# Patient Record
Sex: MALE | Race: WHITE | Employment: OTHER
[De-identification: names, ages, dates, MRNs, and addresses within clinical notes are randomized per-mention and may not be internally consistent; named-entity substitution may affect disease eponyms.]

---

## 2017-01-05 ENCOUNTER — CARE COORDINATION (OUTPATIENT)
Dept: CARE COORDINATION | Facility: CLINIC | Age: 57
End: 2017-01-05

## 2017-01-19 ENCOUNTER — CARE COORDINATION (OUTPATIENT)
Dept: CARE COORDINATION | Facility: CLINIC | Age: 57
End: 2017-01-19

## 2017-01-23 DIAGNOSIS — J69.0 ASPIRATION PNEUMONIA OF BOTH LOWER LOBES DUE TO GASTRIC SECRETIONS (HCC): ICD-10-CM

## 2017-01-23 DIAGNOSIS — I46.9 CARDIAC ARREST (HCC): ICD-10-CM

## 2017-01-26 ENCOUNTER — TELEPHONE (OUTPATIENT)
Dept: FAMILY MEDICINE CLINIC | Facility: CLINIC | Age: 57
End: 2017-01-26

## 2017-01-26 ENCOUNTER — CARE COORDINATION (OUTPATIENT)
Dept: CARE COORDINATION | Facility: CLINIC | Age: 57
End: 2017-01-26

## 2017-01-27 ENCOUNTER — TELEPHONE (OUTPATIENT)
Dept: FAMILY MEDICINE CLINIC | Facility: CLINIC | Age: 57
End: 2017-01-27

## 2017-01-27 DIAGNOSIS — M79.604 LUMBAR PAIN WITH RADIATION DOWN BOTH LEGS: ICD-10-CM

## 2017-01-27 DIAGNOSIS — M79.605 LUMBAR PAIN WITH RADIATION DOWN BOTH LEGS: ICD-10-CM

## 2017-01-27 DIAGNOSIS — M54.50 LUMBAR PAIN WITH RADIATION DOWN BOTH LEGS: ICD-10-CM

## 2017-01-27 RX ORDER — ALPRAZOLAM 1 MG/1
1 TABLET ORAL 2 TIMES DAILY PRN
Qty: 60 TABLET | Refills: 0 | Status: SHIPPED | OUTPATIENT
Start: 2017-01-27 | End: 2017-03-01 | Stop reason: SDUPTHER

## 2017-01-27 RX ORDER — CARISOPRODOL 350 MG/1
350 TABLET ORAL 2 TIMES DAILY PRN
Qty: 60 TABLET | Refills: 0 | Status: SHIPPED | OUTPATIENT
Start: 2017-01-27 | End: 2017-03-01 | Stop reason: SDUPTHER

## 2017-01-30 DIAGNOSIS — R00.2 PALPITATIONS: ICD-10-CM

## 2017-01-30 DIAGNOSIS — J69.0 ASPIRATION PNEUMONIA OF BOTH LOWER LOBES DUE TO GASTRIC SECRETIONS (HCC): ICD-10-CM

## 2017-01-30 DIAGNOSIS — M79.604 LUMBAR PAIN WITH RADIATION DOWN BOTH LEGS: ICD-10-CM

## 2017-01-30 DIAGNOSIS — M54.50 LUMBAR PAIN WITH RADIATION DOWN BOTH LEGS: ICD-10-CM

## 2017-01-30 DIAGNOSIS — R00.0 TACHYCARDIA: ICD-10-CM

## 2017-01-30 DIAGNOSIS — I46.9 CARDIAC ARREST (HCC): ICD-10-CM

## 2017-01-30 DIAGNOSIS — M79.605 LUMBAR PAIN WITH RADIATION DOWN BOTH LEGS: ICD-10-CM

## 2017-01-30 RX ORDER — MELOXICAM 15 MG/1
15 TABLET ORAL DAILY
Qty: 30 TABLET | Refills: 5 | Status: SHIPPED | OUTPATIENT
Start: 2017-01-30 | End: 2017-08-21 | Stop reason: SDUPTHER

## 2017-01-30 RX ORDER — NORTRIPTYLINE HYDROCHLORIDE 25 MG/1
25 CAPSULE ORAL NIGHTLY
Qty: 30 CAPSULE | Refills: 3 | Status: SHIPPED | OUTPATIENT
Start: 2017-01-30 | End: 2017-06-30 | Stop reason: SDUPTHER

## 2017-01-30 RX ORDER — ASPIRIN 81 MG/1
81 TABLET ORAL DAILY
Qty: 30 TABLET | Refills: 3 | Status: SHIPPED | OUTPATIENT
Start: 2017-01-30 | End: 2017-06-30 | Stop reason: SDUPTHER

## 2017-01-31 DIAGNOSIS — J44.1 COPD EXACERBATION (HCC): ICD-10-CM

## 2017-01-31 RX ORDER — UMECLIDINIUM 62.5 UG/1
AEROSOL, POWDER ORAL
Qty: 1 EACH | Refills: 11 | Status: ON HOLD | OUTPATIENT
Start: 2017-01-31 | End: 2017-11-03 | Stop reason: HOSPADM

## 2017-01-31 RX ORDER — FLUTICASONE FUROATE AND VILANTEROL TRIFENATATE 100; 25 UG/1; UG/1
POWDER RESPIRATORY (INHALATION)
Qty: 1 EACH | Refills: 11 | Status: SHIPPED | OUTPATIENT
Start: 2017-01-31 | End: 2017-09-08 | Stop reason: ALTCHOICE

## 2017-02-13 ENCOUNTER — CARE COORDINATION (OUTPATIENT)
Dept: CARE COORDINATION | Facility: CLINIC | Age: 57
End: 2017-02-13

## 2017-02-15 ENCOUNTER — HOSPITAL ENCOUNTER (OUTPATIENT)
Dept: RADIATION ONCOLOGY | Facility: MEDICAL CENTER | Age: 57
Discharge: HOME | End: 2017-02-15
Payer: MEDICARE

## 2017-02-15 PROCEDURE — 99211 OFF/OP EST MAY X REQ PHY/QHP: CPT | Performed by: RADIOLOGY

## 2017-02-23 ENCOUNTER — CARE COORDINATION (OUTPATIENT)
Dept: CARE COORDINATION | Facility: CLINIC | Age: 57
End: 2017-02-23

## 2017-03-01 DIAGNOSIS — M79.605 LUMBAR PAIN WITH RADIATION DOWN BOTH LEGS: ICD-10-CM

## 2017-03-01 DIAGNOSIS — M79.604 LUMBAR PAIN WITH RADIATION DOWN BOTH LEGS: ICD-10-CM

## 2017-03-01 DIAGNOSIS — M54.50 LUMBAR PAIN WITH RADIATION DOWN BOTH LEGS: ICD-10-CM

## 2017-03-02 RX ORDER — CARISOPRODOL 350 MG/1
350 TABLET ORAL 2 TIMES DAILY PRN
Qty: 60 TABLET | Refills: 0 | Status: SHIPPED | OUTPATIENT
Start: 2017-03-02 | End: 2017-03-28 | Stop reason: SDUPTHER

## 2017-03-02 RX ORDER — ALPRAZOLAM 1 MG/1
1 TABLET ORAL 2 TIMES DAILY PRN
Qty: 60 TABLET | Refills: 0 | Status: SHIPPED | OUTPATIENT
Start: 2017-03-02 | End: 2017-03-28 | Stop reason: SDUPTHER

## 2017-03-03 DIAGNOSIS — M79.604 LUMBAR PAIN WITH RADIATION DOWN BOTH LEGS: ICD-10-CM

## 2017-03-03 DIAGNOSIS — M54.50 LUMBAR PAIN WITH RADIATION DOWN BOTH LEGS: ICD-10-CM

## 2017-03-03 DIAGNOSIS — M79.605 LUMBAR PAIN WITH RADIATION DOWN BOTH LEGS: ICD-10-CM

## 2017-03-03 RX ORDER — ALPRAZOLAM 1 MG/1
TABLET ORAL
Qty: 60 TABLET | Refills: 0 | Status: SHIPPED | OUTPATIENT
Start: 2017-03-03 | End: 2017-03-29

## 2017-03-03 RX ORDER — CARISOPRODOL 350 MG/1
TABLET ORAL
Qty: 60 TABLET | Refills: 0 | Status: SHIPPED | OUTPATIENT
Start: 2017-03-03 | End: 2017-03-29

## 2017-03-28 DIAGNOSIS — M79.604 LUMBAR PAIN WITH RADIATION DOWN BOTH LEGS: ICD-10-CM

## 2017-03-28 DIAGNOSIS — M54.50 LUMBAR PAIN WITH RADIATION DOWN BOTH LEGS: ICD-10-CM

## 2017-03-28 DIAGNOSIS — M79.605 LUMBAR PAIN WITH RADIATION DOWN BOTH LEGS: ICD-10-CM

## 2017-03-29 ENCOUNTER — TELEPHONE (OUTPATIENT)
Dept: FAMILY MEDICINE CLINIC | Age: 57
End: 2017-03-29

## 2017-03-29 RX ORDER — CARISOPRODOL 350 MG/1
350 TABLET ORAL 2 TIMES DAILY PRN
Qty: 60 TABLET | Refills: 0 | Status: SHIPPED | OUTPATIENT
Start: 2017-03-29 | End: 2017-04-27 | Stop reason: SDUPTHER

## 2017-03-29 RX ORDER — ALPRAZOLAM 1 MG/1
1 TABLET ORAL 2 TIMES DAILY PRN
Qty: 60 TABLET | Refills: 0 | Status: SHIPPED | OUTPATIENT
Start: 2017-03-29 | End: 2017-04-27 | Stop reason: SDUPTHER

## 2017-04-06 ENCOUNTER — CARE COORDINATION (OUTPATIENT)
Dept: CARE COORDINATION | Age: 57
End: 2017-04-06

## 2017-04-11 ENCOUNTER — OFFICE VISIT (OUTPATIENT)
Dept: FAMILY MEDICINE CLINIC | Age: 57
End: 2017-04-11
Payer: MEDICARE

## 2017-04-11 VITALS
WEIGHT: 112 LBS | HEART RATE: 72 BPM | RESPIRATION RATE: 18 BRPM | SYSTOLIC BLOOD PRESSURE: 125 MMHG | DIASTOLIC BLOOD PRESSURE: 87 MMHG | HEIGHT: 65 IN | BODY MASS INDEX: 18.66 KG/M2

## 2017-04-11 DIAGNOSIS — D50.0 IRON DEFICIENCY ANEMIA DUE TO CHRONIC BLOOD LOSS: ICD-10-CM

## 2017-04-11 DIAGNOSIS — J41.0 SIMPLE CHRONIC BRONCHITIS (HCC): ICD-10-CM

## 2017-04-11 DIAGNOSIS — E46 MALNUTRITION (HCC): ICD-10-CM

## 2017-04-11 DIAGNOSIS — E87.6 HYPOKALEMIA: ICD-10-CM

## 2017-04-11 DIAGNOSIS — Z86.74 HISTORY OF CARDIAC ARREST: ICD-10-CM

## 2017-04-11 DIAGNOSIS — R53.83 FATIGUE, UNSPECIFIED TYPE: ICD-10-CM

## 2017-04-11 DIAGNOSIS — R29.898 WEAKNESS OF BOTH LEGS: Primary | ICD-10-CM

## 2017-04-11 PROCEDURE — 3023F SPIROM DOC REV: CPT | Performed by: FAMILY MEDICINE

## 2017-04-11 PROCEDURE — 4004F PT TOBACCO SCREEN RCVD TLK: CPT | Performed by: FAMILY MEDICINE

## 2017-04-11 PROCEDURE — G8926 SPIRO NO PERF OR DOC: HCPCS | Performed by: FAMILY MEDICINE

## 2017-04-11 PROCEDURE — 3017F COLORECTAL CA SCREEN DOC REV: CPT | Performed by: FAMILY MEDICINE

## 2017-04-11 PROCEDURE — G8420 CALC BMI NORM PARAMETERS: HCPCS | Performed by: FAMILY MEDICINE

## 2017-04-11 PROCEDURE — G8427 DOCREV CUR MEDS BY ELIG CLIN: HCPCS | Performed by: FAMILY MEDICINE

## 2017-04-11 PROCEDURE — 99214 OFFICE O/P EST MOD 30 MIN: CPT | Performed by: FAMILY MEDICINE

## 2017-04-12 DIAGNOSIS — R29.898 WEAKNESS OF BOTH LEGS: ICD-10-CM

## 2017-04-12 DIAGNOSIS — D50.0 IRON DEFICIENCY ANEMIA DUE TO CHRONIC BLOOD LOSS: ICD-10-CM

## 2017-04-12 DIAGNOSIS — R53.83 FATIGUE, UNSPECIFIED TYPE: ICD-10-CM

## 2017-04-12 DIAGNOSIS — E87.6 HYPOKALEMIA: ICD-10-CM

## 2017-04-12 LAB
ALBUMIN SERPL-MCNC: NORMAL G/DL
ALP BLD-CCNC: NORMAL U/L
ALT SERPL-CCNC: NORMAL U/L
AST SERPL-CCNC: NORMAL U/L
BILIRUB SERPL-MCNC: NORMAL MG/DL (ref 0.1–1.4)
BUN BLDV-MCNC: NORMAL MG/DL
CALCIUM SERPL-MCNC: NORMAL MG/DL
CHLORIDE BLD-SCNC: NORMAL MMOL/L
CO2: NORMAL MMOL/L
CREAT SERPL-MCNC: NORMAL MG/DL
FOLATE: NORMAL
GFR CALCULATED: NORMAL
GLUCOSE BLD-MCNC: NORMAL MG/DL
IRON: NORMAL
MAGNESIUM: NORMAL MG/DL
POTASSIUM SERPL-SCNC: NORMAL MMOL/L
SODIUM BLD-SCNC: NORMAL MMOL/L
T4 FREE: NORMAL
TOTAL IRON BINDING CAPACITY: NORMAL
TOTAL PROTEIN: NORMAL
TSH SERPL DL<=0.05 MIU/L-ACNC: NORMAL UIU/ML
VITAMIN B-12: NORMAL

## 2017-04-15 ENCOUNTER — HOSPITAL ENCOUNTER (OUTPATIENT)
Dept: MRI IMAGING | Age: 57
Discharge: HOME | End: 2017-04-15
Payer: MEDICARE

## 2017-04-15 DIAGNOSIS — R29.898 WEAKNESS OF BOTH LEGS: ICD-10-CM

## 2017-04-15 DIAGNOSIS — R53.83 FATIGUE, UNSPECIFIED TYPE: ICD-10-CM

## 2017-04-15 PROCEDURE — 70553 MRI BRAIN STEM W/O & W/DYE: CPT

## 2017-04-15 PROCEDURE — A9579 GAD-BASE MR CONTRAST NOS,1ML: HCPCS | Performed by: FAMILY MEDICINE

## 2017-04-15 PROCEDURE — 6360000004 HC RX CONTRAST MEDICATION: Performed by: FAMILY MEDICINE

## 2017-04-15 RX ADMIN — GADOPENTETATE DIMEGLUMINE 10 ML: 469.01 INJECTION INTRAVENOUS at 08:44

## 2017-04-27 ENCOUNTER — CARE COORDINATION (OUTPATIENT)
Dept: CARE COORDINATION | Age: 57
End: 2017-04-27

## 2017-04-27 DIAGNOSIS — M79.604 LUMBAR PAIN WITH RADIATION DOWN BOTH LEGS: ICD-10-CM

## 2017-04-27 DIAGNOSIS — M54.50 LUMBAR PAIN WITH RADIATION DOWN BOTH LEGS: ICD-10-CM

## 2017-04-27 DIAGNOSIS — M79.605 LUMBAR PAIN WITH RADIATION DOWN BOTH LEGS: ICD-10-CM

## 2017-04-28 RX ORDER — CARISOPRODOL 350 MG/1
350 TABLET ORAL 2 TIMES DAILY PRN
Qty: 60 TABLET | Refills: 0 | Status: SHIPPED | OUTPATIENT
Start: 2017-04-28 | End: 2017-05-30 | Stop reason: SDUPTHER

## 2017-04-28 RX ORDER — ALPRAZOLAM 1 MG/1
1 TABLET ORAL 2 TIMES DAILY PRN
Qty: 60 TABLET | Refills: 0 | Status: SHIPPED | OUTPATIENT
Start: 2017-04-28 | End: 2017-05-30 | Stop reason: SDUPTHER

## 2017-05-11 ENCOUNTER — CARE COORDINATION (OUTPATIENT)
Dept: CARE COORDINATION | Age: 57
End: 2017-05-11

## 2017-05-12 ENCOUNTER — CARE COORDINATION (OUTPATIENT)
Dept: CARE COORDINATION | Age: 57
End: 2017-05-12

## 2017-05-30 DIAGNOSIS — M79.605 LUMBAR PAIN WITH RADIATION DOWN BOTH LEGS: ICD-10-CM

## 2017-05-30 DIAGNOSIS — M79.604 LUMBAR PAIN WITH RADIATION DOWN BOTH LEGS: ICD-10-CM

## 2017-05-30 DIAGNOSIS — M54.50 LUMBAR PAIN WITH RADIATION DOWN BOTH LEGS: ICD-10-CM

## 2017-05-31 RX ORDER — ALPRAZOLAM 1 MG/1
1 TABLET ORAL 2 TIMES DAILY PRN
Qty: 60 TABLET | Refills: 0 | Status: SHIPPED | OUTPATIENT
Start: 2017-05-31 | End: 2017-06-29 | Stop reason: SDUPTHER

## 2017-05-31 RX ORDER — CARISOPRODOL 350 MG/1
350 TABLET ORAL 2 TIMES DAILY PRN
Qty: 60 TABLET | Refills: 0 | Status: SHIPPED | OUTPATIENT
Start: 2017-05-31 | End: 2017-06-29 | Stop reason: SDUPTHER

## 2017-06-05 ENCOUNTER — HOSPITAL ENCOUNTER (OUTPATIENT)
Dept: NEUROLOGY | Age: 57
Discharge: HOME | End: 2017-06-05
Payer: MEDICARE

## 2017-06-05 DIAGNOSIS — D50.0 IRON DEFICIENCY ANEMIA DUE TO CHRONIC BLOOD LOSS: ICD-10-CM

## 2017-06-05 DIAGNOSIS — R53.83 FATIGUE, UNSPECIFIED TYPE: ICD-10-CM

## 2017-06-05 DIAGNOSIS — E87.6 HYPOKALEMIA: ICD-10-CM

## 2017-06-05 DIAGNOSIS — R29.898 WEAKNESS OF BOTH LEGS: ICD-10-CM

## 2017-06-05 PROCEDURE — 95909 NRV CNDJ TST 5-6 STUDIES: CPT | Performed by: PHYSICAL MEDICINE & REHABILITATION

## 2017-06-05 PROCEDURE — 95886 MUSC TEST DONE W/N TEST COMP: CPT | Performed by: PHYSICAL MEDICINE & REHABILITATION

## 2017-06-06 ENCOUNTER — HOSPITAL ENCOUNTER (OUTPATIENT)
Age: 57
Setting detail: SPECIMEN
Discharge: HOME | End: 2017-06-06
Payer: MEDICARE

## 2017-06-06 ENCOUNTER — OFFICE VISIT (OUTPATIENT)
Dept: FAMILY MEDICINE CLINIC | Age: 57
End: 2017-06-06
Payer: MEDICARE

## 2017-06-06 VITALS
OXYGEN SATURATION: 98 % | WEIGHT: 111 LBS | HEIGHT: 65 IN | SYSTOLIC BLOOD PRESSURE: 137 MMHG | BODY MASS INDEX: 18.49 KG/M2 | RESPIRATION RATE: 18 BRPM | DIASTOLIC BLOOD PRESSURE: 89 MMHG | HEART RATE: 74 BPM

## 2017-06-06 DIAGNOSIS — R53.83 FATIGUE, UNSPECIFIED TYPE: ICD-10-CM

## 2017-06-06 DIAGNOSIS — M54.12 CERVICAL NEUROPATHIC PAIN: Primary | ICD-10-CM

## 2017-06-06 LAB
ABSOLUTE EOS #: 0.2 K/UL (ref 0–0.4)
ABSOLUTE LYMPH #: 1.4 K/UL (ref 1–4.8)
ABSOLUTE MONO #: 0.8 K/UL (ref 0.1–1.2)
ABSOLUTE RETIC #: 0.04 M/UL (ref 0.02–0.1)
ANION GAP SERPL CALCULATED.3IONS-SCNC: 17 MMOL/L (ref 9–17)
BASOPHILS # BLD: 1 %
BASOPHILS ABSOLUTE: 0.1 K/UL (ref 0–0.2)
CHLORIDE BLD-SCNC: 101 MMOL/L (ref 98–107)
CO2: 24 MMOL/L (ref 20–31)
DIFFERENTIAL TYPE: ABNORMAL
EOSINOPHILS RELATIVE PERCENT: 2 %
FOLATE: 5.6 NG/ML
HCT VFR BLD CALC: 43.8 % (ref 41–53)
HEMOGLOBIN: 14.5 G/DL (ref 13.5–17.5)
IRON SATURATION: 22 % (ref 20–55)
IRON: 64 UG/DL (ref 59–158)
LYMPHOCYTES # BLD: 17 %
MCH RBC QN AUTO: 31.3 PG (ref 26–34)
MCHC RBC AUTO-ENTMCNC: 33.2 G/DL (ref 31–37)
MCV RBC AUTO: 94.5 FL (ref 80–100)
MONOCYTES # BLD: 10 %
PDW BLD-RTO: 15.5 % (ref 12.5–15.4)
PLATELET # BLD: 271 K/UL (ref 140–450)
PLATELET ESTIMATE: ABNORMAL
PMV BLD AUTO: 7.6 FL (ref 6–12)
POTASSIUM SERPL-SCNC: 4.2 MMOL/L (ref 3.7–5.3)
RBC # BLD: 4.64 M/UL (ref 4.5–5.9)
RBC # BLD: ABNORMAL 10*6/UL
RETIC %: 0.8 % (ref 0.5–2)
SEG NEUTROPHILS: 70 %
SEGMENTED NEUTROPHILS ABSOLUTE COUNT: 6 K/UL (ref 1.8–7.7)
SODIUM BLD-SCNC: 142 MMOL/L (ref 135–144)
TOTAL IRON BINDING CAPACITY: 292 UG/DL (ref 250–450)
UNSATURATED IRON BINDING CAPACITY: 228 UG/DL (ref 112–347)
VITAMIN B-12: 328 PG/ML (ref 211–946)
WBC # BLD: 8.4 K/UL (ref 3.5–11)
WBC # BLD: ABNORMAL 10*3/UL

## 2017-06-06 PROCEDURE — 99214 OFFICE O/P EST MOD 30 MIN: CPT | Performed by: FAMILY MEDICINE

## 2017-06-06 PROCEDURE — 3017F COLORECTAL CA SCREEN DOC REV: CPT | Performed by: FAMILY MEDICINE

## 2017-06-06 PROCEDURE — 4004F PT TOBACCO SCREEN RCVD TLK: CPT | Performed by: FAMILY MEDICINE

## 2017-06-06 PROCEDURE — G8427 DOCREV CUR MEDS BY ELIG CLIN: HCPCS | Performed by: FAMILY MEDICINE

## 2017-06-06 PROCEDURE — G8419 CALC BMI OUT NRM PARAM NOF/U: HCPCS | Performed by: FAMILY MEDICINE

## 2017-06-06 RX ORDER — MEGESTROL ACETATE 40 MG/ML
400 SUSPENSION ORAL DAILY
Refills: 2 | COMMUNITY
Start: 2017-05-30 | End: 2018-01-15

## 2017-06-06 ASSESSMENT — PATIENT HEALTH QUESTIONNAIRE - PHQ9
SUM OF ALL RESPONSES TO PHQ9 QUESTIONS 1 & 2: 0
SUM OF ALL RESPONSES TO PHQ QUESTIONS 1-9: 0
2. FEELING DOWN, DEPRESSED OR HOPELESS: 0
1. LITTLE INTEREST OR PLEASURE IN DOING THINGS: 0

## 2017-06-15 ENCOUNTER — TELEPHONE (OUTPATIENT)
Dept: FAMILY MEDICINE CLINIC | Age: 57
End: 2017-06-15

## 2017-06-15 ENCOUNTER — CARE COORDINATION (OUTPATIENT)
Dept: CARE COORDINATION | Age: 57
End: 2017-06-15

## 2017-06-15 DIAGNOSIS — M54.12 CERVICAL NEUROPATHIC PAIN: Primary | ICD-10-CM

## 2017-06-26 ENCOUNTER — HOSPITAL ENCOUNTER (OUTPATIENT)
Dept: MRI IMAGING | Age: 57
Discharge: HOME | End: 2017-06-26
Payer: MEDICARE

## 2017-06-26 DIAGNOSIS — M54.12 CERVICAL NEUROPATHIC PAIN: ICD-10-CM

## 2017-06-26 PROCEDURE — 72156 MRI NECK SPINE W/O & W/DYE: CPT

## 2017-06-26 PROCEDURE — A9579 GAD-BASE MR CONTRAST NOS,1ML: HCPCS | Performed by: FAMILY MEDICINE

## 2017-06-26 PROCEDURE — 6360000004 HC RX CONTRAST MEDICATION: Performed by: FAMILY MEDICINE

## 2017-06-26 RX ADMIN — GADOPENTETATE DIMEGLUMINE 10 ML: 469.01 INJECTION INTRAVENOUS at 11:50

## 2017-06-28 ENCOUNTER — CARE COORDINATION (OUTPATIENT)
Dept: CARE COORDINATION | Age: 57
End: 2017-06-28

## 2017-06-28 ENCOUNTER — TELEPHONE (OUTPATIENT)
Dept: NEUROSURGERY | Age: 57
End: 2017-06-28

## 2017-06-29 DIAGNOSIS — M54.50 LUMBAR PAIN WITH RADIATION DOWN BOTH LEGS: ICD-10-CM

## 2017-06-29 DIAGNOSIS — M79.605 LUMBAR PAIN WITH RADIATION DOWN BOTH LEGS: ICD-10-CM

## 2017-06-29 DIAGNOSIS — M79.604 LUMBAR PAIN WITH RADIATION DOWN BOTH LEGS: ICD-10-CM

## 2017-06-30 DIAGNOSIS — M54.50 LUMBAR PAIN WITH RADIATION DOWN BOTH LEGS: ICD-10-CM

## 2017-06-30 DIAGNOSIS — M79.605 LUMBAR PAIN WITH RADIATION DOWN BOTH LEGS: ICD-10-CM

## 2017-06-30 DIAGNOSIS — R00.0 TACHYCARDIA: ICD-10-CM

## 2017-06-30 DIAGNOSIS — M79.604 LUMBAR PAIN WITH RADIATION DOWN BOTH LEGS: ICD-10-CM

## 2017-06-30 DIAGNOSIS — R00.2 PALPITATIONS: ICD-10-CM

## 2017-06-30 RX ORDER — ATENOLOL 50 MG/1
TABLET ORAL
Qty: 30 TABLET | Refills: 5 | Status: SHIPPED | OUTPATIENT
Start: 2017-06-30 | End: 2018-01-15

## 2017-06-30 RX ORDER — ALPRAZOLAM 1 MG/1
TABLET ORAL
Qty: 60 TABLET | Refills: 0 | Status: SHIPPED | OUTPATIENT
Start: 2017-06-30 | End: 2017-07-31 | Stop reason: SDUPTHER

## 2017-06-30 RX ORDER — CARISOPRODOL 350 MG/1
TABLET ORAL
Qty: 60 TABLET | Refills: 0 | Status: SHIPPED | OUTPATIENT
Start: 2017-06-30 | End: 2017-07-31 | Stop reason: SDUPTHER

## 2017-06-30 RX ORDER — NORTRIPTYLINE HYDROCHLORIDE 25 MG/1
CAPSULE ORAL
Qty: 30 CAPSULE | Refills: 3 | Status: ON HOLD | OUTPATIENT
Start: 2017-06-30 | End: 2017-10-31 | Stop reason: ALTCHOICE

## 2017-06-30 RX ORDER — ASPIRIN 81 MG/1
TABLET ORAL
Qty: 30 TABLET | Refills: 3 | Status: SHIPPED | OUTPATIENT
Start: 2017-06-30 | End: 2017-10-11 | Stop reason: SDUPTHER

## 2017-07-06 ENCOUNTER — HOSPITAL ENCOUNTER (OUTPATIENT)
Facility: CLINIC | Age: 57
Discharge: HOME | End: 2017-07-06
Payer: MEDICARE

## 2017-07-06 ENCOUNTER — HOSPITAL ENCOUNTER (OUTPATIENT)
Dept: GENERAL RADIOLOGY | Facility: CLINIC | Age: 57
Discharge: HOME | End: 2017-07-06
Payer: MEDICARE

## 2017-07-06 ENCOUNTER — INITIAL CONSULT (OUTPATIENT)
Dept: NEUROSURGERY | Age: 57
End: 2017-07-06
Payer: MEDICARE

## 2017-07-06 VITALS
SYSTOLIC BLOOD PRESSURE: 102 MMHG | HEIGHT: 65 IN | WEIGHT: 120 LBS | BODY MASS INDEX: 19.99 KG/M2 | DIASTOLIC BLOOD PRESSURE: 70 MMHG

## 2017-07-06 DIAGNOSIS — G89.29 CHRONIC NECK PAIN: ICD-10-CM

## 2017-07-06 DIAGNOSIS — M54.2 CHRONIC NECK PAIN: ICD-10-CM

## 2017-07-06 DIAGNOSIS — G89.29 CHRONIC NECK PAIN: Primary | ICD-10-CM

## 2017-07-06 DIAGNOSIS — M54.2 CHRONIC NECK PAIN: Primary | ICD-10-CM

## 2017-07-06 PROCEDURE — 99203 OFFICE O/P NEW LOW 30 MIN: CPT | Performed by: NEUROLOGICAL SURGERY

## 2017-07-06 PROCEDURE — 3017F COLORECTAL CA SCREEN DOC REV: CPT | Performed by: NEUROLOGICAL SURGERY

## 2017-07-06 PROCEDURE — G8427 DOCREV CUR MEDS BY ELIG CLIN: HCPCS | Performed by: NEUROLOGICAL SURGERY

## 2017-07-06 PROCEDURE — 4004F PT TOBACCO SCREEN RCVD TLK: CPT | Performed by: NEUROLOGICAL SURGERY

## 2017-07-06 PROCEDURE — 72040 X-RAY EXAM NECK SPINE 2-3 VW: CPT

## 2017-07-06 PROCEDURE — G8420 CALC BMI NORM PARAMETERS: HCPCS | Performed by: NEUROLOGICAL SURGERY

## 2017-07-07 ASSESSMENT — VISUAL ACUITY: VA_NORMAL: 1

## 2017-07-18 ENCOUNTER — OFFICE VISIT (OUTPATIENT)
Dept: FAMILY MEDICINE CLINIC | Age: 57
End: 2017-07-18
Payer: MEDICARE

## 2017-07-18 VITALS
HEART RATE: 92 BPM | SYSTOLIC BLOOD PRESSURE: 130 MMHG | RESPIRATION RATE: 16 BRPM | HEIGHT: 65 IN | DIASTOLIC BLOOD PRESSURE: 86 MMHG | WEIGHT: 118 LBS | BODY MASS INDEX: 19.66 KG/M2

## 2017-07-18 DIAGNOSIS — M48.02 CERVICAL STENOSIS OF SPINE: Primary | ICD-10-CM

## 2017-07-18 PROCEDURE — 3017F COLORECTAL CA SCREEN DOC REV: CPT | Performed by: FAMILY MEDICINE

## 2017-07-18 PROCEDURE — G8427 DOCREV CUR MEDS BY ELIG CLIN: HCPCS | Performed by: FAMILY MEDICINE

## 2017-07-18 PROCEDURE — G8420 CALC BMI NORM PARAMETERS: HCPCS | Performed by: FAMILY MEDICINE

## 2017-07-18 PROCEDURE — 4004F PT TOBACCO SCREEN RCVD TLK: CPT | Performed by: FAMILY MEDICINE

## 2017-07-18 PROCEDURE — 99213 OFFICE O/P EST LOW 20 MIN: CPT | Performed by: FAMILY MEDICINE

## 2017-07-26 ENCOUNTER — CARE COORDINATION (OUTPATIENT)
Dept: CARE COORDINATION | Age: 57
End: 2017-07-26

## 2017-07-31 DIAGNOSIS — M79.605 LUMBAR PAIN WITH RADIATION DOWN BOTH LEGS: ICD-10-CM

## 2017-07-31 DIAGNOSIS — M79.604 LUMBAR PAIN WITH RADIATION DOWN BOTH LEGS: ICD-10-CM

## 2017-07-31 DIAGNOSIS — M54.50 LUMBAR PAIN WITH RADIATION DOWN BOTH LEGS: ICD-10-CM

## 2017-07-31 RX ORDER — CARISOPRODOL 350 MG/1
TABLET ORAL
Qty: 60 TABLET | Refills: 0 | Status: SHIPPED | OUTPATIENT
Start: 2017-07-31 | End: 2017-08-29 | Stop reason: SDUPTHER

## 2017-07-31 RX ORDER — ALPRAZOLAM 1 MG/1
TABLET ORAL
Qty: 60 TABLET | Refills: 0 | Status: SHIPPED | OUTPATIENT
Start: 2017-07-31 | End: 2017-08-29 | Stop reason: SDUPTHER

## 2017-08-15 ENCOUNTER — INITIAL CONSULT (OUTPATIENT)
Dept: PAIN MANAGEMENT | Age: 57
End: 2017-08-15
Payer: MEDICARE

## 2017-08-15 VITALS
RESPIRATION RATE: 16 BRPM | HEART RATE: 78 BPM | HEIGHT: 65 IN | BODY MASS INDEX: 20.03 KG/M2 | WEIGHT: 120.2 LBS | SYSTOLIC BLOOD PRESSURE: 121 MMHG | OXYGEN SATURATION: 98 % | TEMPERATURE: 98.1 F | DIASTOLIC BLOOD PRESSURE: 83 MMHG

## 2017-08-15 DIAGNOSIS — M47.26 OSTEOARTHRITIS OF SPINE WITH RADICULOPATHY, LUMBAR REGION: ICD-10-CM

## 2017-08-15 DIAGNOSIS — F10.10 ALCOHOL ABUSE: ICD-10-CM

## 2017-08-15 DIAGNOSIS — M96.1 FAILED NECK SYNDROME: Primary | ICD-10-CM

## 2017-08-15 DIAGNOSIS — Z79.899 CHRONIC PRESCRIPTION BENZODIAZEPINE USE: ICD-10-CM

## 2017-08-15 DIAGNOSIS — M54.12 CERVICAL RADICULOPATHY: ICD-10-CM

## 2017-08-15 PROCEDURE — 99205 OFFICE O/P NEW HI 60 MIN: CPT | Performed by: ANESTHESIOLOGY

## 2017-08-15 PROCEDURE — G8420 CALC BMI NORM PARAMETERS: HCPCS | Performed by: ANESTHESIOLOGY

## 2017-08-15 PROCEDURE — G8427 DOCREV CUR MEDS BY ELIG CLIN: HCPCS | Performed by: ANESTHESIOLOGY

## 2017-08-15 PROCEDURE — 4004F PT TOBACCO SCREEN RCVD TLK: CPT | Performed by: ANESTHESIOLOGY

## 2017-08-15 PROCEDURE — 3017F COLORECTAL CA SCREEN DOC REV: CPT | Performed by: ANESTHESIOLOGY

## 2017-08-15 ASSESSMENT — ENCOUNTER SYMPTOMS
SHORTNESS OF BREATH: 1
COUGH: 1
EYES NEGATIVE: 1
GASTROINTESTINAL NEGATIVE: 1
BACK PAIN: 1
WHEEZING: 1

## 2017-08-16 ENCOUNTER — CARE COORDINATION (OUTPATIENT)
Dept: CARE COORDINATION | Age: 57
End: 2017-08-16

## 2017-08-21 DIAGNOSIS — I46.9 CARDIAC ARREST (HCC): ICD-10-CM

## 2017-08-21 DIAGNOSIS — J69.0 ASPIRATION PNEUMONIA OF BOTH LOWER LOBES DUE TO GASTRIC SECRETIONS (HCC): ICD-10-CM

## 2017-08-22 ENCOUNTER — NURSE ONLY (OUTPATIENT)
Dept: FAMILY MEDICINE CLINIC | Age: 57
End: 2017-08-22
Payer: MEDICARE

## 2017-08-22 DIAGNOSIS — Z23 NEED FOR INFLUENZA VACCINATION: Primary | ICD-10-CM

## 2017-08-22 PROCEDURE — G0008 ADMIN INFLUENZA VIRUS VAC: HCPCS | Performed by: FAMILY MEDICINE

## 2017-08-22 PROCEDURE — 90662 IIV NO PRSV INCREASED AG IM: CPT | Performed by: FAMILY MEDICINE

## 2017-08-22 RX ORDER — MELOXICAM 15 MG/1
TABLET ORAL
Qty: 30 TABLET | Refills: 5 | Status: ON HOLD | OUTPATIENT
Start: 2017-08-22 | End: 2017-11-03 | Stop reason: HOSPADM

## 2017-08-29 DIAGNOSIS — M79.604 LUMBAR PAIN WITH RADIATION DOWN BOTH LEGS: ICD-10-CM

## 2017-08-29 DIAGNOSIS — M79.605 LUMBAR PAIN WITH RADIATION DOWN BOTH LEGS: ICD-10-CM

## 2017-08-29 DIAGNOSIS — M54.50 LUMBAR PAIN WITH RADIATION DOWN BOTH LEGS: ICD-10-CM

## 2017-08-29 RX ORDER — CARISOPRODOL 350 MG/1
TABLET ORAL
Qty: 60 TABLET | Refills: 0 | Status: SHIPPED | OUTPATIENT
Start: 2017-08-29 | End: 2017-09-30 | Stop reason: SDUPTHER

## 2017-08-29 RX ORDER — ALPRAZOLAM 1 MG/1
TABLET ORAL
Qty: 60 TABLET | Refills: 0 | Status: SHIPPED | OUTPATIENT
Start: 2017-08-29 | End: 2017-09-30 | Stop reason: SDUPTHER

## 2017-09-07 ENCOUNTER — HOSPITAL ENCOUNTER (OUTPATIENT)
Dept: NEUROLOGY | Age: 57
Discharge: HOME | End: 2017-09-07
Payer: MEDICARE

## 2017-09-07 PROCEDURE — 95910 NRV CNDJ TEST 7-8 STUDIES: CPT | Performed by: PHYSICAL MEDICINE & REHABILITATION

## 2017-09-07 PROCEDURE — 95886 MUSC TEST DONE W/N TEST COMP: CPT | Performed by: PHYSICAL MEDICINE & REHABILITATION

## 2017-09-08 ENCOUNTER — CARE COORDINATION (OUTPATIENT)
Dept: CARE COORDINATION | Age: 57
End: 2017-09-08

## 2017-09-08 RX ORDER — FLUTICASONE FUROATE AND VILANTEROL 200; 25 UG/1; UG/1
1 POWDER RESPIRATORY (INHALATION) DAILY
Status: ON HOLD | COMMUNITY
End: 2017-11-10 | Stop reason: HOSPADM

## 2017-09-14 DIAGNOSIS — G89.29 CHRONIC NECK PAIN: ICD-10-CM

## 2017-09-14 DIAGNOSIS — M54.2 CHRONIC NECK PAIN: ICD-10-CM

## 2017-09-19 ENCOUNTER — OFFICE VISIT (OUTPATIENT)
Dept: NEUROSURGERY | Age: 57
End: 2017-09-19
Payer: MEDICARE

## 2017-09-19 VITALS
DIASTOLIC BLOOD PRESSURE: 81 MMHG | BODY MASS INDEX: 21.49 KG/M2 | HEART RATE: 108 BPM | WEIGHT: 129 LBS | SYSTOLIC BLOOD PRESSURE: 125 MMHG | HEIGHT: 65 IN

## 2017-09-19 DIAGNOSIS — G89.29 CHRONIC NECK PAIN: Primary | ICD-10-CM

## 2017-09-19 DIAGNOSIS — M54.2 CHRONIC NECK PAIN: Primary | ICD-10-CM

## 2017-09-19 PROCEDURE — 4004F PT TOBACCO SCREEN RCVD TLK: CPT | Performed by: NEUROLOGICAL SURGERY

## 2017-09-19 PROCEDURE — G8427 DOCREV CUR MEDS BY ELIG CLIN: HCPCS | Performed by: NEUROLOGICAL SURGERY

## 2017-09-19 PROCEDURE — 3017F COLORECTAL CA SCREEN DOC REV: CPT | Performed by: NEUROLOGICAL SURGERY

## 2017-09-19 PROCEDURE — 99213 OFFICE O/P EST LOW 20 MIN: CPT | Performed by: NEUROLOGICAL SURGERY

## 2017-09-19 PROCEDURE — G8420 CALC BMI NORM PARAMETERS: HCPCS | Performed by: NEUROLOGICAL SURGERY

## 2017-09-19 ASSESSMENT — VISUAL ACUITY: VA_NORMAL: 1

## 2017-09-22 ENCOUNTER — CARE COORDINATION (OUTPATIENT)
Dept: CARE COORDINATION | Age: 57
End: 2017-09-22

## 2017-09-26 ENCOUNTER — HOSPITAL ENCOUNTER (OUTPATIENT)
Dept: CT IMAGING | Age: 57
Discharge: HOME | End: 2017-09-26
Payer: MEDICARE

## 2017-09-26 ENCOUNTER — HOSPITAL ENCOUNTER (OUTPATIENT)
Dept: GENERAL RADIOLOGY | Age: 57
Discharge: HOME | End: 2017-09-26
Payer: MEDICARE

## 2017-09-26 VITALS
RESPIRATION RATE: 20 BRPM | BODY MASS INDEX: 21.66 KG/M2 | WEIGHT: 130 LBS | OXYGEN SATURATION: 97 % | HEIGHT: 65 IN | HEART RATE: 70 BPM | TEMPERATURE: 96.9 F | SYSTOLIC BLOOD PRESSURE: 112 MMHG | DIASTOLIC BLOOD PRESSURE: 78 MMHG

## 2017-09-26 DIAGNOSIS — C34.32 MALIGNANT NEOPLASM OF LOWER LOBE OF LEFT LUNG (HCC): ICD-10-CM

## 2017-09-26 DIAGNOSIS — J95.811 PNEUMOTHORAX OF RIGHT LUNG AFTER BIOPSY: ICD-10-CM

## 2017-09-26 DIAGNOSIS — C34.30 MALIGNANT NEOPLASM OF LOWER LOBE OF LUNG, UNSPECIFIED LATERALITY (HCC): ICD-10-CM

## 2017-09-26 DIAGNOSIS — C78.1 SECONDARY MALIGNANT NEOPLASM OF MEDIASTINUM (HCC): ICD-10-CM

## 2017-09-26 LAB
INR BLD: 0.9
PARTIAL THROMBOPLASTIN TIME: 25.8 SEC (ref 23–31)
PLATELET # BLD: 369 K/UL (ref 150–450)
PROTHROMBIN TIME: 10 SEC (ref 9.7–12)

## 2017-09-26 PROCEDURE — 7100000031 HC ASPR PHASE II RECOVERY - ADDTL 15 MIN

## 2017-09-26 PROCEDURE — 6360000002 HC RX W HCPCS: Performed by: RADIOLOGY

## 2017-09-26 PROCEDURE — 7100000030 HC ASPR PHASE II RECOVERY - FIRST 15 MIN

## 2017-09-26 PROCEDURE — 36415 COLL VENOUS BLD VENIPUNCTURE: CPT

## 2017-09-26 PROCEDURE — 2500000003 HC RX 250 WO HCPCS: Performed by: RADIOLOGY

## 2017-09-26 PROCEDURE — 85610 PROTHROMBIN TIME: CPT

## 2017-09-26 PROCEDURE — 94760 N-INVAS EAR/PLS OXIMETRY 1: CPT

## 2017-09-26 PROCEDURE — 88342 IMHCHEM/IMCYTCHM 1ST ANTB: CPT

## 2017-09-26 PROCEDURE — 85730 THROMBOPLASTIN TIME PARTIAL: CPT

## 2017-09-26 PROCEDURE — 88333 PATH CONSLTJ SURG CYTO XM 1: CPT

## 2017-09-26 PROCEDURE — 71010 XR CHEST PORTABLE: CPT

## 2017-09-26 PROCEDURE — 88307 TISSUE EXAM BY PATHOLOGIST: CPT

## 2017-09-26 PROCEDURE — 85049 AUTOMATED PLATELET COUNT: CPT

## 2017-09-26 PROCEDURE — 2580000003 HC RX 258: Performed by: RADIOLOGY

## 2017-09-26 PROCEDURE — 32405 CT NEEDLE BIOPSY LUNG PERCUTANEOUS: CPT

## 2017-09-26 RX ORDER — LIDOCAINE HYDROCHLORIDE 20 MG/ML
INJECTION, SOLUTION INFILTRATION; PERINEURAL
Status: COMPLETED | OUTPATIENT
Start: 2017-09-26 | End: 2017-09-26

## 2017-09-26 RX ORDER — SODIUM CHLORIDE 9 MG/ML
INJECTION, SOLUTION INTRAVENOUS CONTINUOUS
Status: DISCONTINUED | OUTPATIENT
Start: 2017-09-26 | End: 2017-09-29 | Stop reason: HOSPADM

## 2017-09-26 RX ORDER — SODIUM CHLORIDE 0.9 % (FLUSH) 0.9 %
10 SYRINGE (ML) INJECTION PRN
Status: DISCONTINUED | OUTPATIENT
Start: 2017-09-26 | End: 2017-09-29 | Stop reason: HOSPADM

## 2017-09-26 RX ORDER — FENTANYL CITRATE 50 UG/ML
INJECTION, SOLUTION INTRAMUSCULAR; INTRAVENOUS
Status: COMPLETED | OUTPATIENT
Start: 2017-09-26 | End: 2017-09-26

## 2017-09-26 RX ORDER — MIDAZOLAM HYDROCHLORIDE 1 MG/ML
INJECTION INTRAMUSCULAR; INTRAVENOUS
Status: COMPLETED | OUTPATIENT
Start: 2017-09-26 | End: 2017-09-26

## 2017-09-26 RX ADMIN — SODIUM CHLORIDE: 9 INJECTION, SOLUTION INTRAVENOUS at 09:15

## 2017-09-26 RX ADMIN — FENTANYL CITRATE 50 MCG: 50 INJECTION INTRAMUSCULAR; INTRAVENOUS at 09:40

## 2017-09-26 RX ADMIN — LIDOCAINE HYDROCHLORIDE 5 ML: 20 INJECTION, SOLUTION INFILTRATION; PERINEURAL at 09:44

## 2017-09-26 RX ADMIN — Medication 10 ML: at 08:20

## 2017-09-26 RX ADMIN — MIDAZOLAM 1 MG: 1 INJECTION INTRAMUSCULAR; INTRAVENOUS at 09:41

## 2017-09-26 RX ADMIN — FENTANYL CITRATE 50 MCG: 50 INJECTION INTRAMUSCULAR; INTRAVENOUS at 09:52

## 2017-09-26 ASSESSMENT — PAIN SCALES - GENERAL
PAINLEVEL_OUTOF10: 0

## 2017-09-28 LAB — SURGICAL PATHOLOGY REPORT: NORMAL

## 2017-09-30 DIAGNOSIS — M79.605 LUMBAR PAIN WITH RADIATION DOWN BOTH LEGS: ICD-10-CM

## 2017-09-30 DIAGNOSIS — M54.50 LUMBAR PAIN WITH RADIATION DOWN BOTH LEGS: ICD-10-CM

## 2017-09-30 DIAGNOSIS — M79.604 LUMBAR PAIN WITH RADIATION DOWN BOTH LEGS: ICD-10-CM

## 2017-10-03 RX ORDER — ALPRAZOLAM 1 MG/1
TABLET ORAL
Qty: 60 TABLET | Refills: 0 | Status: ON HOLD | OUTPATIENT
Start: 2017-10-03 | End: 2017-11-10 | Stop reason: HOSPADM

## 2017-10-03 RX ORDER — CARISOPRODOL 350 MG/1
TABLET ORAL
Qty: 60 TABLET | Refills: 0 | Status: SHIPPED | OUTPATIENT
Start: 2017-10-03 | End: 2017-12-04 | Stop reason: SDUPTHER

## 2017-10-05 ENCOUNTER — HOSPITAL ENCOUNTER (OUTPATIENT)
Dept: RADIATION ONCOLOGY | Facility: MEDICAL CENTER | Age: 57
Discharge: HOME | End: 2017-10-05
Payer: MEDICARE

## 2017-10-13 ENCOUNTER — CARE COORDINATION (OUTPATIENT)
Dept: CARE COORDINATION | Age: 57
End: 2017-10-13

## 2017-10-14 ENCOUNTER — HOSPITAL ENCOUNTER (EMERGENCY)
Age: 57
Discharge: HOME | End: 2017-10-14
Attending: EMERGENCY MEDICINE
Payer: MEDICARE

## 2017-10-14 ENCOUNTER — APPOINTMENT (OUTPATIENT)
Dept: CT IMAGING | Age: 57
End: 2017-10-14
Payer: MEDICARE

## 2017-10-14 VITALS
TEMPERATURE: 97.5 F | WEIGHT: 130 LBS | HEIGHT: 65 IN | HEART RATE: 98 BPM | BODY MASS INDEX: 21.66 KG/M2 | OXYGEN SATURATION: 97 % | DIASTOLIC BLOOD PRESSURE: 75 MMHG | RESPIRATION RATE: 16 BRPM | SYSTOLIC BLOOD PRESSURE: 108 MMHG

## 2017-10-14 DIAGNOSIS — R10.9 LEFT FLANK PAIN: Primary | ICD-10-CM

## 2017-10-14 LAB
-: ABNORMAL
AMORPHOUS: ABNORMAL
BACTERIA: ABNORMAL
BILIRUBIN URINE: NEGATIVE
CASTS UA: ABNORMAL /LPF
COLOR: YELLOW
COMMENT UA: ABNORMAL
CRYSTALS, UA: ABNORMAL /HPF
EPITHELIAL CELLS UA: ABNORMAL /HPF
GLUCOSE URINE: NEGATIVE
KETONES, URINE: NEGATIVE
LEUKOCYTE ESTERASE, URINE: NEGATIVE
MUCUS: ABNORMAL
NITRITE, URINE: NEGATIVE
OTHER OBSERVATIONS UA: ABNORMAL
PH UA: 5.5 (ref 5–8)
PROTEIN UA: NEGATIVE
RBC UA: ABNORMAL /HPF (ref 0–2)
RENAL EPITHELIAL, UA: ABNORMAL /HPF
SPECIFIC GRAVITY UA: 1.01 (ref 1–1.03)
TRICHOMONAS: ABNORMAL
TURBIDITY: CLEAR
URINE HGB: NEGATIVE
UROBILINOGEN, URINE: NORMAL
WBC UA: ABNORMAL /HPF (ref 0–5)
YEAST: ABNORMAL

## 2017-10-14 PROCEDURE — 96374 THER/PROPH/DIAG INJ IV PUSH: CPT

## 2017-10-14 PROCEDURE — 2580000003 HC RX 258: Performed by: EMERGENCY MEDICINE

## 2017-10-14 PROCEDURE — 74176 CT ABD & PELVIS W/O CONTRAST: CPT

## 2017-10-14 PROCEDURE — 99284 EMERGENCY DEPT VISIT MOD MDM: CPT

## 2017-10-14 PROCEDURE — 6360000002 HC RX W HCPCS: Performed by: EMERGENCY MEDICINE

## 2017-10-14 PROCEDURE — 96360 HYDRATION IV INFUSION INIT: CPT

## 2017-10-14 PROCEDURE — 96375 TX/PRO/DX INJ NEW DRUG ADDON: CPT

## 2017-10-14 PROCEDURE — 96361 HYDRATE IV INFUSION ADD-ON: CPT

## 2017-10-14 PROCEDURE — 81001 URINALYSIS AUTO W/SCOPE: CPT

## 2017-10-14 RX ORDER — KETOROLAC TROMETHAMINE 15 MG/ML
15 INJECTION, SOLUTION INTRAMUSCULAR; INTRAVENOUS ONCE
Status: COMPLETED | OUTPATIENT
Start: 2017-10-14 | End: 2017-10-14

## 2017-10-14 RX ORDER — 0.9 % SODIUM CHLORIDE 0.9 %
1000 INTRAVENOUS SOLUTION INTRAVENOUS ONCE
Status: COMPLETED | OUTPATIENT
Start: 2017-10-14 | End: 2017-10-14

## 2017-10-14 RX ORDER — MORPHINE SULFATE 10 MG/ML
6 INJECTION, SOLUTION INTRAMUSCULAR; INTRAVENOUS ONCE
Status: COMPLETED | OUTPATIENT
Start: 2017-10-14 | End: 2017-10-14

## 2017-10-14 RX ADMIN — SODIUM CHLORIDE 1000 ML: 9 INJECTION, SOLUTION INTRAVENOUS at 16:31

## 2017-10-14 RX ADMIN — MORPHINE SULFATE 6 MG: 10 INJECTION, SOLUTION INTRAMUSCULAR; INTRAVENOUS at 16:31

## 2017-10-14 RX ADMIN — KETOROLAC TROMETHAMINE 15 MG: 15 INJECTION, SOLUTION INTRAMUSCULAR; INTRAVENOUS at 16:31

## 2017-10-14 ASSESSMENT — PAIN SCALES - GENERAL: PAINLEVEL_OUTOF10: 10

## 2017-10-14 NOTE — ED NOTES
Pt to room by wheelchair. Pt c/o left flank pain that started suddenly today. Pt denies injury. Pt states he tried taking a vicoden that he has for lung cancer pain without relief. Pt A&O x3, skin warm and dry, respirations equal and unlabored bilat, tenderness to left flank.      Nadia Salgado RN  10/14/17 \[facility]\"" 32, [address] [facility]  10/14/17 9751

## 2017-10-16 ENCOUNTER — CARE COORDINATION (OUTPATIENT)
Dept: CARE COORDINATION | Age: 57
End: 2017-10-16

## 2017-10-16 NOTE — CARE COORDINATION
Attempted to reach patient for ed follow up, detailed message left with instructions to return call to care coordination at [phone]
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
## 2017-10-20 ENCOUNTER — CARE COORDINATION (OUTPATIENT)
Dept: CARE COORDINATION | Age: 57
End: 2017-10-20

## 2017-10-20 NOTE — CARE COORDINATION
Ambulatory Care Coordination Note  10/20/2017  CM Risk Score: 9  Samuel Mortality Risk Score:      ACC: Miki Pena, RN    Summary Note: he did start chemo yesterday he is going back on Wednesday he will be getting 4 treatments. He will make his follow up visit with his pcp for after his chemo treatments. He said yesterday chemo wiped him out. He said his breathing is his baseline. He also said he will find out what pain mgt doctor he was referred to. Care Coordination Interventions    Program Enrollment:  Complex Care  Referral from Primary Care Provider:  No  Suggested Interventions and Community Resources  Medi Set or Pill Pack:  Completed  Smoking Cessation:  Declined         Goals Addressed             Most Recent     Care Coordination Self Management   On track (10/20/2017)             CC Self Management Goal  Patient Goal (What steps will patient take to achieve goal?): go to pain mgt   Patient is able to discuss self-management of condition(s): htn pain   Pt demonstrates adherence to medications  Pt demonstrates understanding of self-monitoring  Patient is able to identify Red Flags:  Alert to potential adverse drug reactions(s) or side effects and actions to take should they arise  Discuss target symptoms and actions to take should they arise  Identify problems that require immediate PCP or specialist visit  Patient demonstrates understanding of access to PCP/Specialist:  Understands about scheduling routine Follow Up appointments   Understands about sick day appointment options for worsening of symptoms/progression (Same Day, E Visits)            Prior to Admission medications    Medication Sig Start Date End Date Taking?  Authorizing Provider   ASPIR-LOW 81 MG EC tablet Take 1 tablet by mouth daily 10/12/17   Gin Patel, DO   ALPRAZolam (XANAX) 1 MG tablet TAKE 1 TABLET BY MOUTH 2 TIMES DAILY AS NEEDED FOR SLEEP 10/3/17   Gin Patel, DO   carisoprodol (SOMA) 350 MG tablet TAKE 1 TABLET BY

## 2017-10-30 ENCOUNTER — HOSPITAL ENCOUNTER (INPATIENT)
Age: 57
LOS: 4 days | Discharge: HOME HEALTH | DRG: 377 | End: 2017-11-03
Attending: EMERGENCY MEDICINE | Admitting: INTERNAL MEDICINE
Payer: MEDICARE

## 2017-10-30 ENCOUNTER — OFFICE VISIT (OUTPATIENT)
Dept: FAMILY MEDICINE CLINIC | Age: 57
End: 2017-10-30
Payer: MEDICARE

## 2017-10-30 VITALS
HEIGHT: 65 IN | WEIGHT: 121 LBS | DIASTOLIC BLOOD PRESSURE: 68 MMHG | HEART RATE: 53 BPM | SYSTOLIC BLOOD PRESSURE: 97 MMHG | BODY MASS INDEX: 20.16 KG/M2 | RESPIRATION RATE: 16 BRPM | OXYGEN SATURATION: 99 % | TEMPERATURE: 97 F

## 2017-10-30 DIAGNOSIS — E87.6 HYPOKALEMIA: ICD-10-CM

## 2017-10-30 DIAGNOSIS — D69.6 THROMBOCYTOPENIA (HCC): ICD-10-CM

## 2017-10-30 DIAGNOSIS — I46.9 CARDIAC ARREST (HCC): ICD-10-CM

## 2017-10-30 DIAGNOSIS — J44.9 CHRONIC OBSTRUCTIVE PULMONARY DISEASE, UNSPECIFIED COPD TYPE (HCC): ICD-10-CM

## 2017-10-30 DIAGNOSIS — K92.2 UPPER GI BLEED: Primary | ICD-10-CM

## 2017-10-30 DIAGNOSIS — K92.2 GASTROINTESTINAL HEMORRHAGE, UNSPECIFIED GASTROINTESTINAL HEMORRHAGE TYPE: Primary | ICD-10-CM

## 2017-10-30 DIAGNOSIS — J69.0 ASPIRATION PNEUMONIA OF BOTH LOWER LOBES DUE TO GASTRIC SECRETIONS (HCC): ICD-10-CM

## 2017-10-30 LAB
ABO/RH: NORMAL
ABSOLUTE EOS #: 0.01 K/UL (ref 0–0.4)
ABSOLUTE IMMATURE GRANULOCYTE: ABNORMAL K/UL (ref 0–0.3)
ABSOLUTE LYMPH #: 0.5 K/UL (ref 1–4.8)
ABSOLUTE MONO #: 0.01 K/UL (ref 0.2–0.8)
ALBUMIN SERPL-MCNC: 4.3 G/DL (ref 3.5–5.2)
ALBUMIN/GLOBULIN RATIO: ABNORMAL (ref 1–2.5)
ALP BLD-CCNC: 63 U/L (ref 40–129)
ALT SERPL-CCNC: 49 U/L (ref 5–41)
ANION GAP SERPL CALCULATED.3IONS-SCNC: 17 MMOL/L
ANTIBODY SCREEN: NEGATIVE
ARM BAND NUMBER: NORMAL
AST SERPL-CCNC: 28 U/L
BASOPHILS # BLD: 2 %
BASOPHILS ABSOLUTE: 0.02 K/UL (ref 0–0.2)
BILIRUB SERPL-MCNC: 0.61 MG/DL (ref 0.3–1.2)
BUN BLDV-MCNC: 21 MG/DL (ref 6–20)
BUN/CREAT BLD: 24 (ref 9–20)
CALCIUM SERPL-MCNC: 9 MG/DL (ref 8.6–10.4)
CHLORIDE BLD-SCNC: 95 MMOL/L (ref 98–107)
CO2: 24 MMOL/L (ref 20–31)
CREAT SERPL-MCNC: 0.87 MG/DL (ref 0.7–1.2)
DATE, STOOL #1: ABNORMAL
DATE, STOOL #2: ABNORMAL
DATE, STOOL #3: ABNORMAL
DIFFERENTIAL TYPE: ABNORMAL
EOSINOPHILS RELATIVE PERCENT: 1 %
EXPIRATION DATE: NORMAL
GFR AFRICAN AMERICAN: >60 ML/MIN
GFR NON-AFRICAN AMERICAN: >60 ML/MIN
GFR SERPL CREATININE-BSD FRML MDRD: ABNORMAL ML/MIN/{1.73_M2}
GFR SERPL CREATININE-BSD FRML MDRD: ABNORMAL ML/MIN/{1.73_M2}
GLUCOSE BLD-MCNC: 119 MG/DL (ref 70–99)
HCT VFR BLD CALC: 41 % (ref 41–53)
HEMOCCULT SP1 STL QL: POSITIVE
HEMOCCULT SP2 STL QL: ABNORMAL
HEMOCCULT SP3 STL QL: ABNORMAL
HEMOGLOBIN: 13.9 G/DL (ref 13.5–17.5)
IMMATURE GRANULOCYTES: ABNORMAL %
INR BLD: 0.9
LYMPHOCYTES # BLD: 63 %
MCH RBC QN AUTO: 32.6 PG (ref 26–34)
MCHC RBC AUTO-ENTMCNC: 33.9 G/DL (ref 31–37)
MCV RBC AUTO: 96.2 FL (ref 80–100)
MONOCYTES # BLD: 1 %
PARTIAL THROMBOPLASTIN TIME: 25.1 SEC (ref 23–31)
PDW BLD-RTO: 12.9 % (ref 11.5–14.5)
PLATELET # BLD: 27 K/UL (ref 130–400)
PLATELET ESTIMATE: ABNORMAL
PMV BLD AUTO: ABNORMAL FL (ref 6–12)
POTASSIUM SERPL-SCNC: 4.1 MMOL/L (ref 3.7–5.3)
PROTHROMBIN TIME: 9.7 SEC (ref 9.7–11.6)
RBC # BLD: 4.26 M/UL (ref 4.5–5.9)
RBC # BLD: ABNORMAL 10*6/UL
SEG NEUTROPHILS: 33 %
SEGMENTED NEUTROPHILS ABSOLUTE COUNT: 0.26 K/UL (ref 1.8–7.7)
SODIUM BLD-SCNC: 136 MMOL/L (ref 135–144)
TIME, STOOL #1: 1450
TIME, STOOL #2: ABNORMAL
TIME, STOOL #3: ABNORMAL
TOTAL PROTEIN: 7.1 G/DL (ref 6.4–8.3)
WBC # BLD: 0.8 K/UL (ref 3.5–11)
WBC # BLD: ABNORMAL 10*3/UL

## 2017-10-30 PROCEDURE — 85610 PROTHROMBIN TIME: CPT

## 2017-10-30 PROCEDURE — G8427 DOCREV CUR MEDS BY ELIG CLIN: HCPCS | Performed by: FAMILY MEDICINE

## 2017-10-30 PROCEDURE — 36415 COLL VENOUS BLD VENIPUNCTURE: CPT

## 2017-10-30 PROCEDURE — 96375 TX/PRO/DX INJ NEW DRUG ADDON: CPT

## 2017-10-30 PROCEDURE — G0328 FECAL BLOOD SCRN IMMUNOASSAY: HCPCS

## 2017-10-30 PROCEDURE — 86850 RBC ANTIBODY SCREEN: CPT

## 2017-10-30 PROCEDURE — 80053 COMPREHEN METABOLIC PANEL: CPT

## 2017-10-30 PROCEDURE — 3017F COLORECTAL CA SCREEN DOC REV: CPT | Performed by: FAMILY MEDICINE

## 2017-10-30 PROCEDURE — 2580000003 HC RX 258: Performed by: INTERNAL MEDICINE

## 2017-10-30 PROCEDURE — 96365 THER/PROPH/DIAG IV INF INIT: CPT

## 2017-10-30 PROCEDURE — 6360000002 HC RX W HCPCS: Performed by: NURSE PRACTITIONER

## 2017-10-30 PROCEDURE — 2580000003 HC RX 258: Performed by: PHYSICIAN ASSISTANT

## 2017-10-30 PROCEDURE — 99285 EMERGENCY DEPT VISIT HI MDM: CPT

## 2017-10-30 PROCEDURE — 85025 COMPLETE CBC W/AUTO DIFF WBC: CPT

## 2017-10-30 PROCEDURE — 6370000000 HC RX 637 (ALT 250 FOR IP): Performed by: INTERNAL MEDICINE

## 2017-10-30 PROCEDURE — G8420 CALC BMI NORM PARAMETERS: HCPCS | Performed by: FAMILY MEDICINE

## 2017-10-30 PROCEDURE — 6360000002 HC RX W HCPCS: Performed by: PHYSICIAN ASSISTANT

## 2017-10-30 PROCEDURE — 86901 BLOOD TYPING SEROLOGIC RH(D): CPT

## 2017-10-30 PROCEDURE — 86900 BLOOD TYPING SEROLOGIC ABO: CPT

## 2017-10-30 PROCEDURE — 85730 THROMBOPLASTIN TIME PARTIAL: CPT

## 2017-10-30 PROCEDURE — C9113 INJ PANTOPRAZOLE SODIUM, VIA: HCPCS | Performed by: PHYSICIAN ASSISTANT

## 2017-10-30 PROCEDURE — G8484 FLU IMMUNIZE NO ADMIN: HCPCS | Performed by: FAMILY MEDICINE

## 2017-10-30 PROCEDURE — 6360000002 HC RX W HCPCS: Performed by: INTERNAL MEDICINE

## 2017-10-30 PROCEDURE — 4004F PT TOBACCO SCREEN RCVD TLK: CPT | Performed by: FAMILY MEDICINE

## 2017-10-30 PROCEDURE — 1200000000 HC SEMI PRIVATE

## 2017-10-30 PROCEDURE — 99213 OFFICE O/P EST LOW 20 MIN: CPT | Performed by: FAMILY MEDICINE

## 2017-10-30 RX ORDER — FLUTICASONE FUROATE AND VILANTEROL 200; 25 UG/1; UG/1
1 POWDER RESPIRATORY (INHALATION) DAILY
Status: DISCONTINUED | OUTPATIENT
Start: 2017-10-30 | End: 2017-10-30 | Stop reason: CLARIF

## 2017-10-30 RX ORDER — GUAIFENESIN 600 MG/1
600 TABLET, EXTENDED RELEASE ORAL 2 TIMES DAILY
Status: DISCONTINUED | OUTPATIENT
Start: 2017-10-30 | End: 2017-11-03 | Stop reason: HOSPADM

## 2017-10-30 RX ORDER — NORTRIPTYLINE HYDROCHLORIDE 25 MG/1
25 CAPSULE ORAL NIGHTLY
Status: DISCONTINUED | OUTPATIENT
Start: 2017-10-30 | End: 2017-11-01

## 2017-10-30 RX ORDER — SODIUM CHLORIDE 9 MG/ML
INJECTION, SOLUTION INTRAVENOUS CONTINUOUS
Status: DISCONTINUED | OUTPATIENT
Start: 2017-10-30 | End: 2017-11-02

## 2017-10-30 RX ORDER — MORPHINE SULFATE 2 MG/ML
4 INJECTION, SOLUTION INTRAMUSCULAR; INTRAVENOUS
Status: DISCONTINUED | OUTPATIENT
Start: 2017-10-30 | End: 2017-10-30

## 2017-10-30 RX ORDER — ACETAMINOPHEN 325 MG/1
650 TABLET ORAL EVERY 4 HOURS PRN
Status: DISCONTINUED | OUTPATIENT
Start: 2017-10-30 | End: 2017-11-03 | Stop reason: HOSPADM

## 2017-10-30 RX ORDER — MORPHINE SULFATE 2 MG/ML
2 INJECTION, SOLUTION INTRAMUSCULAR; INTRAVENOUS
Status: DISCONTINUED | OUTPATIENT
Start: 2017-10-30 | End: 2017-10-30

## 2017-10-30 RX ORDER — SODIUM CHLORIDE 0.9 % (FLUSH) 0.9 %
10 SYRINGE (ML) INJECTION PRN
Status: DISCONTINUED | OUTPATIENT
Start: 2017-10-30 | End: 2017-11-03 | Stop reason: HOSPADM

## 2017-10-30 RX ORDER — 0.9 % SODIUM CHLORIDE 0.9 %
10 VIAL (ML) INJECTION DAILY
Status: DISCONTINUED | OUTPATIENT
Start: 2017-10-31 | End: 2017-11-03

## 2017-10-30 RX ORDER — SODIUM CHLORIDE 0.9 % (FLUSH) 0.9 %
10 SYRINGE (ML) INJECTION EVERY 12 HOURS SCHEDULED
Status: DISCONTINUED | OUTPATIENT
Start: 2017-10-30 | End: 2017-11-03 | Stop reason: HOSPADM

## 2017-10-30 RX ORDER — TIZANIDINE 2 MG/1
2 TABLET ORAL 3 TIMES DAILY
Status: DISCONTINUED | OUTPATIENT
Start: 2017-10-30 | End: 2017-11-03 | Stop reason: HOSPADM

## 2017-10-30 RX ORDER — 0.9 % SODIUM CHLORIDE 0.9 %
1000 INTRAVENOUS SOLUTION INTRAVENOUS ONCE
Status: COMPLETED | OUTPATIENT
Start: 2017-10-30 | End: 2017-10-30

## 2017-10-30 RX ORDER — PROCHLORPERAZINE MALEATE 10 MG
10 TABLET ORAL EVERY 6 HOURS PRN
Status: DISCONTINUED | OUTPATIENT
Start: 2017-10-30 | End: 2017-11-03 | Stop reason: HOSPADM

## 2017-10-30 RX ORDER — DRONABINOL 2.5 MG/1
5 CAPSULE ORAL
Status: DISCONTINUED | OUTPATIENT
Start: 2017-10-31 | End: 2017-11-01

## 2017-10-30 RX ORDER — ATENOLOL 50 MG/1
50 TABLET ORAL DAILY
Status: DISCONTINUED | OUTPATIENT
Start: 2017-10-31 | End: 2017-11-03 | Stop reason: HOSPADM

## 2017-10-30 RX ORDER — PANTOPRAZOLE SODIUM 40 MG/10ML
40 INJECTION, POWDER, LYOPHILIZED, FOR SOLUTION INTRAVENOUS DAILY
Status: DISCONTINUED | OUTPATIENT
Start: 2017-10-31 | End: 2017-11-03

## 2017-10-30 RX ORDER — ONDANSETRON 2 MG/ML
4 INJECTION INTRAMUSCULAR; INTRAVENOUS EVERY 6 HOURS PRN
Status: DISCONTINUED | OUTPATIENT
Start: 2017-10-30 | End: 2017-11-03 | Stop reason: HOSPADM

## 2017-10-30 RX ORDER — CALCIUM CARBONATE/VITAMIN D3 250-3.125
1 TABLET ORAL 2 TIMES DAILY WITH MEALS
Status: DISCONTINUED | OUTPATIENT
Start: 2017-10-30 | End: 2017-11-03 | Stop reason: HOSPADM

## 2017-10-30 RX ORDER — ALBUTEROL SULFATE 90 UG/1
2 AEROSOL, METERED RESPIRATORY (INHALATION) EVERY 4 HOURS PRN
Status: DISCONTINUED | OUTPATIENT
Start: 2017-10-30 | End: 2017-11-03 | Stop reason: HOSPADM

## 2017-10-30 RX ORDER — ALPRAZOLAM 0.25 MG/1
1 TABLET ORAL 2 TIMES DAILY PRN
Status: DISCONTINUED | OUTPATIENT
Start: 2017-10-30 | End: 2017-11-02

## 2017-10-30 RX ADMIN — TIZANIDINE 2 MG: 2 TABLET ORAL at 20:34

## 2017-10-30 RX ADMIN — MOMETASONE FUROATE AND FORMOTEROL FUMARATE DIHYDRATE 2 PUFF: 200; 5 AEROSOL RESPIRATORY (INHALATION) at 20:28

## 2017-10-30 RX ADMIN — NORTRIPTYLINE HYDROCHLORIDE 25 MG: 25 CAPSULE ORAL at 20:34

## 2017-10-30 RX ADMIN — MORPHINE SULFATE 4 MG: 2 INJECTION, SOLUTION INTRAMUSCULAR; INTRAVENOUS at 18:29

## 2017-10-30 RX ADMIN — HYDROMORPHONE HYDROCHLORIDE 1 MG: 1 INJECTION, SOLUTION INTRAMUSCULAR; INTRAVENOUS; SUBCUTANEOUS at 14:47

## 2017-10-30 RX ADMIN — GUAIFENESIN 600 MG: 600 TABLET, EXTENDED RELEASE ORAL at 20:34

## 2017-10-30 RX ADMIN — SODIUM CHLORIDE 80 MG: 9 INJECTION, SOLUTION INTRAVENOUS at 15:18

## 2017-10-30 RX ADMIN — Medication 10 ML: at 20:34

## 2017-10-30 RX ADMIN — SODIUM CHLORIDE 1000 ML: 9 INJECTION, SOLUTION INTRAVENOUS at 14:48

## 2017-10-30 RX ADMIN — ALPRAZOLAM 1 MG: 0.25 TABLET ORAL at 16:54

## 2017-10-30 RX ADMIN — SODIUM CHLORIDE 8 MG/HR: 9 INJECTION, SOLUTION INTRAVENOUS at 15:28

## 2017-10-30 RX ADMIN — HYDROMORPHONE HYDROCHLORIDE 1 MG: 1 INJECTION, SOLUTION INTRAMUSCULAR; INTRAVENOUS; SUBCUTANEOUS at 20:35

## 2017-10-30 RX ADMIN — SODIUM CHLORIDE: 9 INJECTION, SOLUTION INTRAVENOUS at 18:05

## 2017-10-30 ASSESSMENT — PAIN SCALES - GENERAL
PAINLEVEL_OUTOF10: 5
PAINLEVEL_OUTOF10: 8
PAINLEVEL_OUTOF10: 10
PAINLEVEL_OUTOF10: 10
PAINLEVEL_OUTOF10: 9

## 2017-10-30 ASSESSMENT — PAIN DESCRIPTION - DESCRIPTORS: DESCRIPTORS: SHARP

## 2017-10-30 ASSESSMENT — PAIN DESCRIPTION - PROGRESSION: CLINICAL_PROGRESSION: GRADUALLY IMPROVING

## 2017-10-30 ASSESSMENT — PAIN DESCRIPTION - FREQUENCY: FREQUENCY: CONTINUOUS

## 2017-10-30 ASSESSMENT — PAIN DESCRIPTION - PAIN TYPE: TYPE: CHRONIC PAIN

## 2017-10-30 ASSESSMENT — PAIN DESCRIPTION - ONSET: ONSET: ON-GOING

## 2017-10-30 ASSESSMENT — PAIN DESCRIPTION - LOCATION: LOCATION: ABDOMEN;BACK

## 2017-10-30 NOTE — ED NOTES
Here from [facility]'s office back and abd pain sent here for evaluation of possible GI bleed. \"dark stools past several days\" abd soft resp even non labored.  HX lung cancer with mets \"nobody will give me any pain meds\"      Josr Sheehan RN  10/30/17 3000

## 2017-10-30 NOTE — PROGRESS NOTES
Pt received from ER awake, alert. Iv infuses per orders, pain med given. Oriented to room and equipment.  Admission data base completed

## 2017-10-30 NOTE — ED PROVIDER NOTES
[address]  eMERGENCY dEPARTMENT eNCOUnter      Pt Name: Perri Baum  MRN: [id number]  [place] 1960  Date of evaluation: 10/30/2017  Provider: Shaina Bolden Dr       Chief Complaint   Patient presents with    GI Bleeding         HISTORY OF PRESENT ILLNESS  (Location/Symptom, Timing/Onset, Context/Setting, Quality, Duration, Modifying Factors, Severity.)   Perri Baum is a 65 y.o. male who presents to the emergency department with GI bleeding. Patient has noticed some black stools the last couple days and feeling weak. Patient has a recurrent history of lung cancer with metastases. He reports back pain as well. No definite alleviating or aggravating factors. Apparently he was at dr Herson Jones office this morning and heme occult positive stool today and sent to ER for admission. Nursing Notes were reviewed. ALLERGIES     Aspirin; Fish-derived products; Shellfish-derived products; and Motrin [ibuprofen micronized]    CURRENT MEDICATIONS       Current Discharge Medication List      CONTINUE these medications which have NOT CHANGED    Details   ASPIR-LOW 81 MG EC tablet Take 1 tablet by mouth daily  Qty: 30 tablet, Refills: 11    Associated Diagnoses: Palpitations; Tachycardia      ALPRAZolam (XANAX) 1 MG tablet TAKE 1 TABLET BY MOUTH 2 TIMES DAILY AS NEEDED FOR SLEEP  Qty: 60 tablet, Refills: 0      carisoprodol (SOMA) 350 MG tablet TAKE 1 TABLET BY MOUTH 2 TIMES DAILY AS NEEDED FOR MUSCLE SPASMS  Qty: 60 tablet, Refills: 0    Associated Diagnoses: Lumbar pain with radiation down both legs      Fluticasone Furoate-Vilanterol (BREO ELLIPTA) 200-25 MCG/INH AEPB Inhale 1 puff into the lungs daily      meloxicam (MOBIC) 15 MG tablet Take 1 tablet by mouth daily  Qty: 30 tablet, Refills: 5    Associated Diagnoses: Cardiac arrest ([facility]  Aspiration pneumonia of both lower lobes due to gastric secretions (HCC)      atenolol (TENORMIN) 50 MG tablet Take 1 tablet by mouth daily  Qty: 30 tablet, Refills: 5    Associated Diagnoses: Tachycardia      nortriptyline (PAMELOR) 25 MG capsule Take 1 capsule by mouth nightly  Qty: 30 capsule, Refills: 3    Associated Diagnoses: Lumbar pain with radiation down both legs      megestrol (MEGACE) 40 MG/ML suspension Refills: 2      dronabinol (MARINOL) 5 MG capsule Take 5 mg by mouth 2 times daily (before meals)      Albuterol Sulfate (VENTOLIN HFA IN) Inhale into the lungs as needed      !! INCRUSE ELLIPTA 62.5 MCG/INH AEPB Inhale 2 puffs into the lungs daily  Qty: 1 each, Refills: 11    Associated Diagnoses: COPD exacerbation (HCC)      guaiFENesin (MUCINEX) 600 MG extended release tablet Take 1 tablet by mouth 2 times daily  Qty: 60 tablet, Refills: 0    Associated Diagnoses: Cardiac arrest ([facility] Aspiration pneumonia of both lower lobes due to gastric secretions (HCC)      calcium citrate-vitamin D (CITRICAL + D) 315-250 MG-UNIT TABS per tablet Take 1 tablet by mouth 2 times daily (with meals)      prochlorperazine (COMPAZINE) 10 MG tablet Take 10 mg by mouth every 6 hours as needed (nausea)       !! Umeclidinium Bromide (INCRUSE ELLIPTA) 62.5 MCG/INH AEPB Inhale 1 puff into the lungs daily  Qty: 1 each, Refills: 3       !! - Potential duplicate medications found. Please discuss with provider.           PAST MEDICAL HISTORY         Diagnosis Date    Benign essential HTN 12/10/2016    Cardiac arrest due to respiratory disorder ([facility] 12/10/2016    Cellulitis     Chronic low back pain 8/16/2016    Emphysema of lung ([facility]     Epidermoid carcinoma of lung ([facility] 8/16/2016    Head injury     July 1984, [place], Halo placed    Hepatitis     Insomnia     Osteoarthritis of spine with radiculopathy, lumbar region 8/15/2017    Reflux     Seizures ([address]     last one Dec 2016    Tremor     hx of    Ulcer [facility])        SURGICAL HISTORY           Procedure Laterality Date   [address] [facility] SURGERY  2000 Emergency Department Physician who either signs or Co-signs this chart in the absence of a cardiologist.        RADIOLOGY:   Non-plain film images such as CT, Ultrasound and MRI are read by the radiologist. Plain radiographic images are visualized and preliminarily interpreted by the emergency physician with the below findings:        Interpretation per the Radiologist below, if available at the time of this note:          ED BEDSIDE ULTRASOUND:   Performed by ED Physician - none    LABS:  Labs Reviewed   CBC WITH AUTO DIFFERENTIAL - Abnormal; Notable for the following:        Result Value    WBC 0.8 (*)     RBC 4.26 (*)     Platelets 27 (*)     Segs Absolute 0.26 (*)     Absolute Lymph # 0.50 (*)     Absolute Mono # 0.01 (*)     All other components within normal limits   COMPREHENSIVE METABOLIC PANEL - Abnormal; Notable for the following:     Glucose 119 (*)     BUN 21 (*)     Bun/Cre Ratio 24 (*)     Chloride 95 (*)     ALT 49 (*)     All other components within normal limits   OCCULT BLOOD SCREEN - Abnormal; Notable for the following:     Occult Blood, Stool #1 POSITIVE (*)     All other components within normal limits   APTT   PROTIME-INR   COMPREHENSIVE METABOLIC PANEL   CBC   PROTIME-INR   HEMOGLOBIN   TYPE AND SCREEN       All other labs were within normal range or not returned as of this dictation. EMERGENCY DEPARTMENT COURSE and DIFFERENTIAL DIAGNOSIS/MDM:   Vitals:    Vitals:    10/30/17 1645 10/30/17 1700 10/30/17 1715 10/30/17 1756   BP: 111/73 (!) 140/80 100/67 (!) 134/91   Pulse: 92 87 78 80   Resp: 22 23 18 20   Temp:    98.1 °F (36.7 °C)   TempSrc:    Oral   SpO2: 98% 97% 96% 99%   Weight:       Height:         Patient will be admitted to the hospital for upper GI bleed. His stool was guaiac positive. Patient is agreeable to plan of care. CONSULTS:  IP CONSULT TO HOSPITALIST  IP CONSULT TO GI    PROCEDURES:  Procedures        FINAL IMPRESSION      1.  Gastrointestinal hemorrhage, unspecified

## 2017-10-30 NOTE — PROGRESS NOTES
Have you seen any other physician or provider since your last visit no    Have you had any other diagnostic tests since your last visit? no    Have you changed or stopped any medications since your last visit including any over-the-counter medicines, vitamins, or herbal medicines? no     Are you taking all your prescribed medications? Yes  If NO, why? -  N/A           Patient Self-Management Goal for this visit.    What is your goal for your visit today? - anxiety   Barriers to success: none   Plan for overcoming my barriers: N/A      Confidence: 10/10   Date goal set: 10/30/17   Date expected to reach goal: 2days
Hemoccult positive   Musculoskeletal: Normal range of motion. He exhibits no edema or tenderness. Lymphadenopathy: He has no cervical adenopathy. Neurological: He is alert and oriented to person, place, and time. He has normal reflexes. Skin: Skin is warm and dry. No rash noted. Psychiatric: He has a normal mood and affect. His   behavior is normal.       Assessment:      1. Upper GI bleed          Plan:      Call or return to clinic prn if these symptoms worsen or fail to improve as anticipated. I have reviewed the instructions with the patient, answering all questions to his satisfaction. No Follow-up on file. No orders of the defined types were placed in this encounter. No orders of the defined types were placed in this encounter.    he was sent to [facility]. Karen's emergency room case was discussed with emergency room physician    Electronically signed by Berta Carrillo DO on 10/30/2017 at 2:54 PM

## 2017-10-31 PROBLEM — T45.1X5A CHEMOTHERAPY-INDUCED NEUTROPENIA (HCC): Status: ACTIVE | Noted: 2017-10-31

## 2017-10-31 PROBLEM — D70.1 CHEMOTHERAPY-INDUCED NEUTROPENIA (HCC): Status: ACTIVE | Noted: 2017-10-31

## 2017-10-31 PROBLEM — D69.6 THROMBOCYTOPENIA (HCC): Status: ACTIVE | Noted: 2017-10-31

## 2017-10-31 PROBLEM — K92.2 ACUTE UPPER GI BLEED: Status: ACTIVE | Noted: 2017-10-31

## 2017-10-31 LAB
ALBUMIN SERPL-MCNC: 2.8 G/DL (ref 3.5–5.2)
ALBUMIN/GLOBULIN RATIO: ABNORMAL (ref 1–2.5)
ALP BLD-CCNC: 42 U/L (ref 40–129)
ALT SERPL-CCNC: 30 U/L (ref 5–41)
ANION GAP SERPL CALCULATED.3IONS-SCNC: 11 MMOL/L (ref 9–17)
AST SERPL-CCNC: 19 U/L
BILIRUB SERPL-MCNC: 0.28 MG/DL (ref 0.3–1.2)
BUN BLDV-MCNC: 13 MG/DL (ref 6–20)
BUN/CREAT BLD: 22 (ref 9–20)
CALCIUM SERPL-MCNC: 7.3 MG/DL (ref 8.6–10.4)
CHLORIDE BLD-SCNC: 105 MMOL/L (ref 98–107)
CO2: 22 MMOL/L (ref 20–31)
CREAT SERPL-MCNC: 0.58 MG/DL (ref 0.7–1.2)
GFR AFRICAN AMERICAN: >60 ML/MIN
GFR NON-AFRICAN AMERICAN: >60 ML/MIN
GFR SERPL CREATININE-BSD FRML MDRD: ABNORMAL ML/MIN/{1.73_M2}
GFR SERPL CREATININE-BSD FRML MDRD: ABNORMAL ML/MIN/{1.73_M2}
GLUCOSE BLD-MCNC: 83 MG/DL (ref 70–99)
HCT VFR BLD CALC: 27.6 % (ref 41–53)
HEMOGLOBIN: 9.1 G/DL (ref 13.5–17.5)
HEMOGLOBIN: 9.4 G/DL (ref 13.5–17.5)
HEMOGLOBIN: 9.4 G/DL (ref 13.5–17.5)
HEMOGLOBIN: 9.6 G/DL (ref 13.5–17.5)
INR BLD: 1
MCH RBC QN AUTO: 33.5 PG (ref 26–34)
MCHC RBC AUTO-ENTMCNC: 34.8 G/DL (ref 31–37)
MCV RBC AUTO: 96.3 FL (ref 80–100)
MRSA, DNA, NASAL: NORMAL
PDW BLD-RTO: 13.5 % (ref 11.5–14.5)
PLATELET # BLD: 12 K/UL (ref 130–400)
PMV BLD AUTO: 7.4 FL (ref 6–12)
POTASSIUM SERPL-SCNC: 3.6 MMOL/L (ref 3.7–5.3)
PROTHROMBIN TIME: 9.9 SEC (ref 9.7–11.6)
RBC # BLD: 2.86 M/UL (ref 4.5–5.9)
SODIUM BLD-SCNC: 138 MMOL/L (ref 135–144)
SPECIMEN DESCRIPTION: NORMAL
TOTAL PROTEIN: 5 G/DL (ref 6.4–8.3)
WBC # BLD: 0.5 K/UL (ref 3.5–11)

## 2017-10-31 PROCEDURE — 6360000002 HC RX W HCPCS: Performed by: INTERNAL MEDICINE

## 2017-10-31 PROCEDURE — 51798 US URINE CAPACITY MEASURE: CPT

## 2017-10-31 PROCEDURE — 6370000000 HC RX 637 (ALT 250 FOR IP): Performed by: INTERNAL MEDICINE

## 2017-10-31 PROCEDURE — 1200000000 HC SEMI PRIVATE

## 2017-10-31 PROCEDURE — 6360000002 HC RX W HCPCS: Performed by: NURSE PRACTITIONER

## 2017-10-31 PROCEDURE — 51701 INSERT BLADDER CATHETER: CPT

## 2017-10-31 PROCEDURE — 87641 MR-STAPH DNA AMP PROBE: CPT

## 2017-10-31 PROCEDURE — 51702 INSERT TEMP BLADDER CATH: CPT

## 2017-10-31 PROCEDURE — 99223 1ST HOSP IP/OBS HIGH 75: CPT | Performed by: INTERNAL MEDICINE

## 2017-10-31 PROCEDURE — 94760 N-INVAS EAR/PLS OXIMETRY 1: CPT

## 2017-10-31 PROCEDURE — 85027 COMPLETE CBC AUTOMATED: CPT

## 2017-10-31 PROCEDURE — 80053 COMPREHEN METABOLIC PANEL: CPT

## 2017-10-31 PROCEDURE — C9113 INJ PANTOPRAZOLE SODIUM, VIA: HCPCS | Performed by: NURSE PRACTITIONER

## 2017-10-31 PROCEDURE — 85610 PROTHROMBIN TIME: CPT

## 2017-10-31 PROCEDURE — 99222 1ST HOSP IP/OBS MODERATE 55: CPT | Performed by: INTERNAL MEDICINE

## 2017-10-31 PROCEDURE — 36415 COLL VENOUS BLD VENIPUNCTURE: CPT

## 2017-10-31 PROCEDURE — 2580000003 HC RX 258: Performed by: NURSE PRACTITIONER

## 2017-10-31 PROCEDURE — 85018 HEMOGLOBIN: CPT

## 2017-10-31 PROCEDURE — 94640 AIRWAY INHALATION TREATMENT: CPT

## 2017-10-31 PROCEDURE — 2580000003 HC RX 258: Performed by: INTERNAL MEDICINE

## 2017-10-31 RX ORDER — ZOLPIDEM TARTRATE 10 MG/1
10 TABLET ORAL NIGHTLY PRN
Status: ON HOLD | COMMUNITY
End: 2017-11-10 | Stop reason: HOSPADM

## 2017-10-31 RX ORDER — OYSTER SHELL CALCIUM WITH VITAMIN D 500; 200 MG/1; [IU]/1
1 TABLET, FILM COATED ORAL 2 TIMES DAILY
COMMUNITY

## 2017-10-31 RX ORDER — PREDNISONE 10 MG/1
10 TABLET ORAL DAILY
Status: ON HOLD | COMMUNITY
End: 2017-11-03 | Stop reason: HOSPADM

## 2017-10-31 RX ORDER — NICOTINE 21 MG/24HR
1 PATCH, TRANSDERMAL 24 HOURS TRANSDERMAL DAILY PRN
Status: DISCONTINUED | OUTPATIENT
Start: 2017-10-31 | End: 2017-11-03 | Stop reason: HOSPADM

## 2017-10-31 RX ADMIN — ATENOLOL 50 MG: 50 TABLET ORAL at 08:25

## 2017-10-31 RX ADMIN — SODIUM CHLORIDE: 9 INJECTION, SOLUTION INTRAVENOUS at 04:47

## 2017-10-31 RX ADMIN — HYDROMORPHONE HYDROCHLORIDE 1 MG: 1 INJECTION, SOLUTION INTRAMUSCULAR; INTRAVENOUS; SUBCUTANEOUS at 04:47

## 2017-10-31 RX ADMIN — HYDROMORPHONE HYDROCHLORIDE 1 MG: 1 INJECTION, SOLUTION INTRAMUSCULAR; INTRAVENOUS; SUBCUTANEOUS at 11:05

## 2017-10-31 RX ADMIN — HYDROMORPHONE HYDROCHLORIDE 1 MG: 1 INJECTION, SOLUTION INTRAMUSCULAR; INTRAVENOUS; SUBCUTANEOUS at 21:17

## 2017-10-31 RX ADMIN — SODIUM CHLORIDE: 9 INJECTION, SOLUTION INTRAVENOUS at 00:15

## 2017-10-31 RX ADMIN — TIZANIDINE 2 MG: 2 TABLET ORAL at 14:14

## 2017-10-31 RX ADMIN — HYDROMORPHONE HYDROCHLORIDE 1 MG: 1 INJECTION, SOLUTION INTRAMUSCULAR; INTRAVENOUS; SUBCUTANEOUS at 00:15

## 2017-10-31 RX ADMIN — MOMETASONE FUROATE AND FORMOTEROL FUMARATE DIHYDRATE 2 PUFF: 200; 5 AEROSOL RESPIRATORY (INHALATION) at 20:42

## 2017-10-31 RX ADMIN — SODIUM CHLORIDE: 9 INJECTION, SOLUTION INTRAVENOUS at 07:24

## 2017-10-31 RX ADMIN — HYDROMORPHONE HYDROCHLORIDE 1 MG: 1 INJECTION, SOLUTION INTRAMUSCULAR; INTRAVENOUS; SUBCUTANEOUS at 14:14

## 2017-10-31 RX ADMIN — TIZANIDINE 2 MG: 2 TABLET ORAL at 07:25

## 2017-10-31 RX ADMIN — SODIUM CHLORIDE: 9 INJECTION, SOLUTION INTRAVENOUS at 21:14

## 2017-10-31 RX ADMIN — MOMETASONE FUROATE AND FORMOTEROL FUMARATE DIHYDRATE 2 PUFF: 200; 5 AEROSOL RESPIRATORY (INHALATION) at 10:19

## 2017-10-31 RX ADMIN — DRONABINOL 5 MG: 2.5 CAPSULE ORAL at 16:42

## 2017-10-31 RX ADMIN — CALCIUM CARBONATE-CHOLECALCIFEROL TAB 250 MG-125 UNIT 250 MG: 250-125 TAB at 16:42

## 2017-10-31 RX ADMIN — CALCIUM CARBONATE-CHOLECALCIFEROL TAB 250 MG-125 UNIT 250 MG: 250-125 TAB at 08:25

## 2017-10-31 RX ADMIN — SODIUM CHLORIDE 10 ML: 9 INJECTION INTRAMUSCULAR; INTRAVENOUS; SUBCUTANEOUS at 08:27

## 2017-10-31 RX ADMIN — GUAIFENESIN 600 MG: 600 TABLET, EXTENDED RELEASE ORAL at 08:25

## 2017-10-31 RX ADMIN — HYDROMORPHONE HYDROCHLORIDE 1 MG: 1 INJECTION, SOLUTION INTRAMUSCULAR; INTRAVENOUS; SUBCUTANEOUS at 08:08

## 2017-10-31 RX ADMIN — GUAIFENESIN 600 MG: 600 TABLET, EXTENDED RELEASE ORAL at 21:13

## 2017-10-31 RX ADMIN — TIZANIDINE 2 MG: 2 TABLET ORAL at 23:00

## 2017-10-31 RX ADMIN — PANTOPRAZOLE SODIUM 40 MG: 40 INJECTION, POWDER, FOR SOLUTION INTRAVENOUS at 08:27

## 2017-10-31 RX ADMIN — SODIUM CHLORIDE: 9 INJECTION, SOLUTION INTRAVENOUS at 14:06

## 2017-10-31 RX ADMIN — ALPRAZOLAM 1 MG: 0.25 TABLET ORAL at 16:18

## 2017-10-31 RX ADMIN — NORTRIPTYLINE HYDROCHLORIDE 25 MG: 25 CAPSULE ORAL at 21:13

## 2017-10-31 RX ADMIN — HYDROMORPHONE HYDROCHLORIDE 1 MG: 1 INJECTION, SOLUTION INTRAMUSCULAR; INTRAVENOUS; SUBCUTANEOUS at 17:29

## 2017-10-31 ASSESSMENT — PAIN DESCRIPTION - LOCATION
LOCATION: BACK
LOCATION: ABDOMEN;CHEST
LOCATION: ABDOMEN;BACK
LOCATION: BACK

## 2017-10-31 ASSESSMENT — PAIN SCALES - GENERAL
PAINLEVEL_OUTOF10: 5
PAINLEVEL_OUTOF10: 2
PAINLEVEL_OUTOF10: 7
PAINLEVEL_OUTOF10: 3
PAINLEVEL_OUTOF10: 7
PAINLEVEL_OUTOF10: 4
PAINLEVEL_OUTOF10: 3
PAINLEVEL_OUTOF10: 3
PAINLEVEL_OUTOF10: 5
PAINLEVEL_OUTOF10: 0
PAINLEVEL_OUTOF10: 7

## 2017-10-31 ASSESSMENT — PAIN DESCRIPTION - DESCRIPTORS
DESCRIPTORS: SORE
DESCRIPTORS: SHARP
DESCRIPTORS: ACHING
DESCRIPTORS: SHARP
DESCRIPTORS: ACHING
DESCRIPTORS: SORE

## 2017-10-31 ASSESSMENT — ENCOUNTER SYMPTOMS
BLURRED VISION: 0
DIARRHEA: 0
CONSTIPATION: 0
SORE THROAT: 1
ABDOMINAL PAIN: 0
VOMITING: 0
SHORTNESS OF BREATH: 1
WHEEZING: 0
EYE DISCHARGE: 0
COUGH: 1
BACK PAIN: 0
NAUSEA: 1

## 2017-10-31 ASSESSMENT — PAIN DESCRIPTION - PROGRESSION
CLINICAL_PROGRESSION: GRADUALLY IMPROVING
CLINICAL_PROGRESSION: GRADUALLY IMPROVING

## 2017-10-31 ASSESSMENT — PAIN DESCRIPTION - FREQUENCY
FREQUENCY: CONTINUOUS
FREQUENCY: INTERMITTENT
FREQUENCY: CONTINUOUS

## 2017-10-31 ASSESSMENT — PAIN DESCRIPTION - ONSET
ONSET: ON-GOING

## 2017-10-31 ASSESSMENT — PAIN DESCRIPTION - PAIN TYPE
TYPE: CHRONIC PAIN
TYPE: CHRONIC PAIN;ACUTE PAIN
TYPE: CHRONIC PAIN
TYPE: CHRONIC PAIN
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
## 2017-10-31 NOTE — FLOWSHEET NOTE
Patient anointed by Father Ildefonso Dias. Writer charting on behalf of Allyn Johnson.     10/31/17 1118   Encounter Summary   Services provided to: Patient   Referral/Consult From: Rounding   Continue Visiting (10/31/17; miky, Fr Salcedo)   Complexity of Encounter Low   Length of Encounter 15 minutes   Routine   Type Initial   Sacraments   Sacrament of Sick-Anointing Anointed  (10/31/17;  Dakota Miller)

## 2017-10-31 NOTE — CARE COORDINATION
Case Management Initial Discharge Plan  Ad Trejo,         Readmission Risk              Readmission Risk:        10.75       Age 73 or Greater:  0    Admitted from SNF or Requires Paid or Family Care:  0    Currently has CHF,COPD,ARF,CRI,or is on dialysis:  0    Takes more than 5 Prescription Medications:  4    Takes Digoxin,Insulin,Anticoagulants,Narcotics or ASA/Plavix:  [address] in Past 12 Months:  0    On Disability:  3    Patient Considers own Health:  3.75            Met with:patient or spouse/SO to discuss discharge plans. Information verified: address, contacts, phone number, , insurance Yes  PCP: Jane Umanzor DO  Date of last visit: 10/30    Insurance Provider: Medicare and Medicaid    Discharge Planning  Current Residence:   house  Living Arrangements:  Spouse/Significant Other   Home has 1 stories/4 stairs to climb  Support Systems:  Spouse/Significant Other, Children  Current Services PTA:    Supplier: [facility]  Patient able to perform ADL's:Independent  DME to aid ambulation prior to admission:  Cane, nebulizer, 4w/ walker, home 02 nocturnal only, /during admission:  none    Potential Assistance Needed:  N/A    Pharmacy: [facility] Medications:  No  Does patient want to participate in local refill/ meds to beds program?  N/A    Patient agreeable to home care: No  [place] of choice provided:  n/a      Type of Home Care Services:  None  Patient expects to be discharged to:  home    Prior SNF/Rehab Placement and Facility:  none  Agreeable to SNF/Rehab: No  [place] of choice provided: n/a   Evaluation: no    Expected Discharge date:  17  Follow Up Appointment: Best Day/ Time:      Transportation provider: self or family member  Transportation arrangements needed for discharge: No    Discharge Plan:  Met with pt at bedside. Pt lives with his SO and friends. Pt has been independent and driving.  Pt uses home 02 at night and prn. Called and verified with [facility] that script is nocturnal only. Dr. Bk Garcia informed. Discussed home care needs. Pt's SO is [employer] and is able to assist pt if needed. NO home care needs identified at this time.    Follow home 02 eval.            Electronically signed by Ava Siddiqui RN on 10/31/17 at 3:14 PM

## 2017-10-31 NOTE — PROGRESS NOTES
Pharmacy Accuracy Service Medication History Note    The patient's list of current home medications has been reviewed. The patient's allergy list has been reviewed and updated. Source(s) of information: Patient/ medication bottles/ SureScripts    Based on information provided by the above source(s), I have updated the patient's home med list as described below. Please review the ACTION REQUESTED BY PHYSICIAN section of this note below for any discrepancies on current hospital orders. I changed or updated the following medications on the patient's home medication list:  Discontinued · Marinol 5mg BID - pt stopped, states ineffective  · Nortriptyline 25mg qhs - pt stopped, ineffective  · Incruse  ellipta - duplicate     Added · Ambien 10mg qhs prn  ·      Adjusted   · Calcium/vit D from 315/200 to 500/200 1 BID  · Megace (no directions) to 10ml daily   Other Notes · Pt ran out of Megace liquid, but states it was effective for weight gain. Has refills and will refill upon discharge. PHYSICIAN ACTION REQUESTED  Discrepancies on current hospital orders that need to be addressed by a physician:    Medication Action Requested   Marinol  Nortriptyline     Please discontinue as no longer home meds         Please feel free to call me with any questions about this encounter. Thank you.     Brittany Monson PharmD  Pharmacy Medication Accuracy Review Service  Phone:  [phone]  Fax: [phone]      Electronically signed by BLAYNE Gardner [facility] on 10/31/2017 at 4:08 PM
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
## 2017-10-31 NOTE — PROGRESS NOTES
Pt up to BR at 1050 to attempt voiding, denies urge. Was unable to void scanned for >500 ml.  Dr Davi Trinidad on unit and order received to insert garcia catheter

## 2017-11-01 LAB
ABO/RH: NORMAL
ABSOLUTE EOS #: 0 K/UL (ref 0–0.4)
ABSOLUTE IMMATURE GRANULOCYTE: ABNORMAL K/UL (ref 0–0.3)
ABSOLUTE LYMPH #: 0.36 K/UL (ref 1–4.8)
ABSOLUTE MONO #: 0 K/UL (ref 0.2–0.8)
ANION GAP SERPL CALCULATED.3IONS-SCNC: 11 MMOL/L (ref 9–17)
ANTIBODY SCREEN: NEGATIVE
ARM BAND NUMBER: NORMAL
BASOPHILS # BLD: 0 %
BASOPHILS ABSOLUTE: 0 K/UL (ref 0–0.2)
BUN BLDV-MCNC: 6 MG/DL (ref 6–20)
BUN/CREAT BLD: 10 (ref 9–20)
CALCIUM SERPL-MCNC: 7.8 MG/DL (ref 8.6–10.4)
CHLORIDE BLD-SCNC: 95 MMOL/L (ref 98–107)
CO2: 24 MMOL/L (ref 20–31)
CREAT SERPL-MCNC: 0.61 MG/DL (ref 0.7–1.2)
DIFFERENTIAL TYPE: ABNORMAL
EOSINOPHILS RELATIVE PERCENT: 0 %
EXPIRATION DATE: NORMAL
GFR AFRICAN AMERICAN: >60 ML/MIN
GFR NON-AFRICAN AMERICAN: >60 ML/MIN
GFR SERPL CREATININE-BSD FRML MDRD: ABNORMAL ML/MIN/{1.73_M2}
GFR SERPL CREATININE-BSD FRML MDRD: ABNORMAL ML/MIN/{1.73_M2}
GLUCOSE BLD-MCNC: 128 MG/DL (ref 70–99)
HCT VFR BLD CALC: 29.1 % (ref 41–53)
HEMOGLOBIN: 10 G/DL (ref 13.5–17.5)
HEMOGLOBIN: 9.5 G/DL (ref 13.5–17.5)
HEMOGLOBIN: 9.5 G/DL (ref 13.5–17.5)
HEMOGLOBIN: 9.8 G/DL (ref 13.5–17.5)
IMMATURE GRANULOCYTES: ABNORMAL %
LYMPHOCYTES # BLD: 89 %
MAGNESIUM: 1.4 MG/DL (ref 1.6–2.6)
MCH RBC QN AUTO: 32.7 PG (ref 26–34)
MCHC RBC AUTO-ENTMCNC: 34.2 G/DL (ref 31–37)
MCV RBC AUTO: 95.6 FL (ref 80–100)
MONOCYTES # BLD: 1 %
PDW BLD-RTO: 12.4 % (ref 11.5–14.5)
PLATELET # BLD: 5 K/UL (ref 130–400)
PLATELET ESTIMATE: ABNORMAL
PMV BLD AUTO: ABNORMAL FL (ref 6–12)
POTASSIUM SERPL-SCNC: 3.5 MMOL/L (ref 3.7–5.3)
RBC # BLD: 3.05 M/UL (ref 4.5–5.9)
RBC # BLD: ABNORMAL 10*6/UL
SEG NEUTROPHILS: 10 %
SEGMENTED NEUTROPHILS ABSOLUTE COUNT: 0.04 K/UL (ref 1.8–7.7)
SODIUM BLD-SCNC: 130 MMOL/L (ref 135–144)
WBC # BLD: 0.4 K/UL (ref 3.5–11)
WBC # BLD: ABNORMAL 10*3/UL

## 2017-11-01 PROCEDURE — 86901 BLOOD TYPING SEROLOGIC RH(D): CPT

## 2017-11-01 PROCEDURE — 99232 SBSQ HOSP IP/OBS MODERATE 35: CPT | Performed by: INTERNAL MEDICINE

## 2017-11-01 PROCEDURE — 6360000002 HC RX W HCPCS: Performed by: NURSE PRACTITIONER

## 2017-11-01 PROCEDURE — 6360000002 HC RX W HCPCS: Performed by: INTERNAL MEDICINE

## 2017-11-01 PROCEDURE — 80048 BASIC METABOLIC PNL TOTAL CA: CPT

## 2017-11-01 PROCEDURE — 99222 1ST HOSP IP/OBS MODERATE 55: CPT | Performed by: INTERNAL MEDICINE

## 2017-11-01 PROCEDURE — 86900 BLOOD TYPING SEROLOGIC ABO: CPT

## 2017-11-01 PROCEDURE — 83735 ASSAY OF MAGNESIUM: CPT

## 2017-11-01 PROCEDURE — 36430 TRANSFUSION BLD/BLD COMPNT: CPT

## 2017-11-01 PROCEDURE — 85025 COMPLETE CBC W/AUTO DIFF WBC: CPT

## 2017-11-01 PROCEDURE — 36415 COLL VENOUS BLD VENIPUNCTURE: CPT

## 2017-11-01 PROCEDURE — 2580000003 HC RX 258: Performed by: INTERNAL MEDICINE

## 2017-11-01 PROCEDURE — C9113 INJ PANTOPRAZOLE SODIUM, VIA: HCPCS | Performed by: NURSE PRACTITIONER

## 2017-11-01 PROCEDURE — 94640 AIRWAY INHALATION TREATMENT: CPT

## 2017-11-01 PROCEDURE — 6370000000 HC RX 637 (ALT 250 FOR IP): Performed by: INTERNAL MEDICINE

## 2017-11-01 PROCEDURE — 86850 RBC ANTIBODY SCREEN: CPT

## 2017-11-01 PROCEDURE — 1200000000 HC SEMI PRIVATE

## 2017-11-01 PROCEDURE — 85018 HEMOGLOBIN: CPT

## 2017-11-01 PROCEDURE — P9037 PLATE PHERES LEUKOREDU IRRAD: HCPCS

## 2017-11-01 PROCEDURE — 2580000003 HC RX 258: Performed by: NURSE PRACTITIONER

## 2017-11-01 RX ORDER — MAGNESIUM SULFATE 1 G/100ML
1 INJECTION INTRAVENOUS
Status: COMPLETED | OUTPATIENT
Start: 2017-11-01 | End: 2017-11-01

## 2017-11-01 RX ORDER — 0.9 % SODIUM CHLORIDE 0.9 %
250 INTRAVENOUS SOLUTION INTRAVENOUS ONCE
Status: DISCONTINUED | OUTPATIENT
Start: 2017-11-01 | End: 2017-11-03

## 2017-11-01 RX ORDER — MEGESTROL ACETATE 40 MG/ML
400 SUSPENSION ORAL DAILY
Status: DISCONTINUED | OUTPATIENT
Start: 2017-11-01 | End: 2017-11-03 | Stop reason: HOSPADM

## 2017-11-01 RX ORDER — DIPHENHYDRAMINE HYDROCHLORIDE 50 MG/ML
25 INJECTION INTRAMUSCULAR; INTRAVENOUS ONCE
Status: COMPLETED | OUTPATIENT
Start: 2017-11-01 | End: 2017-11-01

## 2017-11-01 RX ADMIN — SODIUM CHLORIDE: 9 INJECTION, SOLUTION INTRAVENOUS at 04:14

## 2017-11-01 RX ADMIN — HYDROMORPHONE HYDROCHLORIDE 1 MG: 1 INJECTION, SOLUTION INTRAMUSCULAR; INTRAVENOUS; SUBCUTANEOUS at 07:54

## 2017-11-01 RX ADMIN — DIPHENHYDRAMINE HYDROCHLORIDE 25 MG: 50 INJECTION, SOLUTION INTRAMUSCULAR; INTRAVENOUS at 23:49

## 2017-11-01 RX ADMIN — MAGNESIUM SULFATE HEPTAHYDRATE 1 G: 1 INJECTION, SOLUTION INTRAVENOUS at 15:37

## 2017-11-01 RX ADMIN — ATENOLOL 50 MG: 50 TABLET ORAL at 09:15

## 2017-11-01 RX ADMIN — HYDROMORPHONE HYDROCHLORIDE 1 MG: 1 INJECTION, SOLUTION INTRAMUSCULAR; INTRAVENOUS; SUBCUTANEOUS at 00:41

## 2017-11-01 RX ADMIN — MAGNESIUM SULFATE HEPTAHYDRATE 1 G: 1 INJECTION, SOLUTION INTRAVENOUS at 16:39

## 2017-11-01 RX ADMIN — DRONABINOL 5 MG: 2.5 CAPSULE ORAL at 05:56

## 2017-11-01 RX ADMIN — HYDROMORPHONE HYDROCHLORIDE 1 MG: 1 INJECTION, SOLUTION INTRAMUSCULAR; INTRAVENOUS; SUBCUTANEOUS at 11:02

## 2017-11-01 RX ADMIN — SODIUM CHLORIDE: 9 INJECTION, SOLUTION INTRAVENOUS at 10:41

## 2017-11-01 RX ADMIN — CALCIUM CARBONATE-CHOLECALCIFEROL TAB 250 MG-125 UNIT 250 MG: 250-125 TAB at 09:15

## 2017-11-01 RX ADMIN — GUAIFENESIN 600 MG: 600 TABLET, EXTENDED RELEASE ORAL at 21:01

## 2017-11-01 RX ADMIN — TIZANIDINE 2 MG: 2 TABLET ORAL at 09:15

## 2017-11-01 RX ADMIN — MAGNESIUM SULFATE HEPTAHYDRATE 1 G: 1 INJECTION, SOLUTION INTRAVENOUS at 17:50

## 2017-11-01 RX ADMIN — HYDROMORPHONE HYDROCHLORIDE 1 MG: 1 INJECTION, SOLUTION INTRAMUSCULAR; INTRAVENOUS; SUBCUTANEOUS at 14:03

## 2017-11-01 RX ADMIN — Medication 10 ML: at 17:13

## 2017-11-01 RX ADMIN — ACETAMINOPHEN 650 MG: 325 TABLET ORAL at 22:41

## 2017-11-01 RX ADMIN — GUAIFENESIN 600 MG: 600 TABLET, EXTENDED RELEASE ORAL at 09:15

## 2017-11-01 RX ADMIN — ALPRAZOLAM 1 MG: 0.25 TABLET ORAL at 09:29

## 2017-11-01 RX ADMIN — ACETAMINOPHEN 650 MG: 325 TABLET ORAL at 05:56

## 2017-11-01 RX ADMIN — HYDROMORPHONE HYDROCHLORIDE 1 MG: 1 INJECTION, SOLUTION INTRAMUSCULAR; INTRAVENOUS; SUBCUTANEOUS at 04:14

## 2017-11-01 RX ADMIN — PANTOPRAZOLE SODIUM 40 MG: 40 INJECTION, POWDER, FOR SOLUTION INTRAVENOUS at 09:15

## 2017-11-01 RX ADMIN — HYDROMORPHONE HYDROCHLORIDE 1 MG: 1 INJECTION, SOLUTION INTRAMUSCULAR; INTRAVENOUS; SUBCUTANEOUS at 17:13

## 2017-11-01 RX ADMIN — CALCIUM CARBONATE-CHOLECALCIFEROL TAB 250 MG-125 UNIT 250 MG: 250-125 TAB at 17:14

## 2017-11-01 RX ADMIN — MEGESTROL ACETATE 400 MG: 40 SUSPENSION ORAL at 10:40

## 2017-11-01 RX ADMIN — HYDROMORPHONE HYDROCHLORIDE 1 MG: 1 INJECTION, SOLUTION INTRAMUSCULAR; INTRAVENOUS; SUBCUTANEOUS at 21:01

## 2017-11-01 RX ADMIN — TIZANIDINE 2 MG: 2 TABLET ORAL at 21:01

## 2017-11-01 RX ADMIN — SODIUM CHLORIDE 10 ML: 9 INJECTION INTRAMUSCULAR; INTRAVENOUS; SUBCUTANEOUS at 09:15

## 2017-11-01 RX ADMIN — TIZANIDINE 2 MG: 2 TABLET ORAL at 14:02

## 2017-11-01 RX ADMIN — MOMETASONE FUROATE AND FORMOTEROL FUMARATE DIHYDRATE 2 PUFF: 200; 5 AEROSOL RESPIRATORY (INHALATION) at 10:12

## 2017-11-01 ASSESSMENT — PAIN SCALES - GENERAL
PAINLEVEL_OUTOF10: 7
PAINLEVEL_OUTOF10: 7
PAINLEVEL_OUTOF10: 0
PAINLEVEL_OUTOF10: 7
PAINLEVEL_OUTOF10: 7
PAINLEVEL_OUTOF10: 2
PAINLEVEL_OUTOF10: 7
PAINLEVEL_OUTOF10: 5
PAINLEVEL_OUTOF10: 0
PAINLEVEL_OUTOF10: 0
PAINLEVEL_OUTOF10: 7
PAINLEVEL_OUTOF10: 6
PAINLEVEL_OUTOF10: 3
PAINLEVEL_OUTOF10: 3

## 2017-11-01 ASSESSMENT — PAIN DESCRIPTION - FREQUENCY: FREQUENCY: INTERMITTENT

## 2017-11-01 ASSESSMENT — PAIN DESCRIPTION - LOCATION: LOCATION: BACK

## 2017-11-01 ASSESSMENT — PAIN DESCRIPTION - DESCRIPTORS: DESCRIPTORS: ACHING

## 2017-11-01 ASSESSMENT — PAIN DESCRIPTION - ONSET: ONSET: ON-GOING

## 2017-11-01 ASSESSMENT — PAIN DESCRIPTION - PAIN TYPE: TYPE: CHRONIC PAIN

## 2017-11-01 NOTE — PLAN OF CARE
Problem: Falls - Risk of  Goal: Absence of falls  Outcome: Ongoing  Siderails up x 2  Hourly rounding. Call light in reach. Instructed to call for assist before attempting out of bed. Remains free from falls and accidental injury at this time. Floor free from obstacles, and bed is locked and in lowest position. Adequate lighting provided. Problem: Tobacco Use:  Goal: Inpatient tobacco use cessation counseling participation  Inpatient tobacco use cessation counseling participation   Outcome: Ongoing  Has nicotine patch in place. Patient educated on not smoking while patch in place.

## 2017-11-01 NOTE — CONSULTS
Facility-Administered Medications Ordered in Other Encounters   Medication Dose Route Frequency Provider Last Rate Last Dose    0.9 % sodium chloride infusion 250 mL  250 mL Intravenous Once Gin Patel DO           Allergies:  Aspirin; Fish-derived products; Shellfish-derived products; and Motrin [ibuprofen micronized]    Social History:   reports that he has been smoking Cigarettes. He has a 40.00 pack-year smoking history. He has never used smokeless tobacco. He reports that he uses drugs, including Marijuana. He reports that he does not drink alcohol. Family History: family history includes Cancer in his father and mother; Heart Disease in his father, mother, and paternal grandmother; Stroke in his maternal grandfather. REVIEW OF SYSTEMS:    Constitutional: ++fatigue, No fever or chills. No night sweats, no weight loss   Eyes: No eye discharge, double vision, or eye pain   HEENT: negative for sore mouth, sore throat, hoarseness and voice change   Respiratory: negative for cough , sputum, dyspnea, wheezing, hemoptysis, chest pain   Cardiovascular: negative for chest pain, dyspnea, palpitations, orthopnea, PND   Gastrointestinal: negative for nausea, vomiting, diarrhea, constipation, abdominal pain, Dysphagia, hematemesis and hematochezia   Genitourinary: negative for frequency, dysuria, nocturia, urinary incontinence, and hematuria   Integument: negative for rash, skin lesions, bruises.    Hematologic/Lymphatic: negative for easy bruising, bleeding, lymphadenopathy, or petechiae   Endocrine: negative for heat or cold intolerance,weight changes, change in bowel habits and hair loss   Musculoskeletal: negative for myalgias, arthralgias, pain, joint swelling,and bone pain   Neurological: negative for headaches, dizziness, seizures, weakness, numbness    PHYSICAL EXAM:      BP 98/65   Pulse 88   Temp 98.4 °F (36.9 °C) (Oral)   Resp 18   Ht 5' 5\" (1.651 m)   Wt 132 lb 1.6 oz (59.9 kg)   SpO2 94% [I10] 12/10/2016    Chronic low back pain [M54.5, G89.29] 08/16/2016    Epidermoid carcinoma of lung ([facility] [C34.90] 08/16/2016    Anemia [D64.9] 12/17/2014    Chronic obstructive pulmonary disease ([facility] [J44.9] 04/25/2013       IMPRESSION:   1. Recurrent Lung cancer on Shivani and gemzar  2. Rectal bleeding  3. Pancytopenia  4. RECOMMENDATIONS:  1. Give two units plts as plts 5K  2. Check smear, check hapto  3. GI evaluation  4. Will give granix one dose      Discussed with patient and Nurse. Thank you for asking us to see this patient.     Zechariah Garcia MD  Hematologist/Medical Oncologist  Cell: [phone]

## 2017-11-01 NOTE — PROGRESS NOTES
chloride infusion Continuous   sodium chloride flush 0.9 % injection 10 mL 2 times per day   sodium chloride flush 0.9 % injection 10 mL PRN   acetaminophen (TYLENOL) tablet 650 mg Q4H PRN   ondansetron (ZOFRAN) injection 4 mg Q6H PRN   tiotropium (SPIRIVA) inhalation capsule 18 mcg Daily   mometasone-formoterol (DULERA) 200-5 MCG/ACT inhaler 2 puff BID   HYDROmorphone (DILAUDID) injection 0.5 mg Q3H PRN   Or    HYDROmorphone (DILAUDID) injection 1 mg Q3H PRN   pantoprazole (PROTONIX) injection 40 mg Daily   And    sodium chloride (PF) 0.9 % injection 10 mL Daily     0.9 % sodium chloride infusion 250 mL Once       Data:     Code Status:  Full Code    Labs:    Hematology:  Recent Labs      10/30/17   1445  10/31/17   0623   10/31/17   1755  11/01/17   0011  11/01/17 0718   WBC  0.8*  0.5*   --    --    --   0.4*   RBC  4.26*  2.86*   --    --    --   3.05*   HGB  13.9  9.6*   < >  9.1*  9.5*  10.0*   HCT  41.0  27.6*   --    --    --   29.1*   MCV  96.2  96.3   --    --    --   95.6   MCH  32.6  33.5   --    --    --   32.7   MCHC  33.9  34.8   --    --    --   34.2   RDW  12.9  13.5   --    --    --   12.4   PLT  27*  12*   --    --    --   5*   MPV  NOT REPORTED  7.4   --    --    --   NOT REPORTED   INR  0.9  1.0   --    --    --    --     < > = values in this interval not displayed.      Chemistry:  Recent Labs      10/30/17   1445  10/31/17   0623  11/01/17   0718   NA  136  138  130*   K  4.1  3.6*  3.5*   CL  95*  105  95*   CO2  24  22  24   GLUCOSE  119*  83  128*   BUN  21*  13  6   CREATININE  0.87  0.58*  0.61*   MG   --    --   1.4*   ANIONGAP  17  11  11   LABGLOM  >60  >60  >60   GFRAA  >60  >60  >60   CALCIUM  9.0  7.3*  7.8*     Recent Labs      10/30/17   1445  10/31/17   0623   PROT  7.1  5.0*   LABALBU  4.3  2.8*   AST  28  19   ALT  49*  30   ALKPHOS  63  42   BILITOT  0.61  0.28*       Physical Examination:    /82   Pulse 90   Temp 98.8 °F (37.1 °C) (Oral)   Resp 16   Ht 5' 5\" (1.651 m)   Wt 132 lb 1.6 oz (59.9 kg)   SpO2 95%   BMI 21.98 kg/m²   Intake/Output Summary (Last 24 hours) at 11/01/17 0558  Last data filed at 11/01/17 0557   Gross per 24 hour   Intake           4625.5 ml   Output             3930 ml   Net            695.5 ml        General Appearance:    Alert, cooperative, no distress, appears stated age   Head:    Normocephalic, without obvious abnormality, atraumatic   Eyes:    PERRL, conjunctiva/corneas clear, EOM's intact        Ears:    Normal external ear canals, both ears   Nose:   Nares normal, septum midline, mucosa normal, no drainage    or sinus tenderness   Throat:   Lips, mucosa, and tongue normal; teeth and gums normal   Neck:   Supple, symmetrical, trachea midline, no adenopathy;        thyroid:  No enlargement/tenderness/nodules; no carotid    bruit or JVD   Back:     Symmetric, no curvature, ROM normal, chronic lumbar tenderness   Lungs:     Clear to auscultation bilaterally, respirations unlabored, decreased breath sounds noted   Chest wall:    No tenderness or deformity   Heart:    Regular rate and rhythm, S1 and S2 normal, no murmur, rub   or gallop   Abdomen:     Soft, non-tender, bowel sounds active all four quadrants,     no masses, no organomegaly   Extremities:   Extremities normal, atraumatic, no cyanosis or edema   Pulses:   2+ and symmetric all extremities   Skin:   Skin color, texture, turgor normal, no rashes or lesions   Lymph nodes:   Cervical, supraclavicular, and axillary nodes normal   Neurologic:   CNII-XII intact.  Normal strength, sensation and reflexes       throughout       Assessment:     Primary Problem  Acute upper GI bleed     Active Hospital Problems    Diagnosis Date Noted    Acute upper GI bleed [K92.2] 10/31/2017    Thrombocytopenia (HCC) [D69.6] 10/31/2017    Chemotherapy-induced neutropenia ([facility] [D70.1, T45.1X5A] 10/31/2017    Gastrointestinal hemorrhage [K92.2]     Benign essential HTN [I10] 12/10/2016    Chronic low back

## 2017-11-01 NOTE — CARE COORDINATION
[address] Transitions Interview     2017    Patient: Elaine Mcbride Patient : 1960   MRN: [id number]  Reason for Admission: Acute upper GI bleed     RARS: [facility] Risk Score: 10.75  CMRS: 9      Spoke with:   Discharge planning and care transitions. .. Attempt to meet with patient. Patient in consultation at time of attempt. Will try to meet with patient at later time. Review of MR completed. DC planner notes indicate patient intends to discharge home with family assist as needed. Readmission Risk  Patient Active Problem List   Diagnosis    GERD (gastroesophageal reflux disease)    Chronic obstructive pulmonary disease (HCC)    Anxiety    Hypokalemia    Malnutrition (HCC)    Anemia    Benign essential HTN    Anorexia    Mixed anxiety depressive disorder    Cachectic ([address]    Pain of metastatic malignancy    Chronic low back pain    Advance directive discussed with patient    Mixed simple and mucopurulent chronic bronchitis ([address]    Encounter for palliative care    Epidermoid carcinoma of lung ([address]    Constipation due to opioid therapy    Osteoarthritis of spine with radiculopathy, lumbar region    Cervical radiculopathy    Chronic prescription benzodiazepine use    Failed neck syndrome    Acute upper GI bleed    Thrombocytopenia (HCC)    Chemotherapy-induced neutropenia (HCC)    Gastrointestinal hemorrhage       Inpatient Assessment  Care Transitions Summary    Care Transitions Inpatient Review  Medication Review  Do you have all of your prescriptions and are they filled?:  Yes   Barriers to Medication Adherence:  Forgets to take  Are you able to afford your medications?:  Yes  How often do you have difficulty taking your medications?:  Sometimes I take them as prescribed.   Housing Review  Who do you live with?:  Partner/Spouse/SO  Are you an active caregiver in your home?:  No  Does the person that you care for see a [facility] PCP?:  No  Social Support  Do you have a ?:  No  Do you have a [facility]?:  No  Durable Medical Equipment  Patient DME:  Wheelchair, Fidencio Betancur, Straight cane  Patient Home Equipment:  Nebulizer, Oxygen  Functional Review  Ability to seek help/take action for Emergent/Urgent situations i.e. fire, crime, inclement weather or health crisis. :  Independent  Ability handle personal hygiene needs (bathing/dressing/grooming): Independent  Ability to manage medications: Independent  Ability to prepare food:  Independent  Ability to maintain home (clean home, laundry): Independent  Ability to drive and/or has transportation:  Independent  Ability to do shopping:  Independent  Ability to manage finances: Independent  Is patient able to live independently?:  Yes  Hearing and Vision  Visual Impairment:  None  Hearing Impairment:  None  Care Transitions Interventions         Follow Up  Future Appointments  Date Time Provider Bruce Dahl   4/6/2018 2:30 PM SCHEDULE, [facility] None       Health Maintenance  There are no preventive care reminders to display for this patient.     Daniella Cedillo MA

## 2017-11-02 LAB
ABSOLUTE RETIC #: 0.01 M/UL (ref 0.02–0.1)
HAPTOGLOBIN: 258 MG/DL (ref 30–200)
HCT VFR BLD CALC: 25.8 % (ref 40.7–50.3)
HEMOGLOBIN: 9 G/DL (ref 13.5–17.5)
HEMOGLOBIN: 9 G/DL (ref 13–17)
HEMOGLOBIN: 9.1 G/DL (ref 13.5–17.5)
HEMOGLOBIN: 9.3 G/DL (ref 13.5–17.5)
HEMOGLOBIN: 9.3 G/DL (ref 13.5–17.5)
IMMATURE RETIC FRACT: ABNORMAL
LACTATE DEHYDROGENASE: 206 U/L (ref 135–225)
MCH RBC QN AUTO: 33.2 PG (ref 25.2–33.5)
MCHC RBC AUTO-ENTMCNC: 34.9 G/DL (ref 29.9–34.7)
MCV RBC AUTO: 95.2 FL (ref 82.6–102.9)
PATHOLOGIST REVIEW: NORMAL
PDW BLD-RTO: 12.3 % (ref 11.8–14.4)
PLATELET # BLD: ABNORMAL K/UL (ref 138–453)
PLATELET, FLUORESCENCE: 29 K/UL (ref 138–453)
PLATELET, IMMATURE FRACTION: 2 % (ref 1.1–10.3)
PMV BLD AUTO: ABNORMAL FL (ref 8.1–13.5)
RBC # BLD: 2.71 M/UL (ref 4.21–5.77)
RETIC %: 0.2 % (ref 0.5–2)
RETIC HEMOGLOBIN: ABNORMAL PG (ref 28.2–35.7)
SURGICAL PATHOLOGY REPORT: NORMAL
WBC # BLD: 0.1 K/UL (ref 3.5–11.3)

## 2017-11-02 PROCEDURE — 6370000000 HC RX 637 (ALT 250 FOR IP): Performed by: INTERNAL MEDICINE

## 2017-11-02 PROCEDURE — 36415 COLL VENOUS BLD VENIPUNCTURE: CPT

## 2017-11-02 PROCEDURE — 99232 SBSQ HOSP IP/OBS MODERATE 35: CPT | Performed by: INTERNAL MEDICINE

## 2017-11-02 PROCEDURE — 6360000002 HC RX W HCPCS: Performed by: NURSE PRACTITIONER

## 2017-11-02 PROCEDURE — 6360000002 HC RX W HCPCS: Performed by: INTERNAL MEDICINE

## 2017-11-02 PROCEDURE — 85018 HEMOGLOBIN: CPT

## 2017-11-02 PROCEDURE — 83010 ASSAY OF HAPTOGLOBIN QUANT: CPT

## 2017-11-02 PROCEDURE — 2580000003 HC RX 258: Performed by: INTERNAL MEDICINE

## 2017-11-02 PROCEDURE — 2580000003 HC RX 258: Performed by: NURSE PRACTITIONER

## 2017-11-02 PROCEDURE — C9113 INJ PANTOPRAZOLE SODIUM, VIA: HCPCS | Performed by: NURSE PRACTITIONER

## 2017-11-02 PROCEDURE — 83615 LACTATE (LD) (LDH) ENZYME: CPT

## 2017-11-02 PROCEDURE — 85025 COMPLETE CBC W/AUTO DIFF WBC: CPT

## 2017-11-02 PROCEDURE — 85027 COMPLETE CBC AUTOMATED: CPT

## 2017-11-02 PROCEDURE — 85045 AUTOMATED RETICULOCYTE COUNT: CPT

## 2017-11-02 PROCEDURE — 1200000000 HC SEMI PRIVATE

## 2017-11-02 PROCEDURE — 94640 AIRWAY INHALATION TREATMENT: CPT

## 2017-11-02 RX ORDER — ALPRAZOLAM 1 MG/1
1 TABLET ORAL 4 TIMES DAILY PRN
Status: DISCONTINUED | OUTPATIENT
Start: 2017-11-02 | End: 2017-11-02

## 2017-11-02 RX ORDER — HYDROCODONE BITARTRATE AND ACETAMINOPHEN 5; 325 MG/1; MG/1
1 TABLET ORAL EVERY 4 HOURS PRN
Status: DISCONTINUED | OUTPATIENT
Start: 2017-11-02 | End: 2017-11-03 | Stop reason: HOSPADM

## 2017-11-02 RX ORDER — HYDROCODONE BITARTRATE AND ACETAMINOPHEN 5; 325 MG/1; MG/1
2 TABLET ORAL EVERY 4 HOURS PRN
Status: DISCONTINUED | OUTPATIENT
Start: 2017-11-02 | End: 2017-11-03 | Stop reason: HOSPADM

## 2017-11-02 RX ORDER — ALPRAZOLAM 1 MG/1
1 TABLET ORAL EVERY 6 HOURS SCHEDULED
Status: DISCONTINUED | OUTPATIENT
Start: 2017-11-02 | End: 2017-11-03

## 2017-11-02 RX ADMIN — MOMETASONE FUROATE AND FORMOTEROL FUMARATE DIHYDRATE 2 PUFF: 200; 5 AEROSOL RESPIRATORY (INHALATION) at 08:23

## 2017-11-02 RX ADMIN — GUAIFENESIN 600 MG: 600 TABLET, EXTENDED RELEASE ORAL at 07:54

## 2017-11-02 RX ADMIN — Medication 10 ML: at 21:00

## 2017-11-02 RX ADMIN — CALCIUM CARBONATE-CHOLECALCIFEROL TAB 250 MG-125 UNIT 250 MG: 250-125 TAB at 16:57

## 2017-11-02 RX ADMIN — HYDROMORPHONE HYDROCHLORIDE 1 MG: 1 INJECTION, SOLUTION INTRAMUSCULAR; INTRAVENOUS; SUBCUTANEOUS at 14:25

## 2017-11-02 RX ADMIN — PANTOPRAZOLE SODIUM 40 MG: 40 INJECTION, POWDER, FOR SOLUTION INTRAVENOUS at 07:56

## 2017-11-02 RX ADMIN — MOMETASONE FUROATE AND FORMOTEROL FUMARATE DIHYDRATE 2 PUFF: 200; 5 AEROSOL RESPIRATORY (INHALATION) at 22:45

## 2017-11-02 RX ADMIN — ATENOLOL 50 MG: 50 TABLET ORAL at 07:54

## 2017-11-02 RX ADMIN — TBO-FILGRASTIM 300 MCG: 300 INJECTION, SOLUTION SUBCUTANEOUS at 11:23

## 2017-11-02 RX ADMIN — ALPRAZOLAM 1 MG: 1 TABLET ORAL at 12:25

## 2017-11-02 RX ADMIN — HYDROMORPHONE HYDROCHLORIDE 1 MG: 1 INJECTION, SOLUTION INTRAMUSCULAR; INTRAVENOUS; SUBCUTANEOUS at 05:13

## 2017-11-02 RX ADMIN — CALCIUM CARBONATE-CHOLECALCIFEROL TAB 250 MG-125 UNIT 250 MG: 250-125 TAB at 07:54

## 2017-11-02 RX ADMIN — HYDROMORPHONE HYDROCHLORIDE 1 MG: 1 INJECTION, SOLUTION INTRAMUSCULAR; INTRAVENOUS; SUBCUTANEOUS at 20:16

## 2017-11-02 RX ADMIN — PROCHLORPERAZINE MALEATE 10 MG: 10 TABLET, FILM COATED ORAL at 14:29

## 2017-11-02 RX ADMIN — GUAIFENESIN 600 MG: 600 TABLET, EXTENDED RELEASE ORAL at 20:15

## 2017-11-02 RX ADMIN — ALPRAZOLAM 1 MG: 0.25 TABLET ORAL at 08:13

## 2017-11-02 RX ADMIN — HYDROMORPHONE HYDROCHLORIDE 1 MG: 1 INJECTION, SOLUTION INTRAMUSCULAR; INTRAVENOUS; SUBCUTANEOUS at 11:22

## 2017-11-02 RX ADMIN — MEGESTROL ACETATE 400 MG: 40 SUSPENSION ORAL at 07:54

## 2017-11-02 RX ADMIN — TIZANIDINE 2 MG: 2 TABLET ORAL at 14:27

## 2017-11-02 RX ADMIN — HYDROMORPHONE HYDROCHLORIDE 1 MG: 1 INJECTION, SOLUTION INTRAMUSCULAR; INTRAVENOUS; SUBCUTANEOUS at 08:13

## 2017-11-02 RX ADMIN — TIZANIDINE 2 MG: 2 TABLET ORAL at 07:54

## 2017-11-02 RX ADMIN — HYDROMORPHONE HYDROCHLORIDE 1 MG: 1 INJECTION, SOLUTION INTRAMUSCULAR; INTRAVENOUS; SUBCUTANEOUS at 00:52

## 2017-11-02 RX ADMIN — SODIUM CHLORIDE: 9 INJECTION, SOLUTION INTRAVENOUS at 05:18

## 2017-11-02 RX ADMIN — SODIUM CHLORIDE 10 ML: 9 INJECTION INTRAMUSCULAR; INTRAVENOUS; SUBCUTANEOUS at 07:57

## 2017-11-02 RX ADMIN — HYDROMORPHONE HYDROCHLORIDE 1 MG: 1 INJECTION, SOLUTION INTRAMUSCULAR; INTRAVENOUS; SUBCUTANEOUS at 17:41

## 2017-11-02 RX ADMIN — ALPRAZOLAM 1 MG: 1 TABLET ORAL at 17:40

## 2017-11-02 RX ADMIN — TIZANIDINE 2 MG: 2 TABLET ORAL at 20:15

## 2017-11-02 ASSESSMENT — PAIN DESCRIPTION - LOCATION: LOCATION: BACK

## 2017-11-02 ASSESSMENT — PAIN DESCRIPTION - DIRECTION: RADIATING_TOWARDS: ABDOMEN

## 2017-11-02 ASSESSMENT — PAIN SCALES - GENERAL
PAINLEVEL_OUTOF10: 7
PAINLEVEL_OUTOF10: 4
PAINLEVEL_OUTOF10: 0
PAINLEVEL_OUTOF10: 9
PAINLEVEL_OUTOF10: 7

## 2017-11-02 ASSESSMENT — PAIN DESCRIPTION - ONSET: ONSET: ON-GOING

## 2017-11-02 ASSESSMENT — PAIN DESCRIPTION - PAIN TYPE: TYPE: CHRONIC PAIN

## 2017-11-02 ASSESSMENT — PAIN DESCRIPTION - FREQUENCY: FREQUENCY: CONTINUOUS

## 2017-11-02 ASSESSMENT — PAIN DESCRIPTION - DESCRIPTORS: DESCRIPTORS: ACHING

## 2017-11-02 NOTE — PROGRESS NOTES
Patient started on 1st unit of platelets. Patient was tolerating well. Patient VS at start of infusion was: 161/83 101 99.6, respirations 20 and O2 @ 95% Patient was approximately 3/4 of the way through the unit when he began with shaking chills and stated \"im freezing to death\" RN immediately back into room and VS were were taken:140/102 heart rate 132, temp 101.8, respirations 20, O2 @90%@ 2L. Patient is alert and orientated, speaking in full sentences. Denies SOB. Tylenol given for fever. Infusion immediately stopped and NS infusing. VS taken again: 153/98 122 Hien@[facility] temp 102.9 patient continues with chills. Ned Rodarte, house supervisor called into room. Dr. Robin Horn paged and updated on condition. New orders in computer. Patient rechecked with VS:126/82 117 Hien@[facility] on 2L temp 101.5 Patient states he is feeling much better now. Will monitor frequently.

## 2017-11-02 NOTE — PROGRESS NOTES
bleed  Active Problems:    Chronic obstructive pulmonary disease (HCC)    Anemia    Benign essential HTN    Chronic low back pain    Recurrent malignant neoplasm of lung of unknown cell type (HCC)    Thrombocytopenia (HCC)    Chemotherapy-induced neutropenia (HCC)    Gastrointestinal hemorrhage        PLAN :  1) GI status stable. Patient has pancytopenia. May need hematology consultation. From GI point of view no workup planned.         Mary Gong MD
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
## 2017-11-02 NOTE — PROGRESS NOTES
Today's Date: 11/2/2017  Patient Name: Jossie Zabala  Date of admission: 10/30/2017  2:11 PM  Patient's age: 65 y.o., 1960  Admission Dx: GI bleed [K92.2]    Reason for Consult: recurrent lung carcinoma, neutropenia, thrombocytopenia  Requesting Physician: Clarisse Franco DO    SUBJECTIVE:    Patient seen and examined  Yesterday had plt transfusion but during first unit pt had mild fever 100 F   Transfusion stopped and received benadryl. Since then afebrile  This morning Platelet result Not available in [facility] but I called hematology labs and the count report is 29K  WBC still low    Component Value Ref Range & Units Status Collected Lab   Platelet, Immature Fraction 2.0  1.1 - 10.3 % Final 11/02/2017 12:49  [address]   Platelet, Fluorescence 29   138 - 453 k/uL Final 11/02/2017 12:49  [address]   Performed at [address], [address] [place] [address] [phone]       HISTORY OF PRESENT ILLNESS IN BRIEF:    This is a 64 YO male with History of Recurrent lung cancer  Being followed by Dr Vidya Willingham and currently on chemo was admitted with GI bleeding. Reportedly he was at PCP office and noted to have heme occult positive stool. 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 2.  Relapse of squamous cell carcinoma of the right lower lung, biopsy proven 09/26/17  Started on carbo+gemzar on 10/16  Now with pancytopenia. Medications:    Prior to Admission medications    Medication Sig Start Date End Date Taking?  Authorizing Provider   predniSONE (DELTASONE) 10 MG tablet Take 10 mg by mouth daily   Yes Historical Provider, MD   calcium-vitamin D (Mir Ramirez) 10 mL at 17 0757     Facility-Administered Medications Ordered in Other Encounters   Medication Dose Route Frequency Provider Last Rate Last Dose    0.9 % sodium chloride infusion 250 mL  250 mL Intravenous Once Gin Patel,          REVIEW OF SYSTEMS:    Constitutional: ++fatigue, No fever or chills. No night sweats, no weight loss   Eyes: No eye discharge, double vision, or eye pain   HEENT: negative for sore mouth, sore throat, hoarseness and voice change   Respiratory: negative for cough , sputum, dyspnea, wheezing, hemoptysis, chest pain   Cardiovascular: negative for chest pain, dyspnea, palpitations, orthopnea, PND   Gastrointestinal: negative for nausea, vomiting, diarrhea, constipation, abdominal pain, Dysphagia, hematemesis and hematochezia   Genitourinary: negative for frequency, dysuria, nocturia, urinary incontinence, and hematuria   Integument: negative for rash, skin lesions, bruises.    Hematologic/Lymphatic: negative for easy bruising, bleeding, lymphadenopathy, or petechiae   Endocrine: negative for heat or cold intolerance,weight changes, change in bowel habits and hair loss   Musculoskeletal: negative for myalgias, arthralgias, pain, joint swelling,and bone pain   Neurological: negative for headaches, dizziness, seizures, weakness, numbness    PHYSICAL EXAM:      BP (!) 148/94   Pulse 99   Temp 98.6 °F (37 °C) (Oral)   Resp 20   Ht 5' 5\" (1.651 m)   Wt 135 lb 11.2 oz (61.6 kg)   SpO2 98%   BMI 22.58 kg/m²    Temp (24hrs), Av.3 °F (37.4 °C), Min:98.4 °F (36.9 °C), Max:100 °F (37.8 °C)  General appearance - well appearing, no in pain or distress   Mental status - alert and cooperative   Eyes - pupils equal and reactive, extraocular eye movements intact   Ears - bilateral TM's and external ear canals normal   Mouth - mucous membranes moist, pharynx normal without lesions   Neck - supple, no significant adenopathy   Lymphatics - no palpable lymphadenopathy, no hepatosplenomegaly Chest - clear to auscultation, no wheezes, rales or rhonchi, symmetric air entry   Heart - normal rate, regular rhythm, normal S1, S2, no murmurs  Abdomen - soft, nontender, nondistended, no masses or organomegaly   Neurological - alert, oriented, normal speech, no focal findings or movement disorder noted   Musculoskeletal - no joint tenderness, deformity or swelling   Extremities - peripheral pulses normal, no pedal edema, no clubbing or cyanosis   Skin - normal coloration and turgor, no rashes, no suspicious skin lesions noted ,    DATA:    Labs:   CBC:   Recent Labs      11/01/17   0718   11/02/17   0049  11/02/17   0709   WBC  0.4*   --   0.1*   --    HGB  10.0*   < >  9.0*  9.0*  9.3*   HCT  29.1*   --   25.8*   --    PLT  5*   --   See Reflexed IPF Result   --     < > = values in this interval not displayed. BMP:   Recent Labs      10/31/17   0623  11/01/17   0718   NA  138  130*   K  3.6*  3.5*   CO2  22  24   BUN  13  6   CREATININE  0.58*  0.61*   LABGLOM  >60  >60   GLUCOSE  83  128*     PT/INR:   Recent Labs      10/30/17   1445  10/31/17   0623   PROTIME  9.7  9.9   INR  0.9  1.0     IMAGING DATA:  Reviewed    Primary Problem  Acute upper GI bleed    Active Hospital Problems    Diagnosis Date Noted    Acute upper GI bleed [K92.2] 10/31/2017    Thrombocytopenia (HCC) [D69.6] 10/31/2017    Chemotherapy-induced neutropenia ([facility] [D70.1, T45.1X5A] 10/31/2017    Gastrointestinal hemorrhage [K92.2]     Benign essential HTN [I10] 12/10/2016    Chronic low back pain [M54.5, G89.29] 08/16/2016    Recurrent malignant neoplasm of lung of unknown cell type ([facility] [C34.90] 08/16/2016    Anemia [D64.9] 12/17/2014    Chronic obstructive pulmonary disease ([facility] [J44.9] 04/25/2013     IMPRESSION:   1. Recurrent Lung cancer on Shivani and gemzar  2. Rectal bleeding: GI on board  3. Pancytopenia: secondary to chemo  4. Anemia    RECOMMENDATIONS:  1. Check platlets in am  2. Await smear, normal  hapto  3.  GI evaluation  4. Will give granix one dose  5. Monitor CBC daily  6. Watch for bleeding    Discussed with patient and Nurse. Thank you for asking us to see this patient.     Griselda Jabs Shelke,MD  Hematologist/Medical Oncologist  Cell: [phone]

## 2017-11-03 ENCOUNTER — APPOINTMENT (OUTPATIENT)
Dept: GENERAL RADIOLOGY | Age: 57
DRG: 377 | End: 2017-11-03
Payer: MEDICARE

## 2017-11-03 VITALS
HEART RATE: 84 BPM | HEIGHT: 65 IN | DIASTOLIC BLOOD PRESSURE: 60 MMHG | TEMPERATURE: 98.4 F | WEIGHT: 128.8 LBS | SYSTOLIC BLOOD PRESSURE: 101 MMHG | RESPIRATION RATE: 16 BRPM | OXYGEN SATURATION: 94 % | BODY MASS INDEX: 21.46 KG/M2

## 2017-11-03 PROBLEM — E83.42 HYPOMAGNESEMIA: Status: ACTIVE | Noted: 2017-11-03

## 2017-11-03 LAB
BLD PROD TYP BPU: NORMAL
DISPENSE STATUS BLOOD BANK: NORMAL
EKG ATRIAL RATE: 110 BPM
EKG P AXIS: 60 DEGREES
EKG P-R INTERVAL: 126 MS
EKG Q-T INTERVAL: 324 MS
EKG QRS DURATION: 74 MS
EKG QTC CALCULATION (BAZETT): 438 MS
EKG R AXIS: 11 DEGREES
EKG T AXIS: 92 DEGREES
EKG VENTRICULAR RATE: 110 BPM
HCT VFR BLD CALC: 27.3 % (ref 41–53)
HEMOGLOBIN: 9.2 G/DL (ref 13.5–17.5)
HEMOGLOBIN: 9.3 G/DL (ref 13.5–17.5)
HEMOGLOBIN: 9.3 G/DL (ref 13.5–17.5)
MAGNESIUM: 1.4 MG/DL (ref 1.6–2.6)
MAGNESIUM: 1.7 MG/DL (ref 1.6–2.6)
MCH RBC QN AUTO: 32.6 PG (ref 26–34)
MCHC RBC AUTO-ENTMCNC: 34.2 G/DL (ref 31–37)
MCV RBC AUTO: 95.3 FL (ref 80–100)
PDW BLD-RTO: 12.8 % (ref 11.5–14.5)
PLATELET # BLD: 25 K/UL (ref 130–400)
PMV BLD AUTO: ABNORMAL FL (ref 6–12)
POTASSIUM SERPL-SCNC: 3.6 MMOL/L (ref 3.7–5.3)
POTASSIUM SERPL-SCNC: 4.2 MMOL/L (ref 3.7–5.3)
RBC # BLD: 2.87 M/UL (ref 4.5–5.9)
TRANSFUSION STATUS: NORMAL
TROPONIN INTERP: NORMAL
TROPONIN T: <0.03 NG/ML
UNIT DIVISION: 0
UNIT NUMBER: NORMAL
WBC # BLD: 0.4 K/UL (ref 3.5–11)

## 2017-11-03 PROCEDURE — 84132 ASSAY OF SERUM POTASSIUM: CPT

## 2017-11-03 PROCEDURE — 2580000003 HC RX 258: Performed by: INTERNAL MEDICINE

## 2017-11-03 PROCEDURE — 93005 ELECTROCARDIOGRAM TRACING: CPT

## 2017-11-03 PROCEDURE — 83735 ASSAY OF MAGNESIUM: CPT

## 2017-11-03 PROCEDURE — G8987 SELF CARE CURRENT STATUS: HCPCS

## 2017-11-03 PROCEDURE — G8988 SELF CARE GOAL STATUS: HCPCS

## 2017-11-03 PROCEDURE — C9113 INJ PANTOPRAZOLE SODIUM, VIA: HCPCS | Performed by: NURSE PRACTITIONER

## 2017-11-03 PROCEDURE — 97161 PT EVAL LOW COMPLEX 20 MIN: CPT

## 2017-11-03 PROCEDURE — 97166 OT EVAL MOD COMPLEX 45 MIN: CPT

## 2017-11-03 PROCEDURE — 84484 ASSAY OF TROPONIN QUANT: CPT

## 2017-11-03 PROCEDURE — 71010 XR CHEST PORTABLE: CPT

## 2017-11-03 PROCEDURE — G8978 MOBILITY CURRENT STATUS: HCPCS

## 2017-11-03 PROCEDURE — 6370000000 HC RX 637 (ALT 250 FOR IP): Performed by: INTERNAL MEDICINE

## 2017-11-03 PROCEDURE — 97535 SELF CARE MNGMENT TRAINING: CPT

## 2017-11-03 PROCEDURE — 36415 COLL VENOUS BLD VENIPUNCTURE: CPT

## 2017-11-03 PROCEDURE — 6360000002 HC RX W HCPCS: Performed by: INTERNAL MEDICINE

## 2017-11-03 PROCEDURE — G8979 MOBILITY GOAL STATUS: HCPCS

## 2017-11-03 PROCEDURE — 6360000002 HC RX W HCPCS: Performed by: NURSE PRACTITIONER

## 2017-11-03 PROCEDURE — 2700000000 HC OXYGEN THERAPY PER DAY

## 2017-11-03 PROCEDURE — 85027 COMPLETE CBC AUTOMATED: CPT

## 2017-11-03 PROCEDURE — 94640 AIRWAY INHALATION TREATMENT: CPT

## 2017-11-03 PROCEDURE — 97530 THERAPEUTIC ACTIVITIES: CPT

## 2017-11-03 PROCEDURE — 2580000003 HC RX 258: Performed by: NURSE PRACTITIONER

## 2017-11-03 PROCEDURE — 99239 HOSP IP/OBS DSCHRG MGMT >30: CPT | Performed by: INTERNAL MEDICINE

## 2017-11-03 RX ORDER — PANTOPRAZOLE SODIUM 40 MG/1
40 TABLET, DELAYED RELEASE ORAL
Status: DISCONTINUED | OUTPATIENT
Start: 2017-11-04 | End: 2017-11-03 | Stop reason: HOSPADM

## 2017-11-03 RX ORDER — POTASSIUM CHLORIDE 7.45 MG/ML
10 INJECTION INTRAVENOUS PRN
Status: DISCONTINUED | OUTPATIENT
Start: 2017-11-03 | End: 2017-11-03 | Stop reason: HOSPADM

## 2017-11-03 RX ORDER — MAGNESIUM SULFATE 1 G/100ML
1 INJECTION INTRAVENOUS PRN
Status: DISCONTINUED | OUTPATIENT
Start: 2017-11-03 | End: 2017-11-03 | Stop reason: HOSPADM

## 2017-11-03 RX ORDER — HYDROCODONE BITARTRATE AND ACETAMINOPHEN 5; 325 MG/1; MG/1
1-2 TABLET ORAL EVERY 4 HOURS PRN
Qty: 25 TABLET | Refills: 0 | Status: ON HOLD | OUTPATIENT
Start: 2017-11-03 | End: 2017-11-10 | Stop reason: HOSPADM

## 2017-11-03 RX ORDER — GUAIFENESIN 600 MG/1
600 TABLET, EXTENDED RELEASE ORAL 2 TIMES DAILY
Qty: 60 TABLET | Refills: 0 | Status: ON HOLD | OUTPATIENT
Start: 2017-11-03 | End: 2017-11-10 | Stop reason: HOSPADM

## 2017-11-03 RX ORDER — NICOTINE 21 MG/24HR
1 PATCH, TRANSDERMAL 24 HOURS TRANSDERMAL DAILY PRN
Qty: 14 PATCH | Refills: 0 | Status: SHIPPED | OUTPATIENT
Start: 2017-11-03 | End: 2017-11-04

## 2017-11-03 RX ORDER — ALPRAZOLAM 1 MG/1
1 TABLET ORAL 4 TIMES DAILY PRN
Status: DISCONTINUED | OUTPATIENT
Start: 2017-11-03 | End: 2017-11-03 | Stop reason: HOSPADM

## 2017-11-03 RX ORDER — POTASSIUM CHLORIDE 20MEQ/15ML
40 LIQUID (ML) ORAL PRN
Status: DISCONTINUED | OUTPATIENT
Start: 2017-11-03 | End: 2017-11-03 | Stop reason: HOSPADM

## 2017-11-03 RX ORDER — POTASSIUM CHLORIDE 20 MEQ/1
40 TABLET, EXTENDED RELEASE ORAL PRN
Status: DISCONTINUED | OUTPATIENT
Start: 2017-11-03 | End: 2017-11-03 | Stop reason: HOSPADM

## 2017-11-03 RX ORDER — PANTOPRAZOLE SODIUM 40 MG/1
40 TABLET, DELAYED RELEASE ORAL
Qty: 30 TABLET | Refills: 3 | Status: SHIPPED | OUTPATIENT
Start: 2017-11-04 | End: 2018-04-19 | Stop reason: SDUPTHER

## 2017-11-03 RX ORDER — POTASSIUM CHLORIDE 20 MEQ/1
40 TABLET, EXTENDED RELEASE ORAL ONCE
Status: COMPLETED | OUTPATIENT
Start: 2017-11-03 | End: 2017-11-03

## 2017-11-03 RX ADMIN — Medication 1200 MG: at 10:51

## 2017-11-03 RX ADMIN — Medication 10 ML: at 08:04

## 2017-11-03 RX ADMIN — GUAIFENESIN 600 MG: 600 TABLET, EXTENDED RELEASE ORAL at 08:03

## 2017-11-03 RX ADMIN — TBO-FILGRASTIM 300 MCG: 300 INJECTION, SOLUTION SUBCUTANEOUS at 10:52

## 2017-11-03 RX ADMIN — HYDROMORPHONE HYDROCHLORIDE 1 MG: 1 INJECTION, SOLUTION INTRAMUSCULAR; INTRAVENOUS; SUBCUTANEOUS at 08:07

## 2017-11-03 RX ADMIN — ALPRAZOLAM 1 MG: 1 TABLET ORAL at 06:37

## 2017-11-03 RX ADMIN — ALPRAZOLAM 1 MG: 1 TABLET ORAL at 00:21

## 2017-11-03 RX ADMIN — CALCIUM CARBONATE-CHOLECALCIFEROL TAB 250 MG-125 UNIT 250 MG: 250-125 TAB at 16:19

## 2017-11-03 RX ADMIN — PANTOPRAZOLE SODIUM 40 MG: 40 INJECTION, POWDER, FOR SOLUTION INTRAVENOUS at 08:04

## 2017-11-03 RX ADMIN — POTASSIUM CHLORIDE 40 MEQ: 20 TABLET, EXTENDED RELEASE ORAL at 10:51

## 2017-11-03 RX ADMIN — CALCIUM CARBONATE-CHOLECALCIFEROL TAB 250 MG-125 UNIT 250 MG: 250-125 TAB at 08:02

## 2017-11-03 RX ADMIN — HYDROCODONE BITARTRATE AND ACETAMINOPHEN 2 TABLET: 5; 325 TABLET ORAL at 16:19

## 2017-11-03 RX ADMIN — TIOTROPIUM BROMIDE 18 MCG: 18 CAPSULE ORAL; RESPIRATORY (INHALATION) at 09:51

## 2017-11-03 RX ADMIN — ALBUTEROL SULFATE 2 PUFF: 90 AEROSOL, METERED RESPIRATORY (INHALATION) at 04:38

## 2017-11-03 RX ADMIN — ATENOLOL 50 MG: 50 TABLET ORAL at 08:03

## 2017-11-03 RX ADMIN — TIZANIDINE 2 MG: 2 TABLET ORAL at 08:03

## 2017-11-03 RX ADMIN — TIZANIDINE 2 MG: 2 TABLET ORAL at 16:19

## 2017-11-03 RX ADMIN — HYDROCODONE BITARTRATE AND ACETAMINOPHEN 2 TABLET: 5; 325 TABLET ORAL at 02:44

## 2017-11-03 RX ADMIN — SODIUM CHLORIDE 10 ML: 9 INJECTION INTRAMUSCULAR; INTRAVENOUS; SUBCUTANEOUS at 08:04

## 2017-11-03 RX ADMIN — HYDROCODONE BITARTRATE AND ACETAMINOPHEN 2 TABLET: 5; 325 TABLET ORAL at 12:12

## 2017-11-03 RX ADMIN — MEGESTROL ACETATE 400 MG: 40 SUSPENSION ORAL at 08:03

## 2017-11-03 RX ADMIN — HYDROMORPHONE HYDROCHLORIDE 1 MG: 1 INJECTION, SOLUTION INTRAMUSCULAR; INTRAVENOUS; SUBCUTANEOUS at 04:27

## 2017-11-03 RX ADMIN — MOMETASONE FUROATE AND FORMOTEROL FUMARATE DIHYDRATE 2 PUFF: 200; 5 AEROSOL RESPIRATORY (INHALATION) at 09:22

## 2017-11-03 ASSESSMENT — PAIN DESCRIPTION - PROGRESSION
CLINICAL_PROGRESSION: NOT CHANGED

## 2017-11-03 ASSESSMENT — PAIN SCALES - GENERAL
PAINLEVEL_OUTOF10: 8
PAINLEVEL_OUTOF10: 0
PAINLEVEL_OUTOF10: 0
PAINLEVEL_OUTOF10: 9
PAINLEVEL_OUTOF10: 10

## 2017-11-03 ASSESSMENT — PAIN DESCRIPTION - FREQUENCY: FREQUENCY: CONTINUOUS

## 2017-11-03 ASSESSMENT — ACTIVITIES OF DAILY LIVING (ADL): EFFECT OF PAIN ON DAILY ACTIVITIES: WORSE WITH COUGHING

## 2017-11-03 ASSESSMENT — PAIN DESCRIPTION - LOCATION: LOCATION: CHEST

## 2017-11-03 ASSESSMENT — PAIN DESCRIPTION - DESCRIPTORS: DESCRIPTORS: SHARP

## 2017-11-03 ASSESSMENT — PAIN DESCRIPTION - PAIN TYPE: TYPE: ACUTE PAIN

## 2017-11-03 ASSESSMENT — PAIN DESCRIPTION - ORIENTATION: ORIENTATION: MID

## 2017-11-03 ASSESSMENT — PAIN DESCRIPTION - ONSET: ONSET: ON-GOING

## 2017-11-03 NOTE — PROGRESS NOTES
[facility]    Progress Note    11/3/2017    11:57 AM    Name:   Fabiola York  MRN:     [id number]     [place]:      [de-identified]   Room:   2004/2004-02  IP Day:  4  Admit Date:  10/30/2017  2:11 PM    PCP:   Shraddha Dugan DO  Code Status:  Full Code    Subjective:     C/C:   Chief Complaint   Patient presents with    GI Bleeding      Interval History Status: improved. Patient did have some chest discomfort last night. Pain is atypical in nature and started after coughing. It appears to be more musculoskeletal.  It is not anginal-like. Patient would like to go home if possible. He is coughing less. He is somewhat somnolent and lethargic after receiving scheduled Xanax dose. Spoke with nursing staff    Previous notes have been reviewed    Brief History:     \"Ad Fitch is a 65 y.o.  male who presents Emergency department with a complaint of GI bleeding area the patient states that he has noticed black stools over the last several days and feeling weak. He has a known history recurrent lung cancer. His initial occurrence was approximately 3-4 years ago. He was treated with radiation as well as chemotherapy and told that it had completely resolved. He has recently had a recurrence and is undergoing chemotherapy at this time. He complains of back pain which is chronic occurring at the time of his initial diagnosis. He was checked at his PCPs office yesterday and found to have stools that were positive for occult blood. The case has been discussed with GI who is on consultation. At this time no intervention is planned. Initial hemoglobin was 13.9, has dropped to 9.6 and 9.4. He is severely neutropenic as well as thrombocytopenic. He will be placed in reverse isolation and oncology consultation has been ordered. \"    Gastroenterology and hematology/oncology consultations ordered.   No further GI workup planned at this history. He has never used smokeless tobacco. He reports that he uses drugs, including Marijuana. He reports that he does not drink alcohol. Family History:   Family History   Problem Relation Age of Onset    Heart Disease Mother      CABG    Cancer Mother     Cancer Father      Throat    Heart Disease Father     Stroke Maternal Grandfather     Heart Disease Paternal Grandmother        Vitals:  BP 82/64   Pulse 91   Temp 98.4 °F (36.9 °C) (Oral)   Resp 16   Ht 5' 5\" (1.651 m)   Wt 128 lb 12.8 oz (58.4 kg)   SpO2 94%   BMI 21.43 kg/m²   Temp (24hrs), Av.9 °F (37.7 °C), Min:98.4 °F (36.9 °C), Max:100.9 °F (38.3 °C)    No results for input(s): POCGLU in the last 72 hours. I/O (24Hr): Intake/Output Summary (Last 24 hours) at 17 1157  Last data filed at 17 1126   Gross per 24 hour   Intake             1565 ml   Output             3375 ml   Net            -1810 ml       Labs:    Hematology:  Recent Labs      17   0718   17   0049   17   1707  17   2340  17   0516   WBC  0.4*   --   0.1*   --    --    --   0.4*   RBC  3.05*   --   2.71*   --    --    --   2.87*   HGB  10.0*   < >  9.0*  9.0*   < >  9.3*  9.2*  9.3*  9.3*   HCT  29.1*   --   25.8*   --    --    --   27.3*   MCV  95.6   --   95.2   --    --    --   95.3   MCH  32.7   --   33.2   --    --    --   32.6   MCHC  34.2   --   34.9*   --    --    --   34.2   RDW  12.4   --   12.3   --    --    --   12.8   PLT  5*   --   See Reflexed IPF Result   --    --    --   25*   MPV  NOT REPORTED   --   NOT REPORTED   --    --    --   NOT REPORTED    < > = values in this interval not displayed.      Chemistry:  Recent Labs      17   0718  17   0515   NA  130*   --    K  3.5*  3.6*   CL  95*   --    CO2  24   --    GLUCOSE  128*   --    BUN  6   --    CREATININE  0.61*   --    MG  1.4*  1.4*   ANIONGAP  11   --    LABGLOM  >60   --    GFRAA  >60   --    CALCIUM  7.8*   --    TROPONINT   -- wheelchair is necessary due to patient's impaired ambulation and mobility restrictions and would be unable to resolve these daily living tasks using a cane or walker. The patient is capable of using a standard wheelchair safely in their home and can maneuver within their home with adequate access. There is a caregiver available to provide necessary assistance. The need for this equipment was discussed with the patient and he understands, is in agreement, and has not expressed an unwillingness to use the wheelchair.       Electronically signed by Jeffrey Ward DO on 11/3/17 at 4:27 PM

## 2017-11-03 NOTE — PROGRESS NOTES
Physical Therapy    Facility/Department: [facility]  Initial Assessment    NAME: Katherin Trinidad  : 1960  MRN: [id number]    Date of Service: 11/3/2017  Katherin Trinidad is a 65 y.o. male who presents to the emergency department with GI bleeding. Patient has noticed some black stools the last couple days and feeling weak. Patient has a recurrent history of lung cancer with metastases. He reports back pain as well. No definite alleviating or aggravating factors. Apparently he was at dr Cruzito Molina office this morning and heme occult positive stool today and sent to ER for admission.     Patient Diagnosis(es): The primary encounter diagnosis was Gastrointestinal hemorrhage, unspecified gastrointestinal hemorrhage type. Diagnoses of Cardiac arrest ([address] Aspiration pneumonia of both lower lobes due to gastric secretions ([address] Chronic obstructive pulmonary disease, unspecified COPD type ([address] Thrombocytopenia ([address] and Hypokalemia were also pertinent to this visit. has a past medical history of Benign essential HTN; Cardiac arrest due to respiratory disorder ([address] Cellulitis; Chronic low back pain; Emphysema of lung ([address] Epidermoid carcinoma of lung ([address] Head injury; Hepatitis; Insomnia; Osteoarthritis of spine with radiculopathy, lumbar region; Reflux; Seizures ([address] Tremor; and Ulcer ([address] has a past surgical history that includes Cervical disc surgery (); Cystocopy (2015); Colonoscopy; Endoscopy, colon, diagnostic; tracheostomy; and Tunneled venous port placement.     Restrictions  Restrictions/Precautions  Restrictions/Precautions: General Precautions, Fall Risk  Vision/Hearing  Vision: Within Functional Limits  Hearing: Within functional limits     Subjective  General  Chart Reviewed: Yes  Follows Commands: Within Functional Limits          Orientation  Orientation  Overall Orientation Status: Within Normal Limits    Social/Functional History  Social/Functional History  Lives With: Spouse  Type of Home: House  Home Layout: One level  Home Access: Stairs to enter with rails  Entrance Stairs - Number of Steps: 4  Bathroom Shower/Tub: Tub/Shower unit  Home Equipment: Rolling walker, Cane  ADL Assistance: Independent  Homemaking Assistance: Independent  Ambulation Assistance: Independent (uses motorized cart in large stores like [employer])  Transfer Assistance: Independent  Active : Yes  Objective     Observation/Palpation  Posture: Fair  Observation: pale; weak    AROM RLE (degrees)  RLE General AROM: PF contracture ankle  AROM LLE (degrees)  LLE AROM : WFL  AROM RUE (degrees)  RUE AROM : WFL  AROM LUE (degrees)  LUE AROM : WFL  Strength RLE  Comment: 3-/5 LEs bilat. ;  PF contracture ankle  Tone RLE  RLE Tone: Clonus  Tone LLE  LLE Tone: Clonus  Tone Description: Old C-spine injury;  clonus noted bilat. LE  Sensation  Overall Sensation Status: WFL  Bed mobility  Rolling to Left: Contact guard assistance  Supine to Sit: Minimal assistance (for LEs)  Sit to Supine: Minimal assistance (for LEs)  Transfers  Sit to Stand: Minimal Assistance (unable to stand with hip/knee extension. Sit to stand x 3. Each time pt. stood straighter.  )  Stand to sit: Minimal Assistance  Ambulation  Ambulation?: Yes  Ambulation 1  Surface: level tile  Device: Rolling Walker  Assistance: Minimal assistance  Quality of Gait: Pt. with great difficulty moving feet to take steps along EOB with RW. Able to take a few small sidesteps with min. assist.  One LOB posteriorly requiring min assist.    Distance: one foot  Comments: back to bed. Balance  Posture: Fair  Sitting - Static: Good  Sitting - Dynamic: Good;-  Standing - Static: Poor        Assessment   Body structures, Functions, Activity limitations: Decreased functional mobility ; Decreased ADL status; Decreased ROM; Decreased strength;Decreased endurance;Decreased balance  Assessment: Per pt. and RN, pt. was amb. around room yesterday with RW.  Today, pt. needed min assist for supine to sit and sit to stand transfer. Pt. could not stand with full hip/knee extension due to weakness. Could not take steps along EOB with RW without min. assist due to weakness and posterior LOB. Pt. will not d/c to SNF. States his wife is an [employer] and \"has a gait belt. \"  There will be two others besides his wife at home when he arrives who can assist, per pt. He has 4 steps to enter his home. Explained to pt. that PT is recommending SNF. If pt. is insisting on d/c home, he at least needs a w/c due to inability to amb. Talked to RN and D/C planner. Specific instructions for Next Treatment: progress functional strength  Prognosis: Good  Decision Making: Low Complexity  Patient Education: Leodan. re. pt's current state of weakness and how it would be unsafe to d/c home. REQUIRES PT FOLLOW UP: Yes  Activity Tolerance  Activity Tolerance: Patient limited by fatigue;Patient limited by endurance; Patient limited by pain     Discharge Recommendations:  [facility]  Times per week: 1-2x/day; 5-6days/wk  Specific instructions for Next Treatment: progress functional strength  Current Treatment Recommendations: Strengthening, ROM, Balance Training, Functional Mobility Training, Transfer Training, Endurance Training, Gait Training, Stair training  Safety Devices  Type of devices: All fall risk precautions in place, Bed alarm in place, Call light within reach, Patient at risk for falls, Left in bed, Nurse notified    G-Code  PT G-Codes  Functional Assessment Tool Used: [facility] FO  Score: 12  Functional Limitation: Mobility: Walking and moving around  Mobility: Walking and Moving Around Current Status ():  At least 40 percent but less than 60 percent impaired, limited or restricted  Mobility: Walking and Moving Around Goal Status (): 0 percent impaired, limited or restricted                 Goals  Short term goals  Time Frame for Short term goals: 12 visits:  Short term goal 1: Pt. to be indep with bed mob. Short term goal 2: Pt. to be indep with transfers  Short term goal 3: Pt. to be indep with gait with approp. assistive device 100+ ft. Short term goal 4: Pt. to safely negotiate 4 step/ 1HR to prepare for d/c home (if not SNF)  Short term goal 5: Pt. to tolerate 25+ min.  of PT for ther ex/ functional mobility training/ ther ex/ gait/ balance  Patient Goals   Patient goals : d/c home       Therapy Time   Individual Concurrent Group Co-treatment   Time In  1350         Time Out  1436         Minutes  Pao [address], PT

## 2017-11-03 NOTE — PROGRESS NOTES
Occupational Therapy   Occupational Therapy Initial Assessment  Date: 11/3/2017   Patient Name: Leatha Yeboah  MRN: [id number]     : 1960   RN reports patient is medically stable for therapy treatment this date. Chart reviewed prior to treatment and patient is agreeable for therapy. All lines intact and patient positioned comfortably at end of treatment. All patient needs addressed prior to ending therapy session. Note copied from ED: Leatha Yeboah is a 65 y.o. male who presents to the emergency department with GI bleeding. Patient has noticed some black stools the last couple days and feeling weak. Patient has a recurrent history of lung cancer with metastases. He reports back pain as well. No definite alleviating or aggravating factors. Apparently he was at dr Modi Karla office this morning and heme occult positive stool today and sent to ER for admission. Patient Diagnosis(es): The primary encounter diagnosis was Gastrointestinal hemorrhage, unspecified gastrointestinal hemorrhage type. Diagnoses of Cardiac arrest ([address] Aspiration pneumonia of both lower lobes due to gastric secretions ([address] Chronic obstructive pulmonary disease, unspecified COPD type ([address] Thrombocytopenia ([address] and Hypokalemia were also pertinent to this visit. has a past medical history of Benign essential HTN; Cardiac arrest due to respiratory disorder ([address] Cellulitis; Chronic low back pain; Emphysema of lung ([address] Epidermoid carcinoma of lung ([address] Head injury; Hepatitis; Insomnia; Osteoarthritis of spine with radiculopathy, lumbar region; Reflux; Seizures ([address] Tremor; and Ulcer ([address] has a past surgical history that includes Cervical disc surgery (); Cystocopy (2015); Colonoscopy; Endoscopy, colon, diagnostic; tracheostomy; and Tunneled venous port placement.     Treatment Diagnosis: GI bleed      Restrictions  Restrictions/Precautions  Restrictions/Precautions: General Precautions, 23 51.12 5.88 16%   24 57.54 7.36 0%       Goals  Short term goals  Time Frame for Short term goals: Pt will, by discharge  Short term goal 1: demo UB self-care IND  Short term goal 2: demo LB self-care MIN A  Short term goal 3: demo functional mobility to/from bathroom with MIN A  Short term goal 4: demo activity tolerance for seated ADL >15min  Patient Goals   Patient goals : to go home safely       Therapy Time   Individual Concurrent Group Co-treatment   Time In 1540         Time Out 1610         Minutes [id number] [address] [place] [address] OTR/L

## 2017-11-03 NOTE — PROGRESS NOTES
Writer reviewed discharge instructions with patient and girlfriend. They verbalized  Understanding. Signature obtained per girlfriend d/t patient weakness. Scripts for [employer], Nicotine patch and Mucinex.  Patient discharge with all belongings , Oxygen and wheelchair

## 2017-11-03 NOTE — DISCHARGE SUMMARY
checked at his PCPs office yesterday and found to have stools that were positive for occult blood. The case has been discussed with GI who is on consultation. At this time no intervention is planned. Initial hemoglobin was 13.9, has dropped to 9.6 and 9.4. He is severely neutropenic as well as thrombocytopenic. He will be placed in reverse isolation and oncology consultation has been ordered. \"     Patient was admitted to the hospital for further evaluation and treatment. Hematology/oncology as well as gastroenterology consultations were ordered. Given comorbidities and thrombocytopenia, further endoscopy not planned at this time. Can follow up with gastric neurology as an outpatient. Granix was administered by hematology/oncology. Recommend to keep platelet count greater than 10,000.       Significant therapeutic interventions: Granix for thrombocytopenia, IV fluids, pain control, specialist consultation    Significant Diagnostic Studies:   Labs / Micro:  CBC:   Lab Results   Component Value Date    WBC 0.4 11/03/2017    RBC 2.87 11/03/2017    RBC 4.69 09/08/2011    HGB 9.3 11/03/2017    HGB 9.3 11/03/2017    HCT 27.3 11/03/2017    MCV 95.3 11/03/2017    MCH 32.6 11/03/2017    MCHC 34.2 11/03/2017    RDW 12.8 11/03/2017    PLT 25 11/03/2017     09/08/2011     BMP:    Lab Results   Component Value Date    GLUCOSE 128 11/01/2017    GLUCOSE 95 09/08/2011     11/01/2017    K 3.6 11/03/2017    CL 95 11/01/2017    CO2 24 11/01/2017    ANIONGAP 11 11/01/2017    BUN 6 11/01/2017    CREATININE 0.61 11/01/2017    BUNCRER 10 11/01/2017    CALCIUM 7.8 11/01/2017    LABGLOM >60 11/01/2017    GFRAA >60 11/01/2017    GFR      11/01/2017    GFR NOT REPORTED 11/01/2017     CMP:    Lab Results   Component Value Date    GLUCOSE 128 11/01/2017    GLUCOSE 95 09/08/2011     11/01/2017    K 3.6 11/03/2017    CL 95 11/01/2017    CO2 24 11/01/2017    BUN 6 11/01/2017    CREATININE 0.61 11/01/2017    ANIONGAP 11 500-200 MG-UNIT per tablet  Commonly known as:  OSCAL-500     carisoprodol 350 MG tablet  Commonly known as:  SOMA  TAKE 1 TABLET BY MOUTH 2 TIMES DAILY AS NEEDED FOR MUSCLE SPASMS     guaiFENesin 600 MG extended release tablet  Commonly known as:  MUCINEX  Take 1 tablet by mouth 2 times daily     megestrol 40 MG/ML suspension  Commonly known as:  MEGACE     prochlorperazine 10 MG tablet  Commonly known as:  COMPAZINE     VENTOLIN HFA IN     zolpidem 10 MG tablet  Commonly known as:  AMBIEN        STOP taking these medications    ASPIR-LOW 81 MG EC tablet  Generic drug:  aspirin     dronabinol 5 MG capsule  Commonly known as:  MARINOL     meloxicam 15 MG tablet  Commonly known as:  MOBIC     predniSONE 10 MG tablet  Commonly known as:  Isela Gomez           Where to Get Your Medications      These medications were sent to [facility], [address], [address] [place] [address]    Phone:  [phone]   · guaiFENesin 600 MG extended release tablet  · nicotine 14 MG/24HR  · pantoprazole 40 MG tablet     You can get these medications from any pharmacy    Bring a paper prescription for each of these medications  · HYDROcodone-acetaminophen 5-325 MG per tablet         Time Spent on discharge is  35 mins in patient examination, evaluation, counseling as well as medication reconciliation, prescriptions for required medications, discharge plan and follow up. Electronically signed by   Linnette Lopez DO  11/3/2017  12:03 PM      Thank you Dr. Jessica Mckeon DO for the opportunity to be involved in this patient's care.

## 2017-11-03 NOTE — CARE COORDINATION
DC PLANNING    Pt ready for discharge and qualifies for home 02 24/7. Met with pt at bedside. Pt will go home with assist of his girlfriend. Pt has nocturnal roseline e02 thru [facility]. Script for home 02 24/7 called to [facility]. [employer]. Script, roseline e02 eval and progress note faxed to [facility]. Pt will have delivery of port tank to hospital room this afternoon. Discussed with pt need for home nursing and PT/OT. Pt is now agreeable. Pt cannot remember name of agency. Await SO to come with choice. DARIUS signed on chart.

## 2017-11-03 NOTE — PLAN OF CARE
Problem: Tobacco Use:  Goal: Inpatient tobacco use cessation counseling participation  Inpatient tobacco use cessation counseling participation   Outcome: Ongoing  Patient has a nicotine patch in place.

## 2017-11-04 ENCOUNTER — HOSPITAL ENCOUNTER (INPATIENT)
Age: 57
LOS: 6 days | Discharge: NURSING, REHAB OR LONG-TERM CARE | DRG: 871 | End: 2017-11-10
Attending: EMERGENCY MEDICINE | Admitting: INTERNAL MEDICINE
Payer: MEDICARE

## 2017-11-04 ENCOUNTER — CARE COORDINATION (OUTPATIENT)
Dept: CASE MANAGEMENT | Age: 57
End: 2017-11-04

## 2017-11-04 ENCOUNTER — APPOINTMENT (OUTPATIENT)
Dept: GENERAL RADIOLOGY | Age: 57
DRG: 871 | End: 2017-11-04
Payer: MEDICARE

## 2017-11-04 DIAGNOSIS — R50.9 ACUTE FEBRILE ILLNESS: Primary | ICD-10-CM

## 2017-11-04 PROBLEM — R06.03 ACUTE RESPIRATORY DISTRESS: Status: ACTIVE | Noted: 2017-11-04

## 2017-11-04 PROBLEM — D70.9 NEUTROPENIA WITH FEVER (HCC): Status: ACTIVE | Noted: 2017-11-04

## 2017-11-04 PROBLEM — R50.81 NEUTROPENIA WITH FEVER (HCC): Status: ACTIVE | Noted: 2017-11-04

## 2017-11-04 PROBLEM — J18.9 MULTIFOCAL PNEUMONIA: Status: ACTIVE | Noted: 2017-11-04

## 2017-11-04 PROBLEM — Z87.19 H/O: GI BLEED: Status: ACTIVE | Noted: 2017-11-04

## 2017-11-04 LAB
-: ABNORMAL
ABSOLUTE EOS #: 0 K/UL (ref 0–0.4)
ABSOLUTE IMMATURE GRANULOCYTE: ABNORMAL K/UL (ref 0–0.3)
ABSOLUTE LYMPH #: 0.35 K/UL (ref 1–4.8)
ABSOLUTE MONO #: 0.16 K/UL (ref 0.2–0.8)
ALBUMIN SERPL-MCNC: 3 G/DL (ref 3.5–5.2)
ALBUMIN/GLOBULIN RATIO: ABNORMAL (ref 1–2.5)
ALP BLD-CCNC: 44 U/L (ref 40–129)
ALT SERPL-CCNC: 21 U/L (ref 5–41)
AMORPHOUS: ABNORMAL
ANION GAP SERPL CALCULATED.3IONS-SCNC: 15 MMOL/L (ref 9–17)
AST SERPL-CCNC: 30 U/L
BACTERIA: ABNORMAL
BASOPHILS # BLD: 0 %
BASOPHILS ABSOLUTE: 0 K/UL (ref 0–0.2)
BILIRUB SERPL-MCNC: 0.41 MG/DL (ref 0.3–1.2)
BILIRUBIN DIRECT: 0.19 MG/DL
BILIRUBIN URINE: NEGATIVE
BILIRUBIN, INDIRECT: 0.22 MG/DL (ref 0–1)
BUN BLDV-MCNC: 13 MG/DL (ref 6–20)
BUN/CREAT BLD: 16 (ref 9–20)
CALCIUM SERPL-MCNC: 9 MG/DL (ref 8.6–10.4)
CASTS UA: ABNORMAL /LPF
CHLORIDE BLD-SCNC: 91 MMOL/L (ref 98–107)
CO2: 24 MMOL/L (ref 20–31)
COLOR: YELLOW
COMMENT UA: ABNORMAL
CREAT SERPL-MCNC: 0.82 MG/DL (ref 0.7–1.2)
CRYSTALS, UA: ABNORMAL /HPF
DIFFERENTIAL TYPE: ABNORMAL
EOSINOPHILS RELATIVE PERCENT: 0 %
EPITHELIAL CELLS UA: ABNORMAL /HPF
GFR AFRICAN AMERICAN: >60 ML/MIN
GFR NON-AFRICAN AMERICAN: >60 ML/MIN
GFR SERPL CREATININE-BSD FRML MDRD: ABNORMAL ML/MIN/{1.73_M2}
GFR SERPL CREATININE-BSD FRML MDRD: ABNORMAL ML/MIN/{1.73_M2}
GLOBULIN: ABNORMAL G/DL (ref 1.5–3.8)
GLUCOSE BLD-MCNC: 80 MG/DL (ref 70–99)
GLUCOSE URINE: NEGATIVE
HCT VFR BLD CALC: 29.2 % (ref 41–53)
HEMOGLOBIN: 10.1 G/DL (ref 13.5–17.5)
IMMATURE GRANULOCYTES: ABNORMAL %
KETONES, URINE: NEGATIVE
LACTIC ACID: 1.7 MMOL/L (ref 0.5–2.2)
LEUKOCYTE ESTERASE, URINE: NEGATIVE
LYMPHOCYTES # BLD: 58 %
MCH RBC QN AUTO: 33.1 PG (ref 26–34)
MCHC RBC AUTO-ENTMCNC: 34.8 G/DL (ref 31–37)
MCV RBC AUTO: 95.1 FL (ref 80–100)
METAMYELOCYTES ABSOLUTE COUNT: 0.01 K/UL
METAMYELOCYTES: 2 %
MONOCYTES # BLD: 27 %
MORPHOLOGY: ABNORMAL
MUCUS: ABNORMAL
MYELOCYTES ABSOLUTE COUNT: 0.01 K/UL
MYELOCYTES: 1 %
MYOGLOBIN: 157 NG/ML (ref 28–72)
NITRITE, URINE: NEGATIVE
OTHER OBSERVATIONS UA: ABNORMAL
PDW BLD-RTO: 13.7 % (ref 11.5–14.5)
PH UA: 7.5 (ref 5–8)
PLATELET # BLD: 48 K/UL (ref 130–400)
PLATELET ESTIMATE: ABNORMAL
PMV BLD AUTO: 7.8 FL (ref 6–12)
POTASSIUM SERPL-SCNC: 3.8 MMOL/L (ref 3.7–5.3)
PROTEIN UA: ABNORMAL
RBC # BLD: 3.07 M/UL (ref 4.5–5.9)
RBC # BLD: ABNORMAL 10*6/UL
RBC UA: ABNORMAL /HPF (ref 0–2)
RENAL EPITHELIAL, UA: ABNORMAL /HPF
SEG NEUTROPHILS: 12 %
SEGMENTED NEUTROPHILS ABSOLUTE COUNT: 0.07 K/UL (ref 1.8–7.7)
SODIUM BLD-SCNC: 130 MMOL/L (ref 135–144)
SPECIFIC GRAVITY UA: 1.01 (ref 1–1.03)
TOTAL PROTEIN: 6.7 G/DL (ref 6.4–8.3)
TRICHOMONAS: ABNORMAL
TROPONIN INTERP: ABNORMAL
TROPONIN T: <0.03 NG/ML
TURBIDITY: ABNORMAL
URINE HGB: ABNORMAL
UROBILINOGEN, URINE: NORMAL
WBC # BLD: 0.6 K/UL (ref 3.5–11)
WBC # BLD: ABNORMAL 10*3/UL
WBC UA: ABNORMAL /HPF (ref 0–5)
YEAST: ABNORMAL

## 2017-11-04 PROCEDURE — 87040 BLOOD CULTURE FOR BACTERIA: CPT

## 2017-11-04 PROCEDURE — 80076 HEPATIC FUNCTION PANEL: CPT

## 2017-11-04 PROCEDURE — 99285 EMERGENCY DEPT VISIT HI MDM: CPT

## 2017-11-04 PROCEDURE — 81001 URINALYSIS AUTO W/SCOPE: CPT

## 2017-11-04 PROCEDURE — 2500000003 HC RX 250 WO HCPCS: Performed by: EMERGENCY MEDICINE

## 2017-11-04 PROCEDURE — 87185 SC STD ENZYME DETCJ PER NZM: CPT

## 2017-11-04 PROCEDURE — 83874 ASSAY OF MYOGLOBIN: CPT

## 2017-11-04 PROCEDURE — 93005 ELECTROCARDIOGRAM TRACING: CPT

## 2017-11-04 PROCEDURE — 2000000000 HC ICU R&B

## 2017-11-04 PROCEDURE — 94640 AIRWAY INHALATION TREATMENT: CPT

## 2017-11-04 PROCEDURE — 99223 1ST HOSP IP/OBS HIGH 75: CPT | Performed by: INTERNAL MEDICINE

## 2017-11-04 PROCEDURE — 6370000000 HC RX 637 (ALT 250 FOR IP): Performed by: INTERNAL MEDICINE

## 2017-11-04 PROCEDURE — 85025 COMPLETE CBC W/AUTO DIFF WBC: CPT

## 2017-11-04 PROCEDURE — 84484 ASSAY OF TROPONIN QUANT: CPT

## 2017-11-04 PROCEDURE — 80048 BASIC METABOLIC PNL TOTAL CA: CPT

## 2017-11-04 PROCEDURE — 51702 INSERT TEMP BLADDER CATH: CPT

## 2017-11-04 PROCEDURE — 71010 XR CHEST PORTABLE: CPT

## 2017-11-04 PROCEDURE — 87205 SMEAR GRAM STAIN: CPT

## 2017-11-04 PROCEDURE — 6360000002 HC RX W HCPCS: Performed by: INTERNAL MEDICINE

## 2017-11-04 PROCEDURE — 6370000000 HC RX 637 (ALT 250 FOR IP): Performed by: NURSE PRACTITIONER

## 2017-11-04 PROCEDURE — 83605 ASSAY OF LACTIC ACID: CPT

## 2017-11-04 PROCEDURE — 87086 URINE CULTURE/COLONY COUNT: CPT

## 2017-11-04 PROCEDURE — 2580000003 HC RX 258: Performed by: INTERNAL MEDICINE

## 2017-11-04 PROCEDURE — 87076 CULTURE ANAEROBE IDENT EACH: CPT

## 2017-11-04 PROCEDURE — 87070 CULTURE OTHR SPECIMN AEROBIC: CPT

## 2017-11-04 RX ORDER — HYDROCODONE BITARTRATE AND ACETAMINOPHEN 5; 325 MG/1; MG/1
1 TABLET ORAL EVERY 4 HOURS PRN
Status: DISCONTINUED | OUTPATIENT
Start: 2017-11-04 | End: 2017-11-10 | Stop reason: HOSPADM

## 2017-11-04 RX ORDER — SODIUM CHLORIDE 0.9 % (FLUSH) 0.9 %
10 SYRINGE (ML) INJECTION EVERY 12 HOURS SCHEDULED
Status: DISCONTINUED | OUTPATIENT
Start: 2017-11-04 | End: 2017-11-10 | Stop reason: HOSPADM

## 2017-11-04 RX ORDER — CALCIUM CARBONATE/VITAMIN D3 600 MG-10
1 TABLET ORAL 2 TIMES DAILY
Status: DISCONTINUED | OUTPATIENT
Start: 2017-11-04 | End: 2017-11-10 | Stop reason: HOSPADM

## 2017-11-04 RX ORDER — ACETAMINOPHEN 500 MG
1000 TABLET ORAL ONCE
Status: COMPLETED | OUTPATIENT
Start: 2017-11-04 | End: 2017-11-04

## 2017-11-04 RX ORDER — ALPRAZOLAM 1 MG/1
1 TABLET ORAL NIGHTLY PRN
Status: DISCONTINUED | OUTPATIENT
Start: 2017-11-04 | End: 2017-11-04

## 2017-11-04 RX ORDER — HYDROCODONE BITARTRATE AND ACETAMINOPHEN 5; 325 MG/1; MG/1
2 TABLET ORAL EVERY 4 HOURS PRN
Status: DISCONTINUED | OUTPATIENT
Start: 2017-11-04 | End: 2017-11-10 | Stop reason: HOSPADM

## 2017-11-04 RX ORDER — PANTOPRAZOLE SODIUM 40 MG/1
40 TABLET, DELAYED RELEASE ORAL
Status: DISCONTINUED | OUTPATIENT
Start: 2017-11-05 | End: 2017-11-10 | Stop reason: HOSPADM

## 2017-11-04 RX ORDER — TIZANIDINE 2 MG/1
2 TABLET ORAL 3 TIMES DAILY
Status: DISCONTINUED | OUTPATIENT
Start: 2017-11-04 | End: 2017-11-05

## 2017-11-04 RX ORDER — ACETAMINOPHEN 325 MG/1
650 TABLET ORAL EVERY 4 HOURS PRN
Status: DISCONTINUED | OUTPATIENT
Start: 2017-11-04 | End: 2017-11-10 | Stop reason: HOSPADM

## 2017-11-04 RX ORDER — OYSTER SHELL CALCIUM WITH VITAMIN D 500; 200 MG/1; [IU]/1
1 TABLET, FILM COATED ORAL 2 TIMES DAILY
Status: DISCONTINUED | OUTPATIENT
Start: 2017-11-04 | End: 2017-11-04 | Stop reason: CLARIF

## 2017-11-04 RX ORDER — IPRATROPIUM BROMIDE AND ALBUTEROL SULFATE 2.5; .5 MG/3ML; MG/3ML
1 SOLUTION RESPIRATORY (INHALATION)
Status: DISCONTINUED | OUTPATIENT
Start: 2017-11-04 | End: 2017-11-10 | Stop reason: HOSPADM

## 2017-11-04 RX ORDER — ZOLPIDEM TARTRATE 5 MG/1
10 TABLET ORAL NIGHTLY PRN
Status: DISCONTINUED | OUTPATIENT
Start: 2017-11-04 | End: 2017-11-10 | Stop reason: HOSPADM

## 2017-11-04 RX ORDER — MEGESTROL ACETATE 40 MG/ML
400 SUSPENSION ORAL DAILY
Status: DISCONTINUED | OUTPATIENT
Start: 2017-11-04 | End: 2017-11-10 | Stop reason: HOSPADM

## 2017-11-04 RX ORDER — LEVOFLOXACIN 5 MG/ML
750 INJECTION, SOLUTION INTRAVENOUS EVERY 24 HOURS
Status: DISCONTINUED | OUTPATIENT
Start: 2017-11-04 | End: 2017-11-05

## 2017-11-04 RX ORDER — ALPRAZOLAM 1 MG/1
1 TABLET ORAL NIGHTLY PRN
Status: DISCONTINUED | OUTPATIENT
Start: 2017-11-04 | End: 2017-11-10 | Stop reason: HOSPADM

## 2017-11-04 RX ORDER — ONDANSETRON 2 MG/ML
4 INJECTION INTRAMUSCULAR; INTRAVENOUS EVERY 6 HOURS PRN
Status: DISCONTINUED | OUTPATIENT
Start: 2017-11-04 | End: 2017-11-10 | Stop reason: HOSPADM

## 2017-11-04 RX ORDER — GUAIFENESIN 600 MG/1
600 TABLET, EXTENDED RELEASE ORAL 2 TIMES DAILY
Status: DISCONTINUED | OUTPATIENT
Start: 2017-11-04 | End: 2017-11-05

## 2017-11-04 RX ORDER — NICOTINE 21 MG/24HR
1 PATCH, TRANSDERMAL 24 HOURS TRANSDERMAL DAILY PRN
Status: DISCONTINUED | OUTPATIENT
Start: 2017-11-04 | End: 2017-11-10 | Stop reason: HOSPADM

## 2017-11-04 RX ORDER — METHYLPREDNISOLONE SODIUM SUCCINATE 40 MG/ML
40 INJECTION, POWDER, LYOPHILIZED, FOR SOLUTION INTRAMUSCULAR; INTRAVENOUS EVERY 6 HOURS SCHEDULED
Status: DISCONTINUED | OUTPATIENT
Start: 2017-11-04 | End: 2017-11-07

## 2017-11-04 RX ORDER — ACETAMINOPHEN 325 MG/1
650 TABLET ORAL EVERY 4 HOURS PRN
Status: DISCONTINUED | OUTPATIENT
Start: 2017-11-04 | End: 2017-11-04

## 2017-11-04 RX ORDER — PROCHLORPERAZINE MALEATE 10 MG
10 TABLET ORAL EVERY 6 HOURS PRN
Status: DISCONTINUED | OUTPATIENT
Start: 2017-11-04 | End: 2017-11-10 | Stop reason: HOSPADM

## 2017-11-04 RX ORDER — SODIUM CHLORIDE 0.9 % (FLUSH) 0.9 %
10 SYRINGE (ML) INJECTION PRN
Status: DISCONTINUED | OUTPATIENT
Start: 2017-11-04 | End: 2017-11-10 | Stop reason: HOSPADM

## 2017-11-04 RX ORDER — SODIUM CHLORIDE 9 MG/ML
INJECTION, SOLUTION INTRAVENOUS CONTINUOUS
Status: DISCONTINUED | OUTPATIENT
Start: 2017-11-04 | End: 2017-11-09

## 2017-11-04 RX ORDER — ALBUTEROL SULFATE 90 UG/1
2 AEROSOL, METERED RESPIRATORY (INHALATION) EVERY 4 HOURS PRN
Status: DISCONTINUED | OUTPATIENT
Start: 2017-11-04 | End: 2017-11-10 | Stop reason: HOSPADM

## 2017-11-04 RX ORDER — BISACODYL 10 MG
10 SUPPOSITORY, RECTAL RECTAL DAILY PRN
Status: DISCONTINUED | OUTPATIENT
Start: 2017-11-04 | End: 2017-11-10 | Stop reason: HOSPADM

## 2017-11-04 RX ORDER — FAMOTIDINE 20 MG/1
20 TABLET, FILM COATED ORAL 2 TIMES DAILY
Status: DISCONTINUED | OUTPATIENT
Start: 2017-11-04 | End: 2017-11-06

## 2017-11-04 RX ORDER — ALBUTEROL SULFATE 2.5 MG/3ML
2.5 SOLUTION RESPIRATORY (INHALATION)
Status: DISCONTINUED | OUTPATIENT
Start: 2017-11-04 | End: 2017-11-10 | Stop reason: HOSPADM

## 2017-11-04 RX ORDER — FLUTICASONE FUROATE AND VILANTEROL 200; 25 UG/1; UG/1
1 POWDER RESPIRATORY (INHALATION) DAILY
Status: DISCONTINUED | OUTPATIENT
Start: 2017-11-04 | End: 2017-11-04 | Stop reason: CLARIF

## 2017-11-04 RX ORDER — DOCUSATE SODIUM 100 MG/1
100 CAPSULE, LIQUID FILLED ORAL 2 TIMES DAILY
Status: DISCONTINUED | OUTPATIENT
Start: 2017-11-04 | End: 2017-11-10 | Stop reason: HOSPADM

## 2017-11-04 RX ORDER — ATENOLOL 50 MG/1
50 TABLET ORAL DAILY
Status: DISCONTINUED | OUTPATIENT
Start: 2017-11-04 | End: 2017-11-10 | Stop reason: HOSPADM

## 2017-11-04 RX ADMIN — ACETAMINOPHEN 1000 MG: 500 TABLET ORAL at 16:38

## 2017-11-04 RX ADMIN — METHYLPREDNISOLONE SODIUM SUCCINATE 40 MG: 40 INJECTION, POWDER, FOR SOLUTION INTRAMUSCULAR; INTRAVENOUS at 18:35

## 2017-11-04 RX ADMIN — METHYLPREDNISOLONE SODIUM SUCCINATE 40 MG: 40 INJECTION, POWDER, FOR SOLUTION INTRAMUSCULAR; INTRAVENOUS at 23:38

## 2017-11-04 RX ADMIN — TAZOBACTAM SODIUM AND PIPERACILLIN SODIUM 3.38 G: 375; 3 INJECTION, SOLUTION INTRAVENOUS at 18:01

## 2017-11-04 RX ADMIN — LEVOFLOXACIN 750 MG: 5 INJECTION, SOLUTION INTRAVENOUS at 22:12

## 2017-11-04 RX ADMIN — SODIUM CHLORIDE: 9 INJECTION, SOLUTION INTRAVENOUS at 22:12

## 2017-11-04 RX ADMIN — IPRATROPIUM BROMIDE AND ALBUTEROL SULFATE 1 AMPULE: .5; 3 SOLUTION RESPIRATORY (INHALATION) at 21:16

## 2017-11-04 ASSESSMENT — PAIN SCALES - GENERAL
PAINLEVEL_OUTOF10: 8

## 2017-11-04 ASSESSMENT — ENCOUNTER SYMPTOMS
SORE THROAT: 0
COUGH: 0
CONSTIPATION: 0
BLOOD IN STOOL: 0
SINUS PRESSURE: 0
COLOR CHANGE: 0
NAUSEA: 0
ABDOMINAL PAIN: 1
PHOTOPHOBIA: 0
DIARRHEA: 0
SHORTNESS OF BREATH: 0
VOMITING: 0
EYE PAIN: 0
RHINORRHEA: 0

## 2017-11-04 ASSESSMENT — PAIN DESCRIPTION - ORIENTATION: ORIENTATION: LEFT

## 2017-11-04 ASSESSMENT — PAIN DESCRIPTION - LOCATION: LOCATION: ABDOMEN

## 2017-11-04 NOTE — ED PROVIDER NOTES
[facility] [address] ED  eMERGENCY dEPARTMENT eNCOUnter      Pt Name: Thalia Hernandez  MRN: [id number]  [place] 1960  Date of evaluation: 11/4/2017  Provider: Yuko Mckeon NP    CHIEF COMPLAINT       Chief Complaint   Patient presents with    Fatigue         HISTORY OF PRESENT ILLNESS  (Location/Symptom, Timing/Onset, Context/Setting, Quality, Duration, Modifying Factors, Severity.)   Thalia Hernandez is a 65 y.o. male who presents to the emergency department via EMS for fatigue. He was discharged from the hospital yesterday after admission for GI bleed, aspiration pneumonia. Denies chills, cough, vomiting, diarrhea, SOB, headache, urinary symptoms. Denies blood in his stools. Rates his pain 8/10 at this time to his left abdomen. He is in treatment for lung cancer. Nursing Notes were reviewed. ALLERGIES     Aspirin; Fish-derived products; Shellfish-derived products; and Motrin [ibuprofen micronized]    CURRENT MEDICATIONS       Previous Medications    ALBUTEROL SULFATE (VENTOLIN HFA IN)    Inhale into the lungs as needed    ALPRAZOLAM (XANAX) 1 MG TABLET    TAKE 1 TABLET BY MOUTH 2 TIMES DAILY AS NEEDED FOR SLEEP    ATENOLOL (TENORMIN) 50 MG TABLET    Take 1 tablet by mouth daily    CALCIUM-VITAMIN D (OSCAL-500) 500-200 MG-UNIT PER TABLET    Take 1 tablet by mouth 2 times daily    CARISOPRODOL (SOMA) 350 MG TABLET    TAKE 1 TABLET BY MOUTH 2 TIMES DAILY AS NEEDED FOR MUSCLE SPASMS    FLUTICASONE FUROATE-VILANTEROL (BREO ELLIPTA) 200-25 MCG/INH AEPB    Inhale 1 puff into the lungs daily    GUAIFENESIN (MUCINEX) 600 MG EXTENDED RELEASE TABLET    Take 1 tablet by mouth 2 times daily    HYDROCODONE-ACETAMINOPHEN (NORCO) 5-325 MG PER TABLET    Take 1-2 tablets by mouth every 4 hours as needed for Pain .     MEGESTROL (MEGACE) 40 MG/ML SUSPENSION    Take 400 mg by mouth daily     PANTOPRAZOLE (PROTONIX) 40 MG TABLET    Take 1 tablet by mouth every morning (before breakfast) PROCHLORPERAZINE (COMPAZINE) 10 MG TABLET    Take 10 mg by mouth every 6 hours as needed (nausea)     UMECLIDINIUM BROMIDE (INCRUSE ELLIPTA) 62.5 MCG/INH AEPB    Inhale 1 puff into the lungs daily    ZOLPIDEM (AMBIEN) 10 MG TABLET    Take 10 mg by mouth nightly as needed for Sleep       PAST MEDICAL HISTORY         Diagnosis Date    Benign essential HTN 12/10/2016    Cardiac arrest due to [facility] disorder ([facility] 12/10/2016    Cellulitis     Chronic low back pain 2016    Emphysema of lung ([facility]     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    Head injury     1984, [place] & hit head on bottom, Halo placed    Hepatitis     Insomnia     Osteoarthritis of spine with radiculopathy, lumbar region 8/15/2017    Reflux     Seizures ([facility]     last one Dec 2016    Tremor     hx of    Ulcer [facility])        SURGICAL HISTORY           Procedure Laterality Date    CERVICAL One [address] SURGERY      c3-c6 fusion    COLONOSCOPY      CYSTOSCOPY  2015    ENDOSCOPY, COLON, DIAGNOSTIC      EGD    TRACHEOSTOMY       with broken neck    TUNNELED VENOUS PORT PLACEMENT           FAMILY HISTORY           Problem Relation Age of Onset    Heart Disease Mother      CABG    Cancer Mother     Cancer Father      Throat    Heart Disease Father     Stroke Maternal Grandfather     Heart Disease Paternal Grandmother      Family Status   Relation Status    Mother Alive    Father     Sister Alive    Brother Alive    Brother Alive    Sister Alive    Sister Alive    Maternal Grandfather     Paternal Grandmother         SOCIAL HISTORY      reports that he has been smoking Cigarettes. He has a 40.00 pack-year smoking history. He has never used smokeless tobacco. He reports that he uses drugs, including Marijuana. He reports that he does not drink alcohol.     REVIEW OF SYSTEMS    (2-9 systems for level 4, 10 or more for level 5)     Review of Systems   Constitutional: Positive for fatigue 6.   Skin: Skin is warm and dry. No rash noted. He is not diaphoretic. Psychiatric: He has a normal mood and affect. His behavior is normal.   Vitals reviewed. DIAGNOSTIC RESULTS     EKG: All EKG's are interpreted by the Emergency Department Physician who either signs or Co-signs this chart in the absence of a cardiologist.  EKG was interpreted by Dr. Regan Norman after completion. RADIOLOGY:   Non-plain film images such as CT, Ultrasound and MRI are read by the radiologist. Kateryna Bautista radiographic images are visualized and preliminarily interpreted by the emergency physician with the below findings:    Interpretation per the Radiologist below, if available at the time of this note:    Xr Chest Portable    Result Date: 11/4/2017  EXAMINATION: SINGLE VIEW OF THE CHEST 11/4/2017 5:03 pm COMPARISON: 3 November 2017 HISTORY: ORDERING SYSTEM PROVIDED HISTORY: fatigue TECHNOLOGIST PROVIDED HISTORY: Reason for exam:->fatigue Ordering Physician Provided Reason for Exam: fatigue, cough Acuity: Acute Type of Exam: Initial FINDINGS: AP portable view of the chest time stamped at 1654 hours demonstrates overlying cardiac monitoring electrodes. A central catheter enters from the right, terminating in the distal superior vena cava. Patient has findings of COPD. Heart size is normal.  Continued right basilar infiltrate is noted likely pneumonitis. Interstitial markings are coarsened which may be related to vascular congestion, lymphatic metastasis, or interstitial pneumonitis. Osseous structures are stable. Compression plate and screws are noted in the cervical spine peer     Continued right basilar infiltrate with pleural thickening at the right base laterally. Diffuse interstitial prominence is noted discussed above.      LABS:  Labs Reviewed   BASIC METABOLIC PANEL - Abnormal; Notable for the following:        Result Value    Sodium 130 (*)     Chloride 91 (*)     All other components within normal limits   CBC WITH AUTO DIFFERENTIAL - Abnormal; Notable for the following:     WBC 0.6 (*)     RBC 3.07 (*)     Hemoglobin 10.1 (*)     Hematocrit 29.2 (*)     Platelets 48 (*)     Segs Absolute 0.10 (*)     Absolute Lymph # 0.20 (*)     All other components within normal limits   HEPATIC FUNCTION PANEL - Abnormal; Notable for the following:     Alb 3.0 (*)     All other components within normal limits   TROP/MYOGLOBIN - Abnormal; Notable for the following:     Myoglobin 157 (*)     All other components within normal limits   CULTURE BLOOD #1   CULTURE BLOOD #1   LACTIC ACID   UA W/REFLEX CULTURE       All other labs were within normal range or not returned as of this dictation. EMERGENCY DEPARTMENT COURSE and DIFFERENTIAL DIAGNOSIS/MDM:   Vitals:    Vitals:    11/04/17 1625 11/04/17 1729   BP: (!) 152/83    Pulse: 115    Resp: 18    Temp: 102 °F (38.9 °C) 100.6 °F (38.1 °C)   TempSrc: Oral    SpO2: (!) 80%    Weight: 120 lb (54.4 kg)    Height: 5' 5\" (1.651 m)        MEDICATIONS GIVEN IN THE ED:  Medications   piperacillin-tazobactam (ZOSYN) 3.375 g in dextrose 50 mL IVPB extended infusion (premix) (not administered)   acetaminophen (TYLENOL) tablet 1,000 mg (1,000 mg Oral Given 11/4/17 1638)       CLINICAL DECISION MAKING:  The patient presented alert with an ill appearance and was seen in conjunction with Dr. Sarah Vail. He presented febrile, hypoxic. Hgb, WBC count, and platelets were low; comparable to previous. Chest x-ray continued right basilar infiltrate with pleural thickening at the right base laterally. He was started on IV antibiotics. The patient will be admitted for further evaluation and treatment. CONSULTS:  IP CONSULT TO HOSPITALIST        FINAL IMPRESSION      1. Acute febrile illness          DISPOSITION/PLAN   DISPOSITION ADMIT    PATIENT REFERRED TO:   No follow-up provider specified.     DISCHARGE MEDICATIONS:     New Prescriptions    No medications on file           (Please note that portions of this note were

## 2017-11-04 NOTE — H&P
[facility]    HISTORY AND PHYSICAL EXAMINATION            Date:   11/4/2017  Patient name:  Mickeal Cushing  Date of admission:  11/4/2017  4:24 PM  MRN:   [id number]  Account:  [de-identified]  YOB: 1960  PCP:    Luisana Pat DO  Room:   [place] [address]  Code Status:    Prior    Chief Complaint:     Chief Complaint   Patient presents with    Fatigue   fever    History Obtained From:     patient, family member - Girlfriend, electronic medical record    History of Present Illness: The patient is a 65 y.o. Non-/non  male who presents with Fatigue And fever with chills, fever was more than 101 at home. He was discharged yesterday from the hospital when he was having GI bleed with occult blood positive stools as well as he was severely neutropenic and thrombocytopenic since he was on chemo for metastatic lung cancer.   There were multiple infiltrates in the lungs suggestive of metastasis versus multifocal pneumonia  He is having persistent cough and is able to expectorate a Little, there is no hemoptysis  He does not have much appetite and is feeling weak and weak  He is a full code  Past Medical History:     Past Medical History:   Diagnosis Date    Benign essential HTN 12/10/2016    Cardiac arrest due to respiratory disorder ([address] 12/10/2016    Cellulitis     Chronic low back pain 8/16/2016    Emphysema of lung ([address]     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    Head injury     July 1984, dove [place] & hit head on bottom, Halo placed    Hepatitis     Insomnia     Osteoarthritis of spine with radiculopathy, lumbar region 8/15/2017    Reflux     Seizures ([address]     last one Dec 2016    Tremor     hx of    Ulcer [facility])         Past Surgical History:     Past Surgical History:   Procedure Laterality Date    CERVICAL [address] SURGERY  2000    c3-c6 fusion    COLONOSCOPY      CYSTOSCOPY  7/27/2015    ENDOSCOPY, COLON, DIAGNOSTIC      EGD    TRACHEOSTOMY      1984 with broken neck    TUNNELED VENOUS PORT PLACEMENT          Medications Prior to Admission:     Prior to Admission medications    Medication Sig Start Date End Date Taking? Authorizing Provider   HYDROcodone-acetaminophen (NORCO) 5-325 MG per tablet Take 1-2 tablets by mouth every 4 hours as needed for Pain . 11/3/17 11/8/17 Yes Cindy Hahn, DO   zolpidem (AMBIEN) 10 MG tablet Take 10 mg by mouth nightly as needed for Sleep   Yes Historical Provider, MD   ALPRAZolam (XANAX) 1 MG tablet TAKE 1 TABLET BY MOUTH 2 TIMES DAILY AS NEEDED FOR SLEEP 10/3/17  Yes Gin Patel, DO   carisoprodol (SOMA) 350 MG tablet TAKE 1 TABLET BY MOUTH 2 TIMES DAILY AS NEEDED FOR MUSCLE SPASMS 10/3/17  Yes Gin Patel, DO   Fluticasone Furoate-Vilanterol (BREO ELLIPTA) 200-25 MCG/INH AEPB Inhale 1 puff into the lungs daily   Yes Historical Provider, MD   megestrol (MEGACE) 40 MG/ML suspension Take 400 mg by mouth daily  5/30/17  Yes Historical Provider, MD   Albuterol Sulfate (VENTOLIN HFA IN) Inhale into the lungs as needed   Yes Historical Provider, MD   Umeclidinium Bromide (INCRUSE ELLIPTA) 62.5 MCG/INH AEPB Inhale 1 puff into the lungs daily 1/13/16  Yes Gin Patel, DO   pantoprazole (PROTONIX) 40 MG tablet Take 1 tablet by mouth every morning (before breakfast) 11/4/17   Cindy Hahn, DO   guaiFENesin ([address]) 600 MG extended release tablet Take 1 tablet by mouth 2 times daily 11/3/17   [facility], DO   calcium-vitamin D ([place]) 500-200 MG-UNIT per tablet Take 1 tablet by mouth 2 times daily    Historical Provider, MD   atenolol (TENORMIN) 50 MG tablet Take 1 tablet by mouth daily 6/30/17   Gin Patel, DO   prochlorperazine (COMPAZINE) 10 MG tablet Take 10 mg by mouth every 6 hours as needed (nausea)     Historical Provider, MD        Allergies:     Aspirin; Fish-derived products;  Shellfish-derived products; and Motrin [ibuprofen micronized]    Social Platelets 48 (L) 138 - 400 k/uL    MPV 7.8 6.0 - 12.0 fL    Differential Type NOT REPORTED     Immature Granulocytes NOT REPORTED 0 %    Absolute Immature Granulocyte NOT REPORTED 0.00 - 0.30 k/uL    WBC Morphology NOT REPORTED     RBC Morphology NOT REPORTED     Platelet Estimate NOT REPORTED     Seg Neutrophils 19 %    Lymphocytes 33 %    Monocytes 47 %    Eosinophils % 0 %    Basophils 1 %    Segs Absolute 0.10 (L) 1.8 - 7.7 k/uL    Absolute Lymph # 0.20 (L) 1.0 - 4.8 k/uL    Absolute Mono # 0.30 0.2 - 0.8 k/uL    Absolute Eos # 0.00 0.0 - 0.4 k/uL    Basophils # 0.00 0.0 - 0.2 k/uL   Lactic Acid    Collection Time: 11/04/17  4:41 PM   Result Value Ref Range    Lactic Acid 1.7 0.5 - 2.2 mmol/L   Liver Profile    Collection Time: 11/04/17  4:41 PM   Result Value Ref Range    Alb 3.0 (L) 3.5 - 5.2 g/dL    Alkaline Phosphatase 44 40 - 129 U/L    ALT 21 5 - 41 U/L    AST 30 <40 U/L    Total Bilirubin 0.41 0.3 - 1.2 mg/dL    Bilirubin, Direct 0.19 <0.31 mg/dL    Bilirubin, Indirect 0.22 0.00 - 1.00 mg/dL    Total Protein 6.7 6.4 - 8.3 g/dL    Globulin NOT REPORTED 1.5 - 3.8 g/dL    Albumin/Globulin Ratio NOT REPORTED 1.0 - 2.5   Trop/Myoglobin    Collection Time: 11/04/17  4:41 PM   Result Value Ref Range    Troponin T <0.03 <0.03 ng/mL    Troponin Interp          Myoglobin 157 (H) 28 - 72 ng/mL   EKG 12 Lead    Collection Time: 11/04/17  4:43 PM   Result Value Ref Range    Ventricular Rate 112 BPM    Atrial Rate 112 BPM    P-R Interval 122 ms    QRS Duration 76 ms    Q-T Interval 308 ms    QTc Calculation (Bazett) 420 ms    P Axis 59 degrees    R Axis 6 degrees    T Axis 83 degrees   UA W/REFLEX CULTURE    Collection Time: 11/04/17  5:59 PM   Result Value Ref Range    Color, UA YELLOW YEL    Turbidity UA SLIGHTLY CLOUDY (A) CLEAR    Glucose, Ur NEGATIVE NEG    Bilirubin Urine NEGATIVE NEG    Ketones, Urine NEGATIVE NEG    Specific Gravity, UA 1.010 1.005 - 1.030    Urine Hgb 1+ (A) NEG    pH, UA 7.5 5.0 - 8.0 steroids  4. Resume home medications  5. Consultant oncology and pulmonary  6. No chemical DVT prophylaxis due to low platelet count  7. Monitor daily labs    Patient is full code        Consultations:   IP CONSULT TO HOSPITALIST  IP CONSULT TO PULMONOLOGY  IP CONSULT TO ONCOLOGY  IP CONSULT TO PULMONOLOGY     Patient is admitted as inpatient status because of co-morbidities listed above, severity of signs and symptoms as outlined, requirement for current medical therapies and most importantly because of direct risk to patient if care not provided in a hospital setting.     Leodan Machado MD  11/4/2017  6:22 PM    Copy sent to Dr. Yesy Warren DO

## 2017-11-04 NOTE — CARE COORDINATION
[facility] Transitions Initial Follow Up Call  Call within 2 business days of discharge: Yes    Patient: Ernestine Ornelas   Patient : 1960   MRN: [id number]    Reason for Admission:   Discharge Date: 11/3/17   RARS: [facility] Risk Score: 10.75, CM 9     Spoke with: patient's significant other, Adin Gonzalez @ [address]: [facility]  Non-face-to-face services provided:  Scheduled appointment with PCP-SO wants to schedule - will call on Monday,   Scheduled appointment with Specialist-SO will schedule with GI as per instructions  Obtained and reviewed discharge summary and/or continuity of care documents  Communication with home health agencies or other community services the patient is currently using-contacted [facility] to confirm initial visit over week-end  Assessment and support for treatment adherence and medication management-reviewed with SO      Care Transitions 24 Hour Call    Schedule Follow Up Appointment with PCP:  Declined  Do you have any ongoing symptoms?:  Yes  Patient-reported symptoms:  Weakness (Comment: continued weakness, but no worse)  Interventions for patient-reported symptoms:  Notified Home Care (Comment: home care will see patient for first visit this week-end)  Do you have a copy of your discharge instructions?:  Yes  Do you have all of your prescriptions and are they filled?:  Yes  Have you been contacted by a [facility] Pharmacist?:  No  Have you scheduled your follow up appointment?:  No (Comment: just seen @ PCP office on day of direct admission.  ASTON Calderon plans to call on Monday to schedule all appointments)  Were you discharged with any Home Care or Post Acute Services:  Yes  Post Acute Services:  Home Health (Comment: Spoke with [facility] re: timing of initial visit)  Patient DME:  Wheelchair, Walker, Straight cane  Patient Home Equipment:  Nebulizer, Oxygen  Do you have support at home?:  Partner/Spouse/SO  Do you feel like you have everything you need to keep

## 2017-11-04 NOTE — CARE COORDINATION
[facility] Transitions  2017    Patient: Sheng Kennedy  Patient : 1960   MRN: [id number]  Discharge Date: 11/3/17   RARS: Risk Score: 10.75, CM 9    Edith from [place] called me back after speaking with Dhiraj Bloom, patient's girlfriend who is extremely upset about patient's sudden change in behavior. Based on how she described patient's behavior now to [place], patient's home care nurse, Peter Calderon directed him to the ER. According to Jluis Garza said he is not acting like himself @ all now & was afraid something is wrong.     Sheng Weldon RN

## 2017-11-05 ENCOUNTER — APPOINTMENT (OUTPATIENT)
Dept: GENERAL RADIOLOGY | Age: 57
DRG: 871 | End: 2017-11-05
Payer: MEDICARE

## 2017-11-05 LAB
ALBUMIN SERPL-MCNC: 2.7 G/DL (ref 3.5–5.2)
ALBUMIN/GLOBULIN RATIO: ABNORMAL (ref 1–2.5)
ALP BLD-CCNC: 37 U/L (ref 40–129)
ALT SERPL-CCNC: 19 U/L (ref 5–41)
ANION GAP SERPL CALCULATED.3IONS-SCNC: 15 MMOL/L (ref 9–17)
AST SERPL-CCNC: 24 U/L
BILIRUB SERPL-MCNC: 0.33 MG/DL (ref 0.3–1.2)
BUN BLDV-MCNC: 16 MG/DL (ref 6–20)
BUN/CREAT BLD: 24 (ref 9–20)
CALCIUM SERPL-MCNC: 8.7 MG/DL (ref 8.6–10.4)
CHLORIDE BLD-SCNC: 95 MMOL/L (ref 98–107)
CO2: 27 MMOL/L (ref 20–31)
CREAT SERPL-MCNC: 0.67 MG/DL (ref 0.7–1.2)
GFR AFRICAN AMERICAN: >60 ML/MIN
GFR NON-AFRICAN AMERICAN: >60 ML/MIN
GFR SERPL CREATININE-BSD FRML MDRD: ABNORMAL ML/MIN/{1.73_M2}
GFR SERPL CREATININE-BSD FRML MDRD: ABNORMAL ML/MIN/{1.73_M2}
GLUCOSE BLD-MCNC: 115 MG/DL (ref 70–99)
HCT VFR BLD CALC: 28.3 % (ref 41–53)
HEMOGLOBIN: 9.9 G/DL (ref 13.5–17.5)
MCH RBC QN AUTO: 33.1 PG (ref 26–34)
MCHC RBC AUTO-ENTMCNC: 34.8 G/DL (ref 31–37)
MCV RBC AUTO: 95.2 FL (ref 80–100)
PDW BLD-RTO: 13.8 % (ref 11.5–14.5)
PLATELET # BLD: 49 K/UL (ref 130–400)
PMV BLD AUTO: 8.1 FL (ref 6–12)
POTASSIUM SERPL-SCNC: 3.8 MMOL/L (ref 3.7–5.3)
RBC # BLD: 2.97 M/UL (ref 4.5–5.9)
SODIUM BLD-SCNC: 137 MMOL/L (ref 135–144)
TOTAL PROTEIN: 6.2 G/DL (ref 6.4–8.3)
WBC # BLD: 1 K/UL (ref 3.5–11)

## 2017-11-05 PROCEDURE — 6360000002 HC RX W HCPCS: Performed by: INTERNAL MEDICINE

## 2017-11-05 PROCEDURE — 2000000000 HC ICU R&B

## 2017-11-05 PROCEDURE — 6370000000 HC RX 637 (ALT 250 FOR IP): Performed by: INTERNAL MEDICINE

## 2017-11-05 PROCEDURE — 2500000003 HC RX 250 WO HCPCS: Performed by: INTERNAL MEDICINE

## 2017-11-05 PROCEDURE — 2580000003 HC RX 258: Performed by: INTERNAL MEDICINE

## 2017-11-05 PROCEDURE — 99232 SBSQ HOSP IP/OBS MODERATE 35: CPT | Performed by: INTERNAL MEDICINE

## 2017-11-05 PROCEDURE — 94640 AIRWAY INHALATION TREATMENT: CPT

## 2017-11-05 PROCEDURE — 80053 COMPREHEN METABOLIC PANEL: CPT

## 2017-11-05 PROCEDURE — 71010 XR CHEST PORTABLE: CPT

## 2017-11-05 PROCEDURE — 85027 COMPLETE CBC AUTOMATED: CPT

## 2017-11-05 PROCEDURE — 94761 N-INVAS EAR/PLS OXIMETRY MLT: CPT

## 2017-11-05 PROCEDURE — 2700000000 HC OXYGEN THERAPY PER DAY

## 2017-11-05 PROCEDURE — 94667 MNPJ CHEST WALL 1ST: CPT

## 2017-11-05 PROCEDURE — 36415 COLL VENOUS BLD VENIPUNCTURE: CPT

## 2017-11-05 PROCEDURE — 99223 1ST HOSP IP/OBS HIGH 75: CPT | Performed by: INTERNAL MEDICINE

## 2017-11-05 RX ORDER — TIZANIDINE 2 MG/1
2 TABLET ORAL 3 TIMES DAILY PRN
Status: DISCONTINUED | OUTPATIENT
Start: 2017-11-05 | End: 2017-11-10 | Stop reason: HOSPADM

## 2017-11-05 RX ORDER — ACETYLCYSTEINE 200 MG/ML
600 SOLUTION ORAL; RESPIRATORY (INHALATION) 2 TIMES DAILY
Status: DISCONTINUED | OUTPATIENT
Start: 2017-11-05 | End: 2017-11-07

## 2017-11-05 RX ORDER — BENZONATATE 100 MG/1
200 CAPSULE ORAL 3 TIMES DAILY PRN
Status: DISCONTINUED | OUTPATIENT
Start: 2017-11-05 | End: 2017-11-10 | Stop reason: HOSPADM

## 2017-11-05 RX ADMIN — FAMOTIDINE 20 MG: 20 TABLET, FILM COATED ORAL at 08:48

## 2017-11-05 RX ADMIN — SODIUM CHLORIDE: 9 INJECTION, SOLUTION INTRAVENOUS at 11:19

## 2017-11-05 RX ADMIN — TBO-FILGRASTIM 300 MCG: 300 INJECTION, SOLUTION SUBCUTANEOUS at 17:28

## 2017-11-05 RX ADMIN — ATENOLOL 50 MG: 50 TABLET ORAL at 08:49

## 2017-11-05 RX ADMIN — SODIUM CHLORIDE: 9 INJECTION, SOLUTION INTRAVENOUS at 23:46

## 2017-11-05 RX ADMIN — IPRATROPIUM BROMIDE AND ALBUTEROL SULFATE 1 AMPULE: .5; 3 SOLUTION RESPIRATORY (INHALATION) at 19:12

## 2017-11-05 RX ADMIN — WATER 2 G: 1 INJECTION INTRAMUSCULAR; INTRAVENOUS; SUBCUTANEOUS at 10:19

## 2017-11-05 RX ADMIN — MOMETASONE FUROATE AND FORMOTEROL FUMARATE DIHYDRATE 2 PUFF: 200; 5 AEROSOL RESPIRATORY (INHALATION) at 08:15

## 2017-11-05 RX ADMIN — Medication 10 ML: at 08:49

## 2017-11-05 RX ADMIN — TIZANIDINE 2 MG: 2 TABLET ORAL at 14:02

## 2017-11-05 RX ADMIN — METHYLPREDNISOLONE SODIUM SUCCINATE 40 MG: 40 INJECTION, POWDER, FOR SOLUTION INTRAMUSCULAR; INTRAVENOUS at 12:18

## 2017-11-05 RX ADMIN — ACETYLCYSTEINE 600 MG: 200 SOLUTION ORAL; RESPIRATORY (INHALATION) at 19:13

## 2017-11-05 RX ADMIN — IPRATROPIUM BROMIDE AND ALBUTEROL SULFATE 1 AMPULE: .5; 3 SOLUTION RESPIRATORY (INHALATION) at 11:26

## 2017-11-05 RX ADMIN — Medication 3.38 G: at 02:00

## 2017-11-05 RX ADMIN — METHYLPREDNISOLONE SODIUM SUCCINATE 40 MG: 40 INJECTION, POWDER, FOR SOLUTION INTRAMUSCULAR; INTRAVENOUS at 05:54

## 2017-11-05 RX ADMIN — MOMETASONE FUROATE AND FORMOTEROL FUMARATE DIHYDRATE 2 PUFF: 200; 5 AEROSOL RESPIRATORY (INHALATION) at 19:13

## 2017-11-05 RX ADMIN — Medication 1 TABLET: at 22:13

## 2017-11-05 RX ADMIN — FAMOTIDINE 20 MG: 20 TABLET, FILM COATED ORAL at 22:13

## 2017-11-05 RX ADMIN — Medication 1 TABLET: at 08:48

## 2017-11-05 RX ADMIN — ALPRAZOLAM 1 MG: 1 TABLET ORAL at 16:57

## 2017-11-05 RX ADMIN — IPRATROPIUM BROMIDE AND ALBUTEROL SULFATE 1 AMPULE: .5; 3 SOLUTION RESPIRATORY (INHALATION) at 15:29

## 2017-11-05 RX ADMIN — TIZANIDINE 2 MG: 2 TABLET ORAL at 08:49

## 2017-11-05 RX ADMIN — HYDROCODONE BITARTRATE AND ACETAMINOPHEN 1 TABLET: 5; 325 TABLET ORAL at 17:20

## 2017-11-05 RX ADMIN — PANTOPRAZOLE SODIUM 40 MG: 40 TABLET, DELAYED RELEASE ORAL at 08:49

## 2017-11-05 RX ADMIN — WATER 2 G: 1 INJECTION INTRAMUSCULAR; INTRAVENOUS; SUBCUTANEOUS at 22:09

## 2017-11-05 RX ADMIN — TIZANIDINE 2 MG: 2 TABLET ORAL at 22:12

## 2017-11-05 RX ADMIN — DOCUSATE SODIUM 100 MG: 100 CAPSULE, LIQUID FILLED ORAL at 08:48

## 2017-11-05 RX ADMIN — IPRATROPIUM BROMIDE AND ALBUTEROL SULFATE 1 AMPULE: .5; 3 SOLUTION RESPIRATORY (INHALATION) at 08:14

## 2017-11-05 RX ADMIN — GUAIFENESIN 600 MG: 600 TABLET, EXTENDED RELEASE ORAL at 08:48

## 2017-11-05 RX ADMIN — METHYLPREDNISOLONE SODIUM SUCCINATE 40 MG: 40 INJECTION, POWDER, FOR SOLUTION INTRAMUSCULAR; INTRAVENOUS at 17:28

## 2017-11-05 RX ADMIN — MEGESTROL ACETATE 400 MG: 40 SUSPENSION ORAL at 08:48

## 2017-11-05 ASSESSMENT — PAIN SCALES - GENERAL: PAINLEVEL_OUTOF10: 6

## 2017-11-05 NOTE — CONSULTS
Facility-Administered Medications   Medication Dose Route Frequency Provider Last Rate Last Dose    ceFEPIme (MAXIPIME) 2 g in sterile water 20 mL IV syringe  2 g Intravenous Q12H Chase Fernando MD        filgrastim (NEUPOGEN) injection 300 mcg  300 mcg Subcutaneous QPM Linda Davis MD        prochlorperazine (COMPAZINE) tablet 10 mg  10 mg Oral Q6H PRN Landy Riley MD        albuterol sulfate  (90 Base) MCG/ACT inhaler 2 puff  2 puff Inhalation Q4H PRN Landy Riley MD        megestrol (MEGACE) 40 MG/ML suspension 400 mg  400 mg Oral Daily Landy Riley MD        atenolol (TENORMIN) tablet 50 mg  50 mg Oral Daily Landy Riley MD        tiZANidine (ZANAFLEX) tablet 2 mg  2 mg Oral TID Landy Riley MD        zolpidem [facility]) tablet 10 mg  10 mg Oral Nightly PRN Landy Riley MD        HYDROcodone-acetaminophen [facility]) 5-325 MG per tablet 1 tablet  1 tablet Oral Q4H PRN Landy Riley MD        pantoprazole (PROTONIX) tablet 40 mg  40 mg Oral QAM AC Landy Riley MD        guaiFENesin [facility]) extended release tablet 600 mg  600 mg Oral BID Landy Riley MD        0.9 % sodium chloride infusion   Intravenous Continuous Landy Riley MD 75 mL/hr at 11/04/17 2212      sodium chloride flush 0.9 % injection 10 mL  10 mL Intravenous 2 times per day [address] MD        sodium chloride flush 0.9 % injection 10 mL  10 mL Intravenous PRN Landy Riley MD        HYDROcodone-acetaminophen [facility]) 5-325 MG per tablet 1 tablet  1 tablet Oral Q4H PRN Landy Riley MD        Or    HYDROcodone-acetaminophen (NORCO) 5-325 MG per tablet 2 tablet  2 tablet Oral Q4H PRN Landy Riley MD        magnesium hydroxide (MILK OF MAGNESIA) 400 MG/5ML suspension 30 mL  30 mL Oral Daily PRN Landy Riley MD        docusate sodium (COLACE) capsule 100 mg  100 mg Oral BID Landy Riley MD        bisacodyl (DULCOLAX) suppository 10 mg  10 mg Rectal Daily PRN 13.7 11.5 - 14.5 %    Platelets 48 (L) 886 - 400 k/uL    MPV 7.8 6.0 - 12.0 fL    Differential Type NOT REPORTED     Immature Granulocytes NOT REPORTED 0 %    Absolute Immature Granulocyte NOT REPORTED 0.00 - 0.30 k/uL    WBC Morphology NOT REPORTED     RBC Morphology NOT REPORTED     Platelet Estimate NOT REPORTED     Seg Neutrophils 12 %    Lymphocytes 58 %    Monocytes 27 %    Eosinophils % 0 %    Basophils 0 %    Segs Absolute 0.07 (L) 1.8 - 7.7 k/uL    Absolute Lymph # 0.35 (L) 1.0 - 4.8 k/uL    Absolute Mono # 0.16 (L) 0.2 - 0.8 k/uL    Absolute Eos # 0.00 0.0 - 0.4 k/uL    Basophils # 0.00 0.0 - 0.2 k/uL    Metamyelocytes 2 (H) 0 %    Myelocytes 1 (H) 0 %    Metamyelocytes Absolute 0.01 (H) 0 k/uL    Myelocytes Absolute 0.01 (H) 0 k/uL    Morphology GIANT PLATELETS PRESENT    Lactic Acid   Result Value Ref Range    Lactic Acid 1.7 0.5 - 2.2 mmol/L   Liver Profile   Result Value Ref Range    Alb 3.0 (L) 3.5 - 5.2 g/dL    Alkaline Phosphatase 44 40 - 129 U/L    ALT 21 5 - 41 U/L    AST 30 <40 U/L    Total Bilirubin 0.41 0.3 - 1.2 mg/dL    Bilirubin, Direct 0.19 <0.31 mg/dL    Bilirubin, Indirect 0.22 0.00 - 1.00 mg/dL    Total Protein 6.7 6.4 - 8.3 g/dL    Globulin NOT REPORTED 1.5 - 3.8 g/dL    Albumin/Globulin Ratio NOT REPORTED 1.0 - 2.5   Trop/Myoglobin   Result Value Ref Range    Troponin T <0.03 <0.03 ng/mL    Troponin Interp          Myoglobin 157 (H) 28 - 72 ng/mL   UA W/REFLEX CULTURE   Result Value Ref Range    Color, UA YELLOW YEL    Turbidity UA SLIGHTLY CLOUDY (A) CLEAR    Glucose, Ur NEGATIVE NEG    Bilirubin Urine NEGATIVE NEG    Ketones, Urine NEGATIVE NEG    Specific Gravity, UA 1.010 1.005 - 1.030    Urine Hgb 1+ (A) NEG    pH, UA 7.5 5.0 - 8.0    Protein, UA TRACE (A) NEG    Urobilinogen, Urine Normal NORM    Nitrite, Urine NEGATIVE NEG    Leukocyte Esterase, Urine NEGATIVE NEG    Urinalysis Comments NOT REPORTED    Microscopic Urinalysis   Result Value Ref Range    -          WBC, UA 2 TO 5 0 - 5 /HPF    RBC, UA 5 TO 10 0 - 2 /HPF    Casts UA NOT REPORTED /LPF    Crystals UA NOT REPORTED NONE /HPF    Epithelial Cells UA 2 TO 5 /HPF    Renal Epithelial, Urine NOT REPORTED 0 /HPF    Bacteria, UA RARE (A) NONE    Mucus, UA NOT REPORTED NONE    Trichomonas, UA NOT REPORTED NONE    Amorphous, UA 1+ (A) NONE    Other Observations UA NOT REPORTED NREQ    Yeast, UA NOT REPORTED NONE   Comprehensive metabolic panel   Result Value Ref Range    Glucose 115 (H) 70 - 99 mg/dL    BUN 16 6 - 20 mg/dL    CREATININE 0.67 (L) 0.70 - 1.20 mg/dL    Bun/Cre Ratio 24 (H) 9 - 20    Calcium 8.7 8.6 - 10.4 mg/dL    Sodium 137 135 - 144 mmol/L    Potassium 3.8 3.7 - 5.3 mmol/L    Chloride 95 (L) 98 - 107 mmol/L    CO2 27 20 - 31 mmol/L    Anion Gap 15 9 - 17 mmol/L    Alkaline Phosphatase 37 (L) 40 - 129 U/L    ALT 19 5 - 41 U/L    AST 24 <40 U/L    Total Bilirubin 0.33 0.3 - 1.2 mg/dL    Total Protein 6.2 (L) 6.4 - 8.3 g/dL    Alb 2.7 (L) 3.5 - 5.2 g/dL    Albumin/Globulin Ratio NOT REPORTED 1.0 - 2.5    GFR Non-African American >60 >60 mL/min    GFR African American >60 >60 mL/min    GFR Comment          GFR Staging NOT REPORTED    CBC   Result Value Ref Range    WBC 1.0 (LL) 3.5 - 11.0 k/uL    RBC 2.97 (L) 4.5 - 5.9 m/uL    Hemoglobin 9.9 (L) 13.5 - 17.5 g/dL    Hematocrit 28.3 (L) 41 - 53 %    MCV 95.2 80 - 100 fL    MCH 33.1 26 - 34 pg    MCHC 34.8 31 - 37 g/dL    RDW 13.8 11.5 - 14.5 %    Platelets 49 (L) 713 - 400 k/uL    MPV 8.1 6.0 - 12.0 fL   EKG 12 Lead   Result Value Ref Range    Ventricular Rate 112 BPM    Atrial Rate 112 BPM    P-R Interval 122 ms    QRS Duration 76 ms    Q-T Interval 308 ms    QTc Calculation (Bazett) 420 ms    P Axis 59 degrees    R Axis 6 degrees    T Axis 83 degrees       Electronically signed by Johanny Barrett MD on 11/5/2017 at 8:05 AM

## 2017-11-05 NOTE — CONSULTS
meningismus. Blood culture may represent contamination with Neisseria spp. Vs true infection with meningococcus. Discussed with Micro lab. More data on 11-6-17. 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 Has COPD and chronic changes in RLL and interstitium of lung. Had recent admission because of GI bleed. Reportedly had aspiration pneumonia although CXR did not show acute changes. Returned with fevers, dry cough, confusion and Chemotherapy-induced neutropenia. One blood culture with Gram negative diplococci. Neisseria spp vs meningococcus. Plan cefepime while awaiting return of WBC after one cycle of chemotherapy. PLAN:  · D/C levaquin  · D/C Zosyn  · Cefepime 2 gm IV q 12 hr  · Monitor clinical response and WBC to determine length of treatment. · Neupogen    Discussed with patient, RN, Dr Maggy Thompson. I have personally reviewed the past medical history, past surgical history, medications, social history, and family history, and I have updated the database accordingly.   Past Medical History:     Past Medical History:   Diagnosis Date    Benign essential HTN 12/10/2016    Cardiac arrest due to respiratory disorder ([address] 12/10/2016    Cellulitis     Chronic low back pain 8/16/2016    Emphysema of lung ([address]     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    Head injury     July 1984, [place] & hit head on bottom, Halo placed    Hepatitis     Insomnia     Osteoarthritis of spine with radiculopathy, lumbar region 8/15/2017    Reflux     Seizures ([address]     last one Dec 2016    Tremor     hx of    Ulcer [facility])        Past Surgical  History:     Past Surgical History:   Procedure Laterality Date    CERVICAL [address] SURGERY  2000    c3-c6 fusion    COLONOSCOPY      CYSTOSCOPY  7/27/2015    ENDOSCOPY, COLON, DIAGNOSTIC      EGD    TRACHEOSTOMY      1984 with broken neck    TUNNELED VENOUS PORT PLACEMENT         Medications:      megestrol  400 mg Oral Daily    atenolol  50 mg Oral Daily    tiZANidine  2 mg Oral TID    pantoprazole  40 mg Oral QAM AC    guaiFENesin  600 mg Oral BID    sodium chloride flush  10 mL Intravenous 2 times per day    docusate sodium  100 mg Oral BID    famotidine  20 mg Oral BID    ipratropium-albuterol  1 ampule Inhalation Q4H WA    piperacillin-tazobactam  3.375 g Intravenous Q8H    And    levofloxacin  750 mg Intravenous Q24H    methylPREDNISolone  40 mg Intravenous 4 times per day    mometasone-formoterol  2 puff Inhalation BID    Calcium Carb-Cholecalciferol  1 tablet Oral BID       Social History:     Social History     Social History    Marital status: Single     Spouse name: N/A    Number of children: N/A    Years of education: N/A     Occupational History    Not on file. Social History Main Topics    Smoking status: Heavy Tobacco Smoker     Packs/day: 1.00     Years: 40.00     Types: Cigarettes    Smokeless tobacco: Never Used    Alcohol use No      Comment: none currently    Drug use:      Types: Marijuana      Comment: occasionally-with severe pain    Sexual activity: Not on file     Other Topics Concern    Not on file     Social History Narrative    No narrative on file       Family History:     Family History   Problem Relation Age of Onset    Heart Disease Mother      CABG    Cancer Mother     Cancer Father      Throat    Heart Disease Father     Stroke Maternal Grandfather     Heart Disease Paternal Grandmother         Allergies:   Aspirin; Fish-derived products; Shellfish-derived products; and Motrin [ibuprofen micronized]     Review of Systems:   Constitutional: Fevers no chills. Confusion, fatigue  Head: No headaches  Eyes: No double vision or blurry vision. ENT: No sore throat or runny nose. . No hearing loss, tinnitus or vertigo. Cardiovascular: No chest pain or palpitations. No shortness of breath. No MENA  Lung: No shortness of breath. Dry cough.  No sputum I have independently reviewed/ordered the following labs:  11/4/2017 11:57 PM - Latanya Shelton Incoming Lab Results From [place]     Component Results     Component Collected Lab   Specimen Description 11/04/2017  5:14  [facility]   . BLOOD R HAND SILVER 10ML RED 2ML Performed at [facility]    Specimen Description 11/04/2017  5:14  [facility]   [address], [address] [facility] [phone]    Special Requests 11/04/2017  5:14  [facility]   NOT REPORTED    Culture 11/04/2017  5:14  [address]   NO GROWTH 3 HOURS        CBC with Differential: Recent Labs      11/04/17 1641 11/05/17   0533   WBC  0.6*  1.0*   HGB  10.1*  9.9*   HCT  29.2*  28.3*   PLT  48*  49*   LYMPHOPCT  58   --    MONOPCT  27   --      BMP: Recent Labs      11/03/17   0515  11/03/17   1522  11/04/17   1641  11/05/17   0533   NA   --    --   130*  137   K  3.6*  4.2  3.8  3.8   CL   --    --   91*  95*   CO2   --    --   24  27   BUN   --    --   13  16   CREATININE   --    --   0.82  0.67*   MG  1.4*  1.7   --    --      Hepatic Function Panel: Recent Labs      11/04/17   1641 11/05/17   0533   PROT  6.7  6.2*   LABALBU  3.0*  2.7*   BILIDIR  0.19   --    IBILI  0.22   --    BILITOT  0.41  0.33   ALKPHOS  44  37*   ALT  21  19   AST  30  24     No results for input(s): RPR in the last 72 hours. No results for input(s): HIV in the last 72 hours. No results for input(s): BC in the last 72 hours. Lab Results   Component Value Date    MUCUS NOT REPORTED 11/04/2017    RBC 2.97 11/05/2017    RBC 4.69 09/08/2011    TRICHOMONAS NOT REPORTED 11/04/2017    WBC 1.0 11/05/2017    YEAST NOT REPORTED 11/04/2017    TURBIDITY SLIGHTLY CLOUDY 11/04/2017     Lab Results   Component Value Date    CREATININE 0.67 11/05/2017    GLUCOSE 115 11/05/2017    GLUCOSE 95 09/08/2011             Thank you for allowing us to participate in the care of this patient.  Please call with

## 2017-11-05 NOTE — CONSULTS
Inpatient consult to Respiratory Care  Consult performed by: Haven Bright  Consult ordered by: [facility]         Pulmonary Medicine and [address] MD      Patient - Markel Paniagua   MRN -  [id number]   Michael # - [de-identified]   - 1960      Date of Admission -  2017  4:24 PM  Date of evaluation -  2017  Room - [address] [place] [address]   Hospital Day - [address] MD Primary Care Physician - Shagufta Bauer, DO     Reason for Consult    Pneumonia/febrile neutropenia    Assessment   · Acute Respiratory failure  · Acute exacerbation of COPD  · Pneumonia/febrile neutropenia  · Active smoker  · Metastatic squamous cell lung cancer  · History of GI bleed/hypertension/anemia/anxiety disorder    Recommendations   · Continue IV antibiotics, Cefepime, ID on the case  · IV solu-medrol  · Albuterol and Ipratropium Q 4 hours and prn  · Dulera 200  · IV fluids  · Nicotine patch  · X-ray chest in am  · Labs: CBC and BMP in am  · BiPAP when necessary  · 2 liters/min via nasal cannula  · DVT prophylaxis with low molecular weight heparin  · Will follow with you    Problem List      Patient Active Problem List   Diagnosis    GERD (gastroesophageal reflux disease)    Chronic obstructive pulmonary disease (HCC)    Anxiety    Hypokalemia    Malnutrition (HCC)    Anemia of chronic disease    Benign essential HTN    Anorexia    Mixed anxiety depressive disorder    Cachectic ([address]    Pain of metastatic malignancy    Chronic bilateral low back pain without sciatica    Advance directive discussed with patient    Mixed simple and mucopurulent chronic bronchitis ([address]    Encounter for palliative care    Squamous cell carcinoma of lung ([address]    Constipation due to opioid therapy    Osteoarthritis of spine with radiculopathy, lumbar region    Cervical radiculopathy    Chronic prescription benzodiazepine use    Failed neck syndrome    Acute upper GI bleed    Thrombocytopenia (HCC)    Chemotherapy-induced neutropenia (HCC)    Gastrointestinal hemorrhage    Iron deficiency anemia due to chronic blood loss    Hypomagnesemia    Neutropenia with fever (HCC)    Multifocal pneumonia    H/O: GI bleed    Acute respiratory distress       HPI     Brad Ventura is 65 y.o.,  male, admitted because of fever and increasing shortness of breath. Patient was recently hospitalized at [facility]. and for GI bleed. Patient went home and started experiencing fever and shortness of breath. Patient denies significant chest pain. He has cough mostly nonproductive. He does have fever. He denies hemoptysis. He was sent home on oxygen for loss 4 days. He unfortunately still actively smoke.     PMHx   Past Medical History      Diagnosis Date    Benign essential HTN 12/10/2016    Cardiac arrest due to respiratory disorder ([address] 12/10/2016    Cellulitis     Chronic low back pain 8/16/2016    Emphysema of lung ([address]     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    Head injury     July 1984, [place] & hit head on [place], Halo placed    Hepatitis     Insomnia     Osteoarthritis of spine with radiculopathy, lumbar region 8/15/2017    Reflux     Seizures ([address]     last one Dec 2016    Tremor     hx of    Ulcer [facility])       Past Surgical History        Procedure Laterality Date    CERVICAL [address] SURGERY  2000    c3-c6 fusion    COLONOSCOPY      CYSTOSCOPY  7/27/2015    ENDOSCOPY, COLON, DIAGNOSTIC      EGD    TRACHEOSTOMY      1984 with broken neck    TUNNELED VENOUS PORT PLACEMENT         Meds    Current Medications    cefepime  2 g Intravenous Q12H    tbo-filgrastim  300 mcg Subcutaneous QPM    acetylcysteine  600 mg Inhalation BID    megestrol  400 mg Oral Daily    atenolol  50 mg Oral Daily    tiZANidine  2 mg Oral TID    pantoprazole  40 mg Oral QAM AC    sodium chloride flush  10 mL Intravenous 2 times per day    docusate sodium  100 mg Oral BID    famotidine  20 mg Oral BID    ipratropium-albuterol  1 ampule Inhalation Q4H WA    methylPREDNISolone  40 mg Intravenous 4 times per day    mometasone-formoterol  2 puff Inhalation BID    Calcium Carb-Cholecalciferol  1 tablet Oral BID     prochlorperazine, albuterol sulfate HFA, zolpidem, HYDROcodone-acetaminophen, sodium chloride flush, HYDROcodone 5 mg - acetaminophen **OR** HYDROcodone 5 mg - acetaminophen, magnesium hydroxide, bisacodyl, ondansetron, nicotine, albuterol, acetaminophen, ALPRAZolam  IV Drips/Infusions   sodium chloride 75 mL/hr at 11/05/17 1119     Home Medications  Prescriptions Prior to Admission: HYDROcodone-acetaminophen (NORCO) 5-325 MG per tablet, Take 1-2 tablets by mouth every 4 hours as needed for Pain .  zolpidem (AMBIEN) 10 MG tablet, Take 10 mg by mouth nightly as needed for Sleep  ALPRAZolam (XANAX) 1 MG tablet, TAKE 1 TABLET BY MOUTH 2 TIMES DAILY AS NEEDED FOR SLEEP  carisoprodol (SOMA) 350 MG tablet, TAKE 1 TABLET BY MOUTH 2 TIMES DAILY AS NEEDED FOR MUSCLE SPASMS  Fluticasone Furoate-Vilanterol (BREO ELLIPTA) 200-25 MCG/INH AEPB, Inhale 1 puff into the lungs daily  megestrol (MEGACE) 40 MG/ML suspension, Take 400 mg by mouth daily   Albuterol Sulfate (VENTOLIN HFA IN), Inhale into the lungs as needed  Umeclidinium Bromide (INCRUSE ELLIPTA) 62.5 MCG/INH AEPB, Inhale 1 puff into the lungs daily  pantoprazole (PROTONIX) 40 MG tablet, Take 1 tablet by mouth every morning (before breakfast)  guaiFENesin (MUCINEX) 600 MG extended release tablet, Take 1 tablet by mouth 2 times daily  calcium-vitamin D (OSCAL-500) 500-200 MG-UNIT per tablet, Take 1 tablet by mouth 2 times daily  atenolol (TENORMIN) 50 MG tablet, Take 1 tablet by mouth daily  prochlorperazine (COMPAZINE) 10 MG tablet, Take 10 mg by mouth every 6 hours as needed (nausea)     Allergies    Aspirin; Fish-derived products;  Shellfish-derived products; and Motrin [ibuprofen micronized]  Social History     Social History   Substance Use Topics    Smoking status: Heavy Tobacco Smoker     Packs/day: 1.00     Years: 40.00     Types: Cigarettes    Smokeless tobacco: Never Used    Alcohol use No      Comment: none currently     Family History          Problem Relation Age of Onset    Heart Disease Mother      CABG    Cancer Mother     Cancer Father      Throat    Heart Disease Father     Stroke Maternal Grandfather     Heart Disease Paternal Grandmother      ROS - 6 systems   General Denies any fever or chills  HEENT Denies any diplopia, tinnitus or vertigo  Resp positive for  dry cough and dyspnea  Cardiac Denies any chest pain, palpitations, claudication or edema  GI Denies any melena, hematochezia, hematemesis or pyrosis   Denies any frequency, urgency, hesitancy or incontinence  Heme Denies bruising or bleeding easily  Endocrine Denies any history of diabetes or thyroid disease  Neuro Denies any focal motor or sensory deficits  Psychiatric Denies anxiety, depression, suicidal ideation  Skin Denies rashes, itching, open sores    Vitals     height is 5' 5\" (1.651 m) and weight is 120 lb (54.4 kg). His infrared temperature is 97.3 °F (36.3 °C). His blood pressure is 111/66 and his pulse is 80. His respiration is 27 and oxygen saturation is 96%. Body mass index is 19.97 kg/m². I/O      Intake/Output Summary (Last 24 hours) at 11/05/17 1442  Last data filed at 11/05/17 0848   Gross per 24 hour   Intake              729 ml   Output             1300 ml   Net             -571 ml     I/O last 3 completed shifts: In: 719 [I.V.:719]  Out: 1300 [Urine:1300]   Patient Vitals for the past 96 hrs (Last 3 readings):   Weight   11/04/17 1625 120 lb (54.4 kg)     Exam   General Appearance  Awake, alert, oriented, in no acute distress  HEENT - Head is normocephalic, atraumatic.  Pupil reactive to light  Neck - Supple, symmetrical, trachea midline and Soft, trachea midline and straight  Lungs - positive findings: prolonged expiration  Cardiovascular - Heart sounds are normal.  Regular rhythm normal rate without murmur, gallop or rub. Abdomen - Soft, nontender, nondistended, no masses or organomegaly  Neurologic - CN II-XII are grossly intact.  There are no focal motor or sensory deficits  Skin - No bruising or bleeding  Extremities - No cyanosis, clubbing or edema    Labs  - Old records and notes have been reviewed in [facility]   CBC   Lab Results   Component Value Date    WBC 1.0 11/05/2017    RBC 2.97 11/05/2017    RBC 4.69 09/08/2011    HGB 9.9 11/05/2017    HCT 28.3 11/05/2017    PLT 49 11/05/2017     09/08/2011    MCV 95.2 11/05/2017    MCH 33.1 11/05/2017    MCHC 34.8 11/05/2017    RDW 13.8 11/05/2017    METASPCT 2 11/04/2017    LYMPHOPCT 58 11/04/2017    MONOPCT 27 11/04/2017    MYELOPCT 1 11/04/2017    BASOPCT 0 11/04/2017    MONOSABS 0.16 11/04/2017    LYMPHSABS 0.35 11/04/2017    EOSABS 0.00 11/04/2017    BASOSABS 0.00 11/04/2017    DIFFTYPE NOT REPORTED 11/04/2017     BMP Lab Results   Component Value Date     11/05/2017    K 3.8 11/05/2017    CL 95 11/05/2017    CO2 27 11/05/2017    BUN 16 11/05/2017    CREATININE 0.67 11/05/2017    GLUCOSE 115 11/05/2017    GLUCOSE 95 09/08/2011    CALCIUM 8.7 11/05/2017    MG 1.7 11/03/2017     LFTS  Lab Results   Component Value Date    ALKPHOS 37 11/05/2017    ALT 19 11/05/2017    AST 24 11/05/2017    PROT 6.2 11/05/2017    BILITOT 0.33 11/05/2017    BILIDIR 0.19 11/04/2017    IBILI 0.22 11/04/2017    LABALBU 2.7 11/05/2017    LABALBU 4.1 09/08/2011     ABG   Lab Results   Component Value Date    PHART 7.44 02/14/2013    HND0BVI 34 02/14/2013    PO2ART 112 02/14/2013    DMP4XHK 22.7 02/14/2013    BFV1BOP 22 12/10/2016    Z0PEGFAK 99.2 02/14/2013     PTT  Lab Results   Component Value Date    APTT 25.1 10/30/2017     INR   Lab Results   Component Value Date    INR 1.0 10/31/2017    INR 0.9 10/30/2017    INR 0.9 09/26/2017    PROTIME 9.9 10/31/2017    PROTIME 9.7 10/30/2017 PROTIME 10.0 09/26/2017       Radiology    CXR     (See actual reports for details)    \"Thank you for asking us to see this patient\"    Case discussed with nurse and patient. Questions and concerns addressed.     Electronically signed by     Simon Jules MD on 11/5/2017 at 2:42 PM  Pulmonary Critical Care and Sleep Medicine,  [facility]  Cell: [phone]  Office: [phone]

## 2017-11-05 NOTE — PROGRESS NOTES
[facility]    Progress Note    11/5/2017    7:36 AM    Name:   Kody Doshi  MRN:     [id number]     Acct:      [de-identified]   Room:   [address] [place] [address] Day:  1  Admit Date:  11/4/2017  4:24 PM    PCP:   Agustin Ac DO  Code Status:  Full Code    Subjective:     C/C:   Chief Complaint   Patient presents with    Fatigue   fever   Interval History Status: improved. His breathing better compared to yesterday  Able to speak sentences today  Still having cough with minimal expectoration  No fever today  Still lethargic but improved      Brief History:   The patient is a 65 y.o. Non-/non  male who presents with Fatigue And fever with chills, fever was more than 101 at home. He was discharged yesterday from the hospital when he was having GI bleed with occult blood positive stools as well as he was severely neutropenic and thrombocytopenic since he was on chemo for metastatic lung cancer. There were multiple infiltrates in the lungs suggestive of metastasis versus multifocal pneumonia  He is having persistent cough and is able to expectorate a Little, there is no hemoptysis  He does not have much appetite and is feeling weak and weak  He is a full code      Review of Systems:     Constitutional:  negative for chills, fevers, sweats  Respiratory:  + for cough,  shortness of breath, wheezing  Cardiovascular:  negative for chest pain, chest pressure/discomfort, lower extremity edema, palpitations  Gastrointestinal:  negative for abdominal pain, constipation, diarrhea, nausea, vomiting  Neurological:  negative for dizziness, headache    Medications: Allergies:     Allergies   Allergen Reactions    Aspirin     Fish-Derived Products Swelling    Shellfish-Derived Products      Other reaction(s): syncope/severe facial swelling/vomits    Motrin [Ibuprofen Micronized] Other (See Comments)     Upset stomach        Current Meds: Scheduled Meds:    megestrol  400 mg Oral Daily    atenolol  50 mg Oral Daily    tiZANidine  2 mg Oral TID    pantoprazole  40 mg Oral QAM AC    guaiFENesin  600 mg Oral BID    sodium chloride flush  10 mL Intravenous 2 times per day    docusate sodium  100 mg Oral BID    famotidine  20 mg Oral BID    ipratropium-albuterol  1 ampule Inhalation Q4H WA    piperacillin-tazobactam  3.375 g Intravenous Q8H    And    levofloxacin  750 mg Intravenous Q24H    methylPREDNISolone  40 mg Intravenous 4 times per day    mometasone-formoterol  2 puff Inhalation BID    Calcium Carb-Cholecalciferol  1 tablet Oral BID     Continuous Infusions:    sodium chloride 75 mL/hr at 11/04/17 2212     PRN Meds: prochlorperazine, albuterol sulfate HFA, zolpidem, HYDROcodone-acetaminophen, sodium chloride flush, HYDROcodone 5 mg - acetaminophen **OR** HYDROcodone 5 mg - acetaminophen, magnesium hydroxide, bisacodyl, ondansetron, nicotine, albuterol, acetaminophen, ALPRAZolam    Data:     Past Medical History:   has a past medical history of Benign essential HTN; Cardiac arrest due to respiratory disorder ([facility] Cellulitis; Chronic low back pain; Emphysema of lung ([facility] Epidermoid carcinoma of lung ([facility] Head injury; Hepatitis; Insomnia; Osteoarthritis of spine with radiculopathy, lumbar region; Reflux; Seizures ([facility] Tremor; and Ulcer ([facility] Social History:   reports that he has been smoking Cigarettes. He has a 40.00 pack-year smoking history. He has never used smokeless tobacco. He reports that he uses drugs, including Marijuana. He reports that he does not drink alcohol.      Family History:   Family History   Problem Relation Age of Onset    Heart Disease Mother      CABG    Cancer Mother     Cancer Father      Throat    Heart Disease Father     Stroke Maternal Grandfather     Heart Disease Paternal Grandmother        Vitals:  /77   Pulse 76   Temp 97.6 °F (36.4 °C) (Infrared)   Resp 22   Ht 5' 5\" (1.651 m)   Wt 120 lb (54.4 kg)   SpO2 100%   BMI 19.97 kg/m²   Temp (24hrs), Av.5 °F (37.5 °C), Min:97 °F (36.1 °C), Max:102 °F (38.9 °C)    No results for input(s): POCGLU in the last 72 hours. I/O (24Hr):     Intake/Output Summary (Last 24 hours) at 17  Last data filed at 17 0559   Gross per 24 hour   Intake              719 ml   Output             1300 ml   Net             -581 ml       Labs:    Hematology:  Recent Labs      17   0516  17   WBC  0.4*  0.6*  1.0*   HGB  9.3*  9.3*  10.1*  9.9*   HCT  27.3*  29.2*  28.3*   PLT  25*  48*  49*     Chemistry:  Recent Labs      1715  17   1522  1733   NA   --    --   130*  137   K  3.6*  4.2  3.8  3.8   CL   --    --   91*  95*   CO2   --    --   24  27   GLUCOSE   --    --   80  115*   BUN   --    --   13  16   CREATININE   --    --   0.82  0.67*   MG  1.4*  1.7   --    --    ANIONGAP   --    --   15  15   LABGLOM   --    --   >60  >60   GFRAA   --    --   >60  >60   CALCIUM   --    --   9.0  8.7   MYOGLOBIN   --    --   157*   --      Recent Labs      17   [id number]   PROT  6.7  6.2*   LABALBU  3.0*  2.7*   AST  30  24   ALT  21  19   ALKPHOS  44  37*   BILITOT  0.41  0.33   BILIDIR  0.19   --          Lab Results   Component Value Date/Time    SPECIAL NOT REPORTED 2017 05:14 PM     Lab Results   Component Value Date/Time    CULTURE NO GROWTH 3 HOURS 2017 05:14 PM    CULTURE  2017 05:14 PM     Performed at [address] [place] [address] [phone]       Lab Results   Component Value Date    POCPH 7.34 12/10/2016    PHART 7.44 2013    POCPCO2 38 12/10/2016    ZVW5DQJ 34 2013    POCPO2 166 12/10/2016    PO2ART 112 2013    POCHCO3 20.8 12/10/2016    HQH0UXC 22.7 2013    NBEA 5 12/10/2016    PBEA NOT REPORTED 12/10/2016    WEM5TQX 22 12/10/2016    AQNF2EEE 99 12/10/2016    K6GWNWCL 99.2 02/14/2013    FIO2 40.0 12/10/2016       Radiology:  CXR  Interval development of left mid lung airspace disease.  Right lower lobe   airspace disease and small pleural effusion persists. Physical Examination:        General appearance:  alert, cooperative and no distress  Mental Status:  oriented to person, place and time and normal affect  Lungs:  clear to auscultation bilaterally, normal effort  Heart:  regular rate and rhythm, no murmur  Abdomen:  soft, nontender, nondistended, normal bowel sounds, no masses, hepatomegaly, splenomegaly  Extremities:  no edema, redness, tenderness in the calves  Skin:  no gross lesions, rashes, induration    Assessment:        Primary Problem  Neutropenia with fever ([address]  Acute respiratory distress  Active Hospital Problems    Diagnosis Date Noted    Neutropenia with fever ([address] [D70.9, R50.81] 11/04/2017    Multifocal pneumonia [J18.9] 11/04/2017    H/O: GI bleed [Z87.19] 11/04/2017    Acute respiratory distress [R06.03] 11/04/2017    Chemotherapy-induced neutropenia ([address] [D70.1, T45.1X5A] 10/31/2017    Benign essential HTN [I10] 12/10/2016    Anorexia [R63.0] 08/16/2016    Cachectic ([address] Laxmi Outhouse 08/16/2016    Mixed anxiety depressive disorder [F41.8] 08/16/2016    Pain of metastatic malignancy [G89.3] 08/16/2016    Squamous cell carcinoma of lung ([address] [C34.90] 08/16/2016    Anemia of chronic disease [D63.8] 12/17/2014    Malnutrition ([address] [E46] 12/17/2014    Anxiety [F41.9] 04/25/2013    Chronic obstructive pulmonary disease ([address] [J44.9] 04/25/2013    GERD (gastroesophageal reflux disease) [K21.9] 10/03/2012       Plan:        1. Antibiotic has been switched to cefepime by ID  2. Start on Neupogen by hematology/oncology  3. Add Mucomyst  4. Monitor vitals  5.  Continue other supportive treatment as before    [address] Watson MD  11/5/2017  7:36 AM

## 2017-11-06 ENCOUNTER — APPOINTMENT (OUTPATIENT)
Dept: MRI IMAGING | Age: 57
DRG: 871 | End: 2017-11-06
Payer: MEDICARE

## 2017-11-06 ENCOUNTER — APPOINTMENT (OUTPATIENT)
Dept: GENERAL RADIOLOGY | Age: 57
DRG: 871 | End: 2017-11-06
Payer: MEDICARE

## 2017-11-06 PROBLEM — A41.50 GRAM NEGATIVE SEPSIS (HCC): Status: ACTIVE | Noted: 2017-11-06

## 2017-11-06 LAB
ABSOLUTE EOS #: 0.18 K/UL (ref 0–0.4)
ABSOLUTE IMMATURE GRANULOCYTE: ABNORMAL K/UL (ref 0–0.3)
ABSOLUTE LYMPH #: 1.38 K/UL (ref 1–4.8)
ABSOLUTE MONO #: 0.64 K/UL (ref 0.2–0.8)
ANION GAP SERPL CALCULATED.3IONS-SCNC: 9 MMOL/L (ref 9–17)
BASOPHILS # BLD: 0 %
BASOPHILS ABSOLUTE: 0 K/UL (ref 0–0.2)
BUN BLDV-MCNC: 15 MG/DL (ref 6–20)
BUN/CREAT BLD: 25 (ref 9–20)
CALCIUM SERPL-MCNC: 8.6 MG/DL (ref 8.6–10.4)
CHLORIDE BLD-SCNC: 101 MMOL/L (ref 98–107)
CO2: 29 MMOL/L (ref 20–31)
CREAT SERPL-MCNC: 0.61 MG/DL (ref 0.7–1.2)
CULTURE: ABNORMAL
DIFFERENTIAL TYPE: ABNORMAL
EKG ATRIAL RATE: 112 BPM
EKG P AXIS: 59 DEGREES
EKG P-R INTERVAL: 122 MS
EKG Q-T INTERVAL: 308 MS
EKG QRS DURATION: 76 MS
EKG QTC CALCULATION (BAZETT): 420 MS
EKG R AXIS: 6 DEGREES
EKG T AXIS: 83 DEGREES
EKG VENTRICULAR RATE: 112 BPM
EOSINOPHILS RELATIVE PERCENT: 2 %
GFR AFRICAN AMERICAN: >60 ML/MIN
GFR NON-AFRICAN AMERICAN: >60 ML/MIN
GFR SERPL CREATININE-BSD FRML MDRD: ABNORMAL ML/MIN/{1.73_M2}
GFR SERPL CREATININE-BSD FRML MDRD: ABNORMAL ML/MIN/{1.73_M2}
GLUCOSE BLD-MCNC: 123 MG/DL (ref 70–99)
HCT VFR BLD CALC: 28.5 % (ref 41–53)
HEMOGLOBIN: 9.8 G/DL (ref 13.5–17.5)
IMMATURE GRANULOCYTES: ABNORMAL %
LYMPHOCYTES # BLD: 15 %
Lab: ABNORMAL
MCH RBC QN AUTO: 33.3 PG (ref 26–34)
MCHC RBC AUTO-ENTMCNC: 34.4 G/DL (ref 31–37)
MCV RBC AUTO: 96.8 FL (ref 80–100)
METAMYELOCYTES ABSOLUTE COUNT: 0.46 K/UL
METAMYELOCYTES: 5 %
MONOCYTES # BLD: 7 %
MORPHOLOGY: ABNORMAL
MYELOCYTES ABSOLUTE COUNT: 0.55 K/UL
MYELOCYTES: 6 %
PDW BLD-RTO: 14.1 % (ref 11.5–14.5)
PLATELET # BLD: 63 K/UL (ref 130–400)
PLATELET ESTIMATE: ABNORMAL
PMV BLD AUTO: 8.4 FL (ref 6–12)
POTASSIUM SERPL-SCNC: 4 MMOL/L (ref 3.7–5.3)
RBC # BLD: 2.94 M/UL (ref 4.5–5.9)
RBC # BLD: ABNORMAL 10*6/UL
SEG NEUTROPHILS: 65 %
SEGMENTED NEUTROPHILS ABSOLUTE COUNT: 5.99 K/UL (ref 1.8–7.7)
SODIUM BLD-SCNC: 139 MMOL/L (ref 135–144)
SPECIMEN DESCRIPTION: ABNORMAL
SPECIMEN DESCRIPTION: ABNORMAL
STATUS: ABNORMAL
WBC # BLD: 9.2 K/UL (ref 3.5–11)
WBC # BLD: ABNORMAL 10*3/UL

## 2017-11-06 PROCEDURE — 6370000000 HC RX 637 (ALT 250 FOR IP): Performed by: INTERNAL MEDICINE

## 2017-11-06 PROCEDURE — 85025 COMPLETE CBC W/AUTO DIFF WBC: CPT

## 2017-11-06 PROCEDURE — 2580000003 HC RX 258: Performed by: INTERNAL MEDICINE

## 2017-11-06 PROCEDURE — 94761 N-INVAS EAR/PLS OXIMETRY MLT: CPT

## 2017-11-06 PROCEDURE — 6360000002 HC RX W HCPCS: Performed by: INTERNAL MEDICINE

## 2017-11-06 PROCEDURE — 94640 AIRWAY INHALATION TREATMENT: CPT

## 2017-11-06 PROCEDURE — 36415 COLL VENOUS BLD VENIPUNCTURE: CPT

## 2017-11-06 PROCEDURE — 80048 BASIC METABOLIC PNL TOTAL CA: CPT

## 2017-11-06 PROCEDURE — 6360000004 HC RX CONTRAST MEDICATION: Performed by: INTERNAL MEDICINE

## 2017-11-06 PROCEDURE — 6370000000 HC RX 637 (ALT 250 FOR IP): Performed by: NURSE PRACTITIONER

## 2017-11-06 PROCEDURE — 99232 SBSQ HOSP IP/OBS MODERATE 35: CPT | Performed by: INTERNAL MEDICINE

## 2017-11-06 PROCEDURE — 71010 XR CHEST PORTABLE: CPT

## 2017-11-06 PROCEDURE — 94668 MNPJ CHEST WALL SBSQ: CPT

## 2017-11-06 PROCEDURE — 2700000000 HC OXYGEN THERAPY PER DAY

## 2017-11-06 PROCEDURE — 70553 MRI BRAIN STEM W/O & W/DYE: CPT

## 2017-11-06 PROCEDURE — A9579 GAD-BASE MR CONTRAST NOS,1ML: HCPCS | Performed by: INTERNAL MEDICINE

## 2017-11-06 PROCEDURE — 2060000000 HC ICU INTERMEDIATE R&B

## 2017-11-06 RX ORDER — SODIUM CHLORIDE 0.9 % (FLUSH) 0.9 %
10 SYRINGE (ML) INJECTION
Status: DISCONTINUED | OUTPATIENT
Start: 2017-11-06 | End: 2017-11-08 | Stop reason: ALTCHOICE

## 2017-11-06 RX ORDER — SERTRALINE HYDROCHLORIDE 25 MG/1
25 TABLET, FILM COATED ORAL DAILY
Status: DISCONTINUED | OUTPATIENT
Start: 2017-11-06 | End: 2017-11-10 | Stop reason: HOSPADM

## 2017-11-06 RX ORDER — ALPRAZOLAM 0.5 MG/1
0.5 TABLET ORAL 3 TIMES DAILY PRN
Status: DISCONTINUED | OUTPATIENT
Start: 2017-11-06 | End: 2017-11-10 | Stop reason: HOSPADM

## 2017-11-06 RX ADMIN — METHYLPREDNISOLONE SODIUM SUCCINATE 40 MG: 40 INJECTION, POWDER, FOR SOLUTION INTRAMUSCULAR; INTRAVENOUS at 00:00

## 2017-11-06 RX ADMIN — ACETYLCYSTEINE 600 MG: 200 SOLUTION ORAL; RESPIRATORY (INHALATION) at 20:05

## 2017-11-06 RX ADMIN — ALPRAZOLAM 1 MG: 1 TABLET ORAL at 07:58

## 2017-11-06 RX ADMIN — DOCUSATE SODIUM 100 MG: 100 CAPSULE, LIQUID FILLED ORAL at 07:59

## 2017-11-06 RX ADMIN — MOMETASONE FUROATE AND FORMOTEROL FUMARATE DIHYDRATE 2 PUFF: 200; 5 AEROSOL RESPIRATORY (INHALATION) at 20:05

## 2017-11-06 RX ADMIN — ALBUTEROL SULFATE 2.5 MG: 2.5 SOLUTION RESPIRATORY (INHALATION) at 05:32

## 2017-11-06 RX ADMIN — Medication 10 ML: at 11:03

## 2017-11-06 RX ADMIN — PANTOPRAZOLE SODIUM 40 MG: 40 TABLET, DELAYED RELEASE ORAL at 07:59

## 2017-11-06 RX ADMIN — METHYLPREDNISOLONE SODIUM SUCCINATE 40 MG: 40 INJECTION, POWDER, FOR SOLUTION INTRAMUSCULAR; INTRAVENOUS at 12:22

## 2017-11-06 RX ADMIN — Medication 10 ML: at 09:10

## 2017-11-06 RX ADMIN — WATER 2 G: 1 INJECTION INTRAMUSCULAR; INTRAVENOUS; SUBCUTANEOUS at 22:06

## 2017-11-06 RX ADMIN — HYDROCODONE BITARTRATE AND ACETAMINOPHEN 2 TABLET: 5; 325 TABLET ORAL at 11:59

## 2017-11-06 RX ADMIN — TIZANIDINE 2 MG: 2 TABLET ORAL at 18:44

## 2017-11-06 RX ADMIN — METHYLPREDNISOLONE SODIUM SUCCINATE 40 MG: 40 INJECTION, POWDER, FOR SOLUTION INTRAMUSCULAR; INTRAVENOUS at 05:34

## 2017-11-06 RX ADMIN — Medication 1 TABLET: at 07:59

## 2017-11-06 RX ADMIN — Medication 1 TABLET: at 21:12

## 2017-11-06 RX ADMIN — SODIUM CHLORIDE: 9 INJECTION, SOLUTION INTRAVENOUS at 16:28

## 2017-11-06 RX ADMIN — IPRATROPIUM BROMIDE AND ALBUTEROL SULFATE 1 AMPULE: .5; 3 SOLUTION RESPIRATORY (INHALATION) at 07:13

## 2017-11-06 RX ADMIN — ALPRAZOLAM 1 MG: 1 TABLET ORAL at 22:35

## 2017-11-06 RX ADMIN — TBO-FILGRASTIM 300 MCG: 300 INJECTION, SOLUTION SUBCUTANEOUS at 22:06

## 2017-11-06 RX ADMIN — IPRATROPIUM BROMIDE AND ALBUTEROL SULFATE 1 AMPULE: .5; 3 SOLUTION RESPIRATORY (INHALATION) at 20:05

## 2017-11-06 RX ADMIN — HYDROCODONE BITARTRATE AND ACETAMINOPHEN 2 TABLET: 5; 325 TABLET ORAL at 07:58

## 2017-11-06 RX ADMIN — BENZONATATE 200 MG: 100 CAPSULE ORAL at 00:03

## 2017-11-06 RX ADMIN — HYDROCODONE BITARTRATE AND ACETAMINOPHEN 2 TABLET: 5; 325 TABLET ORAL at 16:51

## 2017-11-06 RX ADMIN — ATENOLOL 50 MG: 50 TABLET ORAL at 07:58

## 2017-11-06 RX ADMIN — SERTRALINE 25 MG: 25 TABLET, FILM COATED ORAL at 12:05

## 2017-11-06 RX ADMIN — ACETYLCYSTEINE 600 MG: 200 SOLUTION ORAL; RESPIRATORY (INHALATION) at 07:12

## 2017-11-06 RX ADMIN — WATER 2 G: 1 INJECTION INTRAMUSCULAR; INTRAVENOUS; SUBCUTANEOUS at 11:03

## 2017-11-06 RX ADMIN — MOMETASONE FUROATE AND FORMOTEROL FUMARATE DIHYDRATE 2 PUFF: 200; 5 AEROSOL RESPIRATORY (INHALATION) at 07:15

## 2017-11-06 RX ADMIN — METHYLPREDNISOLONE SODIUM SUCCINATE 40 MG: 40 INJECTION, POWDER, FOR SOLUTION INTRAMUSCULAR; INTRAVENOUS at 18:00

## 2017-11-06 RX ADMIN — FAMOTIDINE 20 MG: 20 TABLET, FILM COATED ORAL at 07:59

## 2017-11-06 RX ADMIN — IPRATROPIUM BROMIDE AND ALBUTEROL SULFATE 1 AMPULE: .5; 3 SOLUTION RESPIRATORY (INHALATION) at 15:16

## 2017-11-06 RX ADMIN — GADOPENTETATE DIMEGLUMINE 12 ML: 469.01 INJECTION INTRAVENOUS at 09:09

## 2017-11-06 RX ADMIN — MEGESTROL ACETATE 400 MG: 40 SUSPENSION ORAL at 07:59

## 2017-11-06 RX ADMIN — ZOLPIDEM TARTRATE 10 MG: 5 TABLET ORAL at 00:50

## 2017-11-06 RX ADMIN — IPRATROPIUM BROMIDE AND ALBUTEROL SULFATE 1 AMPULE: .5; 3 SOLUTION RESPIRATORY (INHALATION) at 11:02

## 2017-11-06 ASSESSMENT — PAIN SCALES - GENERAL
PAINLEVEL_OUTOF10: 10
PAINLEVEL_OUTOF10: 0
PAINLEVEL_OUTOF10: 7
PAINLEVEL_OUTOF10: 10

## 2017-11-06 ASSESSMENT — PAIN DESCRIPTION - LOCATION
LOCATION: BACK
LOCATION: BACK
LOCATION: BACK;ABDOMEN

## 2017-11-06 NOTE — PLAN OF CARE
[facility]    Second Visit Note  For more detailed information please refer to the progress note of the day      11/6/2017    6:04 PM    Name:   Katherin Trinidad  MRN:     [id number]     [place]:      [de-identified]   Room:   [address] [place] [address]-  IP Day:  2  Admit Date:  11/4/2017  4:24 PM    PCP:   Jonathan Ramirez DO  Code Status:  Full Code        Pt vitals were reviewed   New labs were reviewed   Patient was seen    Updated plan :     1.  Ativan ordered for anxiety    Devante Ramirez grew in blood cultures-cont iv abx        Alexi STEWARD Blood, DO  11/6/2017  6:04 PM

## 2017-11-06 NOTE — PLAN OF CARE
Problem: Risk for Impaired Skin Integrity  Goal: Tissue integrity - skin and mucous membranes  Structural intactness and normal physiological function of skin and  mucous membranes. Outcome: Ongoing  Writer will continue to monitor pt skin and mucous membranes and note any changes. Problem: Falls - Risk of  Goal: Absence of falls  Outcome: Ongoing  Pt is a fall risk upon this admission. Fall precautions established and remain in place. Pt bed remains in lowest position with wheels locked and 3/4 side rails up. Call light and bedside table within reach. Clutter removed from around bed. Pt was educated on how to call for assistance and both acknowledged and demonstrated teaching. Hourly rounding performed and pt offered toileting during rounding. Problem: Pain:  Goal: Pain level will decrease  Pain level will decrease   Outcome: Ongoing  Pt continues to express pain to writer. Writer responded to pt accordingly with medications from [facility]

## 2017-11-06 NOTE — PROGRESS NOTES
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 · The patient did have MRIs of the brain done today there are pending report. · Await ID of the gram-negative edison    Summary of Stay:   Kody Doshi is a 65y.o.-year-old  male who was initially admitted on 2017. 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      Had recent admission because of GI bleed. Reportedly had aspiration pneumonia although CXR did not show acute changes. Returned with fevers, dry cough, confusion and Chemotherapy-induced neutropenia. One blood culture with Gram negative diplococci. Neisseria spp vs meningococcus.     Current evaluation:2017    BP (!) 113/90   Pulse 104   Temp 98.8 °F (37.1 °C) (Infrared)   Resp 24   Ht 5' 5\" (1.651 m)   Wt 120 lb (54.4 kg)   SpO2 92%   BMI 19.97 kg/m²     Temperature Range: Temp: 98.8 °F (37.1 °C) Temp  Av.2 °F (36.8 °C)  Min: 97.3 °F (36.3 °C)  Max: 98.8 °F (37.1 °C)    The patient is seen and evaluated at bedside he is awake and alert and reports generalized arthralgias and myalgias as well as fatigue. He does not report any fevers or chills. He does report some bilateral chest pain that is constant.   He does not have any abdominal pain nausea Description . BLOOD R HAND SILVER 10ML RED 2ML Performed at [address] [place] [address]    Specimen Description [address], [address] [phone]    Special Requests NOT REPORTED    Culture NO GROWTH 2 DAYS    Culture Performed at [facility] [id number] [place] [address], [address] [place] [address] [phone]    Status Pending   Cult,Blood #1 [[id number]] (Abnormal) Collected: 11/04/17 1713   Order Status: Completed Specimen: Blood Updated: 11/05/17 1248    Specimen Description . BLOOD LT AC 12CC KB Performed at [address] [place] [address] [facility]    Specimen Description Margareth robins, [facility] [phone]    Special Requests NOT REPORTED    Culture POSITIVE BLOOD CULTURE, RN NOTIFIED: Shree Martines [phone] ON 11/5/17 (A)    Culture DIRECT GRAM STAIN FROM BOTTLE: GRAM NEGATIVE DIPLOCOCCI    Culture Performed at [address], [address] [place] [address] [phone]    Status Pending   Urine culture clean catch [[id number]] Collected: 11/04/17 1809   Order Status: Completed Updated: 11/05/17 0802    Specimen Description . CLEAN CATCH URINE Performed at [address] [place] [address] [address] [facility]    Specimen Description Avinger, [facility] [phone]    Special Requests NOT REPORTED    Culture CULTURE IN PROGRESS    Culture Performed at [address], [address] [place] [address] [phone]    Status Pending         Medications:      cefepime  2 g Intravenous Q12H    tbo-filgrastim  300 mcg Subcutaneous QPM    acetylcysteine  600 mg Inhalation BID    megestrol  400 mg Oral Daily    atenolol  50 mg Oral Daily    pantoprazole  40 mg Oral QAM AC    sodium chloride flush  10 mL Intravenous 2 times per day    docusate sodium  100 mg Oral BID    famotidine  20 mg Oral BID    ipratropium-albuterol  1 ampule Inhalation Q4H WA    methylPREDNISolone  40 mg Intravenous 4 times per day    mometasone-formoterol  2 puff Inhalation BID    Calcium Carb-Cholecalciferol  1 tablet Oral BID Thank you for allowing us to participate in the care of this patient. Please call with questions.     Flower Foy MD  [employer] messaging: [phone]

## 2017-11-06 NOTE — PLAN OF CARE
Problem: Risk for Impaired Skin Integrity  Goal: Tissue integrity - skin and mucous membranes  Structural intactness and normal physiological function of skin and  mucous membranes.    Outcome: Ongoing      Problem: Falls - Risk of  Goal: Absence of falls  Outcome: Ongoing      Problem: Fluid Volume - Deficit:  Goal: Achieves intake and output within specified parameters  Achieves intake and output within specified parameters   Outcome: Ongoing

## 2017-11-06 NOTE — CARE COORDINATION
Partner/Spouse/SO  Are you an active caregiver in your home?:  No  Does the person that you care for see a [address] PCP?:  No  Social Support  Do you have a ?:  No  Do you have a [facility]?:  Yes  Deondre Eagle Name:  Licha Barksdale  Patient DME:  Wheelchair, Walker, Straight cane  Patient Home Equipment:  Nebulizer, Oxygen  Functional Review  Ability to seek help/take action for Emergent/Urgent situations i.e. fire, crime, inclement weather or health crisis. :  Independent  Ability handle personal hygiene needs (bathing/dressing/grooming): Independent  Ability to manage medications: Independent  Ability to prepare food:  Independent  Ability to maintain home (clean home, laundry): Independent  Ability to drive and/or has transportation:  Independent  Ability to do shopping:  Independent  Ability to manage finances: Independent  Is patient able to live independently?:  Yes  Hearing and Vision  Visual Impairment:  None  Hearing Impairment:  None  Care Transitions Interventions         Follow Up  Future Appointments  Date Time Provider Bruce Dahl   4/6/2018 2:30 PM SCHEDULE, [facility] ONC [facility] None       Health Maintenance  There are no preventive care reminders to display for this patient.     Flores Gao MA

## 2017-11-06 NOTE — PROGRESS NOTES
Pulmonary Critical Care Progress Note  Wu Palmer MD     Patient seen for the follow up of Febrile neutropenia, acute exacerbation of COPD, pneumonia, Gram negative sepsis ([address]     Subjective:  He denies chest pain. Mild occasional cough, mostly dry. Shortness of breath not much changed. He is slightly confused. Examination:  Vitals: BP (!) 113/90   Pulse 100   Temp 98.8 °F (37.1 °C) (Infrared)   Resp (!) 31   Ht 5' 5\" (1.651 m)   Wt 120 lb (54.4 kg)   SpO2 97%   BMI 19.97 kg/m²   General appearance: alert and cooperative with exam  Neck: No JVD  Lungs: diminished breath sounds bilaterally and wheezes bilaterally  Heart: regular rate and rhythm, S1, S2 normal, no gallop  Abdomen: Soft, non tender, + BS  Extremities: no cyanosis or clubbing.  No significant edema    LABs:  CBC:   Recent Labs      11/05/17   0533  11/06/17   0515   WBC  1.0*  9.2   HGB  9.9*  9.8*   HCT  28.3*  28.5*   PLT  49*  63*     BMP:   Recent Labs      11/05/17   0533  11/06/17   0515   NA  137  139   K  3.8  4.0   CO2  27  29   BUN  16  15   CREATININE  0.67*  0.61*   LABGLOM  >60  >60   GLUCOSE  115*  123*     LIVER PROFILE:  Recent Labs      11/04/17   1641  11/05/17   0533   AST  30  24   ALT  21  19   LABALBU  3.0*  2.7*   Radiology:  11/6/17      Impression:  · Acute respiratory failure  · Acute exacerbation of COPD  · Febrile neutropenia/Pneumonia  · Metastatic squamous cell lung cancer  · History of GI bleed/HTN/anemia/anxiety disorder  · Mild encephalopathy, most likely metabolic    Recommendations:  · Continue IV antibiotics  · IV solu-medrol  · Discontinue Mucomyst   · Albuterol and Ipratropium Q 4 hours and prn  · Dulera 200  · IV fluids   · X-ray chest in am  · Labs: CBC and BMP in am  · 2 liters/min via nasal cannula  · DVT prophylaxis with low molecular weight heparin  · Will follow with you    Wu Palmer MD, [facility]  Pulmonary Critical Care and Sleep Medicine,  [facility]  Cell: [phone]  Office:

## 2017-11-06 NOTE — PROGRESS NOTES
PROGRESS NOTE    PCP: Agustin Ac DO  Referring Provider: No ref. provider found    IMPRESSION:   1. 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 Febrile neutropenia  3. Hypertension  4. Malnourished state/deconditioned  5. COPD  6. Infiltrate right lower lung field possibly related to aspiration  7. Lethargy         INTERVAL HISTORY:   Per nursing he remains confused and weak. Patient out of room now for MRI of brain  White count today up to 9.2  Calcium level 8.6 today    PLAN:     1. Antibiotics  2. Add Neupogen  3. Continue aggressive medical care  4. Follow-up in office with Dr. Ab Moeller after discharge            5.   Brain MRI to evaluate lethargy    No Follow-up on file. VISIT DIAGNOSIS:  The encounter diagnosis was Acute febrile illness.     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    Head injury     July 1984, dove [place] lake & hit head on bottom, Halo placed    Hepatitis     Insomnia     Osteoarthritis of spine with radiculopathy, lumbar region 8/15/2017    Reflux     Seizures ([address]     last one Dec 2016    Tremor     hx of    Ulcer ([address]        PAST SURGICAL HISTORY:      Procedure Laterality Date    CERVICAL [address] SURGERY  2000    c3-c6 fusion    COLONOSCOPY      CYSTOSCOPY  7/27/2015    ENDOSCOPY, COLON, DIAGNOSTIC      EGD    TRACHEOSTOMY      1984 with broken neck    TUNNELED VENOUS PORT PLACEMENT         CURRENT MEDICATIONS:   Current Facility-Administered Medications   Medication Dose Route Frequency Provider Last Rate Last Dose    gadopentetate dimeglumine (MAGNEVIST) injection 12 mL  12 mL Intravenous ONCE PRN Jose Armando Sharma MD        sodium chloride flush 0.9 % injection 10 mL  10 mL Intravenous As Directed RT PRN Marvin Jordan MD        ceFEPIme (MAXIPIME) 2 g in sterile water 20 mL IV syringe  2 g Intravenous Q12H Mariel Kim MD   2 g at 11/05/17 2209    Tbo-Filgrastim (GRANIX) injection 300 mcg  300 mcg Subcutaneous QPM Alexandr Preciado MD   300 mcg at 11/05/17 1728    acetylcysteine (MUCOMYST) 20 % solution 600 mg  600 mg Inhalation BID Virender Abbott Schirmer, MD   600 mg at 11/06/17 9641    tiZANidine (ZANAFLEX) tablet 2 mg  2 mg Oral TID PRN Virender Abbott Schirmer, MD   2 mg at 11/05/17 2212    benzonatate (TESSALON) capsule 200 mg  200 mg Oral TID PRN Bhargavi Rowe NP   200 mg at 11/06/17 0003    prochlorperazine (COMPAZINE) tablet 10 mg  10 mg Oral Q6H PRN Virender Abbott Schirmer, MD        albuterol sulfate  (90 Base) MCG/ACT inhaler 2 puff  2 puff Inhalation Q4H PRN Virender Abbott Schirmer, MD        megestrol (MEGACE) 40 MG/ML suspension 400 mg  400 mg Oral Daily Virender Abbott Schirmer, MD   400 mg at 11/06/17 0759    atenolol (TENORMIN) tablet 50 mg  50 mg Oral Daily Virender Abbott Schirmer, MD   50 mg at 11/06/17 0758    zolpidem (AMBIEN) tablet 10 mg  10 mg Oral Nightly PRN Virender Abbott Schirmer, MD   10 mg at 11/06/17 0050    HYDROcodone-acetaminophen (NORCO) 5-325 MG per tablet 1 tablet  1 tablet Oral Q4H PRN Virender Abbott Schirmer, MD        pantoprazole (PROTONIX) tablet 40 mg  40 mg Oral QAM AC Rajendra Tong MD   40 mg at 11/06/17 0759    0.9 % sodium chloride infusion   Intravenous Continuous Virender Abbott Schirmer, MD 75 mL/hr at 11/05/17 2346      sodium chloride flush 0.9 % injection 10 mL  10 mL Intravenous 2 times per day Rajendra Tong MD   10 mL at 11/05/17 0849    sodium chloride flush 0.9 % injection 10 mL  10 mL Intravenous PRN Virender Abbott Schirmer, MD        HYDROcodone-acetaminophen [facility]) 5-325 MG per tablet 1 tablet  1 tablet Oral Q4H PRN Rajendra Tong MD   1 tablet at 11/05/17 1720    Or    HYDROcodone-acetaminophen (NORCO) 5-325 MG per tablet 2 tablet  2 tablet Oral Q4H PRN Rajendra Tong MD time.  He has not had signs recurrence until recently when evidence of the pulmonary nodules was identified. These were positive both on PET scan and on biopsy. He was started on systemic chemotherapy with carboplatin and Gemzar starting his first cycle on October 18. He had an admission earlier this week when he was found to be neutropenic and he was started on Neupogen. Other issues identified in that admission included upper GI bleeding as well as the pancytopenia as well as the pulmonary infiltrate. He was discharged to home however as mentioned above developed fever and was readmitted. He currently is in the ICU receiving antibiotics.       ROS:  Patient out of room  PHYSICAL EXAM:   Vitals: BP (!) 113/90   Pulse 104   Temp 98.8 °F (37.1 °C) (Infrared)   Resp 24   Ht 5' 5\" (1.651 m)   Wt 120 lb (54.4 kg)   SpO2 92%   BMI 19.97 kg/m²   Per nursing he is somewhat confused and very weak  LABS:   Results for orders placed or performed during the hospital encounter of 11/04/17   Cult,Blood #2   Result Value Ref Range    Specimen Description       . BLOOD R HAND SILVER 10ML RED 2ML Performed at [facility]    Specimen Description  [address] Counts include 234 beds at the [facility], [address] [place] [address] [phone]     Special Requests NOT REPORTED     Culture NO GROWTH 2 DAYS     Culture       Performed at [address] [phone]    Status Pending    Cult,Blood #1   Result Value Ref Range    Specimen Description       . BLOOD LT AC 12CC KB Performed at [facility] [address] [facility]    Specimen Description  [place], [address] [place] [address] [phone]     Special Requests NOT REPORTED     Culture (A)      POSITIVE BLOOD CULTURE, RN NOTIFIED: Shree Martines 13 [phone] ON 11/5/17    Culture       DIRECT GRAM STAIN FROM BOTTLE: GRAM NEGATIVE DIPLOCOCCI    Culture       Performed at [address] [phone]    Status Pending    Urine culture clean catch   Result Value Ref Range    Specimen Description       . CLEAN CATCH URINE Performed at [address] [facility]    Specimen Description  [facility], [address] [facility] [phone]     Special Requests NOT REPORTED     Culture CULTURE IN PROGRESS     Culture       Performed at [address], [address] [facility] [phone]    Status Pending    Basic Metabolic Prof   Result Value Ref Range    Glucose 80 70 - 99 mg/dL    BUN 13 6 - 20 mg/dL    CREATININE 0.82 0.70 - 1.20 mg/dL    Bun/Cre Ratio 16 9 - 20    Calcium 9.0 8.6 - 10.4 mg/dL    Sodium 130 (L) 135 - 144 mmol/L    Potassium 3.8 3.7 - 5.3 mmol/L    Chloride 91 (L) 98 - 107 mmol/L    CO2 24 20 - 31 mmol/L    Anion Gap 15 9 - 17 mmol/L    GFR Non-African American >60 >60 mL/min    GFR African American >60 >60 mL/min    GFR Comment          GFR Staging NOT REPORTED    CBC with DIFF   Result Value Ref Range    WBC 0.6 (LL) 3.5 - 11.0 k/uL    RBC 3.07 (L) 4.5 - 5.9 m/uL    Hemoglobin 10.1 (L) 13.5 - 17.5 g/dL    Hematocrit 29.2 (L) 41 - 53 %    MCV 95.1 80 - 100 fL    MCH 33.1 26 - 34 pg    MCHC 34.8 31 - 37 g/dL    RDW 13.7 11.5 - 14.5 %    Platelets 48 (L) 852 - 400 k/uL    MPV 7.8 6.0 - 12.0 fL    Differential Type NOT REPORTED     Immature Granulocytes NOT REPORTED 0 %    Absolute Immature Granulocyte NOT REPORTED 0.00 - 0.30 k/uL    WBC Morphology NOT REPORTED     RBC Morphology NOT REPORTED     Platelet Estimate NOT REPORTED     Seg Neutrophils 12 %    Lymphocytes 58 %    Monocytes 27 %    Eosinophils % 0 %    Basophils 0 %    Segs Absolute 0.07 (L) 1.8 - 7.7 k/uL    Absolute Lymph # 0.35 (L) 1.0 - 4.8 k/uL    Absolute Mono # 0.16 (L) 0.2 - 0.8 k/uL    Absolute Eos # 0.00 0.0 - 0.4 k/uL    Basophils # 0.00 0.0 - 0.2 k/uL    Metamyelocytes 2 (H) 0 %    Myelocytes 1 (H) 0 %    Metamyelocytes Absolute 0.01 (H) 0 k/uL    Myelocytes Absolute 0.01 (H) 0 k/uL    Morphology GIANT PLATELETS PRESENT    Lactic Acid   Result Value Ref Range    Lactic Acid 1.7 0.5 - 2.2 mmol/L   Liver Profile   Result Value Ref Range    Alb 3.0 (L) 3.5 - 5.2 g/dL    Alkaline Phosphatase 44 40 - 129 U/L    ALT 21 5 - 41 U/L    AST 30 <40 U/L    Total Bilirubin 0.41 0.3 - 1.2 mg/dL    Bilirubin, Direct 0.19 <0.31 mg/dL    Bilirubin, Indirect 0.22 0.00 - 1.00 mg/dL    Total Protein 6.7 6.4 - 8.3 g/dL    Globulin NOT REPORTED 1.5 - 3.8 g/dL    Albumin/Globulin Ratio NOT REPORTED 1.0 - 2.5   Trop/Myoglobin   Result Value Ref Range    Troponin T <0.03 <0.03 ng/mL    Troponin Interp          Myoglobin 157 (H) 28 - 72 ng/mL   UA W/REFLEX CULTURE   Result Value Ref Range    Color, UA YELLOW YEL    Turbidity UA SLIGHTLY CLOUDY (A) CLEAR    Glucose, Ur NEGATIVE NEG    Bilirubin Urine NEGATIVE NEG    Ketones, Urine NEGATIVE NEG    Specific Gravity, UA 1.010 1.005 - 1.030    Urine Hgb 1+ (A) NEG    pH, UA 7.5 5.0 - 8.0    Protein, UA TRACE (A) NEG    Urobilinogen, Urine Normal NORM    Nitrite, Urine NEGATIVE NEG    Leukocyte Esterase, Urine NEGATIVE NEG    Urinalysis Comments NOT REPORTED    Microscopic Urinalysis   Result Value Ref Range    -          WBC, UA 2 TO 5 0 - 5 /HPF    RBC, UA 5 TO 10 0 - 2 /HPF    Casts UA NOT REPORTED /LPF    Crystals UA NOT REPORTED NONE /HPF    Epithelial Cells UA 2 TO 5 /HPF    Renal Epithelial, Urine NOT REPORTED 0 /HPF    Bacteria, UA RARE (A) NONE    Mucus, UA NOT REPORTED NONE    Trichomonas, UA NOT REPORTED NONE    Amorphous, UA 1+ (A) NONE    Other Observations UA NOT REPORTED NREQ    Yeast, UA NOT REPORTED NONE   Comprehensive metabolic panel   Result Value Ref Range    Glucose 115 (H) 70 - 99 mg/dL    BUN 16 6 - 20 mg/dL    CREATININE 0.67 (L) 0.70 - 1.20 mg/dL    Bun/Cre Ratio 24 (H) 9 - 20    Calcium 8.7 8.6 - 10.4 mg/dL    Sodium 137 135 - 144 mmol/L    Potassium 3.8 3.7 - 5.3 mmol/L    Chloride 95 (L) 98 - 107 mmol/L    CO2 27 20 - 31 mmol/L    Anion Gap 15 9 - 17 mmol/L    Alkaline Phosphatase 37 (L) 40 - 129 U/L    ALT 19 5 - 41 U/L    AST 24 <40 U/L    Total Bilirubin 0.33 0.3 - 1.2 mg/dL    Total Protein 6.2 (L) 6.4 - 8.3 g/dL    Alb 2.7 (L) 3.5 - 5.2 g/dL    Albumin/Globulin Ratio NOT REPORTED 1.0 - 2.5    GFR Non-African American >60 >60 mL/min    GFR African American >60 >60 mL/min    GFR Comment          GFR Staging NOT REPORTED    CBC   Result Value Ref Range    WBC 1.0 (LL) 3.5 - 11.0 k/uL    RBC 2.97 (L) 4.5 - 5.9 m/uL    Hemoglobin 9.9 (L) 13.5 - 17.5 g/dL    Hematocrit 28.3 (L) 41 - 53 %    MCV 95.2 80 - 100 fL    MCH 33.1 26 - 34 pg    MCHC 34.8 31 - 37 g/dL    RDW 13.8 11.5 - 14.5 %    Platelets 49 (L) 427 - 400 k/uL    MPV 8.1 6.0 - 12.0 fL   Basic Metabolic Panel   Result Value Ref Range    Glucose 123 (H) 70 - 99 mg/dL    BUN 15 6 - 20 mg/dL    CREATININE 0.61 (L) 0.70 - 1.20 mg/dL    Bun/Cre Ratio 25 (H) 9 - 20    Calcium 8.6 8.6 - 10.4 mg/dL    Sodium 139 135 - 144 mmol/L    Potassium 4.0 3.7 - 5.3 mmol/L    Chloride 101 98 - 107 mmol/L    CO2 29 20 - 31 mmol/L    Anion Gap 9 9 - 17 mmol/L    GFR Non-African American >60 >60 mL/min    GFR African American >60 >60 mL/min    GFR Comment          GFR Staging NOT REPORTED    CBC Auto Differential   Result Value Ref Range    WBC 9.2 3.5 - 11.0 k/uL    RBC 2.94 (L) 4.5 - 5.9 m/uL    Hemoglobin 9.8 (L) 13.5 - 17.5 g/dL    Hematocrit 28.5 (L) 41 - 53 %    MCV 96.8 80 - 100 fL    MCH 33.3 26 - 34 pg    MCHC 34.4 31 - 37 g/dL    RDW 14.1 11.5 - 14.5 %    Platelets 63 (L) 861 - 400 k/uL    MPV 8.4 6.0 - 12.0 fL    Differential Type NOT REPORTED     Immature Granulocytes NOT REPORTED 0 %    Absolute Immature Granulocyte NOT REPORTED 0.00 - 0.30 k/uL    WBC Morphology NOT REPORTED     RBC Morphology NOT REPORTED     Platelet Estimate NOT REPORTED     Seg Neutrophils 65 %    Lymphocytes 15 %    Monocytes 7 %    Eosinophils % 2 %    Basophils 0 %    Metamyelocytes 5 (H) 0 %    Myelocytes 6 (H) 0 %    Segs Absolute 5.99 1.8 - 7.7 k/uL    Absolute Lymph # 1.38 1.0 - 4.8 k/uL    Absolute Mono # 0.64 0.2 - 0.8 k/uL    Absolute Eos # 0.18 0.0 - 0.4 k/uL    Basophils # 0.00 0.0 - 0.2 k/uL    Metamyelocytes Absolute 0.46 (H) 0 k/uL    Myelocytes Absolute 0.55 (H) 0 k/uL    Morphology INCREASED BANDS PRESENT    EKG 12 Lead   Result Value Ref Range    Ventricular Rate 112 BPM    Atrial Rate 112 BPM    P-R Interval 122 ms    QRS Duration 76 ms    Q-T Interval 308 ms    QTc Calculation (Bazett) 420 ms    P Axis 59 degrees    R Axis 6 degrees    T Axis 83 degrees       Electronically signed by Johanny Barrett MD on 11/6/2017 at 8:57 AM

## 2017-11-07 ENCOUNTER — APPOINTMENT (OUTPATIENT)
Dept: GENERAL RADIOLOGY | Age: 57
DRG: 871 | End: 2017-11-07
Payer: MEDICARE

## 2017-11-07 PROBLEM — G93.41 ACUTE METABOLIC ENCEPHALOPATHY: Status: ACTIVE | Noted: 2017-11-07

## 2017-11-07 LAB
ANION GAP SERPL CALCULATED.3IONS-SCNC: 9 MMOL/L (ref 9–17)
BUN BLDV-MCNC: 21 MG/DL (ref 6–20)
BUN/CREAT BLD: 38 (ref 9–20)
CALCIUM SERPL-MCNC: 8.2 MG/DL (ref 8.6–10.4)
CHLORIDE BLD-SCNC: 101 MMOL/L (ref 98–107)
CO2: 31 MMOL/L (ref 20–31)
CREAT SERPL-MCNC: 0.55 MG/DL (ref 0.7–1.2)
CULTURE: NORMAL
CULTURE: NORMAL
GFR AFRICAN AMERICAN: >60 ML/MIN
GFR NON-AFRICAN AMERICAN: >60 ML/MIN
GFR SERPL CREATININE-BSD FRML MDRD: ABNORMAL ML/MIN/{1.73_M2}
GFR SERPL CREATININE-BSD FRML MDRD: ABNORMAL ML/MIN/{1.73_M2}
GLUCOSE BLD-MCNC: 110 MG/DL (ref 70–99)
HCT VFR BLD CALC: 27.4 % (ref 41–53)
HCT VFR BLD CALC: 27.6 % (ref 41–53)
HEMOGLOBIN: 9.4 G/DL (ref 13.5–17.5)
HEMOGLOBIN: 9.8 G/DL (ref 13.5–17.5)
Lab: NORMAL
MCH RBC QN AUTO: 32.6 PG (ref 26–34)
MCH RBC QN AUTO: 34.6 PG (ref 26–34)
MCHC RBC AUTO-ENTMCNC: 33.9 G/DL (ref 31–37)
MCHC RBC AUTO-ENTMCNC: 35.6 G/DL (ref 31–37)
MCV RBC AUTO: 96.1 FL (ref 80–100)
MCV RBC AUTO: 97.1 FL (ref 80–100)
PDW BLD-RTO: 13.4 % (ref 11.5–14.5)
PDW BLD-RTO: 13.5 % (ref 11.5–14.5)
PLATELET # BLD: 54 K/UL (ref 130–400)
PLATELET # BLD: 61 K/UL (ref 130–400)
PMV BLD AUTO: ABNORMAL FL (ref 6–12)
PMV BLD AUTO: ABNORMAL FL (ref 6–12)
POTASSIUM SERPL-SCNC: 3.6 MMOL/L (ref 3.7–5.3)
RBC # BLD: 2.83 M/UL (ref 4.5–5.9)
RBC # BLD: 2.87 M/UL (ref 4.5–5.9)
SODIUM BLD-SCNC: 141 MMOL/L (ref 135–144)
SPECIMEN DESCRIPTION: NORMAL
SPECIMEN DESCRIPTION: NORMAL
STATUS: NORMAL
WBC # BLD: 32.7 K/UL (ref 3.5–11)
WBC # BLD: 35 K/UL (ref 3.5–11)

## 2017-11-07 PROCEDURE — 6370000000 HC RX 637 (ALT 250 FOR IP): Performed by: INTERNAL MEDICINE

## 2017-11-07 PROCEDURE — 97166 OT EVAL MOD COMPLEX 45 MIN: CPT

## 2017-11-07 PROCEDURE — 85027 COMPLETE CBC AUTOMATED: CPT

## 2017-11-07 PROCEDURE — 97161 PT EVAL LOW COMPLEX 20 MIN: CPT

## 2017-11-07 PROCEDURE — 94760 N-INVAS EAR/PLS OXIMETRY 1: CPT

## 2017-11-07 PROCEDURE — 2700000000 HC OXYGEN THERAPY PER DAY

## 2017-11-07 PROCEDURE — 71010 XR CHEST PORTABLE: CPT

## 2017-11-07 PROCEDURE — 2580000003 HC RX 258: Performed by: INTERNAL MEDICINE

## 2017-11-07 PROCEDURE — 6360000002 HC RX W HCPCS: Performed by: INTERNAL MEDICINE

## 2017-11-07 PROCEDURE — 36415 COLL VENOUS BLD VENIPUNCTURE: CPT

## 2017-11-07 PROCEDURE — G8979 MOBILITY GOAL STATUS: HCPCS

## 2017-11-07 PROCEDURE — 80048 BASIC METABOLIC PNL TOTAL CA: CPT

## 2017-11-07 PROCEDURE — 99232 SBSQ HOSP IP/OBS MODERATE 35: CPT | Performed by: INTERNAL MEDICINE

## 2017-11-07 PROCEDURE — 2060000000 HC ICU INTERMEDIATE R&B

## 2017-11-07 PROCEDURE — 97530 THERAPEUTIC ACTIVITIES: CPT

## 2017-11-07 PROCEDURE — G8978 MOBILITY CURRENT STATUS: HCPCS

## 2017-11-07 PROCEDURE — G8988 SELF CARE GOAL STATUS: HCPCS

## 2017-11-07 PROCEDURE — 94640 AIRWAY INHALATION TREATMENT: CPT

## 2017-11-07 PROCEDURE — G8987 SELF CARE CURRENT STATUS: HCPCS

## 2017-11-07 PROCEDURE — 97535 SELF CARE MNGMENT TRAINING: CPT

## 2017-11-07 RX ORDER — AMLODIPINE BESYLATE 5 MG/1
5 TABLET ORAL DAILY
Status: DISCONTINUED | OUTPATIENT
Start: 2017-11-07 | End: 2017-11-10 | Stop reason: HOSPADM

## 2017-11-07 RX ORDER — PREDNISONE 20 MG/1
20 TABLET ORAL 2 TIMES DAILY
Status: DISCONTINUED | OUTPATIENT
Start: 2017-11-07 | End: 2017-11-10 | Stop reason: HOSPADM

## 2017-11-07 RX ADMIN — SERTRALINE 25 MG: 25 TABLET, FILM COATED ORAL at 08:46

## 2017-11-07 RX ADMIN — ZOLPIDEM TARTRATE 10 MG: 5 TABLET ORAL at 20:16

## 2017-11-07 RX ADMIN — HYDROCODONE BITARTRATE AND ACETAMINOPHEN 2 TABLET: 5; 325 TABLET ORAL at 08:46

## 2017-11-07 RX ADMIN — SODIUM CHLORIDE: 9 INJECTION, SOLUTION INTRAVENOUS at 11:37

## 2017-11-07 RX ADMIN — HYDROCODONE BITARTRATE AND ACETAMINOPHEN 2 TABLET: 5; 325 TABLET ORAL at 13:10

## 2017-11-07 RX ADMIN — ALPRAZOLAM 0.5 MG: 0.5 TABLET ORAL at 08:46

## 2017-11-07 RX ADMIN — Medication 1 TABLET: at 08:45

## 2017-11-07 RX ADMIN — PANTOPRAZOLE SODIUM 40 MG: 40 TABLET, DELAYED RELEASE ORAL at 08:46

## 2017-11-07 RX ADMIN — MOMETASONE FUROATE AND FORMOTEROL FUMARATE DIHYDRATE 2 PUFF: 200; 5 AEROSOL RESPIRATORY (INHALATION) at 20:40

## 2017-11-07 RX ADMIN — MEGESTROL ACETATE 400 MG: 40 SUSPENSION ORAL at 08:46

## 2017-11-07 RX ADMIN — Medication 1 TABLET: at 20:18

## 2017-11-07 RX ADMIN — WATER 2 G: 1 INJECTION INTRAMUSCULAR; INTRAVENOUS; SUBCUTANEOUS at 10:04

## 2017-11-07 RX ADMIN — IPRATROPIUM BROMIDE AND ALBUTEROL SULFATE 1 AMPULE: .5; 3 SOLUTION RESPIRATORY (INHALATION) at 20:40

## 2017-11-07 RX ADMIN — HYDROCODONE BITARTRATE AND ACETAMINOPHEN 2 TABLET: 5; 325 TABLET ORAL at 19:39

## 2017-11-07 RX ADMIN — IPRATROPIUM BROMIDE AND ALBUTEROL SULFATE 1 AMPULE: .5; 3 SOLUTION RESPIRATORY (INHALATION) at 10:38

## 2017-11-07 RX ADMIN — MOMETASONE FUROATE AND FORMOTEROL FUMARATE DIHYDRATE 2 PUFF: 200; 5 AEROSOL RESPIRATORY (INHALATION) at 07:10

## 2017-11-07 RX ADMIN — HYDROCODONE BITARTRATE AND ACETAMINOPHEN 1 TABLET: 5; 325 TABLET ORAL at 04:03

## 2017-11-07 RX ADMIN — ALPRAZOLAM 1 MG: 1 TABLET ORAL at 22:42

## 2017-11-07 RX ADMIN — PREDNISONE 20 MG: 20 TABLET ORAL at 13:10

## 2017-11-07 RX ADMIN — WATER 2 G: 1 INJECTION INTRAMUSCULAR; INTRAVENOUS; SUBCUTANEOUS at 13:20

## 2017-11-07 RX ADMIN — HYDROCODONE BITARTRATE AND ACETAMINOPHEN 2 TABLET: 5; 325 TABLET ORAL at 23:39

## 2017-11-07 RX ADMIN — ALPRAZOLAM 0.5 MG: 0.5 TABLET ORAL at 16:19

## 2017-11-07 RX ADMIN — PREDNISONE 20 MG: 20 TABLET ORAL at 20:18

## 2017-11-07 RX ADMIN — ZOLPIDEM TARTRATE 10 MG: 5 TABLET ORAL at 02:03

## 2017-11-07 RX ADMIN — IPRATROPIUM BROMIDE AND ALBUTEROL SULFATE 1 AMPULE: .5; 3 SOLUTION RESPIRATORY (INHALATION) at 14:46

## 2017-11-07 RX ADMIN — ACETYLCYSTEINE 600 MG: 200 SOLUTION ORAL; RESPIRATORY (INHALATION) at 07:09

## 2017-11-07 RX ADMIN — AMLODIPINE BESYLATE 5 MG: 5 TABLET ORAL at 13:10

## 2017-11-07 RX ADMIN — ATENOLOL 50 MG: 50 TABLET ORAL at 08:45

## 2017-11-07 RX ADMIN — IPRATROPIUM BROMIDE AND ALBUTEROL SULFATE 1 AMPULE: .5; 3 SOLUTION RESPIRATORY (INHALATION) at 07:09

## 2017-11-07 RX ADMIN — ONDANSETRON 4 MG: 2 INJECTION INTRAMUSCULAR; INTRAVENOUS at 08:42

## 2017-11-07 RX ADMIN — METHYLPREDNISOLONE SODIUM SUCCINATE 40 MG: 40 INJECTION, POWDER, FOR SOLUTION INTRAMUSCULAR; INTRAVENOUS at 06:15

## 2017-11-07 RX ADMIN — METHYLPREDNISOLONE SODIUM SUCCINATE 40 MG: 40 INJECTION, POWDER, FOR SOLUTION INTRAMUSCULAR; INTRAVENOUS at 00:16

## 2017-11-07 ASSESSMENT — PAIN DESCRIPTION - LOCATION
LOCATION: CHEST
LOCATION: CHEST;BACK

## 2017-11-07 ASSESSMENT — PAIN SCALES - GENERAL
PAINLEVEL_OUTOF10: 2
PAINLEVEL_OUTOF10: 8
PAINLEVEL_OUTOF10: 8
PAINLEVEL_OUTOF10: 0
PAINLEVEL_OUTOF10: 8
PAINLEVEL_OUTOF10: 8

## 2017-11-07 ASSESSMENT — PAIN DESCRIPTION - ORIENTATION: ORIENTATION: UPPER

## 2017-11-07 ASSESSMENT — PAIN DESCRIPTION - PAIN TYPE
TYPE: CHRONIC PAIN
TYPE: CHRONIC PAIN

## 2017-11-07 NOTE — PROGRESS NOTES
[facility]    Progress Note    11/7/2017    7:47 AM    Name:   Sonido Esteves  MRN:     [id number]     Acct:      [de-identified]   Room:   2033/2033-01   Day:  3  Admit Date:  11/4/2017  4:24 PM    PCP:   Db Doe DO  Code Status:  Full Code    Subjective:     C/C:   Chief Complaint   Patient presents with    Fatigue     Interval History Status: not changed. Does not feel well, though does look improved  C/o pain all over, especially chest; also sob and cough    Brief History:     The patient is a 65 y.o.  Non-/non  male who presents with Fatigue And fever with chills, fever was more than 101 at home. He was discharged 11/3/17 from the hospital when he was having GI bleed with occult blood positive stools as well as he was severely neutropenic and thrombocytopenic since he was on chemo for metastatic lung cancer. There were multiple infiltrates in the lungs suggestive of metastasis versus multifocal pneumonia  He is having persistent cough and is able to expectorate a Little, there is no hemoptysis  He does not have much appetite and is feeling weak    Review of Systems:     Constitutional:  negative for chills, fevers, sweats  Respiratory:  positive for cough, dyspnea on exertion, shortness of breath, wheezing  Cardiovascular:  negative for chest pressure, lower extremity edema, palpitations  Gastrointestinal:  negative for abdominal pain, constipation, diarrhea, nausea, vomiting  Neurological:  negative for dizziness, headache    Medications: Allergies:     Allergies   Allergen Reactions    Aspirin     Fish-Derived Products Swelling    Shellfish-Derived Products      Other reaction(s): syncope/severe facial swelling/vomits    Motrin [Ibuprofen Micronized] Other (See Comments)     Upset stomach        Current Meds:   Scheduled Meds:    sertraline  25 mg Oral Daily    cefepime  2 g Intravenous Q12H    tbo-filgrastim  300 mcg Subcutaneous QPM    acetylcysteine  600 mg Inhalation BID    megestrol  400 mg Oral Daily    atenolol  50 mg Oral Daily    pantoprazole  40 mg Oral QAM AC    sodium chloride flush  10 mL Intravenous 2 times per day    docusate sodium  100 mg Oral BID    ipratropium-albuterol  1 ampule Inhalation Q4H WA    methylPREDNISolone  40 mg Intravenous 4 times per day    mometasone-formoterol  2 puff Inhalation BID    Calcium Carb-Cholecalciferol  1 tablet Oral BID     Continuous Infusions:    sodium chloride 75 mL/hr at 11/06/17 1900     PRN Meds: sodium chloride flush, ALPRAZolam, tiZANidine, benzonatate, prochlorperazine, albuterol sulfate HFA, zolpidem, HYDROcodone-acetaminophen, sodium chloride flush, HYDROcodone 5 mg - acetaminophen **OR** HYDROcodone 5 mg - acetaminophen, magnesium hydroxide, bisacodyl, ondansetron, nicotine, albuterol, acetaminophen, ALPRAZolam    Data:     Past Medical History:   has a past medical history of Benign essential HTN; Cardiac arrest due to respiratory disorder ([facility] Cellulitis; Chronic low back pain; Emphysema of lung ([facility] Epidermoid carcinoma of lung ([facility] Head injury; Hepatitis; Insomnia; Osteoarthritis of spine with radiculopathy, lumbar region; Reflux; Seizures ([facility] Tremor; and Ulcer ([facility] Social History:   reports that he has been smoking Cigarettes. He has a 40.00 pack-year smoking history. He has never used smokeless tobacco. He reports that he uses drugs, including Marijuana. He reports that he does not drink alcohol.      Family History:   Family History   Problem Relation Age of Onset    Heart Disease Mother      CABG    Cancer Mother     Cancer Father      Throat    Heart Disease Father     Stroke Maternal Grandfather     Heart Disease Paternal Grandmother        Vitals:  BP (!) 178/94   Pulse 73   Temp 98.1 °F (36.7 °C) (Temporal)   Resp 16   Ht 5' 5\" (1.651 m)   Wt 120 lb (54.4 kg)   SpO2 90%   BMI 19.97 kg/m²   Temp REPORTED 12/10/2016    WVZ6BTC 22 12/10/2016    GXVW6NNI 99 12/10/2016    M7FSTUVH 99.2 02/14/2013    FIO2 40.0 12/10/2016       Radiology:    Nothing new    Physical Examination:        General appearance:  alert, cooperative and no distress  Mental Status:  oriented to person, place and time and normal affect  Lungs: Few wheezes, normal effort  Heart:  regular rate and rhythm, no murmur  Abdomen:  soft, nontender, nondistended, normal bowel sounds, no masses, hepatomegaly, splenomegaly  Extremities:  no edema, redness, tenderness in the calves; cachectic  Skin:  no gross lesions, rashes, induration  Chest ttp    Assessment:        Primary Problem  Gram negative sepsis [facility])    Active Hospital Problems    Diagnosis Date Noted    Neutropenia with fever ([address] [D70.9, R50.81] 11/04/2017     Priority: High    Multifocal pneumonia [J18.9] 11/04/2017     Priority: Medium    Gram negative sepsis ([address] [A41.50] 11/06/2017    Acute febrile illness [R50.9]     H/O: GI bleed [Z87.19] 11/04/2017    Acute respiratory distress [R06.03] 11/04/2017    Chemotherapy-induced neutropenia ([address] [D70.1, T45.1X5A] 10/31/2017    Benign essential HTN [I10] 12/10/2016    Anorexia [R63.0] 08/16/2016    Cachectic ([address] Tania Hernandez 08/16/2016    Mixed anxiety depressive disorder [F41.8] 08/16/2016    Pain of metastatic malignancy [G89.3] 08/16/2016    Squamous cell carcinoma of lung ([address] [C34.90] 08/16/2016    Anemia of chronic disease [D63.8] 12/17/2014    Malnutrition ([address] [E46] 12/17/2014    Anxiety [F41.9] 04/25/2013    Chronic obstructive pulmonary disease ([address] [J44.9] 04/25/2013    GERD (gastroesophageal reflux disease) [K21.9] 10/03/2012   metastatic lung cancer    Plan:        1. Cont abx  2. Suspect high wbc is more likely related to neupogen and less likely infection; but will recheck cxr today  3.  Pt/ot    Arnaldo Malone, DO  11/7/2017  7:47 AM

## 2017-11-07 NOTE — CARE COORDINATION
Case Management Initial Discharge Plan  [place] MANDI Weaverpierre,         Readmission Risk              Readmission Risk:        24.75       Age 72 or Greater:  0    Admitted from SNF or Requires Paid or Family Care:  0    Currently has CHF,COPD,ARF,CRI,or is on dialysis:  4    Takes more than 5 Prescription Medications:  4    Takes Digoxin,Insulin,Anticoagulants,Narcotics or ASA/Plavix:  [address] in Past 12 Months:  10    On Disability:  3    Patient Considers own Health:  3.75            Met with:patient or spouse/SO to discuss discharge plans. Information verified: address, contacts, phone number, , insurance Yes  PCP: Db Doe DO  Date of last visit:  10/30    Insurance Provider:  Medicare and Medicaid    Discharge Planning  Current Residence:   house  Living Arrangements:  Spouse/Significant Other   Home has 1 stories/4 stairs to climb  Support Systems:  Spouse/Significant Other, Children, Family Members  Current Services PTA:  none Agency:  none  Patient able to perform ADL's:Dependent  DME in home:  4/w/walker, home 02  thru [employer], w/c, cane, nebulizer  DME used to aid ambulation prior to admission: w/c  DME used during admission has been inbed    Potential Assistance Needed:  N/A    Pharmacy: [facility] on [employer] Medications:  No  Does patient want to participate in local refill/ meds to beds program?  N/A    Patient agreeable to home care: No  [place] of choice provided:  n/a      Type of Home Care Services:  None  Patient expects to be discharged to:  Home    Prior SNF/Rehab Placement and Facility:  none  Agreeable to SNF/Rehab: Yes  [place] of choice provided: yes   Evaluation: yes    Expected Discharge date: Follow Up Appointment: Best Day/ Time:      Transportation provider:  family  Transportation arrangements needed for discharge: Yes    Discharge Plan: Met with pt and Yumiko CLANCY, at bedside.   Pt was discharged home 11/3 and readmitted 11/4. Pt will need IV ATB until 11/14. PT is recommending SNF and pt and SO are agreeable. Request SW referral to [facility]. SO works there as [employer]. Referral called to SW.    Pt has been accepted at [facility]. DARIUS initiated.          Electronically signed by Clau Rivera RN on 11/7/17 at 4:46 PM

## 2017-11-07 NOTE — PROGRESS NOTES
Pulmonary Critical Care Progress Note  Charly Cantu MD     Patient seen for the follow up of Febrile neutropenia, acute exacerbation of COPD, pneumonia, Gram negative sepsis ([address]     Subjective:  He denies chest pain. Mild occasional cough, mostly dry. Shortness of breath Is better. He is still slightly confused. Examination:  Vitals: BP (!) 165/102   Pulse 90   Temp 97.9 °F (36.6 °C) (Infrared)   Resp 16   Ht 5' 5\" (1.651 m)   Wt 120 lb (54.4 kg)   SpO2 92%   BMI 19.97 kg/m²   General appearance: alert and cooperative with exam  Neck: No JVD  Lungs: diminished breath sounds bilaterally and wheezes bilaterally  Heart: regular rate and rhythm, S1, S2 normal, no gallop  Abdomen: Soft, non tender, + BS  Extremities: no cyanosis or clubbing.  No significant edema    LABs:  CBC:   Recent Labs      11/07/17   0441  11/07/17   0519   WBC  32.7*  35.0*   HGB  9.8*  9.4*   HCT  27.4*  27.6*   PLT  54*  61*     BMP:   Recent Labs      11/06/17   0515  11/07/17   0441   NA  139  141   K  4.0  3.6*   CO2  29  31   BUN  15  21*   CREATININE  0.61*  0.55*   LABGLOM  >60  >60   GLUCOSE  123*  110*     LIVER PROFILE:  Recent Labs      11/04/17   1641  11/05/17   0533   AST  30  24   ALT  21  19   LABALBU  3.0*  2.7*   Radiology:  11/6/17      Impression:  · Acute respiratory failure  · Acute exacerbation of COPD  · Febrile neutropenia/Pneumonia  · Metastatic squamous cell lung cancer  · History of GI bleed/HTN/anemia/anxiety disorder  · Mild encephalopathy, most likely metabolic    Recommendations:  · Continue IV antibiotics  · Discontinue IV solu-medrol  · Prednisone taper  · Albuterol and Ipratropium Q 4 hours and prn  · Dulera 200  · IV fluids   · Continue PTOT  · X-ray chest in am  · Labs: CBC and BMP in am  · 2 liters/min via nasal cannula  · DVT prophylaxis with low molecular weight heparin  · Will follow with you    Charly Cantu MD, [facility]  Pulmonary Critical Care and Sleep Medicine,  [facility]  Cell: [phone]  Office: [phone]

## 2017-11-07 NOTE — PROGRESS NOTES
weakness. He still does have a cough minimally productive. No abdominal pain nausea or vomiting. Physical Examination :     Physical Exam   Constitutional: He is oriented to person, place, and time. He appears well-developed and well-nourished. HENT:   Head: Normocephalic. Eyes: Pupils are equal, round, and reactive to light. Neck: Normal range of motion. Neck supple. Cardiovascular: Normal rate, regular rhythm and normal heart sounds. Pulmonary/Chest: Effort normal. He has wheezes. Abdominal: Soft. Bowel sounds are normal. There is tenderness. There is no rebound and no guarding. Musculoskeletal: Normal range of motion. Neurological: He is alert and oriented to person, place, and time. Skin: Skin is warm and dry. Laboratory data:   I have independently reviewed the following labs:  CBC with Differential:   Recent Labs      11/04/17 1641 11/06/17 0515  11/07/17 0441  11/07/17   0519   WBC  0.6*   < >  9.2  32.7*  35.0*   HGB  10.1*   < >  9.8*  9.8*  9.4*   HCT  29.2*   < >  28.5*  27.4*  27.6*   PLT  48*   < >  63*  54*  61*   LYMPHOPCT  58   --   15   --    --    MONOPCT  27   --   7   --    --     < > = values in this interval not displayed. BMP:   Recent Labs      11/06/17 0515 11/07/17 0441   NA  139  141   K  4.0  3.6*   CL  101  101   CO2  29  31   BUN  15  21*   CREATININE  0.61*  0.55*     Hepatic Function Panel:   Recent Labs      11/04/17 1641 11/05/17   0533   PROT  6.7  6.2*   LABALBU  3.0*  2.7*   BILIDIR  0.19   --    IBILI  0.22   --    BILITOT  0.41  0.33   ALKPHOS  44  37*   ALT  21  19   AST  30  24     No results for input(s): VANCOTROUGH in the last 72 hours.    No results found for: CRP  No results found for: SEDRATE      Imaging Studies:   SINGLE VIEW OF THE CHEST 11/7/2017 Pending    MRI OF THE BRAIN WITHOUT AND WITH CONTRAST  11/6/2017 9:12 am  Impression   No acute abnormality.       No findings diagnostic of metastatic disease     Cultures:     Cult,Blood #2 [[id number]] Collected: 11/04/17 1714   Order Status: Completed Specimen: Blood Updated: 11/07/17 0016    Specimen Description . BLOOD R HAND SILVER 10ML RED 2ML Performed at [address] [place] [address]    Specimen Description [address], [address] [facility] [phone]    Special Requests NOT REPORTED    Culture NO GROWTH 3 DAYS    Culture Performed at [address] [phone]    Status Pending   Cult,Blood #1 [[id number]] (Abnormal) Collected: 11/04/17 1713   Order Status: Completed Specimen: Blood Updated: 11/06/17 1136    Specimen Description . BLOOD LT AC 12CC KB Performed at [address] [place] [address] [facility]    Specimen Description Margareth robins, [address] [place] [address] [phone]    Special Requests NOT REPORTED    Culture POSITIVE BLOOD CULTURE, RN NOTIFIED: Tashia Hutchinson AT 7055 ON 11/5/17 (A)    Culture DIRECT GRAM STAIN FROM BOTTLE: GRAM NEGATIVE DIPLOCOCCI    Culture MORAXELLA CATARRHALIS BETA LACTAMASE POSITIVE (A)    Culture Performed at [address] [phone]    Status FINAL 11/06/2017     Urine culture clean catch [[id number]] Collected: 11/04/17 1809   Order Status: Completed Updated: 11/05/17 0802    Specimen Description . CLEAN CATCH URINE Performed at [address] [place] [address] [address] [facility]    Specimen Description [place], [facility] [phone]    Special Requests NOT REPORTED    Culture CULTURE IN PROGRESS    Culture Performed at [address] [phone]    Status Pending         Medications:      sertraline  25 mg Oral Daily    cefepime  2 g Intravenous Q12H    acetylcysteine  600 mg Inhalation BID    megestrol  400 mg Oral Daily    atenolol  50 mg Oral Daily    pantoprazole  40 mg Oral QAM AC    sodium chloride flush  10 mL Intravenous 2 times per day    docusate sodium  100 mg Oral BID    ipratropium-albuterol  1 ampule Inhalation Q4H WA    methylPREDNISolone  40 mg Intravenous 4 times per day    mometasone-formoterol  2 puff Inhalation BID    Calcium Carb-Cholecalciferol  1 tablet Oral BID     Thank you for allowing us to participate in the care of this patient. Please call with questions.     Gregory Shaw MD  [employer] messaging: [phone]

## 2017-11-07 NOTE — PROGRESS NOTES
Physical Therapy    Facility/[facility]  Initial Assessment    NAME: Fifi Varela  : 1960  MRN: [id number]    Date of Service: 2017  Fifi Varela is a 65 y.o. male who presents to the emergency department via EMS for fatigue. He was discharged from the hospital yesterday after admission for GI bleed, aspiration pneumonia. Denies chills, cough, vomiting, diarrhea, SOB, headache, urinary symptoms. Denies blood in his stools. Rates his pain 8/10 at this time to his left abdomen. He is in treatment for lung cancer.      Patient Diagnosis(es): The encounter diagnosis was Acute febrile illness. has a past medical history of Benign essential HTN; Cardiac arrest due to respiratory disorder ([address] Cellulitis; Chronic low back pain; Emphysema of lung ([address] Epidermoid carcinoma of lung ([address] Head injury; Hepatitis; Insomnia; Osteoarthritis of spine with radiculopathy, lumbar region; Reflux; Seizures ([address] Tremor; and Ulcer ([address] has a past surgical history that includes Cervical disc surgery (); Cystocopy (2015); Colonoscopy; Endoscopy, colon, diagnostic; tracheostomy; and Tunneled venous port placement.     Restrictions  Restrictions/Precautions  Restrictions/Precautions: General Precautions, Fall Risk  Position Activity Restriction  Other position/activity restrictions: O2  Vision/Hearing        Subjective  General  Chart Reviewed: Yes  Patient assessed for rehabilitation services?: Yes  Family / Caregiver Present: No  Follows Commands: Within Functional Limits  Pain Screening  Patient Currently in Pain: Yes          Orientation  Orientation  Overall Orientation Status: Within Functional Limits (possible slight confusion)    Social/Functional History  Social/Functional History  Lives With: Spouse  Type of Home: House  Home Layout: One level  Home Access: Stairs to enter with rails  Entrance Stairs - Number of Steps: 4  Home Equipment: Rolling walker, Cane  ADL Assistance: Independent  Homemaking Assistance: Independent  Homemaking Responsibilities: Yes  Ambulation Assistance: Independent  Transfer Assistance: Independent  Active : Yes  Additional Comments: used motorized scooter at store  Objective          AROM RLE (degrees)  RLE AROM: WFL  AROM LLE (degrees)  LLE AROM : WFL  AROM RUE (degrees)  RUE AROM : WFL  AROM LUE (degrees)  LUE AROM : WFL  Strength RLE  Strength RLE: WFL  Strength LLE  Strength LLE: WFL  Strength RUE  Strength RUE: WFL  Strength LUE  Strength LUE: WFL  Tone RLE  RLE Tone: Clonus  Tone Description: clonus noted bilat. LE  Sensation  Overall Sensation Status: WFL     Transfers  Sit to Stand: Moderate Assistance (x2)  Stand to sit: Minimal Assistance  Ambulation  Ambulation?: Yes  Ambulation 1  Surface: level tile  Device: Rolling Walker  Assistance: Minimal assistance  Quality of Gait: Min assist to steady pt. once standing;  pt. with difficulty extending knees and hips but able with time and cues;  able to eventually take several steps along bedside with RW and min. assist.    Distance: 3 ft. Comments: Back to bed with bed alarm     Balance  Posture: Fair  Sitting - Static: Good;-  Sitting - Dynamic: Fair;+  Standing - Static: Fair (with RW)        Assessment   Body structures, Functions, Activity limitations: Decreased functional mobility ; Decreased ADL status; Decreased strength;Decreased cognition;Decreased endurance;Decreased balance  Specific instructions for Next Treatment: progress OOB mobility  Prognosis: Excellent  Decision Making: Low Complexity  Barriers to Learning: possible slight confusion at this time  REQUIRES PT FOLLOW UP: Yes  Activity Tolerance  Activity Tolerance: Patient limited by endurance; Patient limited by fatigue     Discharge Recommendations:  [address]  Times per week: 1-2x/day; 5-6days/wk  Specific instructions for Next Treatment: progress OOB mobility  Current Treatment

## 2017-11-07 NOTE — PROGRESS NOTES
Impaired  Orientation Level: Oriented to person;Disoriented to time;Oriented to situation  Observation/Palpation  Posture: Fair  Observation: forward head, flexed posture. Cues needed to keep head up and for breathing techs  Balance  Sitting Balance: Contact guard assistance  Standing Balance: Moderate assistance (x2)  Standing Balance  Sit to stand: Moderate assistance (x2)  Stand to sit: Moderate assistance (x2)  Functional Mobility  Functional - Mobility Device: Rolling Walker  Assist Level: Moderate assistance (x2; to take 2-3 small steps towards [facility])  ADL  Feeding: Minimal assistance;Stand by assistance  Grooming: Minimal assistance; Moderate assistance  UE Bathing: Moderate assistance;Maximum assistance  LE Bathing: Maximum assistance  UE Dressing: Moderate assistance;Maximum assistance  LE Dressing: Maximum assistance  Toileting: Maximum assistance  Tone RUE  RUE Tone: Normotonic  Tone LUE  LUE Tone: Normotonic  Quality of Movement Other  Comment: clonus noted in R LE; history of spinal cord injury     Bed mobility  Sit to Supine: Moderate assistance (x2)  Scooting: Moderate assistance (x2)  Transfers  Sit to stand:  Moderate assistance (x2)  Stand to sit: Moderate assistance (x2)     Cognition  Overall Cognitive Status: Exceptions  Safety Judgement: Decreased awareness of need for assistance;Decreased awareness of need for safety  Problem Solving: Assistance required to correct errors made;Assistance required to generate solutions;Assistance required to implement solutions;Decreased awareness of errors  Initiation: Requires cues for some  Sequencing: Requires cues for some  Perception  Overall Perceptual Status: WFL     Sensation  Overall Sensation Status: WFL        LUE AROM (degrees)  LUE AROM : WFL  RUE AROM (degrees)  RUE AROM : WFL  LUE Strength  Gross LUE Strength: WFL (3+/5 BUE's)  RUE Strength  Gross RUE Strength: WFL                  Assessment   Performance deficits / Impairments: Decreased

## 2017-11-07 NOTE — PROGRESS NOTES
PROGRESS NOTE    PCP: Yesy Warren DO  Referring Provider: No ref. provider found    IMPRESSION:   1.  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  Febrile neutropenia  3.  Hypertension  4.  Malnourished state/deconditioned  5.  COPD  6.  Infiltrate right lower lung field possibly related to aspiration  7.  Lethargy      INTERVAL HISTORY:   Neutropenia has resolved both with time and with Neupogen. Neupogen discontinued  More awake and alert not lethargic today  MRI of brain shows no metastases  Complaining of diffuse pain and just generally does not feel well today also complaining of shortness of breath. He does look in some distress this morning. PLAN:     1. Discontinue Neupogen  2. 10 you other antibiotics and other general medical care  3. Improved pain control  4. To increase activity as tolerated    No Follow-up on file. VISIT DIAGNOSIS:  The encounter diagnosis was Acute febrile illness.     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    Head injury     July 1984, dove [place] lake & hit head on bottom, Halo placed    Hepatitis     Insomnia     Osteoarthritis of spine with radiculopathy, lumbar region 8/15/2017    Reflux     Seizures ([address]     last one Dec 2016    Tremor     hx of    Ulcer ([address]        PAST SURGICAL HISTORY:      Procedure Laterality Date    CERVICAL [address] SURGERY  2000    c3-c6 fusion    COLONOSCOPY      CYSTOSCOPY  7/27/2015    ENDOSCOPY, COLON, DIAGNOSTIC      EGD    TRACHEOSTOMY      1984 with broken neck    TUNNELED VENOUS PORT PLACEMENT         CURRENT MEDICATIONS:   Current Facility-Administered Medications   Medication Dose Route Frequency Provider Last Rate Last Dose    sodium chloride Neil Estevez MD   1 tablet at 11/07/17 0403    Or    HYDROcodone-acetaminophen (NORCO) 5-325 MG per tablet 2 tablet  2 tablet Oral Q4H PRN Landy Estevez MD   2 tablet at 11/06/17 1651    magnesium hydroxide (MILK OF MAGNESIA) 400 MG/5ML suspension 30 mL  30 mL Oral Daily PRN Landy Estevez MD        docusate sodium (COLACE) capsule 100 mg  100 mg Oral BID Landy Estevez MD   100 mg at 11/06/17 0759    bisacodyl (DULCOLAX) suppository 10 mg  10 mg Rectal Daily PRN Landy Estevez MD        ondansetron [facility]) injection 4 mg  4 mg Intravenous Q6H PRN Landy Estevez MD   4 mg at 11/07/17 0842    nicotine (NICODERM CQ) 21 MG/24HR 1 patch  1 patch Transdermal Daily PRN Landy Estevez MD        albuterol (PROVENTIL) nebulizer solution 2.5 mg  2.5 mg Nebulization Q2H PRN Landy Estevez MD   2.5 mg at 11/06/17 0532    ipratropium-albuterol (DUONEB) nebulizer solution 1 ampule  1 ampule Inhalation Q4H Torey Adrian MD   1 ampule at 11/07/17 2826    methylPREDNISolone sodium (SOLU-MEDROL) injection 40 mg  40 mg Intravenous 4 times per day [address], MD   40 mg at 11/07/17 0615    mometasone-formoterol (DULERA) 200-5 MCG/ACT inhaler 2 puff  2 puff Inhalation BID [address], MD   2 puff at 11/07/17 0710    Calcium Carb-Cholecalciferol 600-400 MG-UNIT TABS 1 tablet  1 tablet Oral BID [address], MD   1 tablet at 11/06/17 2112    acetaminophen (TYLENOL) tablet 650 mg  650 mg Oral Q4H PRN Landy Estevez MD        ALPRAZolam Donnel Motto) tablet 1 mg  1 mg Oral Nightly PRN [address], MD   1 mg at 11/06/17 1513     Facility-Administered Medications Ordered in Other Encounters   Medication Dose Route Frequency Provider Last Rate Last Dose    0.9 % sodium chloride infusion 250 mL  250 mL Intravenous Once Gin Patel DO           SUBJECTIVE:  Ad Fitch is a very pleasant 65 y. o. male who is Admitted with febrile neutropenia.     Patient was diagnosed with squamous cell cancer of the lung in early 2015 he received chemotherapy and radiation at that time. [facility] has not had signs recurrence until recently when evidence of the pulmonary nodules was identified. Zuri Munson were positive both on PET scan and on biopsy. [facility] was started on systemic chemotherapy with carboplatin and Gemzar starting his first cycle on October 18.  He had an admission earlier this week when he was found to be neutropenic and he was started on Neupogen.  Other issues identified in that admission included upper GI bleeding as well as the pancytopenia as well as the pulmonary infiltrate.  He was discharged to home however as mentioned above developed fever and was readmitted. [facility] currently is in the ICU receiving antibiotics.       ROS:  General: negative for fatigue, fever or night sweats  ENT: negative for headaches, hearing change, oral lesions or visual changes  Hematological and Lymphatic: negative for bleeding problems, blood clots, bruising, fatigue, night sweats or weight loss  Respiratory: negative for cough, shortness of breath or tachypnea  Cardiovascular: negative for chest pain, dyspnea on exertion, edema or paroxysmal nocturnal dyspnea  Gastrointestinal: negative for abdominal pain, appetite loss, change in bowel habits, constipation, diarrhea, melena or nausea/vomiting  Genito-Urinary: negative for dysuria, hematuria or incontinence  Musculoskeletal: negative for gait disturbance, joint pain, joint swelling or muscle pain  Neurological: negative for confusion, dizziness, headaches, seizures or tremors  Dermatological: negative for pruritus or rash      PHYSICAL EXAM:   Vitals: BP (!) 165/102   Pulse 90   Temp 97.9 °F (36.6 °C) (Infrared)   Resp 16   Ht 5' 5\" (1.651 m)   Wt 120 lb (54.4 kg)   SpO2 (!) 89%   BMI 19.97 kg/m²   General appearance: alert, appears stated age and cooperative  Skin: Skin color, texture, turgor normal. No rashes or lesions  HEENT: Head: Normocephalic, no lesions, without obvious abnormality. Neck: no adenopathy  Lungs: clear to auscultation bilaterally  Heart: regular rate and rhythm, S1, S2 normal, no murmur, click, rub or gallop  Abdomen: soft, non-tender; bowel sounds normal; no masses,  no organomegaly  Extremities: extremities normal, atraumatic, no cyanosis or edema  Neurologic: Mental status: Alert, oriented, thought content appropriate      LABS:   Results for orders placed or performed during the hospital encounter of 11/04/17   Cult,Blood #2   Result Value Ref Range    Specimen Description       . BLOOD R HAND SILVER 10ML RED 2ML Performed at [address] [place] [address]    Specimen Description  [address], [address] [place] [address] [phone]     Special Requests NOT REPORTED     Culture NO GROWTH 3 DAYS     Culture       Performed at [address] [phone]    Status Pending    Cult,Blood #1   Result Value Ref Range    Specimen Description       . BLOOD LT AC 12CC KB Performed at [address] [place] [address] [facility]    Specimen Description  [address] [phone]     Special Requests NOT REPORTED     Culture (A)      POSITIVE BLOOD CULTURE, RN NOTIFIED: Erwin Jeff. AT 3821 ON 11/5/17    Culture       DIRECT GRAM STAIN FROM BOTTLE: GRAM NEGATIVE DIPLOCOCCI    Culture MORAXELLA CATARRHALIS BETA LACTAMASE POSITIVE (A)     Culture       Performed at [address], [address] [place] [address] [phone]    Status FINAL 11/06/2017    Urine culture clean catch   Result Value Ref Range    Specimen Description       . CLEAN CATCH URINE Performed at [address] [place] [address] [facility]    Specimen Description  [place], [address] [place] [address] [phone]     Special Requests NOT REPORTED     Culture CULTURE IN PROGRESS     Culture       Performed at [address] [phone]    Status Pending    Basic Metabolic Prof   Result Value Ref Range    Glucose 80 70 - 99 mg/dL    BUN 13 6 - 20 mg/dL    CREATININE 0.82 0.70 - 1.20 mg/dL    Bun/Cre Ratio 16 9 - 20    Calcium 9.0 8.6 - 10.4 mg/dL    Sodium 130 (L) 135 - 144 mmol/L    Potassium 3.8 3.7 - 5.3 mmol/L    Chloride 91 (L) 98 - 107 mmol/L    CO2 24 20 - 31 mmol/L    Anion Gap 15 9 - 17 mmol/L    GFR Non-African American >60 >60 mL/min    GFR African American >60 >60 mL/min    GFR Comment          GFR Staging NOT REPORTED    CBC with DIFF   Result Value Ref Range    WBC 0.6 (LL) 3.5 - 11.0 k/uL    RBC 3.07 (L) 4.5 - 5.9 m/uL    Hemoglobin 10.1 (L) 13.5 - 17.5 g/dL    Hematocrit 29.2 (L) 41 - 53 %    MCV 95.1 80 - 100 fL    MCH 33.1 26 - 34 pg    MCHC 34.8 31 - 37 g/dL    RDW 13.7 11.5 - 14.5 %    Platelets 48 (L) 663 - 400 k/uL    MPV 7.8 6.0 - 12.0 fL    Differential Type NOT REPORTED     Immature Granulocytes NOT REPORTED 0 %    Absolute Immature Granulocyte NOT REPORTED 0.00 - 0.30 k/uL    WBC Morphology NOT REPORTED     RBC Morphology NOT REPORTED     Platelet Estimate NOT REPORTED     Seg Neutrophils 12 %    Lymphocytes 58 %    Monocytes 27 %    Eosinophils % 0 %    Basophils 0 %    Segs Absolute 0.07 (L) 1.8 - 7.7 k/uL    Absolute Lymph # 0.35 (L) 1.0 - 4.8 k/uL    Absolute Mono # 0.16 (L) 0.2 - 0.8 k/uL    Absolute Eos # 0.00 0.0 - 0.4 k/uL    Basophils # 0.00 0.0 - 0.2 k/uL    Metamyelocytes 2 (H) 0 %    Myelocytes 1 (H) 0 %    Metamyelocytes Absolute 0.01 (H) 0 k/uL    Myelocytes Absolute 0.01 (H) 0 k/uL    Morphology GIANT PLATELETS PRESENT    Lactic Acid   Result Value Ref Range    Lactic Acid 1.7 0.5 - 2.2 mmol/L   Liver Profile   Result Value Ref Range    Alb 3.0 (L) 3.5 - 5.2 g/dL    Alkaline Phosphatase 44 40 - 129 U/L    ALT 21 5 - 41 U/L    AST 30 <40 U/L    Total Bilirubin 0.41 0.3 - 1.2 mg/dL    Bilirubin, Direct 0.19 <0.31 mg/dL    Bilirubin, Indirect 0.22 0.00 - 1.00 mg/dL    Total Protein 6.7 6.4 - 8.3 g/dL    Globulin NOT REPORTED 1.5 - 3.8 g/dL    Albumin/Globulin Ratio NOT REPORTED 1.0 - 2.5   Trop/Myoglobin   Result (L) 41 - 53 %    MCV 95.2 80 - 100 fL    MCH 33.1 26 - 34 pg    MCHC 34.8 31 - 37 g/dL    RDW 13.8 11.5 - 14.5 %    Platelets 49 (L) 791 - 400 k/uL    MPV 8.1 6.0 - 12.0 fL   Basic Metabolic Panel   Result Value Ref Range    Glucose 123 (H) 70 - 99 mg/dL    BUN 15 6 - 20 mg/dL    CREATININE 0.61 (L) 0.70 - 1.20 mg/dL    Bun/Cre Ratio 25 (H) 9 - 20    Calcium 8.6 8.6 - 10.4 mg/dL    Sodium 139 135 - 144 mmol/L    Potassium 4.0 3.7 - 5.3 mmol/L    Chloride 101 98 - 107 mmol/L    CO2 29 20 - 31 mmol/L    Anion Gap 9 9 - 17 mmol/L    GFR Non-African American >60 >60 mL/min    GFR African American >60 >60 mL/min    GFR Comment          GFR Staging NOT REPORTED    CBC Auto Differential   Result Value Ref Range    WBC 9.2 3.5 - 11.0 k/uL    RBC 2.94 (L) 4.5 - 5.9 m/uL    Hemoglobin 9.8 (L) 13.5 - 17.5 g/dL    Hematocrit 28.5 (L) 41 - 53 %    MCV 96.8 80 - 100 fL    MCH 33.3 26 - 34 pg    MCHC 34.4 31 - 37 g/dL    RDW 14.1 11.5 - 14.5 %    Platelets 63 (L) 118 - 400 k/uL    MPV 8.4 6.0 - 12.0 fL    Differential Type NOT REPORTED     Immature Granulocytes NOT REPORTED 0 %    Absolute Immature Granulocyte NOT REPORTED 0.00 - 0.30 k/uL    WBC Morphology NOT REPORTED     RBC Morphology NOT REPORTED     Platelet Estimate NOT REPORTED     Seg Neutrophils 65 %    Lymphocytes 15 %    Monocytes 7 %    Eosinophils % 2 %    Basophils 0 %    Metamyelocytes 5 (H) 0 %    Myelocytes 6 (H) 0 %    Segs Absolute 5.99 1.8 - 7.7 k/uL    Absolute Lymph # 1.38 1.0 - 4.8 k/uL    Absolute Mono # 0.64 0.2 - 0.8 k/uL    Absolute Eos # 0.18 0.0 - 0.4 k/uL    Basophils # 0.00 0.0 - 0.2 k/uL    Metamyelocytes Absolute 0.46 (H) 0 k/uL    Myelocytes Absolute 0.55 (H) 0 k/uL    Morphology INCREASED BANDS PRESENT    CBC   Result Value Ref Range    WBC 32.7 (HH) 3.5 - 11.0 k/uL    RBC 2.83 (L) 4.5 - 5.9 m/uL    Hemoglobin 9.8 (L) 13.5 - 17.5 g/dL    Hematocrit 27.4 (L) 41 - 53 %    MCV 97.1 80 - 100 fL    MCH 34.6 (H) 26 - 34 pg    MCHC 35.6 31 - 37 g/dL

## 2017-11-07 NOTE — PLAN OF CARE
[address] [facility]    Second Visit Note  For more detailed information please refer to the progress note of the day      11/7/2017    4:21 PM    Name:   Fabiola York  MRN:     [id number]     Acct:      [de-identified]   Room:   [facility] Day:  3  Admit Date:  11/4/2017  4:24 PM    PCP:   Shraddha Dugan DO  Code Status:  Full Code        Pt vitals were reviewed   New labs were reviewed   Patient was seen    Updated plan :     1. D/w girlfriend and family-pt will need snf upon dc (probably towards end of week)  2. Cont abx  3.  Confusion a bit better today-it is likely the result of infection and meds        Alexi Malone DO  11/7/2017  4:21 PM

## 2017-11-07 NOTE — FLOWSHEET NOTE
PT WBC count elevated to 32.7. Repeat STAT CBC done to verify and WBC count elevated to 35.0. Value called to Dr. Nicole Villarreal.

## 2017-11-07 NOTE — FLOWSHEET NOTE
Patient resting in bed and eating breakfast. He engages in brief conversation with writer. He states that \"I just want to get home, but I don't know how I will get there. \" He expresses no needs at this time. Writer offers supportive conversation and listening presence.      11/07/17 0910   Encounter Summary   Services provided to: Patient   Referral/Consult From: [facility] Visiting (11/7/17)   Complexity of Encounter Low   Length of Encounter 15 minutes   Spiritual Assessment Completed Yes   Routine   Type Follow up   Assessment Approachable   Intervention Active listening   Outcome Expressed feelings/needs/concerns;Engaged in conversation

## 2017-11-08 ENCOUNTER — APPOINTMENT (OUTPATIENT)
Dept: GENERAL RADIOLOGY | Age: 57
DRG: 871 | End: 2017-11-08
Payer: MEDICARE

## 2017-11-08 LAB
ABSOLUTE EOS #: 0 K/UL (ref 0–0.4)
ABSOLUTE IMMATURE GRANULOCYTE: ABNORMAL K/UL (ref 0–0.3)
ABSOLUTE LYMPH #: 10.37 K/UL (ref 1–4.8)
ABSOLUTE MONO #: 1.3 K/UL (ref 0.2–0.8)
ANION GAP SERPL CALCULATED.3IONS-SCNC: 10 MMOL/L (ref 9–17)
BASOPHILS # BLD: 0 %
BASOPHILS ABSOLUTE: 0 K/UL (ref 0–0.2)
BUN BLDV-MCNC: 23 MG/DL (ref 6–20)
BUN/CREAT BLD: 38 (ref 9–20)
CALCIUM SERPL-MCNC: 8.4 MG/DL (ref 8.6–10.4)
CHLORIDE BLD-SCNC: 97 MMOL/L (ref 98–107)
CO2: 31 MMOL/L (ref 20–31)
CREAT SERPL-MCNC: 0.61 MG/DL (ref 0.7–1.2)
DIFFERENTIAL TYPE: ABNORMAL
EOSINOPHILS RELATIVE PERCENT: 0 %
GFR AFRICAN AMERICAN: >60 ML/MIN
GFR NON-AFRICAN AMERICAN: >60 ML/MIN
GFR SERPL CREATININE-BSD FRML MDRD: ABNORMAL ML/MIN/{1.73_M2}
GFR SERPL CREATININE-BSD FRML MDRD: ABNORMAL ML/MIN/{1.73_M2}
GLUCOSE BLD-MCNC: 114 MG/DL (ref 70–99)
HCT VFR BLD CALC: 30.9 % (ref 41–53)
HEMOGLOBIN: 10.7 G/DL (ref 13.5–17.5)
IMMATURE GRANULOCYTES: ABNORMAL %
LYMPHOCYTES # BLD: 16 %
MCH RBC QN AUTO: 32.9 PG (ref 26–34)
MCHC RBC AUTO-ENTMCNC: 34.5 G/DL (ref 31–37)
MCV RBC AUTO: 95.2 FL (ref 80–100)
METAMYELOCYTES ABSOLUTE COUNT: 2.59 K/UL
METAMYELOCYTES: 4 %
MONOCYTES # BLD: 2 %
MORPHOLOGY: ABNORMAL
PDW BLD-RTO: 13.6 % (ref 11.5–14.5)
PLATELET # BLD: 50 K/UL (ref 130–400)
PLATELET ESTIMATE: ABNORMAL
PMV BLD AUTO: ABNORMAL FL (ref 6–12)
POTASSIUM SERPL-SCNC: 4.1 MMOL/L (ref 3.7–5.3)
RBC # BLD: 3.25 M/UL (ref 4.5–5.9)
RBC # BLD: ABNORMAL 10*6/UL
SEG NEUTROPHILS: 78 %
SEGMENTED NEUTROPHILS ABSOLUTE COUNT: 50.54 K/UL (ref 1.8–7.7)
SODIUM BLD-SCNC: 138 MMOL/L (ref 135–144)
WBC # BLD: 64.8 K/UL (ref 3.5–11)
WBC # BLD: ABNORMAL 10*3/UL

## 2017-11-08 PROCEDURE — 6370000000 HC RX 637 (ALT 250 FOR IP): Performed by: INTERNAL MEDICINE

## 2017-11-08 PROCEDURE — 6360000002 HC RX W HCPCS: Performed by: NURSE PRACTITIONER

## 2017-11-08 PROCEDURE — 2700000000 HC OXYGEN THERAPY PER DAY

## 2017-11-08 PROCEDURE — 80048 BASIC METABOLIC PNL TOTAL CA: CPT

## 2017-11-08 PROCEDURE — 36415 COLL VENOUS BLD VENIPUNCTURE: CPT

## 2017-11-08 PROCEDURE — 99232 SBSQ HOSP IP/OBS MODERATE 35: CPT | Performed by: INTERNAL MEDICINE

## 2017-11-08 PROCEDURE — 94760 N-INVAS EAR/PLS OXIMETRY 1: CPT

## 2017-11-08 PROCEDURE — 2060000000 HC ICU INTERMEDIATE R&B

## 2017-11-08 PROCEDURE — 85025 COMPLETE CBC W/AUTO DIFF WBC: CPT

## 2017-11-08 PROCEDURE — 2580000003 HC RX 258: Performed by: INTERNAL MEDICINE

## 2017-11-08 PROCEDURE — 6360000002 HC RX W HCPCS: Performed by: INTERNAL MEDICINE

## 2017-11-08 PROCEDURE — 94640 AIRWAY INHALATION TREATMENT: CPT

## 2017-11-08 PROCEDURE — 99233 SBSQ HOSP IP/OBS HIGH 50: CPT | Performed by: INTERNAL MEDICINE

## 2017-11-08 PROCEDURE — 87040 BLOOD CULTURE FOR BACTERIA: CPT

## 2017-11-08 PROCEDURE — 94761 N-INVAS EAR/PLS OXIMETRY MLT: CPT

## 2017-11-08 PROCEDURE — 71010 XR CHEST PORTABLE: CPT

## 2017-11-08 RX ORDER — FORMOTEROL FUMARATE 20 UG/2ML
20 SOLUTION RESPIRATORY (INHALATION) 2 TIMES DAILY
Status: DISCONTINUED | OUTPATIENT
Start: 2017-11-08 | End: 2017-11-10

## 2017-11-08 RX ORDER — BUDESONIDE 0.5 MG/2ML
0.5 INHALANT ORAL 2 TIMES DAILY
Status: DISCONTINUED | OUTPATIENT
Start: 2017-11-08 | End: 2017-11-10

## 2017-11-08 RX ORDER — MORPHINE SULFATE 2 MG/ML
2 INJECTION, SOLUTION INTRAMUSCULAR; INTRAVENOUS ONCE
Status: COMPLETED | OUTPATIENT
Start: 2017-11-08 | End: 2017-11-08

## 2017-11-08 RX ORDER — METHYLPREDNISOLONE SODIUM SUCCINATE 125 MG/2ML
125 INJECTION, POWDER, LYOPHILIZED, FOR SOLUTION INTRAMUSCULAR; INTRAVENOUS ONCE
Status: COMPLETED | OUTPATIENT
Start: 2017-11-08 | End: 2017-11-08

## 2017-11-08 RX ADMIN — IPRATROPIUM BROMIDE AND ALBUTEROL SULFATE 1 AMPULE: .5; 3 SOLUTION RESPIRATORY (INHALATION) at 19:09

## 2017-11-08 RX ADMIN — WATER 2 G: 1 INJECTION INTRAMUSCULAR; INTRAVENOUS; SUBCUTANEOUS at 14:46

## 2017-11-08 RX ADMIN — MOMETASONE FUROATE AND FORMOTEROL FUMARATE DIHYDRATE 2 PUFF: 200; 5 AEROSOL RESPIRATORY (INHALATION) at 07:46

## 2017-11-08 RX ADMIN — PREDNISONE 20 MG: 20 TABLET ORAL at 10:09

## 2017-11-08 RX ADMIN — PANTOPRAZOLE SODIUM 40 MG: 40 TABLET, DELAYED RELEASE ORAL at 10:08

## 2017-11-08 RX ADMIN — IPRATROPIUM BROMIDE AND ALBUTEROL SULFATE 1 AMPULE: .5; 3 SOLUTION RESPIRATORY (INHALATION) at 15:20

## 2017-11-08 RX ADMIN — ZOLPIDEM TARTRATE 10 MG: 5 TABLET ORAL at 18:47

## 2017-11-08 RX ADMIN — Medication 10 ML: at 09:00

## 2017-11-08 RX ADMIN — FORMOTEROL FUMARATE DIHYDRATE 20 MCG: 20 SOLUTION RESPIRATORY (INHALATION) at 19:10

## 2017-11-08 RX ADMIN — Medication 1 TABLET: at 10:07

## 2017-11-08 RX ADMIN — DOCUSATE SODIUM 100 MG: 100 CAPSULE, LIQUID FILLED ORAL at 10:08

## 2017-11-08 RX ADMIN — HYDROCODONE BITARTRATE AND ACETAMINOPHEN 2 TABLET: 5; 325 TABLET ORAL at 17:02

## 2017-11-08 RX ADMIN — Medication 1 TABLET: at 20:55

## 2017-11-08 RX ADMIN — AMLODIPINE BESYLATE 5 MG: 5 TABLET ORAL at 10:07

## 2017-11-08 RX ADMIN — IPRATROPIUM BROMIDE AND ALBUTEROL SULFATE 1 AMPULE: .5; 3 SOLUTION RESPIRATORY (INHALATION) at 11:37

## 2017-11-08 RX ADMIN — PREDNISONE 20 MG: 20 TABLET ORAL at 20:55

## 2017-11-08 RX ADMIN — SODIUM CHLORIDE: 9 INJECTION, SOLUTION INTRAVENOUS at 20:51

## 2017-11-08 RX ADMIN — ALPRAZOLAM 0.5 MG: 0.5 TABLET ORAL at 10:01

## 2017-11-08 RX ADMIN — ATENOLOL 50 MG: 50 TABLET ORAL at 10:07

## 2017-11-08 RX ADMIN — MEGESTROL ACETATE 400 MG: 40 SUSPENSION ORAL at 10:08

## 2017-11-08 RX ADMIN — IPRATROPIUM BROMIDE AND ALBUTEROL SULFATE 1 AMPULE: .5; 3 SOLUTION RESPIRATORY (INHALATION) at 07:45

## 2017-11-08 RX ADMIN — SERTRALINE 25 MG: 25 TABLET, FILM COATED ORAL at 10:08

## 2017-11-08 RX ADMIN — Medication 2 MG: at 10:00

## 2017-11-08 RX ADMIN — HYDROCODONE BITARTRATE AND ACETAMINOPHEN 2 TABLET: 5; 325 TABLET ORAL at 11:56

## 2017-11-08 RX ADMIN — BUDESONIDE 500 MCG: 0.5 INHALANT RESPIRATORY (INHALATION) at 19:10

## 2017-11-08 RX ADMIN — METHYLPREDNISOLONE SODIUM SUCCINATE 125 MG: 125 INJECTION, POWDER, FOR SOLUTION INTRAMUSCULAR; INTRAVENOUS at 10:17

## 2017-11-08 ASSESSMENT — PAIN SCALES - GENERAL
PAINLEVEL_OUTOF10: 10
PAINLEVEL_OUTOF10: 8
PAINLEVEL_OUTOF10: 7
PAINLEVEL_OUTOF10: 7
PAINLEVEL_OUTOF10: 10
PAINLEVEL_OUTOF10: 2
PAINLEVEL_OUTOF10: 8
PAINLEVEL_OUTOF10: 9
PAINLEVEL_OUTOF10: 5

## 2017-11-08 NOTE — PROGRESS NOTES
Nutrition Assessment    Type and Reason for Visit: Initial, Positive Nutrition Screen, Consult (MST=2 / Poor appetite/po intake)    Nutrition Recommendations: 1. Suggest continuing on general diet. 2. Consider ordering Ensure Enlive ONS x 3/day. Malnutrition Assessment:  · Malnutrition Status: Meets the criteria for severe malnutrition  · Context: Chronic illness  · Findings of the 6 clinical characteristics of malnutrition (Minimum of 2 out of 6 clinical characteristics is required to make the diagnosis of moderate or severe Protein Calorie Malnutrition based on AND/ASPEN Guidelines):  1. Energy Intake-Less than or equal to 75%, greater than or equal to 1 month    2. Weight Loss-7.5% loss or greater, in 1 month  3. Fat Loss-Unable to assess  4. Muscle Loss-Unable to assess  5. Fluid Accumulation-No significant fluid accumulation  6.  Strength-Not measured    Nutrition Diagnosis:   · Problem: Severe malnutrition, in context of chronic illness  · Etiology: related to Catabolic illness     Signs and symptoms:  as evidenced by Weight loss greater than or equal to 5% in 1 month (+4.6 kg--7.8% since 10/14/17)    Nutrition Assessment:  · Subjective Assessment: Pt sleeping @ time of visit. Noted +wt loss of 4.6 kg (7.8%) since 10/14 (59 kg). This is considered to be severe over time frame of x past 1 month.     · Nutrition-Focused Physical Findings: GI:  +flat, +soft, +TIBURCIO, +no flatus, +active bowel sounds; PV:  WDL; Skin:  +pale color  · Wound Type: None  · Current Nutrition Therapies:  · Oral Diet Orders: General   · Oral Diet intake: Unable to assess  · Anthropometric Measures:  · Ht: 5' 5\" (165.1 cm)   · Admission Body Wt: 119 lb 14.9 oz (54.4 kg)  · Usual Body Wt: 130 lb 1.1 oz (59 kg) (10/14/17)  · % Weight Change: 7.8%,  x 1 month  · Ideal Body Wt: 147 lb 11.3 oz (67 kg), % Ideal Body 81%  · BMI Classification: BMI 18.5 - 24.9 Normal Weight  · Comparative Standards (Estimated Nutrition

## 2017-11-08 NOTE — FLOWSHEET NOTE
Writer delivers Advance Directives information packet, and explains contents to patient and his significant other. They will review and have Spiritual Care paged when ready to sign.      11/08/17 1135   Encounter Summary   Services provided to: Patient and family together   Referral/Consult From: Michael   Continue Visiting (11/8/17)   Complexity of Encounter Low   Length of Encounter 15 minutes   Routine   Type Follow up   Assessment Approachable   Intervention Active listening   Outcome Engaged in conversation

## 2017-11-08 NOTE — PROGRESS NOTES
Pulmonary Critical Care Progress Note  Christina Jordan CNP / Dr. Wilmer Habermann, M.D. Patient seen for the follow up of Pneumonia/squamous cell carcinoma lung/ Gram-negative sepsis ([facility] [address]     Subjective:  He is much less confused today. He continues to have a cough without sputum production. He is significantly short of breath. He is requiring high flow oxygen    Examination:  Vitals: /82   Pulse 92   Temp 97.7 °F (36.5 °C) (Oral)   Resp 18   Ht 5' 5\" (1.651 m)   Wt 120 lb (54.4 kg)   SpO2 98%   BMI 19.97 kg/m²      High flow oxygen at 70%/40 L    General appearance: alert and cooperative with exam, family at bedside, sitting up in chair  Neck: No JVD  Lungs: Very poor air exchange,+ rales  Heart: regular rate and rhythm, S1, S2 normal, no gallop  Abdomen: Soft, non tender, + BS  Extremities: no cyanosis or clubbing.  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 post radiation and chemo 2014  · Newly diagnosed right lower lobe squamous cell carcinoma, started on chemo  · Chemotherapy-induced neutropenia  · Pneumonia  · Sepsis  · Mild encephalopathy, improving  · Recent GI bleed/hypertension/anemia/anxiety disorder      Recommendations:  · Continue IV antibiotics per infectious disease  · Prednisone taper  · Albuterol and Ipratropium Q 4 hours and prn  · Discontinue Dulera 200  · Add Perforomist and Pulmicort aerosols every 12 hours  · IV fluids  · Labs: CBC and BMP in am  · PT/OT  · High flow oxygen, keep oxygen saturation greater than 92%  · BiPAP as needed  · DVT prophylaxis with EPC  · He will need SNF upon discharge  · Smoking cessation education, he is strongly encouraged to stop smoking  · Will follow with you    Electronically signed by     Priscila Weeks CNP on 11/8/2017 at 1:42 PM  Patient was seen under the supervision of Dr. Katarzyna Flores and Sleep Medicine,    [facility] AT [place]: [phone]

## 2017-11-08 NOTE — CARE COORDINATION
Additional attempt to meet with patient. At time of attempt, patient not available, in consultation with providers. Will try again at later time. Review of MR, physician notes indicate order placed for palliative care consult. Patient has changed code status to [facility]. Notes indicate patient is refusing intubation as of this morning. Care Transitions will continue to follow. Message sent to palliative care coordinator regarding patient.  -NR
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
## 2017-11-08 NOTE — PROGRESS NOTES
[facility]    Progress Note    11/8/2017    9:23 AM    Name:   Janel Quigley  MRN:     [id number]     Acct:      [de-identified]   Room:   [address] [place] [address] Day:  4  Admit Date:  11/4/2017  4:24 PM    PCP:   Jane Umanzor DO  Code Status:  Full Code    Subjective:     C/C:   Chief Complaint   Patient presents with    Fatigue     Interval History Status: worsened. \"i feel like shit\"  Shakes, rigors, can't get warm  o2 sat dropping    Brief History:     The patient is a 65 y.o.  Non-/non  male who presents with Fatigue And fever with chills, fever was more than 101 at home. He was discharged 11/3/17 from the hospital when he was having GI bleed with occult blood positive stools as well as he was severely neutropenic and thrombocytopenic since he was on chemo for metastatic lung cancer. There were multiple infiltrates in the lungs suggestive of metastasis versus multifocal pneumonia  He is having persistent cough and is able to expectorate a Little, there is no hemoptysis  He does not have much appetite and is feeling weak    Review of Systems:     Constitutional:  negative for chills, fevers, sweats; positive for shakes  Respiratory:  positive for cough, dyspnea on exertion, shortness of breath, wheezing  Cardiovascular:  negative for chest pain, chest pressure/discomfort, lower extremity edema, palpitations  Gastrointestinal:  negative for abdominal pain, constipation, diarrhea, nausea, vomiting  Neurological:  negative for dizziness, headache    Medications: Allergies:     Allergies   Allergen Reactions    Aspirin     Fish-Derived Products Swelling    Shellfish-Derived Products      Other reaction(s): syncope/severe facial swelling/vomits    Motrin [Ibuprofen Micronized] Other (See Comments)     Upset stomach        Current Meds:   Scheduled Meds:    cefTRIAXone (ROCEPHIN) IV  2 g Intravenous Q24H    predniSONE  20 mg Oral BID    amLODIPine  5 mg Oral Daily    sertraline  25 mg Oral Daily    megestrol  400 mg Oral Daily    atenolol  50 mg Oral Daily    pantoprazole  40 mg Oral QAM AC    sodium chloride flush  10 mL Intravenous 2 times per day    docusate sodium  100 mg Oral BID    ipratropium-albuterol  1 ampule Inhalation Q4H [place]    mometasone-formoterol  2 puff Inhalation BID    Calcium Carb-Cholecalciferol  1 tablet Oral BID     Continuous Infusions:    sodium chloride 75 mL/hr at 17 1137     PRN Meds: sodium chloride flush, ALPRAZolam, tiZANidine, benzonatate, prochlorperazine, albuterol sulfate HFA, zolpidem, HYDROcodone-acetaminophen, sodium chloride flush, HYDROcodone 5 mg - acetaminophen **OR** HYDROcodone 5 mg - acetaminophen, magnesium hydroxide, bisacodyl, ondansetron, nicotine, albuterol, acetaminophen, ALPRAZolam    Data:     Past Medical History:   has a past medical history of Benign essential HTN; Cardiac arrest due to respiratory disorder ([facility] [address] Cellulitis; Chronic low back pain; Emphysema of lung ([facility] [address] Epidermoid carcinoma of lung ([facility] [address] Head injury; Hepatitis; Insomnia; Osteoarthritis of spine with radiculopathy, lumbar region; Reflux; Seizures ([facility] [address] Tremor; and Ulcer ([facility] [address] Social History:   reports that he has been smoking Cigarettes. He has a 40.00 pack-year smoking history. He has never used smokeless tobacco. He reports that he uses drugs, including Marijuana. He reports that he does not drink alcohol.      Family History:   Family History   Problem Relation Age of Onset    Heart Disease Mother      CABG    Cancer Mother     Cancer Father      Throat    Heart Disease Father     Stroke Maternal Grandfather     Heart Disease Paternal Grandmother        Vitals:  /82   Pulse 92   Temp 97.7 °F (36.5 °C) (Oral)   Resp 18   Ht 5' 5\" (1.651 m)   Wt 120 lb (54.4 kg)   SpO2 (!) 89%   BMI 19.97 kg/m²   Temp (24hrs), Av.2 °F (36.8 °C), Min:97.7 °F (36.5 °C), Max:98.6 °F (37 °C)    No results for input(s): POCGLU in the last 72 hours. I/O (24Hr): Intake/Output Summary (Last 24 hours) at 11/08/17 0923  Last data filed at 11/08/17 0500   Gross per 24 hour   Intake             3270 ml   Output             1725 ml   Net             1545 ml       Labs:    Hematology:  Recent Labs      11/07/17 0441 11/07/17 0519 11/08/17 0619   WBC  32.7*  35.0*  64.8*   RBC  2.83*  2.87*  3.25*   HGB  9.8*  9.4*  10.7*   HCT  27.4*  27.6*  30.9*   MCV  97.1  96.1  95.2   MCH  34.6*  32.6  32.9   MCHC  35.6  33.9  34.5   RDW  13.4  13.5  13.6   PLT  54*  61*  50*   MPV  NOT REPORTED  NOT REPORTED  NOT REPORTED     Chemistry:  Recent Labs      11/06/17 0515 11/07/17 0441 11/08/17 0619   NA  139  141  138   K  4.0  3.6*  4.1   CL  101  101  97*   CO2  29  31  31   GLUCOSE  123*  110*  114*   BUN  15  21*  23*   CREATININE  0.61*  0.55*  0.61*   ANIONGAP  9  9  10   LABGLOM  >60  >60  >60   GFRAA  >60  >60  >60   CALCIUM  8.6  8.2*  8.4*     No results for input(s): PROT, LABALBU, LABA1C, T3ZNHGM, J0QGRCK, FT4, TSH, AST, ALT, LDH, GGT, ALKPHOS, LABGGT, BILITOT, BILIDIR, AMMONIA, AMYLASE, LIPASE, LACTATE, CHOL, HDL, LDLCHOLESTEROL, CHOLHDLRATIO, TRIG, VLDL, TAE79MS, PHENYTOIN, PHENYF, URICACID, POCGLU in the last 72 hours.       Lab Results   Component Value Date/Time    SPECIAL NOT REPORTED 11/04/2017 06:09 PM     Lab Results   Component Value Date/Time    CULTURE NO SIGNIFICANT GROWTH 11/04/2017 06:09 PM    CULTURE  11/04/2017 06:09 PM     Performed at [address] [place] [address] [phone]       Lab Results   Component Value Date    POCPH 7.34 12/10/2016    PHART 7.44 02/14/2013    POCPCO2 38 12/10/2016    NZI8PHF 34 02/14/2013    POCPO2 166 12/10/2016    PO2ART 112 02/14/2013    POCHCO3 20.8 12/10/2016    QSG5WNS 22.7 02/14/2013    NBEA 5 12/10/2016    PBEA NOT REPORTED 12/10/2016    YLG8QBB 22 12/10/2016    JBMG4NMC 99 12/10/2016    Y4LZZIFL 99.2 02/14/2013 FIO2 40.0 12/10/2016       Radiology:    Cxr:     Impression   Left mid and lower lung reticular interstitial infiltrates and hazy   opacification right lower lung unchanged. Physical Examination:        General appearance:  alert, cooperative and no distress  Mental Status:  oriented to person, place and time and normal affect  Lungs:  clear to auscultation bilaterally, normal effort  Heart:  regular rate and rhythm, no murmur  Abdomen:  soft, nontender, nondistended, normal bowel sounds, no masses, hepatomegaly, splenomegaly  Extremities:  no edema, redness, tenderness in the calves  Skin:  no gross lesions, rashes, induration    Assessment:        Primary Problem  Gram-negative sepsis [facility])    Active Hospital Problems    Diagnosis Date Noted    Neutropenia with fever ([address] [D70.9, R50.81] 11/04/2017     Priority: High    Multifocal pneumonia [J18.9] 11/04/2017     Priority: Medium    Acute metabolic encephalopathy [P53.11] 11/07/2017    Moraxella catarrhalis bronchitis [J40, B96.89]     Gram-negative sepsis ([address] [A41.50] 11/06/2017    Acute febrile illness [R50.9]     H/O: GI bleed [Z87.19] 11/04/2017    Acute respiratory distress [R06.03] 11/04/2017    Chemotherapy-induced neutropenia ([address] [D70.1, T45.1X5A] 10/31/2017    Benign essential HTN [I10] 12/10/2016    Anorexia [R63.0] 08/16/2016    Cachectic ([address] Jose R Yoderh 08/16/2016    Mixed anxiety depressive disorder [F41.8] 08/16/2016    Pain of metastatic malignancy [G89.3] 08/16/2016    Squamous cell carcinoma of lung ([address] [C34.90] 08/16/2016    Anemia of chronic disease [D63.8] 12/17/2014    Malnutrition ([address] [E46] 12/17/2014    Anxiety [F41.9] 04/25/2013    Chronic obstructive pulmonary disease ([address] [J44.9] 04/25/2013    GERD (gastroesophageal reflux disease) [K21.9] 10/03/2012       Plan:        1. dnrcca no intubation per pt wishes; will have rn contact his girlfriend  2.  RT will place on high flow now as his sat keeps dropping on 6L nc  3. Repeat bc x 2 with wbc going up and rigors/shakes (though Dr Jayne Nolan feels the leukocytosis is probably the neupogen)  4. Pall care consult goals of care  5. D/w RN, RT and charge RN And girlfriend and daughter  10. Extra dose of solumedrol now  7. D/w dr Aimee Conley  8. The patient later rescinded his DNR CCA and once again wanted to be full code  9.  Poor prognosis    Praksah Galeas Blood, DO  11/8/2017  9:23 AM

## 2017-11-08 NOTE — PLAN OF CARE
[address] [facility]    Second Visit Note  For more detailed information please refer to the progress note of the day      11/8/2017    6:32 PM    Name:   Wyatt Daily  MRN:     [id number]     Lisasharitalyside:      [de-identified]   Room:   [facility]   Day:  4  Admit Date:  11/4/2017  4:24 PM    PCP:   Felipe Clemens DO  Code Status:  Full Code        Pt vitals were reviewed   New labs were reviewed   Patient was seen    Updated plan :     1. May need high flow o2 again-intermittently drops sat  2.  D/w girlfriencornel Bruce [facility] Blood, DO  11/8/2017  6:32 PM

## 2017-11-08 NOTE — PROGRESS NOTES
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 · The leukocytosis represents the effect of Neupogen. · I will likely switch him to an oral fluoroquinolone at the time of discharge. · Chest x-ray shows stable changes. · Continue current antimicrobial therapy and we will continue to monitor his clinical progress and I suspect that the white blood cell count will start to trend down soon once the Neupogen effect has resolved. Summary of Stay:   Sheng Kennedy is a 65y.o.-year-old  male who was initially admitted on 2017. 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      Had recent admission because of GI bleed. Reportedly had aspiration pneumonia although CXR did not show acute changes. Returned with fevers, dry cough, confusion and Chemotherapy-induced neutropenia. One blood culture with Gram negative diplococci.  Neisseria spp vs meningococcus.     Current evaluation:2017    /70   Pulse 82   Temp 98.6 °F (37 °C) (Oral)   Resp 14   Ht 5' 5\" (1.651 m)   Wt 120 lb (54.4 kg)   SpO2 95%   BMI 19.97 kg/m²     Temperature Range: Temp: 98.6 °F (37 °C) Temp  Av.2 °F (36.8 °C)  Min: 97.7 °F (36.5 °C)  Max: 98.6 °F (37 °C)    The patient is seen and evaluated at bedside and he still does not feel very well reports feeling \"crappy\". He does though states that overall he is better. He does not report any fevers or chills. He did have some hypoxia earlier today and is now on high flow O2. The patient is seen with the nurse at bedside. Physical Examination :     Physical Exam   Constitutional: He is oriented to person, place, and time. He appears well-developed and well-nourished. HENT:   Head: Normocephalic. Eyes: Pupils are equal, round, and reactive to light. Neck: Normal range of motion. Neck supple. Cardiovascular: Normal rate, regular rhythm and normal heart sounds. Pulmonary/Chest: Effort normal. He has wheezes. Abdominal: Soft. Bowel sounds are normal. There is tenderness. There is no rebound and no guarding. Musculoskeletal: Normal range of motion. Neurological: He is alert and oriented to person, place, and time. Skin: Skin is warm and dry. Laboratory data:   I have independently reviewed the following labs:  CBC with Differential:   Recent Labs      11/06/17   0515   11/07/17   0519  11/08/17   0619   WBC  9.2   < >  35.0*  64.8*   HGB  9.8*   < >  9.4*  10.7*   HCT  28.5*   < >  27.6*  30.9*   PLT  63*   < >  61*  50*   LYMPHOPCT  15   --    --   16   MONOPCT  7   --    --   2    < > = values in this interval not displayed. BMP:   Recent Labs      11/07/17   0441  11/08/17   0619   NA  141  138   K  3.6*  4.1   CL  101  97*   CO2  31  31   BUN  21*  23*   CREATININE  0.55*  0.61*     Hepatic Function Panel:   No results for input(s): PROT, LABALBU, BILIDIR, IBILI, BILITOT, ALKPHOS, ALT, AST in the last 72 hours. No results for input(s): VANCOTROUGH in the last 72 hours. No results found for: CRP  No results found for: SEDRATE      Imaging Studies:   SINGLE VIEW OF THE CHEST 11/8/2017 6:37 am    Impression   Left mid and lower lung reticular interstitial infiltrates and hazy   opacification right lower lung unchanged.      Cultures:     Culture Blood #1 [[id number]] Collected: 11/08/17 1007

## 2017-11-08 NOTE — PLAN OF CARE
Problem: Nutrition  Goal: Optimal nutrition therapy  Nutrition Problem: Severe malnutrition, in context of chronic illness  Intervention: Food and/or Nutrient Delivery: Continue current diet, Start ONS  Nutritional Goals: PO intake to meet >75% of estimated kcal/protein needs  Outcome: Ongoing

## 2017-11-09 LAB
ANION GAP SERPL CALCULATED.3IONS-SCNC: 8 MMOL/L (ref 9–17)
BUN BLDV-MCNC: 19 MG/DL (ref 6–20)
BUN/CREAT BLD: 40 (ref 9–20)
CALCIUM SERPL-MCNC: 8.1 MG/DL (ref 8.6–10.4)
CHLORIDE BLD-SCNC: 99 MMOL/L (ref 98–107)
CO2: 32 MMOL/L (ref 20–31)
CREAT SERPL-MCNC: 0.48 MG/DL (ref 0.7–1.2)
GFR AFRICAN AMERICAN: >60 ML/MIN
GFR NON-AFRICAN AMERICAN: >60 ML/MIN
GFR SERPL CREATININE-BSD FRML MDRD: ABNORMAL ML/MIN/{1.73_M2}
GFR SERPL CREATININE-BSD FRML MDRD: ABNORMAL ML/MIN/{1.73_M2}
GLUCOSE BLD-MCNC: 116 MG/DL (ref 70–99)
HCT VFR BLD CALC: 29.6 % (ref 41–53)
HEMOGLOBIN: 10.3 G/DL (ref 13.5–17.5)
MCH RBC QN AUTO: 33.2 PG (ref 26–34)
MCHC RBC AUTO-ENTMCNC: 34.6 G/DL (ref 31–37)
MCV RBC AUTO: 96 FL (ref 80–100)
PDW BLD-RTO: 13.7 % (ref 11.5–14.5)
PLATELET # BLD: 43 K/UL (ref 130–400)
PMV BLD AUTO: ABNORMAL FL (ref 6–12)
POTASSIUM SERPL-SCNC: 4.6 MMOL/L (ref 3.7–5.3)
RBC # BLD: 3.08 M/UL (ref 4.5–5.9)
SODIUM BLD-SCNC: 139 MMOL/L (ref 135–144)
WBC # BLD: 60.6 K/UL (ref 3.5–11)

## 2017-11-09 PROCEDURE — 97535 SELF CARE MNGMENT TRAINING: CPT

## 2017-11-09 PROCEDURE — 6360000002 HC RX W HCPCS: Performed by: NURSE PRACTITIONER

## 2017-11-09 PROCEDURE — 6370000000 HC RX 637 (ALT 250 FOR IP): Performed by: INTERNAL MEDICINE

## 2017-11-09 PROCEDURE — 2580000003 HC RX 258: Performed by: INTERNAL MEDICINE

## 2017-11-09 PROCEDURE — 36415 COLL VENOUS BLD VENIPUNCTURE: CPT

## 2017-11-09 PROCEDURE — 6370000000 HC RX 637 (ALT 250 FOR IP): Performed by: NURSE PRACTITIONER

## 2017-11-09 PROCEDURE — 6360000002 HC RX W HCPCS: Performed by: INTERNAL MEDICINE

## 2017-11-09 PROCEDURE — 94640 AIRWAY INHALATION TREATMENT: CPT

## 2017-11-09 PROCEDURE — 99232 SBSQ HOSP IP/OBS MODERATE 35: CPT | Performed by: INTERNAL MEDICINE

## 2017-11-09 PROCEDURE — 80048 BASIC METABOLIC PNL TOTAL CA: CPT

## 2017-11-09 PROCEDURE — 85027 COMPLETE CBC AUTOMATED: CPT

## 2017-11-09 PROCEDURE — 97530 THERAPEUTIC ACTIVITIES: CPT

## 2017-11-09 PROCEDURE — 2060000000 HC ICU INTERMEDIATE R&B

## 2017-11-09 PROCEDURE — 2700000000 HC OXYGEN THERAPY PER DAY

## 2017-11-09 RX ORDER — SENNA PLUS 8.6 MG/1
1 TABLET ORAL 2 TIMES DAILY
Status: DISCONTINUED | OUTPATIENT
Start: 2017-11-09 | End: 2017-11-10 | Stop reason: HOSPADM

## 2017-11-09 RX ADMIN — ATENOLOL 50 MG: 50 TABLET ORAL at 08:37

## 2017-11-09 RX ADMIN — IPRATROPIUM BROMIDE AND ALBUTEROL SULFATE 1 AMPULE: .5; 3 SOLUTION RESPIRATORY (INHALATION) at 07:16

## 2017-11-09 RX ADMIN — AMLODIPINE BESYLATE 5 MG: 5 TABLET ORAL at 08:37

## 2017-11-09 RX ADMIN — PREDNISONE 20 MG: 20 TABLET ORAL at 08:37

## 2017-11-09 RX ADMIN — PREDNISONE 20 MG: 20 TABLET ORAL at 22:03

## 2017-11-09 RX ADMIN — ONDANSETRON 4 MG: 2 INJECTION INTRAMUSCULAR; INTRAVENOUS at 11:07

## 2017-11-09 RX ADMIN — BUDESONIDE 500 MCG: 0.5 INHALANT RESPIRATORY (INHALATION) at 19:38

## 2017-11-09 RX ADMIN — ALPRAZOLAM 0.5 MG: 0.5 TABLET ORAL at 16:50

## 2017-11-09 RX ADMIN — BUDESONIDE 500 MCG: 0.5 INHALANT RESPIRATORY (INHALATION) at 07:16

## 2017-11-09 RX ADMIN — Medication 1 TABLET: at 08:38

## 2017-11-09 RX ADMIN — IPRATROPIUM BROMIDE AND ALBUTEROL SULFATE 1 AMPULE: .5; 3 SOLUTION RESPIRATORY (INHALATION) at 15:08

## 2017-11-09 RX ADMIN — Medication 10 ML: at 22:04

## 2017-11-09 RX ADMIN — NYSTATIN 500000 UNITS: 100000 SUSPENSION ORAL at 13:00

## 2017-11-09 RX ADMIN — HYDROCODONE BITARTRATE AND ACETAMINOPHEN 2 TABLET: 5; 325 TABLET ORAL at 08:51

## 2017-11-09 RX ADMIN — FORMOTEROL FUMARATE DIHYDRATE 20 MCG: 20 SOLUTION RESPIRATORY (INHALATION) at 07:17

## 2017-11-09 RX ADMIN — ZOLPIDEM TARTRATE 10 MG: 5 TABLET ORAL at 22:02

## 2017-11-09 RX ADMIN — WATER 2 G: 1 INJECTION INTRAMUSCULAR; INTRAVENOUS; SUBCUTANEOUS at 13:00

## 2017-11-09 RX ADMIN — IPRATROPIUM BROMIDE AND ALBUTEROL SULFATE 1 AMPULE: .5; 3 SOLUTION RESPIRATORY (INHALATION) at 19:38

## 2017-11-09 RX ADMIN — DOCUSATE SODIUM 100 MG: 100 CAPSULE, LIQUID FILLED ORAL at 08:37

## 2017-11-09 RX ADMIN — SERTRALINE 25 MG: 25 TABLET, FILM COATED ORAL at 08:38

## 2017-11-09 RX ADMIN — NYSTATIN 500000 UNITS: 100000 SUSPENSION ORAL at 22:01

## 2017-11-09 RX ADMIN — HYDROCODONE BITARTRATE AND ACETAMINOPHEN 2 TABLET: 5; 325 TABLET ORAL at 22:04

## 2017-11-09 RX ADMIN — MEGESTROL ACETATE 400 MG: 40 SUSPENSION ORAL at 08:38

## 2017-11-09 RX ADMIN — MAGNESIUM HYDROXIDE 30 ML: 400 SUSPENSION ORAL at 16:50

## 2017-11-09 RX ADMIN — ALPRAZOLAM 0.5 MG: 0.5 TABLET ORAL at 08:51

## 2017-11-09 RX ADMIN — PANTOPRAZOLE SODIUM 40 MG: 40 TABLET, DELAYED RELEASE ORAL at 08:37

## 2017-11-09 RX ADMIN — Medication 1 TABLET: at 22:04

## 2017-11-09 RX ADMIN — SODIUM CHLORIDE: 9 INJECTION, SOLUTION INTRAVENOUS at 08:57

## 2017-11-09 RX ADMIN — IPRATROPIUM BROMIDE AND ALBUTEROL SULFATE 1 AMPULE: .5; 3 SOLUTION RESPIRATORY (INHALATION) at 10:53

## 2017-11-09 RX ADMIN — BENZONATATE 200 MG: 100 CAPSULE ORAL at 11:07

## 2017-11-09 RX ADMIN — FORMOTEROL FUMARATE DIHYDRATE 20 MCG: 20 SOLUTION RESPIRATORY (INHALATION) at 19:38

## 2017-11-09 RX ADMIN — HYDROCODONE BITARTRATE AND ACETAMINOPHEN 2 TABLET: 5; 325 TABLET ORAL at 16:51

## 2017-11-09 RX ADMIN — HYDROCODONE BITARTRATE AND ACETAMINOPHEN 2 TABLET: 5; 325 TABLET ORAL at 13:00

## 2017-11-09 ASSESSMENT — PAIN SCALES - GENERAL
PAINLEVEL_OUTOF10: 0
PAINLEVEL_OUTOF10: 8
PAINLEVEL_OUTOF10: 8
PAINLEVEL_OUTOF10: 9
PAINLEVEL_OUTOF10: 7
PAINLEVEL_OUTOF10: 9
PAINLEVEL_OUTOF10: 7

## 2017-11-09 ASSESSMENT — PAIN DESCRIPTION - PAIN TYPE
TYPE: CHRONIC PAIN

## 2017-11-09 ASSESSMENT — PAIN DESCRIPTION - LOCATION
LOCATION: ABDOMEN

## 2017-11-09 ASSESSMENT — PAIN DESCRIPTION - FREQUENCY
FREQUENCY: CONTINUOUS

## 2017-11-09 ASSESSMENT — PAIN DESCRIPTION - ORIENTATION: ORIENTATION: UPPER

## 2017-11-09 ASSESSMENT — PAIN DESCRIPTION - DESCRIPTORS
DESCRIPTORS: SHARP;PINS AND NEEDLES
DESCRIPTORS: SHARP;PINS AND NEEDLES

## 2017-11-09 ASSESSMENT — PAIN DESCRIPTION - ONSET
ONSET: ON-GOING
ONSET: ON-GOING

## 2017-11-09 NOTE — PROGRESS NOTES
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 · The leukocytosis represents the effect of Neupogen. · I will likely switch him to an oral fluoroquinolone - Levofloxacin at the time of discharge to complete therapy. · The white blood cell count has decreased slightly today. · The patient remains very weak and has been able to transfer to the chair as well as stand but he is too weak to walk. · The plan is for an extended care facility soon. Summary of Stay:   Fabiola York is a 65y.o.-year-old  male who was initially admitted on 2017. Patient seen at the request of Rosa Maria Angela with past Hx of COPD, Epidermoid Squamous cell carcinoma of lung with metastases, recent GI bleed, Chemotherapy-induced neutropenia and aspiration pneumonia.      Had recent admission because of GI bleed. Reportedly had aspiration pneumonia although CXR did not show acute changes. Returned with fevers, dry cough, confusion and Chemotherapy-induced neutropenia. One blood culture with Gram negative diplococci - Identified as Moraxella catarrhalis     Current evaluation:2017    /62   Pulse 86   Temp 97.8 °F (36.6 °C) (Oral)   Resp 16   Ht 5' 5\" (1.651 m)   Wt 120 lb (54.4 kg)   SpO2 96%   BMI 19.97 kg/m²     Temperature Range: Temp: 97.8 °F (36.6 °C) Temp  Av.2 °F (36.8 °C)  Min: 97.5 °F (36.4 °C)  Max: 98.8 °F (37.1 °C)    Patient seen and evaluated at bedside.  States he ([phone]    Status Pending   Culture Blood #1 [[id number]] Collected: 11/08/17 1008   Order Status: Completed Specimen: Blood Updated: 11/09/17 0728    Specimen Description . BLOOD LEFT FOREARM 6ML Performed at [facility] [id number] [facility]    Specimen Description [place] [address] [phone]    Special Requests NOT REPORTED    Culture NO GROWTH 18 HOURS    Culture Performed at [address] [phone]    Status Pending   Cult,Blood #2 [[id number]] Collected: 11/04/17 1714   Order Status: Completed Specimen: Blood Updated: 11/09/17 0728    Specimen Description . BLOOD R HAND SILVER 10ML RED 2ML Performed at [facility]    Specimen Description [address] [phone]    Special Requests NOT REPORTED    Culture NO GROWTH 5 DAYS    Culture Performed at [address] [phone]    Status Pending       Cult,Blood #1 [[id number]] (Abnormal) Collected: 11/04/17 1713   Order Status: Completed Specimen: Blood Updated: 11/06/17 1136    Specimen Description . BLOOD LT AC 12CC KB Performed at [address]    Specimen Description [place] [address] [phone]    Special Requests NOT REPORTED    Culture POSITIVE BLOOD CULTURE, RN NOTIFIED: Maria Esther Parr AT 2759 ON 11/5/17 (A)    Culture DIRECT GRAM STAIN FROM BOTTLE: GRAM NEGATIVE DIPLOCOCCI    Culture MORAXELLA CATARRHALIS BETA LACTAMASE POSITIVE (A)    Culture Performed at [address] [phone]    Status FINAL 11/06/2017     Urine culture clean catch [[id number]] Collected: 11/04/17 1809   Order Status: Completed Updated: 11/07/17 1853    Specimen Description . CLEAN CATCH URINE Performed at [place] [id number]    Specimen Description [address] [phone]    Special Requests NOT REPORTED    Culture NO SIGNIFICANT

## 2017-11-09 NOTE — PROGRESS NOTES
[facility]    Progress Note    11/9/2017    12:01 PM    Name:   Janel Quigley  MRN:     [id number]     Acct:      [de-identified]   Room:   [address] [place] [address] Day:  5  Admit Date:  11/4/2017  4:24 PM    PCP:   Jane Umanzor DO  Code Status:  Full Code    Subjective:     C/C:   Chief Complaint   Patient presents with    Fatigue     Interval History Status: improved. Feels much better today  A lot less shortness of breath  No pain    Brief History:     The patient is a 65 y.o.  Non-/non  male who presents with Fatigue And fever with chills, fever was more than 101 at home. He was discharged 11/3/17 from the hospital when he was having GI bleed with occult blood positive stools as well as he was severely neutropenic and thrombocytopenic since he was on chemo for metastatic lung cancer. There were multiple infiltrates in the lungs suggestive of metastasis versus multifocal pneumonia  He is having persistent cough and is able to expectorate a Little, there is no hemoptysis  He does not have much appetite and is feeling weak    Review of Systems:     Constitutional:  negative for chills, fevers, sweats  Respiratory:  positive for cough, dyspnea on exertion, shortness of breath, wheezing  Cardiovascular:  negative for chest pain, chest pressure/discomfort, lower extremity edema, palpitations  Gastrointestinal:  negative for abdominal pain, constipation, diarrhea, nausea, vomiting  Neurological:  negative for dizziness, headache    Medications: Allergies:     Allergies   Allergen Reactions    Aspirin     Fish-Derived Products Swelling    Shellfish-Derived Products      Other reaction(s): syncope/severe facial swelling/vomits    Motrin [Ibuprofen Micronized] Other (See Comments)     Upset stomach        Current Meds:   Scheduled Meds:    nystatin  5 mL Oral 4x Daily    formoterol  20 mcg Nebulization BID    budesonide  0.5 mg

## 2017-11-09 NOTE — PROGRESS NOTES
Mobility Training;Transfer Training; Endurance Training;Gait Training   Hood Koffiing PTA     Time in; 925 Time out: 950

## 2017-11-10 VITALS
WEIGHT: 129.8 LBS | BODY MASS INDEX: 21.63 KG/M2 | RESPIRATION RATE: 18 BRPM | HEIGHT: 65 IN | SYSTOLIC BLOOD PRESSURE: 137 MMHG | OXYGEN SATURATION: 98 % | HEART RATE: 76 BPM | DIASTOLIC BLOOD PRESSURE: 78 MMHG | TEMPERATURE: 98.4 F

## 2017-11-10 LAB
ANION GAP SERPL CALCULATED.3IONS-SCNC: 9 MMOL/L (ref 9–17)
BUN BLDV-MCNC: 21 MG/DL (ref 6–20)
BUN/CREAT BLD: 42 (ref 9–20)
CALCIUM SERPL-MCNC: 8.6 MG/DL (ref 8.6–10.4)
CHLORIDE BLD-SCNC: 94 MMOL/L (ref 98–107)
CO2: 32 MMOL/L (ref 20–31)
CREAT SERPL-MCNC: 0.5 MG/DL (ref 0.7–1.2)
CULTURE: NORMAL
CULTURE: NORMAL
GFR AFRICAN AMERICAN: >60 ML/MIN
GFR NON-AFRICAN AMERICAN: >60 ML/MIN
GFR SERPL CREATININE-BSD FRML MDRD: ABNORMAL ML/MIN/{1.73_M2}
GFR SERPL CREATININE-BSD FRML MDRD: ABNORMAL ML/MIN/{1.73_M2}
GLUCOSE BLD-MCNC: 111 MG/DL (ref 70–99)
HCT VFR BLD CALC: 32 % (ref 41–53)
HEMOGLOBIN: 10.7 G/DL (ref 13.5–17.5)
Lab: NORMAL
MCH RBC QN AUTO: 32.4 PG (ref 26–34)
MCHC RBC AUTO-ENTMCNC: 33.4 G/DL (ref 31–37)
MCV RBC AUTO: 96.9 FL (ref 80–100)
PDW BLD-RTO: 14.7 % (ref 11.5–14.5)
PLATELET # BLD: 55 K/UL (ref 130–400)
PMV BLD AUTO: 8.5 FL (ref 6–12)
POTASSIUM SERPL-SCNC: 5 MMOL/L (ref 3.7–5.3)
RBC # BLD: 3.3 M/UL (ref 4.5–5.9)
SODIUM BLD-SCNC: 135 MMOL/L (ref 135–144)
SPECIMEN DESCRIPTION: NORMAL
SPECIMEN DESCRIPTION: NORMAL
STATUS: NORMAL
WBC # BLD: 51.9 K/UL (ref 3.5–11)

## 2017-11-10 PROCEDURE — 85027 COMPLETE CBC AUTOMATED: CPT

## 2017-11-10 PROCEDURE — 6370000000 HC RX 637 (ALT 250 FOR IP): Performed by: NURSE PRACTITIONER

## 2017-11-10 PROCEDURE — 36415 COLL VENOUS BLD VENIPUNCTURE: CPT

## 2017-11-10 PROCEDURE — 6370000000 HC RX 637 (ALT 250 FOR IP): Performed by: INTERNAL MEDICINE

## 2017-11-10 PROCEDURE — 2580000003 HC RX 258: Performed by: INTERNAL MEDICINE

## 2017-11-10 PROCEDURE — 94761 N-INVAS EAR/PLS OXIMETRY MLT: CPT

## 2017-11-10 PROCEDURE — 80048 BASIC METABOLIC PNL TOTAL CA: CPT

## 2017-11-10 PROCEDURE — 99232 SBSQ HOSP IP/OBS MODERATE 35: CPT | Performed by: INTERNAL MEDICINE

## 2017-11-10 PROCEDURE — 6360000002 HC RX W HCPCS: Performed by: NURSE PRACTITIONER

## 2017-11-10 PROCEDURE — 94640 AIRWAY INHALATION TREATMENT: CPT

## 2017-11-10 PROCEDURE — 2700000000 HC OXYGEN THERAPY PER DAY

## 2017-11-10 RX ORDER — HYDROCODONE BITARTRATE AND ACETAMINOPHEN 5; 325 MG/1; MG/1
TABLET ORAL
Qty: 24 TABLET | Status: SHIPPED | OUTPATIENT
Start: 2017-11-10 | End: 2018-01-15

## 2017-11-10 RX ORDER — ZOLPIDEM TARTRATE 10 MG/1
10 TABLET ORAL NIGHTLY PRN
Qty: 5 TABLET | Status: SHIPPED | OUTPATIENT
Start: 2017-11-10 | End: 2017-11-24

## 2017-11-10 RX ORDER — NICOTINE 21 MG/24HR
1 PATCH, TRANSDERMAL 24 HOURS TRANSDERMAL DAILY PRN
Qty: 30 PATCH | Refills: 3 | DISCHARGE
Start: 2017-11-10 | End: 2018-01-15

## 2017-11-10 RX ORDER — PSEUDOEPHEDRINE HCL 30 MG
100 TABLET ORAL 2 TIMES DAILY
DISCHARGE
Start: 2017-11-10 | End: 2018-03-20 | Stop reason: ALTCHOICE

## 2017-11-10 RX ORDER — ALPRAZOLAM 0.5 MG/1
0.5 TABLET ORAL 3 TIMES DAILY PRN
Qty: 15 TABLET | Status: SHIPPED | OUTPATIENT
Start: 2017-11-10 | End: 2017-12-10

## 2017-11-10 RX ORDER — BUDESONIDE AND FORMOTEROL FUMARATE DIHYDRATE 160; 4.5 UG/1; UG/1
2 AEROSOL RESPIRATORY (INHALATION) 2 TIMES DAILY
Qty: 1 INHALER | Refills: 3 | DISCHARGE
Start: 2017-11-10 | End: 2018-03-20 | Stop reason: ALTCHOICE

## 2017-11-10 RX ORDER — LEVOFLOXACIN 750 MG/1
750 TABLET ORAL DAILY
Refills: 0 | DISCHARGE
Start: 2017-11-10 | End: 2017-11-14

## 2017-11-10 RX ORDER — BENZONATATE 200 MG/1
200 CAPSULE ORAL 3 TIMES DAILY PRN
DISCHARGE
Start: 2017-11-10 | End: 2017-11-17

## 2017-11-10 RX ORDER — AMLODIPINE BESYLATE 5 MG/1
5 TABLET ORAL DAILY
Qty: 30 TABLET | Refills: 3 | DISCHARGE
Start: 2017-11-10 | End: 2018-01-15

## 2017-11-10 RX ORDER — PREDNISONE 10 MG/1
TABLET ORAL
Qty: 20 TABLET | Refills: 0 | DISCHARGE
Start: 2017-11-10 | End: 2018-01-15

## 2017-11-10 RX ORDER — BUDESONIDE 0.5 MG/2ML
0.5 INHALANT ORAL 2 TIMES DAILY
Qty: 60 AMPULE | Refills: 3 | DISCHARGE
Start: 2017-11-10 | End: 2017-11-10 | Stop reason: HOSPADM

## 2017-11-10 RX ORDER — LEVOFLOXACIN 500 MG/1
500 TABLET, FILM COATED ORAL DAILY
Status: DISCONTINUED | OUTPATIENT
Start: 2017-11-10 | End: 2017-11-10 | Stop reason: HOSPADM

## 2017-11-10 RX ORDER — FORMOTEROL FUMARATE 20 UG/2ML
20 SOLUTION RESPIRATORY (INHALATION) 2 TIMES DAILY
DISCHARGE
Start: 2017-11-10 | End: 2017-11-10 | Stop reason: HOSPADM

## 2017-11-10 RX ORDER — SERTRALINE HYDROCHLORIDE 25 MG/1
25 TABLET, FILM COATED ORAL DAILY
Qty: 30 TABLET | Refills: 3 | DISCHARGE
Start: 2017-11-10 | End: 2018-01-15

## 2017-11-10 RX ORDER — IPRATROPIUM BROMIDE AND ALBUTEROL SULFATE 2.5; .5 MG/3ML; MG/3ML
3 SOLUTION RESPIRATORY (INHALATION)
Qty: 360 ML | DISCHARGE
Start: 2017-11-10 | End: 2018-10-10 | Stop reason: ALTCHOICE

## 2017-11-10 RX ADMIN — SERTRALINE 25 MG: 25 TABLET, FILM COATED ORAL at 10:17

## 2017-11-10 RX ADMIN — ALPRAZOLAM 0.5 MG: 0.5 TABLET ORAL at 07:26

## 2017-11-10 RX ADMIN — PANTOPRAZOLE SODIUM 40 MG: 40 TABLET, DELAYED RELEASE ORAL at 10:16

## 2017-11-10 RX ADMIN — IPRATROPIUM BROMIDE AND ALBUTEROL SULFATE 1 AMPULE: .5; 3 SOLUTION RESPIRATORY (INHALATION) at 07:29

## 2017-11-10 RX ADMIN — AMLODIPINE BESYLATE 5 MG: 5 TABLET ORAL at 10:17

## 2017-11-10 RX ADMIN — HYDROCODONE BITARTRATE AND ACETAMINOPHEN 2 TABLET: 5; 325 TABLET ORAL at 05:34

## 2017-11-10 RX ADMIN — FORMOTEROL FUMARATE DIHYDRATE 20 MCG: 20 SOLUTION RESPIRATORY (INHALATION) at 07:29

## 2017-11-10 RX ADMIN — DOCUSATE SODIUM 100 MG: 100 CAPSULE, LIQUID FILLED ORAL at 10:17

## 2017-11-10 RX ADMIN — Medication 1 TABLET: at 10:17

## 2017-11-10 RX ADMIN — SENNOSIDES 8.6 MG: 8.6 TABLET, FILM COATED ORAL at 10:17

## 2017-11-10 RX ADMIN — PREDNISONE 20 MG: 20 TABLET ORAL at 10:17

## 2017-11-10 RX ADMIN — BENZONATATE 200 MG: 100 CAPSULE ORAL at 03:04

## 2017-11-10 RX ADMIN — BUDESONIDE 500 MCG: 0.5 INHALANT RESPIRATORY (INHALATION) at 07:30

## 2017-11-10 RX ADMIN — ATENOLOL 50 MG: 50 TABLET ORAL at 10:16

## 2017-11-10 RX ADMIN — MEGESTROL ACETATE 400 MG: 40 SUSPENSION ORAL at 10:17

## 2017-11-10 RX ADMIN — NYSTATIN 500000 UNITS: 100000 SUSPENSION ORAL at 10:17

## 2017-11-10 RX ADMIN — Medication 10 ML: at 10:16

## 2017-11-10 ASSESSMENT — PAIN DESCRIPTION - LOCATION: LOCATION: CHEST

## 2017-11-10 ASSESSMENT — PAIN DESCRIPTION - ORIENTATION: ORIENTATION: MID

## 2017-11-10 ASSESSMENT — PAIN DESCRIPTION - DIRECTION: RADIATING_TOWARDS: AB

## 2017-11-10 ASSESSMENT — PAIN DESCRIPTION - PAIN TYPE: TYPE: ACUTE PAIN

## 2017-11-10 ASSESSMENT — PAIN SCALES - GENERAL
PAINLEVEL_OUTOF10: 5
PAINLEVEL_OUTOF10: 8
PAINLEVEL_OUTOF10: 5

## 2017-11-10 NOTE — PROGRESS NOTES
Oral 4x Daily    senna  1 tablet Oral BID    formoterol  20 mcg Nebulization BID    budesonide  0.5 mg Nebulization BID    cefTRIAXone (ROCEPHIN) IV  2 g Intravenous Q24H    predniSONE  20 mg Oral BID    amLODIPine  5 mg Oral Daily    sertraline  25 mg Oral Daily    megestrol  400 mg Oral Daily    atenolol  50 mg Oral Daily    pantoprazole  40 mg Oral QAM AC    sodium chloride flush  10 mL Intravenous 2 times per day    docusate sodium  100 mg Oral BID    ipratropium-albuterol  1 ampule Inhalation Q4H WA    Calcium Carb-Cholecalciferol  1 tablet Oral BID     Continuous Infusions:    PRN Meds: ALPRAZolam, tiZANidine, benzonatate, prochlorperazine, albuterol sulfate HFA, zolpidem, HYDROcodone-acetaminophen, sodium chloride flush, HYDROcodone 5 mg - acetaminophen **OR** HYDROcodone 5 mg - acetaminophen, magnesium hydroxide, bisacodyl, ondansetron, nicotine, albuterol, acetaminophen, ALPRAZolam    Data:     Past Medical History:   has a past medical history of Benign essential HTN; Cardiac arrest due to respiratory disorder ([facility] Cellulitis; Chronic low back pain; Emphysema of lung ([facility] Epidermoid carcinoma of lung ([facility] Head injury; Hepatitis; Insomnia; Osteoarthritis of spine with radiculopathy, lumbar region; Reflux; Seizures ([facility] Tremor; and Ulcer ([facility] Social History:   reports that he has been smoking Cigarettes. He has a 40.00 pack-year smoking history. He has never used smokeless tobacco. He reports that he uses drugs, including Marijuana. He reports that he does not drink alcohol.      Family History:   Family History   Problem Relation Age of Onset    Heart Disease Mother      CABG    Cancer Mother     Cancer Father      Throat    Heart Disease Father     Stroke Maternal Grandfather     Heart Disease Paternal Grandmother        Vitals:  BP (!) 154/85   Pulse 81   Temp 98.4 °F (36.9 °C) (Oral)   Resp 20   Ht 5' 5\" (1.651 m)   Wt 129 lb 12.8 oz (58.9 kg)   SpO2 97%   BMI 21.60 kg/m²   Temp (24hrs), Av.2 °F (36.8 °C), Min:97.8 °F (36.6 °C), Max:98.4 °F (36.9 °C)    No results for input(s): POCGLU in the last 72 hours. I/O (24Hr): Intake/Output Summary (Last 24 hours) at 11/10/17 0822  Last data filed at 11/10/17 0535   Gross per 24 hour   Intake             2034 ml   Output             2625 ml   Net             -591 ml       Labs:    Hematology:  Recent Labs      11/08/17   0619  11/09/17   0608  11/10/17   0538   WBC  64.8*  60.6*  51.9*   RBC  3.25*  3.08*  3.30*   HGB  10.7*  10.3*  10.7*   HCT  30.9*  29.6*  32.0*   MCV  95.2  96.0  96.9   MCH  32.9  33.2  32.4   MCHC  34.5  34.6  33.4   RDW  13.6  13.7  14.7*   PLT  50*  43*  55*   MPV  NOT REPORTED  NOT REPORTED  8.5     Chemistry:  Recent Labs      1708  11/10/17   0538   NA  138  139  135   K  4.1  4.6  5.0   CL  97*  99  94*   CO2  31  32*  32*   GLUCOSE  114*  116*  111*   BUN  23*  19  21*   CREATININE  0.61*  0.48*  0.50*   ANIONGAP  10  8*  9   LABGLOM  >60  >60  >60   GFRAA  >60  >60  >60   CALCIUM  8.4*  8.1*  8.6     No results for input(s): PROT, LABALBU, LABA1C, H9VOJJT, W2UCHNX, FT4, TSH, AST, ALT, LDH, GGT, ALKPHOS, LABGGT, BILITOT, BILIDIR, AMMONIA, AMYLASE, LIPASE, LACTATE, CHOL, HDL, LDLCHOLESTEROL, CHOLHDLRATIO, TRIG, VLDL, EQO64HC, PHENYTOIN, PHENYF, URICACID, POCGLU in the last 72 hours.       Lab Results   Component Value Date/Time    SPECIAL NOT REPORTED 2017 10:08 AM     Lab Results   Component Value Date/Time    CULTURE NO GROWTH 2 DAYS 2017 10:08 AM    CULTURE  2017 10:08 AM     Performed at [address] [place] [address] [phone]       Lab Results   Component Value Date    POCPH 7.34 12/10/2016    PHART 7.44 2013    POCPCO2 38 12/10/2016    NCE4PVI 34 2013    POCPO2 166 12/10/2016    PO2ART 112 2013    POCHCO3 20.8 12/10/2016    VSP8QKT 22.7 2013    NBEA 5 12/10/2016    PBEA NOT REPORTED 12/10/2016 XUC3DJG 22 12/10/2016    MQBX0EBR 99 12/10/2016    W7VLQZPW 99.2 02/14/2013    FIO2 40.0 12/10/2016       Radiology:    No new radiology report    Physical Examination:        General appearance:  alert, cooperative and no distress  Mental Status:  oriented to person, place and time and normal affect  Lungs:  Faint wheezing bilaterally, normal effort  Heart:  regular rate and rhythm, no murmur  Abdomen:  soft, nontender, nondistended, normal bowel sounds, no masses, hepatomegaly, splenomegaly  Extremities:  no edema, redness, tenderness in the calves  Skin:  no gross lesions, rashes, induration    Assessment:        Primary Problem  Gram-negative sepsis [facility])    Active Hospital Problems    Diagnosis Date Noted    Acute metabolic encephalopathy [F54.96] 11/07/2017    Moraxella catarrhalis bronchitis [J40, B96.89]     Gram-negative sepsis ([address] [A41.50] 11/06/2017    Acute febrile illness [R50.9]     Neutropenia with fever ([address] [D70.9, R50.81] 11/04/2017    Multifocal pneumonia [J18.9] 11/04/2017    H/O: GI bleed [Z87.19] 11/04/2017    Acute respiratory distress [R06.03] 11/04/2017    Chemotherapy-induced neutropenia ([address] [D70.1, T45.1X5A] 10/31/2017    Benign essential HTN [I10] 12/10/2016    Anorexia [R63.0] 08/16/2016    Cachectic ([address] Tania Hernandez 08/16/2016    Mixed anxiety depressive disorder [F41.8] 08/16/2016    Pain of metastatic malignancy [G89.3] 08/16/2016    Squamous cell carcinoma of lung ([address] [C34.90] 08/16/2016    Anemia of chronic disease [D63.8] 12/17/2014    Malnutrition ([address] [E46] 12/17/2014    Anxiety [F41.9] 04/25/2013    Chronic obstructive pulmonary disease ([address] [J44.9] 04/25/2013    GERD (gastroesophageal reflux disease) [K21.9] 10/03/2012       Plan:        1. Continue present medications  2. PT and OT, increase activity  3. Continue Levaquin through the 14th  4.  Discharge to skilled nursing facility    Rani Kaplan DO  11/10/2017  8:22 AM

## 2017-11-10 NOTE — PROGRESS NOTES
Call to [facility] regarding patient discharge. Patient bed available and facility can take patient around 3:00 this afternoon. Patient will go to room 63. Patient/family notified. Transport request form faxed to [employer] requesting a [phone] . Awaiting call from [employer] at this time. RN to call report to [phone] when transport time has been set.

## 2017-11-10 NOTE — PROGRESS NOTES
92%  · BiPAP as needed  · DVT prophylaxis with EPC  · Smoking cessation education, he is strongly encouraged to stop smoking  · Follow up as outpatient with oncology to discuss further chemo treatment  · OK for discharge planning from pulmonary stand point with follow up with pulmonary in 2-3 weeks. Plan was discussed with patient and he understands it. Electronically signed by     Brenda Adams CNP on 11/10/2017 at 10:03 AM  Patient was seen under the supervision of Dr. Dawit Sanford and Sleep Medicine,    Patient seen and evaluated by me. He denies chest pain. Breathing better. Excited about getting discharged from hospital.  Lung exam reveals decreased breath sounds no wheezing. Plan is to continue prednisone taper, antibiotics per ID. Continue bronchodilators. Okay from pulmonary standpoint for discharge planning. Above was reviewed and discussed with NP. And I agree with assessment and plan of care.   Electronically signed by     Bell Rodney MD on 11/10/2017 at 2:20 PM  Pulmonary Critical Care and Sleep Medicine,  [facility]  Cell: [phone]  Office: [phone]

## 2017-11-10 NOTE — PROGRESS NOTES
Return call from [facility]. Transport set for 1430.  Report called to Sarahi Blanchard at [facility] and notified that patient should be arriving to their facility around 3 PM.

## 2017-11-10 NOTE — PROGRESS NOTES
Explained to patient that garcia catheter is to be removed. Patient asked to leave catheter in place until morning.

## 2017-11-10 NOTE — PROGRESS NOTES
mostly nonproductive with some associated shortness of breath. No fevers or chills. He does not report having a bowel movement yesterday despite getting the stool softeners. Physical Examination :     Physical Exam   Constitutional: He is oriented to person, place, and time. He appears well-developed and well-nourished. HENT:   Head: Normocephalic. Cardiovascular: Normal rate, regular rhythm and normal heart sounds. Pulmonary/Chest: Effort normal. He has wheezes. He has rales (Right base). Abdominal: Soft. Bowel sounds are normal.   Musculoskeletal: Normal range of motion. Neurological: He is alert and oriented to person, place, and time. Skin: Skin is warm and dry. Laboratory data:   I have independently reviewed the following labs:  CBC with Differential:   Recent Labs      11/08/17 0619 11/09/17 0608  11/10/17   0538   WBC  64.8*  60.6*  51.9*   HGB  10.7*  10.3*  10.7*   HCT  30.9*  29.6*  32.0*   PLT  50*  43*  55*   LYMPHOPCT  16   --    --    MONOPCT  2   --    --      BMP:   Recent Labs      11/09/17 0608  11/10/17   0538   NA  139  135   K  4.6  5.0   CL  99  94*   CO2  32*  32*   BUN  19  21*   CREATININE  0.48*  0.50*       Imaging Studies:   No new imaging    Cultures:     Culture Blood #1 [[id number]] Collected: 11/08/17 1007   Order Status: Completed Specimen: Blood Updated: 11/10/17 0002    Specimen Description . BLOOD LEFT HAND 10ML Performed at [address] [address] [facility]    Specimen Description Margareth robins, [address] [facility] [phone]    Special Requests NOT REPORTED    Culture NO GROWTH 2 DAYS    Culture Performed at [facility] [address] [facility], [address] [place] [address] [phone]    Status Pending   Culture Blood #1 [[id number]] Collected: 11/08/17 1008   Order Status: Completed Specimen: Blood Updated: 11/10/17 0002    Specimen Description . BLOOD LEFT FOREARM 6ML Performed at [address] [place]    Specimen Description Bunny Herrera [place], [address] [facility] [phone]    Special Requests NOT REPORTED    Culture NO GROWTH 2 DAYS    Culture Performed at [facility] [address] [phone]    Status Pending   Cult,Blood #2 [[id number]] Collected: 11/04/17 1714   Order Status: Completed Specimen: Blood Updated: 11/10/17 0002    Specimen Description . BLOOD R HAND SILVER 10ML RED 2ML Performed at [facility]    Specimen Description [address], [facility] [phone]    Special Requests NOT REPORTED    Culture NO GROWTH 6 DAYS    Culture Performed at [address] [phone]    Status FINAL 11/10/2017   Urine culture clean catch [[id number]] Collected: 11/04/17 1809   Order Status: Completed Updated: 11/07/17 1853    Specimen Description . CLEAN CATCH URINE Performed at [address]    Specimen Description [address] [phone]    Special Requests NOT REPORTED    Culture NO SIGNIFICANT GROWTH    Culture Performed at [address] [phone]    Status FINAL 11/07/2017     Cult,Blood #1 [[id number]] (Abnormal) Collected: 11/04/17 1713   Order Status: Completed Specimen: Blood Updated: 11/06/17 1136    Specimen Description . BLOOD LT AC 12CC KB Performed at [address]    Specimen Description Margareth robins, [address] [place] [address] [phone]    Special Requests NOT REPORTED    Culture POSITIVE BLOOD CULTURE, RN NOTIFIED: Erwin Jeff. AT 6362 ON 11/5/17 (A)    Culture DIRECT GRAM STAIN FROM BOTTLE: GRAM NEGATIVE DIPLOCOCCI    Culture MORAXELLA CATARRHALIS BETA LACTAMASE POSITIVE (A)    Culture Performed at [address] [phone]    Status FINAL 11/06/2017     Medications:      mometasone-formoterol  2 puff Inhalation BID    nystatin  5 mL Oral 4x Daily    senna  1 tablet Oral BID    cefTRIAXone (ROCEPHIN) IV  2

## 2017-11-10 NOTE — PROGRESS NOTES
PROGRESS NOTE    PCP: Luisana Pat DO  Referring Provider: No ref. provider found    IMPRESSION:   1.  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  Febrile neutropenia  3.  Hypertension  4.  Malnourished state/deconditioned  5.  COPD  6.  Infiltrate right lower lung field possibly related to aspiration  7.  Lethargy improving         INTERVAL HISTORY:   Much improved today. Awake alert oriented strong voice anxious to go to rehab      PLAN:     1. OK for rehab  2. Pain is adequately controlled  3. Breathing is improved  4. Follow with Dr. Penelope Villa in office after discharge    No Follow-up on file. VISIT DIAGNOSIS:  The encounter diagnosis was Acute febrile illness.     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    Head injury     July 1984, dove [place] lake & hit head on bottom, Halo placed    Hepatitis     Insomnia     Osteoarthritis of spine with radiculopathy, lumbar region 8/15/2017    Reflux     Seizures ([address]     last one Dec 2016    Tremor     hx of    Ulcer ([address]        PAST SURGICAL HISTORY:      Procedure Laterality Date    CERVICAL [address] SURGERY  2000    c3-c6 fusion    COLONOSCOPY      CYSTOSCOPY  7/27/2015    ENDOSCOPY, COLON, DIAGNOSTIC      EGD    TRACHEOSTOMY      1984 with broken neck    TUNNELED VENOUS PORT PLACEMENT         CURRENT MEDICATIONS:   Current Facility-Administered Medications   Medication Dose Route Frequency Provider Last Rate Last Dose    nystatin (MYCOSTATIN) 147033 UNIT/ML suspension 500,000 Units  5 mL Oral 4x Daily Anjana Seals MD   500,000 Units at 11/09/17 2201    senna (SENOKOT) tablet 8.6 mg  1 tablet Oral BID Kaylynn Del Cid MD        formoterol (PERFOROMIST) nebulizer solution 20 mcg  20 mcg Nebulization BID Rea Mcnamara CNP   20 mcg at 11/10/17 0729    budesonide (PULMICORT) nebulizer suspension 500 mcg  0.5 mg Nebulization BID Rea Mcnamara CNP   500 mcg at 11/10/17 0730    cefTRIAXone (ROCEPHIN) 2 g in sterile water 20 mL IV syringe  2 g Intravenous Q24H Lori Avilez MD   2 g at 11/09/17 1300    predniSONE (DELTASONE) tablet 20 mg  20 mg Oral BID Aaron Fink MD   20 mg at 11/09/17 2203    amLODIPine (NORVASC) tablet 5 mg  5 mg Oral Daily Aaron Fink MD   5 mg at 11/09/17 0837    sertraline (ZOLOFT) tablet 25 mg  25 mg Oral Daily Alexi P Blood, DO   25 mg at 11/09/17 1057    ALPRAZolam (XANAX) tablet 0.5 mg  0.5 mg Oral TID PRN Kelvin P Blood, DO   0.5 mg at 11/10/17 7375    tiZANidine (ZANAFLEX) tablet 2 mg  2 mg Oral TID PRN Landy Edward MD   2 mg at 11/06/17 1844    benzonatate (TESSALON) capsule 200 mg  200 mg Oral TID PRN Israel Marks NP   200 mg at 11/10/17 0304    prochlorperazine (COMPAZINE) tablet 10 mg  10 mg Oral Q6H PRN Landy Edward MD        albuterol sulfate  (90 Base) MCG/ACT inhaler 2 puff  2 puff Inhalation Q4H PRN Landy Edward MD        megestrol (MEGACE) 40 MG/ML suspension 400 mg  400 mg Oral Daily Landy Edward MD   400 mg at 11/09/17 0838    atenolol (TENORMIN) tablet 50 mg  50 mg Oral Daily Landy Edward MD   50 mg at 11/09/17 0837    zolpidem (AMBIEN) tablet 10 mg  10 mg Oral Nightly PRN Landy Edward MD   10 mg at 11/09/17 2202    HYDROcodone-acetaminophen (NORCO) 5-325 MG per tablet 1 tablet  1 tablet Oral Q4H PRN Lnady Edward MD        pantoprazole (PROTONIX) tablet 40 mg  40 mg Oral QAM AC [address] MD   40 mg at 11/09/17 0837    sodium chloride flush 0.9 % injection 10 mL  10 mL Intravenous 2 times per day [address] MD   10 mL at 11/09/17 2204    sodium chloride flush 0.9 % injection 10 mL  10 mL Intravenous PRN [address], MD   10 mL at until recently when evidence of the pulmonary nodules was identified. Donell Jaclyn were positive both on PET scan and on biopsy. [facility] was started on systemic chemotherapy with carboplatin and Gemzar starting his first cycle on October 18.  He had an admission earlier this week when he was found to be neutropenic and he was started on Neupogen.  Other issues identified in that admission included upper GI bleeding as well as the pancytopenia as well as the pulmonary infiltrate.  He was discharged to home however as mentioned above developed fever and was readmitted. [facility] currently is in the ICU receiving antibiotics.     ROS:  General: negative for fatigue, fever or night sweats  ENT: negative for headaches, hearing change, oral lesions or visual changes  Hematological and Lymphatic: negative for bleeding problems, blood clots, bruising, fatigue, night sweats or weight loss  Respiratory: negative for cough, shortness of breath or tachypnea  Cardiovascular: negative for chest pain, dyspnea on exertion, edema or paroxysmal nocturnal dyspnea  Gastrointestinal: negative for abdominal pain, appetite loss, change in bowel habits, constipation, diarrhea, melena or nausea/vomiting  Genito-Urinary: negative for dysuria, hematuria or incontinence  Musculoskeletal: negative for gait disturbance, joint pain, joint swelling or muscle pain  Neurological: negative for confusion, dizziness, headaches, seizures or tremors  Dermatological: negative for pruritus or rash      PHYSICAL EXAM:   Vitals: BP (!) 154/85   Pulse 81   Temp 98.4 °F (36.9 °C) (Oral)   Resp 20   Ht 5' 5\" (1.651 m)   Wt 129 lb 12.8 oz (58.9 kg)   SpO2 97%   BMI 21.60 kg/m²   General appearance: alert, appears stated age and cooperative  Skin: Skin color, texture, turgor normal. No rashes or lesions  HEENT: Head: Normocephalic, no lesions, without obvious abnormality.   Neck: no adenopathy  Lungs: clear to auscultation bilaterally  Heart: regular rate and rhythm, S1, S2 normal, no murmur, click, rub or gallop  Abdomen: soft, non-tender; bowel sounds normal; no masses,  no organomegaly  Extremities: extremities normal, atraumatic, no cyanosis or edema  Neurologic: Mental status: Alert, oriented, thought content appropriate      LABS:   Results for orders placed or performed during the hospital encounter of 11/04/17   Cult,Blood #2   Result Value Ref Range    Specimen Description       . BLOOD R HAND SILVER 10ML RED 2ML Performed at [address] [place] [address]    Specimen Description  [address], [facility] [phone]     Special Requests NOT REPORTED     Culture NO GROWTH 6 DAYS     Culture       Performed at [facility] [id number] [place] [address], [address] [place] [address] [phone]    Status FINAL 11/10/2017    Cult,Blood #1   Result Value Ref Range    Specimen Description       . BLOOD LT AC 12CC KB Performed at [address] [place] [id number] [facility]    Specimen Description  Margareth robins, [address] [place] [address] [phone]     Special Requests NOT REPORTED     Culture (A)      POSITIVE BLOOD CULTURE, RN NOTIFIED: Grant Patricia. AT 9034 ON 11/5/17    Culture       DIRECT GRAM STAIN FROM BOTTLE: GRAM NEGATIVE DIPLOCOCCI    Culture MORAXELLA CATARRHALIS BETA LACTAMASE POSITIVE (A)     Culture       Performed at [facility] [id number] [place] [address] [phone]    Status FINAL 11/06/2017    Urine culture clean catch   Result Value Ref Range    Specimen Description       . CLEAN CATCH URINE Performed at [address] [place] [address] [facility]    Specimen Description  [place], [address] [place] [address] [phone]     Special Requests NOT REPORTED     Culture NO SIGNIFICANT GROWTH     Culture       Performed at [facility] [address] [facility], [address] [place] [address] [phone]    Status FINAL 11/07/2017    Culture Blood #1   Result Value Ref Range    Specimen Description       . BLOOD LEFT HAND 10ML Performed at [address] [place] [address] [facility]    Specimen Description [place], [address] [facility] [phone]     Special Requests NOT REPORTED     Culture NO GROWTH 2 DAYS     Culture       Performed at [address] [facility], [address] [place] [address] [phone]    Status Pending    Culture Blood #1   Result Value Ref Range    Specimen Description       . BLOOD LEFT FOREARM 6ML Performed at [address]    Specimen Description  [place], [address] [place] [address] [phone]     Special Requests NOT REPORTED     Culture NO GROWTH 2 DAYS     Culture       Performed at [address] [facility], [address] [place] [address] [phone]    Status Pending    Basic Metabolic Prof   Result Value Ref Range    Glucose 80 70 - 99 mg/dL    BUN 13 6 - 20 mg/dL    CREATININE 0.82 0.70 - 1.20 mg/dL    Bun/Cre Ratio 16 9 - 20    Calcium 9.0 8.6 - 10.4 mg/dL    Sodium 130 (L) 135 - 144 mmol/L    Potassium 3.8 3.7 - 5.3 mmol/L    Chloride 91 (L) 98 - 107 mmol/L    CO2 24 20 - 31 mmol/L    Anion Gap 15 9 - 17 mmol/L    GFR Non-African American >60 >60 mL/min    GFR African American >60 >60 mL/min    GFR Comment          GFR Staging NOT REPORTED    CBC with DIFF   Result Value Ref Range    WBC 0.6 (LL) 3.5 - 11.0 k/uL    RBC 3.07 (L) 4.5 - 5.9 m/uL    Hemoglobin 10.1 (L) 13.5 - 17.5 g/dL    Hematocrit 29.2 (L) 41 - 53 %    MCV 95.1 80 - 100 fL    MCH 33.1 26 - 34 pg    MCHC 34.8 31 - 37 g/dL    RDW 13.7 11.5 - 14.5 %    Platelets 48 (L) 658 - 400 k/uL    MPV 7.8 6.0 - 12.0 fL    Differential Type NOT REPORTED     Immature Granulocytes NOT REPORTED 0 %    Absolute Immature Granulocyte NOT REPORTED 0.00 - 0.30 k/uL    WBC Morphology NOT REPORTED     RBC Morphology NOT REPORTED     Platelet Estimate NOT REPORTED     Seg Neutrophils 12 %    Lymphocytes 58 %    Monocytes 27 %    Eosinophils % 0 %    Basophils 0 %    Segs Absolute 0.07 (L) 1.8 - 7.7 k/uL    Absolute Lymph # 0.35 (L) 1.0 - 4.8 k/uL    Absolute Mono # 0.16 (L) 0.2 - 0.8 k/uL    Absolute Eos # 0.00 0.0 - 0.4 k/uL 8.7 8.6 - 10.4 mg/dL    Sodium 137 135 - 144 mmol/L    Potassium 3.8 3.7 - 5.3 mmol/L    Chloride 95 (L) 98 - 107 mmol/L    CO2 27 20 - 31 mmol/L    Anion Gap 15 9 - 17 mmol/L    Alkaline Phosphatase 37 (L) 40 - 129 U/L    ALT 19 5 - 41 U/L    AST 24 <40 U/L    Total Bilirubin 0.33 0.3 - 1.2 mg/dL    Total Protein 6.2 (L) 6.4 - 8.3 g/dL    Alb 2.7 (L) 3.5 - 5.2 g/dL    Albumin/Globulin Ratio NOT REPORTED 1.0 - 2.5    GFR Non-African American >60 >60 mL/min    GFR African American >60 >60 mL/min    GFR Comment          GFR Staging NOT REPORTED    CBC   Result Value Ref Range    WBC 1.0 (LL) 3.5 - 11.0 k/uL    RBC 2.97 (L) 4.5 - 5.9 m/uL    Hemoglobin 9.9 (L) 13.5 - 17.5 g/dL    Hematocrit 28.3 (L) 41 - 53 %    MCV 95.2 80 - 100 fL    MCH 33.1 26 - 34 pg    MCHC 34.8 31 - 37 g/dL    RDW 13.8 11.5 - 14.5 %    Platelets 49 (L) 906 - 400 k/uL    MPV 8.1 6.0 - 12.0 fL   Basic Metabolic Panel   Result Value Ref Range    Glucose 123 (H) 70 - 99 mg/dL    BUN 15 6 - 20 mg/dL    CREATININE 0.61 (L) 0.70 - 1.20 mg/dL    Bun/Cre Ratio 25 (H) 9 - 20    Calcium 8.6 8.6 - 10.4 mg/dL    Sodium 139 135 - 144 mmol/L    Potassium 4.0 3.7 - 5.3 mmol/L    Chloride 101 98 - 107 mmol/L    CO2 29 20 - 31 mmol/L    Anion Gap 9 9 - 17 mmol/L    GFR Non-African American >60 >60 mL/min    GFR African American >60 >60 mL/min    GFR Comment          GFR Staging NOT REPORTED    CBC Auto Differential   Result Value Ref Range    WBC 9.2 3.5 - 11.0 k/uL    RBC 2.94 (L) 4.5 - 5.9 m/uL    Hemoglobin 9.8 (L) 13.5 - 17.5 g/dL    Hematocrit 28.5 (L) 41 - 53 %    MCV 96.8 80 - 100 fL    MCH 33.3 26 - 34 pg    MCHC 34.4 31 - 37 g/dL    RDW 14.1 11.5 - 14.5 %    Platelets 63 (L) 925 - 400 k/uL    MPV 8.4 6.0 - 12.0 fL    Differential Type NOT REPORTED     Immature Granulocytes NOT REPORTED 0 %    Absolute Immature Granulocyte NOT REPORTED 0.00 - 0.30 k/uL    WBC Morphology NOT REPORTED     RBC Morphology NOT REPORTED     Platelet Estimate NOT REPORTED Seg Neutrophils 65 %    Lymphocytes 15 %    Monocytes 7 %    Eosinophils % 2 %    Basophils 0 %    Metamyelocytes 5 (H) 0 %    Myelocytes 6 (H) 0 %    Segs Absolute 5.99 1.8 - 7.7 k/uL    Absolute Lymph # 1.38 1.0 - 4.8 k/uL    Absolute Mono # 0.64 0.2 - 0.8 k/uL    Absolute Eos # 0.18 0.0 - 0.4 k/uL    Basophils # 0.00 0.0 - 0.2 k/uL    Metamyelocytes Absolute 0.46 (H) 0 k/uL    Myelocytes Absolute 0.55 (H) 0 k/uL    Morphology INCREASED BANDS PRESENT    CBC   Result Value Ref Range    WBC 32.7 (HH) 3.5 - 11.0 k/uL    RBC 2.83 (L) 4.5 - 5.9 m/uL    Hemoglobin 9.8 (L) 13.5 - 17.5 g/dL    Hematocrit 27.4 (L) 41 - 53 %    MCV 97.1 80 - 100 fL    MCH 34.6 (H) 26 - 34 pg    MCHC 35.6 31 - 37 g/dL    RDW 13.4 11.5 - 14.5 %    Platelets 54 (L) 835 - 400 k/uL    MPV NOT REPORTED 6.0 - 12.0 fL   Basic Metabolic Panel   Result Value Ref Range    Glucose 110 (H) 70 - 99 mg/dL    BUN 21 (H) 6 - 20 mg/dL    CREATININE 0.55 (L) 0.70 - 1.20 mg/dL    Bun/Cre Ratio 38 (H) 9 - 20    Calcium 8.2 (L) 8.6 - 10.4 mg/dL    Sodium 141 135 - 144 mmol/L    Potassium 3.6 (L) 3.7 - 5.3 mmol/L    Chloride 101 98 - 107 mmol/L    CO2 31 20 - 31 mmol/L    Anion Gap 9 9 - 17 mmol/L    GFR Non-African American >60 >60 mL/min    GFR African American >60 >60 mL/min    GFR Comment          GFR Staging NOT REPORTED    CBC   Result Value Ref Range    WBC 35.0 (HH) 3.5 - 11.0 k/uL    RBC 2.87 (L) 4.5 - 5.9 m/uL    Hemoglobin 9.4 (L) 13.5 - 17.5 g/dL    Hematocrit 27.6 (L) 41 - 53 %    MCV 96.1 80 - 100 fL    MCH 32.6 26 - 34 pg    MCHC 33.9 31 - 37 g/dL    RDW 13.5 11.5 - 14.5 %    Platelets 61 (L) 230 - 400 k/uL    MPV NOT REPORTED 6.0 - 12.0 fL   Basic Metabolic Panel   Result Value Ref Range    Glucose 114 (H) 70 - 99 mg/dL    BUN 23 (H) 6 - 20 mg/dL    CREATININE 0.61 (L) 0.70 - 1.20 mg/dL    Bun/Cre Ratio 38 (H) 9 - 20    Calcium 8.4 (L) 8.6 - 10.4 mg/dL    Sodium 138 135 - 144 mmol/L    Potassium 4.1 3.7 - 5.3 mmol/L    Chloride 97 (L) 98 - 107 mmol/L    CO2 31 20 - 31 mmol/L    Anion Gap 10 9 - 17 mmol/L    GFR Non-African American >60 >60 mL/min    GFR African American >60 >60 mL/min    GFR Comment          GFR Staging NOT REPORTED    CBC Auto Differential   Result Value Ref Range    WBC 64.8 (HH) 3.5 - 11.0 k/uL    RBC 3.25 (L) 4.5 - 5.9 m/uL    Hemoglobin 10.7 (L) 13.5 - 17.5 g/dL    Hematocrit 30.9 (L) 41 - 53 %    MCV 95.2 80 - 100 fL    MCH 32.9 26 - 34 pg    MCHC 34.5 31 - 37 g/dL    RDW 13.6 11.5 - 14.5 %    Platelets 50 (L) 525 - 400 k/uL    MPV NOT REPORTED 6.0 - 12.0 fL    Differential Type NOT REPORTED     Immature Granulocytes NOT REPORTED 0 %    Absolute Immature Granulocyte NOT REPORTED 0.00 - 0.30 k/uL    WBC Morphology NOT REPORTED     RBC Morphology NOT REPORTED     Platelet Estimate NOT REPORTED     Seg Neutrophils 78 %    Lymphocytes 16 %    Monocytes 2 %    Eosinophils % 0 %    Basophils 0 %    Metamyelocytes 4 (H) 0 %    Segs Absolute 50.54 (H) 1.8 - 7.7 k/uL    Absolute Lymph # 10.37 (H) 1.0 - 4.8 k/uL    Absolute Mono # 1.30 (H) 0.2 - 0.8 k/uL    Absolute Eos # 0.00 0.0 - 0.4 k/uL    Basophils # 0.00 0.0 - 0.2 k/uL    Metamyelocytes Absolute 2.59 (H) 0 k/uL    Morphology ANISOCYTOSIS PRESENT    CBC   Result Value Ref Range    WBC 60.6 (HH) 3.5 - 11.0 k/uL    RBC 3.08 (L) 4.5 - 5.9 m/uL    Hemoglobin 10.3 (L) 13.5 - 17.5 g/dL    Hematocrit 29.6 (L) 41 - 53 %    MCV 96.0 80 - 100 fL    MCH 33.2 26 - 34 pg    MCHC 34.6 31 - 37 g/dL    RDW 13.7 11.5 - 14.5 %    Platelets 43 (L) 616 - 400 k/uL    MPV NOT REPORTED 6.0 - 12.0 fL   Basic Metabolic Panel   Result Value Ref Range    Glucose 116 (H) 70 - 99 mg/dL    BUN 19 6 - 20 mg/dL    CREATININE 0.48 (L) 0.70 - 1.20 mg/dL    Bun/Cre Ratio 40 (H) 9 - 20    Calcium 8.1 (L) 8.6 - 10.4 mg/dL    Sodium 139 135 - 144 mmol/L    Potassium 4.6 3.7 - 5.3 mmol/L    Chloride 99 98 - 107 mmol/L    CO2 32 (H) 20 - 31 mmol/L    Anion Gap 8 (L) 9 - 17 mmol/L    GFR Non-African American >60 >60 mL/min

## 2017-11-10 NOTE — DISCHARGE SUMMARY
Sepsis due to respiratory infection with Moraxella catarrhalis and was treated with IV Rocephin. Clinically is improving, fevers etc. resolved. In the time of discharge he is able be switched to oral Levaquin through 14 November for complete treatment. Significant therapeutic interventions: As above    Significant Diagnostic Studies:   Labs / Micro:  CBC:   Lab Results   Component Value Date    WBC 51.9 11/10/2017    RBC 3.30 11/10/2017    RBC 4.69 09/08/2011    HGB 10.7 11/10/2017    HCT 32.0 11/10/2017    MCV 96.9 11/10/2017    MCH 32.4 11/10/2017    MCHC 33.4 11/10/2017    RDW 14.7 11/10/2017    PLT 55 11/10/2017     09/08/2011     BMP:    Lab Results   Component Value Date    GLUCOSE 111 11/10/2017    GLUCOSE 95 09/08/2011     11/10/2017    K 5.0 11/10/2017    CL 94 11/10/2017    CO2 32 11/10/2017    ANIONGAP 9 11/10/2017    BUN 21 11/10/2017    CREATININE 0.50 11/10/2017    BUNCRER 42 11/10/2017    CALCIUM 8.6 11/10/2017    LABGLOM >60 11/10/2017    GFRAA >60 11/10/2017    GFR      11/10/2017    GFR NOT REPORTED 11/10/2017         Radiology:  Ct Abdomen Pelvis Wo Iv Contrast    Result Date: 10/15/2017  EXAMINATION: CT OF THE ABDOMEN AND PELVIS WITHOUT CONTRAST 10/14/2017 4:43 pm TECHNIQUE: CT of the abdomen and pelvis was performed without the administration of intravenous contrast. Multiplanar reformatted images are provided for review. Dose modulation, iterative reconstruction, and/or weight based adjustment of the mA/kV was utilized to reduce the radiation dose to as low as reasonably achievable. COMPARISON: December 9, 2016 HISTORY: ORDERING SYSTEM PROVIDED HISTORY: ABDOMINAL PAIN Acute / initial encounter FINDINGS: Lower Chest: Small right pleural effusion. Chronic consolidation is seen within the right lower lobe. Emphysematous changes are seen. Right lower lobe nodule medially has significantly increased in size now measuring up to 13 mm.   Smaller nodule more anteriorly series 2, congestion Acuity: Unknown Type of Exam: Subsequent/Follow-up FINDINGS: Normal cardiomediastinal silhouette. Right IJ central line unchanged. Right lower lung hazy opacity unchanged. Reticular interstitial thickening in the left mid and lower lung zone also unchanged. No pleural effusion or pneumothorax. There is some hyperinflation of the lungs. Posterior fusion cervical spine noted. Left mid and lower lung reticular interstitial infiltrates and hazy opacification right lower lung unchanged. Xr Chest Portable    Result Date: 11/7/2017  EXAMINATION: SINGLE VIEW OF THE CHEST 11/7/2017 11:48 am COMPARISON: AP chest from 11/06/2017 HISTORY: ORDERING SYSTEM PROVIDED HISTORY: sob TECHNOLOGIST PROVIDED HISTORY: Reason for exam:->sob Ordering Physician Provided Reason for Exam: sob Acuity: Unknown Type of Exam: Unknown History of emphysema, seizure disorder, epidermoid carcinoma of the lung and right-sided port placement. FINDINGS: Overlying ECG monitor leads. Right IJ port with its tip unchanged in the SVC. Overlying snaps and postsurgical changes cervical spine. Left perihilar and lower lobe interstitial/parenchymal abnormalities again demonstrated, with slight interval worsening and/ or associated atelectasis. Pleural thickening and adjacent parenchymal changes right base again noted, also perhaps increased somewhat as compared to the previous study. Right upper lung, cardiomediastinal shadow on bony structures unchanged. Apparent slight interval worsening left perihilar/ lower lobe parenchymal and right basilar pleural/ parenchymal abnormalities, as above.      Xr Chest Portable    Result Date: 11/6/2017  EXAMINATION: SINGLE VIEW OF THE CHEST 11/6/2017 6:38 am COMPARISON: 11/05/2017 HISTORY: ORDERING SYSTEM PROVIDED HISTORY: infiltrate TECHNOLOGIST PROVIDED HISTORY: Reason for exam:->infiltrate Ordering Physician Provided Reason for Exam: Infiltrate Acuity: Unknown Type of Exam: Unknown FINDINGS: Right jugular chest port catheter tip remains in the superior vena cava. Partially visualized cervical spinal fusion hardware. Patient is slightly rotated. Monitor leads overlie the chest.  Underlying COPD changes. The heart is stable in size. No significant change in pleural/parenchymal density right lung base ; much of this is likely chronic. Mild patchy left lung parenchymal airspace disease in a perihilar distribution appears similar. No new lobar consolidation. No large pleural effusions. Continued follow-up to confirm complete resolution recommended. Relatively stable exam with left perihilar airspace disease and pleural/parenchymal density right lung base. Xr Chest Portable    Result Date: 11/5/2017  EXAMINATION: SINGLE VIEW OF THE CHEST 11/5/2017 9:37 am COMPARISON: Chest November 4, 2017. HISTORY: ORDERING SYSTEM PROVIDED HISTORY: Pneumonia TECHNOLOGIST PROVIDED HISTORY: Reason for exam:->Pneumonia Ordering Physician Provided Reason for Exam: pneumonia Acuity: Unknown Type of Exam: Unknown FINDINGS: Right-sided port is identified with tip in the SVC. Heart mediastinal structures appear unchanged. Right lower lobe airspace disease and small effusion persists. Interval development of left mid lung airspace disease. No pneumothorax or free air. Interval development of left mid lung airspace disease. Right lower lobe airspace disease and small pleural effusion persists. Xr Chest Portable    Result Date: 11/4/2017  EXAMINATION: SINGLE VIEW OF THE CHEST 11/4/2017 5:03 pm COMPARISON: 3 November 2017 HISTORY: ORDERING SYSTEM PROVIDED HISTORY: fatigue TECHNOLOGIST PROVIDED HISTORY: Reason for exam:->fatigue Ordering Physician Provided Reason for Exam: fatigue, cough Acuity: Acute Type of Exam: Initial FINDINGS: AP portable view of the chest time stamped at 1654 hours demonstrates overlying cardiac monitoring electrodes.   A central catheter enters from the right, terminating in the distal [id number] UNIT/ML suspension  · predniSONE 10 MG tablet  · sertraline 25 MG tablet         Time Spent on discharge is  27 minutes in patient examination, evaluation, counseling as well as medication reconciliation, prescriptions for required medications, discharge plan and follow up. Electronically signed by   Angelita Aguiar DO  11/10/2017  10:08 AM      Thank you Dr. Yordan iWlkinson DO for the opportunity to be involved in this patient's care.

## 2017-11-11 LAB
CULTURE: NORMAL
CULTURE: NORMAL
DIRECT EXAM: NORMAL
Lab: NORMAL
SPECIMEN DESCRIPTION: NORMAL
SPECIMEN DESCRIPTION: NORMAL
STATUS: NORMAL

## 2017-11-13 ENCOUNTER — CARE COORDINATION (OUTPATIENT)
Dept: CASE MANAGEMENT | Age: 57
End: 2017-11-13

## 2017-11-13 ENCOUNTER — HOSPITAL ENCOUNTER (OUTPATIENT)
Age: 57
Setting detail: SPECIMEN
Discharge: HOME | End: 2017-11-13
Payer: MEDICARE

## 2017-11-13 LAB
ABSOLUTE BANDS #: 4.12 K/UL
ABSOLUTE EOS #: 0 K/UL (ref 0–0.4)
ABSOLUTE IMMATURE GRANULOCYTE: ABNORMAL K/UL (ref 0–0.3)
ABSOLUTE LYMPH #: 3.21 K/UL (ref 1–4.8)
ABSOLUTE MONO #: 1.83 K/UL (ref 0.2–0.8)
ANION GAP SERPL CALCULATED.3IONS-SCNC: 12 MMOL/L (ref 9–17)
BANDS: 18 %
BASOPHILS # BLD: 0 %
BASOPHILS ABSOLUTE: 0 K/UL (ref 0–0.2)
BUN BLDV-MCNC: 17 MG/DL (ref 6–20)
BUN/CREAT BLD: 29 (ref 9–20)
CALCIUM SERPL-MCNC: 7.9 MG/DL (ref 8.6–10.4)
CHLORIDE BLD-SCNC: 93 MMOL/L (ref 98–107)
CO2: 27 MMOL/L (ref 20–31)
CREAT SERPL-MCNC: 0.58 MG/DL (ref 0.7–1.2)
DIFFERENTIAL TYPE: ABNORMAL
EOSINOPHILS RELATIVE PERCENT: 0 %
GFR AFRICAN AMERICAN: >60 ML/MIN
GFR NON-AFRICAN AMERICAN: >60 ML/MIN
GFR SERPL CREATININE-BSD FRML MDRD: ABNORMAL ML/MIN/{1.73_M2}
GFR SERPL CREATININE-BSD FRML MDRD: ABNORMAL ML/MIN/{1.73_M2}
GLUCOSE BLD-MCNC: 80 MG/DL (ref 70–99)
HCT VFR BLD CALC: 26.5 % (ref 41–53)
HEMOGLOBIN: 8.9 G/DL (ref 13.5–17.5)
IMMATURE GRANULOCYTES: ABNORMAL %
LYMPHOCYTES # BLD: 14 %
MCH RBC QN AUTO: 32.1 PG (ref 26–34)
MCHC RBC AUTO-ENTMCNC: 33.4 G/DL (ref 31–37)
MCV RBC AUTO: 95.9 FL (ref 80–100)
METAMYELOCYTES ABSOLUTE COUNT: 4.35 K/UL
METAMYELOCYTES: 19 %
MONOCYTES # BLD: 8 %
MORPHOLOGY: ABNORMAL
MYELOCYTES ABSOLUTE COUNT: 2.52 K/UL
MYELOCYTES: 11 %
PDW BLD-RTO: 14.7 % (ref 11.5–14.5)
PLATELET # BLD: 111 K/UL (ref 130–400)
PLATELET ESTIMATE: ABNORMAL
PMV BLD AUTO: ABNORMAL FL (ref 6–12)
POTASSIUM SERPL-SCNC: 3.9 MMOL/L (ref 3.7–5.3)
PROMYELOCYTES ABSOLUTE COUNT: 0.46 K/UL
PROMYELOCYTES: 2 %
RBC # BLD: 2.76 M/UL (ref 4.5–5.9)
RBC # BLD: ABNORMAL 10*6/UL
SEG NEUTROPHILS: 28 %
SEGMENTED NEUTROPHILS ABSOLUTE COUNT: 6.41 K/UL (ref 1.8–7.7)
SODIUM BLD-SCNC: 132 MMOL/L (ref 135–144)
WBC # BLD: 22.9 K/UL (ref 3.5–11)
WBC # BLD: ABNORMAL 10*3/UL

## 2017-11-13 PROCEDURE — P9603 ONE-WAY ALLOW PRORATED MILES: HCPCS

## 2017-11-13 PROCEDURE — 85027 COMPLETE CBC AUTOMATED: CPT

## 2017-11-13 PROCEDURE — 80048 BASIC METABOLIC PNL TOTAL CA: CPT

## 2017-11-13 PROCEDURE — 36415 COLL VENOUS BLD VENIPUNCTURE: CPT

## 2017-11-14 LAB
CULTURE: NORMAL
Lab: NORMAL
Lab: NORMAL
SPECIMEN DESCRIPTION: NORMAL
STATUS: NORMAL
STATUS: NORMAL

## 2017-11-15 ENCOUNTER — CARE COORDINATION (OUTPATIENT)
Dept: CARE COORDINATION | Age: 57
End: 2017-11-15

## 2017-11-15 NOTE — CARE COORDINATION
Ambulatory Care Coordination Note  11/15/2017  CM Risk Score: 13  Samuel Mortality Risk Score:      ACC: Deepa Powers RN    Summary Note: pt called [facility] to update on his condition. He is still at [facility] for rehab. He has two more rounds of chemo to go. He said he is supposed to see Dr Young Solorzano tomorrow to find out what his next steps are. He thinks he will be there for about another week. Will set him up for a follow up apt when he is discharged home. Care Coordination Interventions    Program Enrollment:  Complex Care  Referral from Primary Care Provider:  No  Suggested Interventions and Community Resources  Medi Set or Pill Pack:  Completed  Smoking Cessation:  Declined         Goals Addressed     None          Prior to Admission medications    Medication Sig Start Date End Date Taking? Authorizing Provider   HYDROcodone-acetaminophen (NORCO) 5-325 MG per tablet 1 tablet every 4 hours as needed for mild to moderate pain, 2 tablets every 4 hours as needed for severe pain. Earliest Fill Date: 11/10/17 11/10/17   Katie De Santiago DO   ALPRAZolam Cydne Lincoln) 0.5 MG tablet Take 1 tablet by mouth 3 times daily as needed for Anxiety .  11/10/17 12/10/17  Aldair De Santiago DO   ipratropium-albuterol (DUONEB) 0.5-2.5 (3) MG/3ML SOLN nebulizer solution Inhale 3 mLs into the lungs every 4 hours (while awake) 11/10/17   Katie De Santiago DO   sertraline (ZOLOFT) 25 MG tablet Take 1 tablet by mouth daily 11/10/17   Katie De Santiago DO   amLODIPine (NORVASC) 5 MG tablet Take 1 tablet by mouth daily 11/10/17   Katie De Santiago DO   predniSONE (DELTASONE) 10 MG tablet 4 tablets daily for 3 days, 3 tablets daily for 3 days, 2 tablets daily for 3 days, 1 tablet daily for 3 days 11/10/17   Katie De Santiago DO   benzonatate (TESSALON) 200 MG capsule Take 1 capsule by mouth 3 times daily as needed for Cough 11/10/17 11/17/17  Aldair De Santiago DO   zolpidem (AMBIEN) 10 MG tablet Take 1 tablet by mouth nightly as needed for

## 2017-11-20 ENCOUNTER — HOSPITAL ENCOUNTER (OUTPATIENT)
Age: 57
Setting detail: SPECIMEN
Discharge: HOME | End: 2017-11-20
Payer: MEDICARE

## 2017-11-20 LAB
ANION GAP SERPL CALCULATED.3IONS-SCNC: 9 MMOL/L (ref 9–17)
BUN BLDV-MCNC: 12 MG/DL (ref 6–20)
BUN/CREAT BLD: 18 (ref 9–20)
CALCIUM SERPL-MCNC: 8.4 MG/DL (ref 8.6–10.4)
CHLORIDE BLD-SCNC: 101 MMOL/L (ref 98–107)
CO2: 29 MMOL/L (ref 20–31)
CREAT SERPL-MCNC: 0.66 MG/DL (ref 0.7–1.2)
GFR AFRICAN AMERICAN: >60 ML/MIN
GFR NON-AFRICAN AMERICAN: >60 ML/MIN
GFR SERPL CREATININE-BSD FRML MDRD: ABNORMAL ML/MIN/{1.73_M2}
GFR SERPL CREATININE-BSD FRML MDRD: ABNORMAL ML/MIN/{1.73_M2}
GLUCOSE BLD-MCNC: 104 MG/DL (ref 70–99)
HCT VFR BLD CALC: 25.2 % (ref 41–53)
HEMOGLOBIN: 8.3 G/DL (ref 13.5–17.5)
MCH RBC QN AUTO: 32 PG (ref 26–34)
MCHC RBC AUTO-ENTMCNC: 32.9 G/DL (ref 31–37)
MCV RBC AUTO: 97.4 FL (ref 80–100)
PDW BLD-RTO: 15.6 % (ref 11.5–14.5)
PLATELET # BLD: 433 K/UL (ref 130–400)
PMV BLD AUTO: 7.1 FL (ref 6–12)
POTASSIUM SERPL-SCNC: 4.2 MMOL/L (ref 3.7–5.3)
RBC # BLD: 2.59 M/UL (ref 4.5–5.9)
SODIUM BLD-SCNC: 139 MMOL/L (ref 135–144)
WBC # BLD: 8.3 K/UL (ref 3.5–11)

## 2017-11-20 PROCEDURE — 85027 COMPLETE CBC AUTOMATED: CPT

## 2017-11-20 PROCEDURE — 36415 COLL VENOUS BLD VENIPUNCTURE: CPT

## 2017-11-20 PROCEDURE — 80048 BASIC METABOLIC PNL TOTAL CA: CPT

## 2017-11-20 PROCEDURE — P9603 ONE-WAY ALLOW PRORATED MILES: HCPCS

## 2017-11-24 ENCOUNTER — CARE COORDINATION (OUTPATIENT)
Dept: CARE COORDINATION | Age: 57
End: 2017-11-24

## 2017-11-24 ENCOUNTER — CARE COORDINATION (OUTPATIENT)
Dept: CASE MANAGEMENT | Age: 57
End: 2017-11-24

## 2017-11-24 NOTE — CARE COORDINATION
[facility] Update Call    2017    Patient: Katherin Trinidad Patient : 1960   MRN: [id number]  Reason for Admission: There are no discharge diagnoses documented for the most recent discharge. Discharge Date: 11/10/17 RARS: Geisinger Risk Score: 24.75       Care Transitions Post Acute Facility Update    Care Transitions Interventions  Post Acute Facility:  [facility]  Post Acute Facility Update  ADLs:  Stand by Assist - Presence and Cueing   Transfer Assistance:  Stand by Assist - Presence and Cueing   Ambulation Assistance:  Stand by Assist - Presence and Cueing   How far (in feet) is the patient ambulating?:  200   Does patient use an assistive device?:  Yes   Assistive Devices:  Front wheeled walker      According to report, patient ready for discharge. Message left with LSW, discharge date requested ASAP.

## 2017-11-27 ENCOUNTER — HOSPITAL ENCOUNTER (OUTPATIENT)
Age: 57
Setting detail: SPECIMEN
Discharge: HOME | End: 2017-11-27
Payer: MEDICARE

## 2017-11-27 LAB
ANION GAP SERPL CALCULATED.3IONS-SCNC: 15 MMOL/L (ref 9–17)
BILIRUBIN URINE: NEGATIVE
BUN BLDV-MCNC: 11 MG/DL (ref 6–20)
BUN/CREAT BLD: ABNORMAL (ref 9–20)
CALCIUM SERPL-MCNC: 8.8 MG/DL (ref 8.6–10.4)
CHLORIDE BLD-SCNC: 99 MMOL/L (ref 98–107)
CO2: 24 MMOL/L (ref 20–31)
COLOR: YELLOW
COMMENT UA: NORMAL
CREAT SERPL-MCNC: 0.65 MG/DL (ref 0.7–1.2)
GFR AFRICAN AMERICAN: >60 ML/MIN
GFR NON-AFRICAN AMERICAN: >60 ML/MIN
GFR SERPL CREATININE-BSD FRML MDRD: ABNORMAL ML/MIN/{1.73_M2}
GFR SERPL CREATININE-BSD FRML MDRD: ABNORMAL ML/MIN/{1.73_M2}
GLUCOSE BLD-MCNC: 80 MG/DL (ref 70–99)
GLUCOSE URINE: NEGATIVE
HCT VFR BLD CALC: 32 % (ref 40.7–50.3)
HEMOGLOBIN: 9.7 G/DL (ref 13–17)
KETONES, URINE: NEGATIVE
LEUKOCYTE ESTERASE, URINE: NEGATIVE
MCH RBC QN AUTO: 32.3 PG (ref 25.2–33.5)
MCHC RBC AUTO-ENTMCNC: 30.3 G/DL (ref 28.4–34.8)
MCV RBC AUTO: 106.7 FL (ref 82.6–102.9)
NITRITE, URINE: NEGATIVE
PDW BLD-RTO: 17.9 % (ref 11.8–14.4)
PH UA: 6 (ref 5–8)
PLATELET # BLD: 434 K/UL (ref 138–453)
PMV BLD AUTO: 9.3 FL (ref 8.1–13.5)
POTASSIUM SERPL-SCNC: 4.5 MMOL/L (ref 3.7–5.3)
PROTEIN UA: NEGATIVE
RBC # BLD: 3 M/UL (ref 4.21–5.77)
SODIUM BLD-SCNC: 138 MMOL/L (ref 135–144)
SPECIFIC GRAVITY UA: 1.02 (ref 1–1.03)
TURBIDITY: CLEAR
URINE HGB: NEGATIVE
UROBILINOGEN, URINE: NORMAL
WBC # BLD: 14.1 K/UL (ref 3.5–11.3)

## 2017-11-27 PROCEDURE — 81003 URINALYSIS AUTO W/O SCOPE: CPT

## 2017-11-27 PROCEDURE — 87086 URINE CULTURE/COLONY COUNT: CPT

## 2017-11-27 PROCEDURE — P9603 ONE-WAY ALLOW PRORATED MILES: HCPCS

## 2017-11-27 PROCEDURE — 85027 COMPLETE CBC AUTOMATED: CPT

## 2017-11-27 PROCEDURE — 80048 BASIC METABOLIC PNL TOTAL CA: CPT

## 2017-11-27 PROCEDURE — 36415 COLL VENOUS BLD VENIPUNCTURE: CPT

## 2017-11-28 ENCOUNTER — OFFICE VISIT (OUTPATIENT)
Dept: FAMILY MEDICINE CLINIC | Age: 57
End: 2017-11-28

## 2017-11-28 ENCOUNTER — OFFICE VISIT (OUTPATIENT)
Dept: FAMILY MEDICINE CLINIC | Age: 57
End: 2017-11-28
Payer: MEDICARE

## 2017-11-28 VITALS
OXYGEN SATURATION: 96 % | HEART RATE: 100 BPM | WEIGHT: 119 LBS | SYSTOLIC BLOOD PRESSURE: 115 MMHG | DIASTOLIC BLOOD PRESSURE: 68 MMHG | BODY MASS INDEX: 19.83 KG/M2 | HEIGHT: 65 IN | RESPIRATION RATE: 18 BRPM

## 2017-11-28 DIAGNOSIS — R05.9 COUGH: ICD-10-CM

## 2017-11-28 DIAGNOSIS — R50.81 FEBRILE NEUTROPENIA (HCC): ICD-10-CM

## 2017-11-28 DIAGNOSIS — F17.200 NICOTINE DEPENDENCE, UNCOMPLICATED, UNSPECIFIED NICOTINE PRODUCT TYPE: ICD-10-CM

## 2017-11-28 DIAGNOSIS — Z93.0 STATUS POST TRACHEOSTOMY (HCC): Primary | ICD-10-CM

## 2017-11-28 DIAGNOSIS — D70.9 FEBRILE NEUTROPENIA (HCC): ICD-10-CM

## 2017-11-28 DIAGNOSIS — G89.3 PAIN OF METASTATIC MALIGNANCY: Primary | ICD-10-CM

## 2017-11-28 DIAGNOSIS — D70.1 CHEMOTHERAPY INDUCED NEUTROPENIA (HCC): ICD-10-CM

## 2017-11-28 DIAGNOSIS — T45.1X5A CHEMOTHERAPY INDUCED NEUTROPENIA (HCC): ICD-10-CM

## 2017-11-28 DIAGNOSIS — D69.6 ACQUIRED THROMBOCYTOPENIA (HCC): ICD-10-CM

## 2017-11-28 LAB
CULTURE: NO GROWTH
CULTURE: NORMAL
Lab: NORMAL
Lab: NORMAL
SPECIMEN DESCRIPTION: NORMAL
SPECIMEN DESCRIPTION: NORMAL
STATUS: NORMAL

## 2017-11-28 PROCEDURE — 1111F DSCHRG MED/CURRENT MED MERGE: CPT | Performed by: FAMILY MEDICINE

## 2017-11-28 PROCEDURE — G8427 DOCREV CUR MEDS BY ELIG CLIN: HCPCS | Performed by: FAMILY MEDICINE

## 2017-11-28 PROCEDURE — G8420 CALC BMI NORM PARAMETERS: HCPCS | Performed by: FAMILY MEDICINE

## 2017-11-28 PROCEDURE — 3017F COLORECTAL CA SCREEN DOC REV: CPT | Performed by: FAMILY MEDICINE

## 2017-11-28 PROCEDURE — G8484 FLU IMMUNIZE NO ADMIN: HCPCS | Performed by: FAMILY MEDICINE

## 2017-11-28 PROCEDURE — 99214 OFFICE O/P EST MOD 30 MIN: CPT | Performed by: FAMILY MEDICINE

## 2017-11-28 PROCEDURE — 4004F PT TOBACCO SCREEN RCVD TLK: CPT | Performed by: FAMILY MEDICINE

## 2017-11-28 RX ORDER — GUAIFENESIN AND CODEINE PHOSPHATE 100; 10 MG/5ML; MG/5ML
10 SOLUTION ORAL 4 TIMES DAILY PRN
Qty: 400 ML | Refills: 0 | Status: SHIPPED | OUTPATIENT
Start: 2017-11-28 | End: 2017-12-08

## 2017-11-28 RX ORDER — OXYCODONE HCL 20 MG/1
20 TABLET, FILM COATED, EXTENDED RELEASE ORAL EVERY 12 HOURS
Qty: 60 EACH | Refills: 0 | Status: SHIPPED | OUTPATIENT
Start: 2017-11-28 | End: 2017-12-26 | Stop reason: SDUPTHER

## 2017-11-28 RX ORDER — BUPROPION HYDROCHLORIDE 300 MG/1
300 TABLET ORAL EVERY MORNING
Qty: 30 TABLET | Refills: 1 | Status: SHIPPED | OUTPATIENT
Start: 2017-11-28 | End: 2018-01-15

## 2017-11-28 NOTE — PROGRESS NOTES
[facility] PHYSICIANS  COMPREHENSIVE CARE  [address]  [place] [address] [place] [id number]  Dept: [address] Corrine Diaz is a 65 y.o. male who presents today for follow up on his  medical conditions as noted below. Chief Complaint   Patient presents with   [address] from Hospital     f/u [facility]-feeling better but still very tired, he is coughing a lot.  he would like to quit smoking       Patient Active Problem List:     GERD (gastroesophageal reflux disease)     Chronic obstructive pulmonary disease (HCC)     Anxiety     Hypokalemia     Malnutrition (HCC)     Anemia of chronic disease     Benign essential HTN     Anorexia     Mixed anxiety depressive disorder     Cachectic (HCC)     Pain of metastatic malignancy     Chronic bilateral low back pain without sciatica     Advance directive discussed with patient     Mixed simple and mucopurulent chronic bronchitis ([address]     Encounter for palliative care     Squamous cell carcinoma of lung (HCC)     Constipation due to opioid therapy     Osteoarthritis of spine with radiculopathy, lumbar region     Cervical radiculopathy     Chronic prescription benzodiazepine use     Failed neck syndrome     Acute upper GI bleed     Thrombocytopenia (HCC)     Chemotherapy-induced neutropenia (HCC)     Gastrointestinal hemorrhage     Iron deficiency anemia due to chronic blood loss     Hypomagnesemia     Neutropenia with fever (HCC)     Multifocal pneumonia     H/O: GI bleed     Acute respiratory distress     Gram-negative sepsis (HCC)     Acute febrile illness     Moraxella catarrhalis bronchitis     Acute metabolic encephalopathy     Past Medical History:   Diagnosis Date    Benign essential HTN 12/10/2016    Cardiac arrest due to respiratory disorder ([address] 12/10/2016    Cellulitis     Chronic low back pain 8/16/2016    Emphysema of lung ([address]     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 And for pain medication. He is in a lot of pain from having bone metastases. He was on Vicodin which was not really helping his pain at all. He would like to try something to help him quit smoking  He is  having an ongoing cough    Controlled Substances Monitoring:     Attestation: The Prescription Monitoring Report for this patient was reviewed today. (metastaic lung ca with bone mets ) (Gin Patel, DO)  Documentation: No signs of potential drug abuse or diversion identified. (Gin Patel, DO)  Chronic Pain: Reviewed the patient's functional status and documentation, including the 4A's of chronic pain treatment. (Gin Patel, DO)     Quality & Risk Score Accuracy - MEDICARE ADVANTAGE    Visit Dx:  Acquired thrombocytopenia (HCC)  Asymptomatic. Continue current treatment plan and follow up at least yearly. Additional documentation:  Based on review of the following:  Pertinent labs- Last Platelet Count:  395 on 11/27/17,  No tracheostomy in place   Last edited 11/28/17 16:51 EST by [facility],              Review of Systems:     Constitutional: Negative for fever, appetite change and fatigue. Family social and medical history reviewed and unchanged     HENT: Negative. Negative for nosebleeds, trouble swallowing and neck pain. Eyes: Negative for photophobia and visual disturbance. Respiratory: Negative. Negative for chest tightness and shortness of breath. Cardiovascular: Negative. Negative for chest pain and leg swelling. Gastrointestinal: Negative. Negative for abdominal pain and blood in stool. Endocrine: Negative for cold intolerance and polyuria.    Genitourinary: Negative for dysuria and

## 2017-11-29 ENCOUNTER — CARE COORDINATION (OUTPATIENT)
Dept: CASE MANAGEMENT | Age: 57
End: 2017-11-29

## 2017-11-29 NOTE — CARE COORDINATION
[facility] Update Call    2017    Patient: Katherin Trinidad Patient : 1960   MRN: [id number]  Reason for Admission: There are no discharge diagnoses documented for the most recent discharge. Discharge Date: 11/10/17 RARS: Geisinger Risk Score: 24.75  Status update requested via secure e-mail sent to staff.      Care Transitions Post Acute Facility Update    Care Transitions Interventions  Post Acute Facility:  [address] [place] [address] Acute Facility Update

## 2017-11-30 ENCOUNTER — TELEPHONE (OUTPATIENT)
Dept: FAMILY MEDICINE CLINIC | Age: 57
End: 2017-11-30

## 2017-11-30 VITALS
WEIGHT: 119 LBS | DIASTOLIC BLOOD PRESSURE: 60 MMHG | SYSTOLIC BLOOD PRESSURE: 115 MMHG | HEART RATE: 100 BPM | BODY MASS INDEX: 19.83 KG/M2 | RESPIRATION RATE: 20 BRPM | HEIGHT: 65 IN

## 2017-11-30 PROBLEM — Z93.0 STATUS POST TRACHEOSTOMY (HCC): Status: ACTIVE | Noted: 2017-11-30

## 2017-11-30 NOTE — PROGRESS NOTES
[facility] PHYSICIANS  COMPREHENSIVE CARE  [address]  [place] [address] [place] [phone]  Dept: [address] [facility] Corrine Diaz is a 65 y.o. male who presents today for follow up on his  medical conditions as noted below. No chief complaint on file.       Patient Active Problem List:     GERD (gastroesophageal reflux disease)     Chronic obstructive pulmonary disease (HCC)     Anxiety     Hypokalemia     Malnutrition (HCC)     Anemia of chronic disease     Benign essential HTN     Anorexia     Mixed anxiety depressive disorder     Cachectic (HCC)     Pain of metastatic malignancy     Chronic bilateral low back pain without sciatica     Advance directive discussed with patient     Mixed simple and mucopurulent chronic bronchitis ([address]     Encounter for palliative care     Squamous cell carcinoma of lung (HCC)     Constipation due to opioid therapy     Osteoarthritis of spine with radiculopathy, lumbar region     Cervical radiculopathy     Chronic prescription benzodiazepine use     Failed neck syndrome     Acute upper GI bleed     Thrombocytopenia (HCC)     Chemotherapy-induced neutropenia (HCC)     Gastrointestinal hemorrhage     Iron deficiency anemia due to chronic blood loss     Hypomagnesemia     Neutropenia with fever (HCC)     Multifocal pneumonia     H/O: GI bleed     Acute respiratory distress     Gram-negative sepsis (HCC)     Acute febrile illness     Moraxella catarrhalis bronchitis     Acute metabolic encephalopathy     Past Medical History:   Diagnosis Date    Benign essential HTN 12/10/2016    Cardiac arrest due to respiratory disorder ([address] 12/10/2016    Cellulitis     Chronic low back pain 8/16/2016    Emphysema of lung ([address]     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    Head injury     July 1984, [place] & hit head on bottom, Halo placed    Hepatitis     Insomnia     Osteoarthritis of spine with radiculopathy, lumbar region 8/15/2017    Reflux     Seizures budesonide-formoterol (SYMBICORT) 160-4.5 MCG/ACT AERO Inhale 2 puffs into the lungs 2 times daily 1 Inhaler 3    nicotine (NICODERM CQ) 21 MG/24HR Place 1 patch onto the skin daily as needed (if patient smokes/requests nicotine replacent therapy) 30 patch 3    pantoprazole (PROTONIX) 40 MG tablet Take 1 tablet by mouth every morning (before breakfast) 30 tablet 3    calcium-vitamin D (OSCAL-500) 500-200 MG-UNIT per tablet Take 1 tablet by mouth 2 times daily      carisoprodol (SOMA) 350 MG tablet TAKE 1 TABLET BY MOUTH 2 TIMES DAILY AS NEEDED FOR MUSCLE SPASMS 60 tablet 0    atenolol (TENORMIN) 50 MG tablet Take 1 tablet by mouth daily 30 tablet 5    megestrol (MEGACE) 40 MG/ML suspension Take 400 mg by mouth daily   2    Albuterol Sulfate (VENTOLIN HFA IN) Inhale into the lungs as needed      prochlorperazine (COMPAZINE) 10 MG tablet Take 10 mg by mouth every 6 hours as needed (nausea)        No current facility-administered medications for this visit.       Facility-Administered Medications Ordered in Other Visits   Medication Dose Route Frequency Provider Last Rate Last Dose    0.9 % sodium chloride infusion 250 mL  250 mL Intravenous Once Gin Patel,          ALLERGIES:    Allergies   Allergen Reactions    Aspirin     Fish-Derived Products Swelling    Shellfish-Derived Products      Other reaction(s): syncope/severe facial swelling/vomits    Motrin [Ibuprofen Micronized] Other (See Comments)     Upset stomach        Social History   Substance Use Topics    Smoking status: Heavy Tobacco Smoker     Packs/day: 1.00     Years: 40.00     Types: Cigarettes    Smokeless tobacco: Never Used    Alcohol use No      Comment: none currently        LDL Cholesterol (mg/dL)   Date Value   10/08/2014 77     HDL (mg/dL)   Date Value   10/08/2014 57     BUN (mg/dL)   Date Value   11/27/2017 11     CREATININE (mg/dL)   Date Value   11/27/2017 0.65 (L)     Glucose (mg/dL)   Date Value   11/27/2017 80

## 2017-12-01 ENCOUNTER — CARE COORDINATION (OUTPATIENT)
Dept: CARE COORDINATION | Age: 57
End: 2017-12-01

## 2017-12-01 DIAGNOSIS — R53.1 WEAKNESS: Primary | ICD-10-CM

## 2017-12-01 DIAGNOSIS — M79.605 LUMBAR PAIN WITH RADIATION DOWN BOTH LEGS: ICD-10-CM

## 2017-12-01 DIAGNOSIS — M54.50 LUMBAR PAIN WITH RADIATION DOWN BOTH LEGS: ICD-10-CM

## 2017-12-01 DIAGNOSIS — M79.604 LUMBAR PAIN WITH RADIATION DOWN BOTH LEGS: ICD-10-CM

## 2017-12-01 RX ORDER — NORTRIPTYLINE HYDROCHLORIDE 25 MG/1
CAPSULE ORAL
Qty: 30 CAPSULE | Refills: 4 | Status: SHIPPED | OUTPATIENT
Start: 2017-12-01 | End: 2018-01-15

## 2017-12-04 DIAGNOSIS — M54.50 LUMBAR PAIN WITH RADIATION DOWN BOTH LEGS: ICD-10-CM

## 2017-12-04 DIAGNOSIS — M79.604 LUMBAR PAIN WITH RADIATION DOWN BOTH LEGS: ICD-10-CM

## 2017-12-04 DIAGNOSIS — I46.9 CARDIAC ARREST (HCC): ICD-10-CM

## 2017-12-04 DIAGNOSIS — M79.605 LUMBAR PAIN WITH RADIATION DOWN BOTH LEGS: ICD-10-CM

## 2017-12-04 DIAGNOSIS — J69.0 ASPIRATION PNEUMONIA OF BOTH LOWER LOBES DUE TO GASTRIC SECRETIONS (HCC): ICD-10-CM

## 2017-12-04 RX ORDER — CARISOPRODOL 350 MG/1
TABLET ORAL
Qty: 60 TABLET | Refills: 0 | Status: SHIPPED | OUTPATIENT
Start: 2017-12-04 | End: 2018-01-15

## 2017-12-04 RX ORDER — MELOXICAM 15 MG/1
TABLET ORAL
Qty: 30 TABLET | Refills: 5 | Status: SHIPPED | OUTPATIENT
Start: 2017-12-04 | End: 2018-01-15

## 2017-12-04 RX ORDER — ALPRAZOLAM 1 MG/1
TABLET ORAL
Qty: 60 TABLET | Refills: 0 | Status: SHIPPED | OUTPATIENT
Start: 2017-12-04 | End: 2018-01-02 | Stop reason: SDUPTHER

## 2017-12-05 ENCOUNTER — CARE COORDINATION (OUTPATIENT)
Dept: CASE MANAGEMENT | Age: 57
End: 2017-12-05

## 2017-12-05 NOTE — CARE COORDINATION
[facility] Transitions Initial Follow Up Call    Call within 2 business days of discharge: No    Patient: Jayro Gutierrez Patient : 1960   MRN: [id number]  Reason for Admission: There are no discharge diagnoses documented for the most recent discharge. Discharge Date: 11/10/17 RARS: Geisinger Risk Score: 24.75     Spoke with: Tristan: Discharged from [facility] 17    Non-face-to-face services provided:  Obtained and reviewed discharge summary and/or continuity of care documents    Inpatient Assessment  Care Transitions 24 Hour Call    Do you have all of your prescriptions and are they filled?:  Yes  Patient DME:  Wheelchair, Alfrieda Gutter, Straight cane  Patient Home Equipment:  Nebulizer, Oxygen  Do you have support at home?:  Partner/Spouse/SO  Are you an active caregiver in your home?:  No  Care Transitions Interventions     Saw PCP on 17  Will see the Oncologist 17  Defers need for medication review stating he has a print off from his PCP. \"Too many\". His girlfriend sets his medications up in a weekly medication box. States he needs a shower chair. Instructed that shower chairs are not typically a covered expense. That he may need to purchase this on his own. Has asked the PCP for a prescription for Out Patient Physical Therapy orders. Wants to go to [facility] for therapy.       Follow Up  Future Appointments  Date Time Provider Bruce Dahl   2018 2:30 PM SCHEDULE, [facility] None       Noe Dugan RN

## 2017-12-05 NOTE — CARE COORDINATION
[facility] Update Call    2017    Patient: Emily Lyman Patient : 1960   MRN: [id number]  Reason for Admission: There are no discharge diagnoses documented for the most recent discharge.   Discharge Date: 11/10/17   Discharged from [facility] 17  RARS: Geisinger Risk Score: 24.75       [address] [place] [address] Update    Care Transitions Interventions  Post Acute Facility:  Home from [facility] 17  [address] [place] [address] Dr Sol

## 2017-12-08 ENCOUNTER — CARE COORDINATION (OUTPATIENT)
Dept: CARE COORDINATION | Age: 57
End: 2017-12-08

## 2017-12-08 NOTE — CARE COORDINATION
tablet by mouth every morning (before breakfast) 11/4/17   Maddie Hernandez DO   calcium-vitamin D (Raquel Pilon) 500-200 MG-UNIT per tablet Take 1 tablet by mouth 2 times daily    Historical Provider, MD   atenolol (TENORMIN) 50 MG tablet Take 1 tablet by mouth daily 6/30/17   Gin Patel DO   megestrol (MEGACE) 40 MG/ML suspension Take 400 mg by mouth daily  5/30/17   Historical Provider, MD   Albuterol Sulfate (VENTOLIN HFA IN) Inhale into the lungs as needed    Historical Provider, MD   prochlorperazine (COMPAZINE) 10 MG tablet Take 10 mg by mouth every 6 hours as needed (nausea)     Historical Provider, MD       Future Appointments  Date Time Provider Bruce Dahl   4/6/2018 2:30 PM SCHEDULE, [facility] None

## 2017-12-11 NOTE — CARE COORDINATION
[facility] Transitions Follow Up Call    2017    Patient: Sheng Kennedy  Patient : 1960   MRN: [id number]  Reason for Admission: There are no discharge diagnoses documented for the most recent discharge. Discharge Date: 11/10/17 RARS: Risk Score: 24.75  Discharged from [facility] 17  Care Transitions Episode closed. [facility] Team notified of hand off.     Follow Up  Future Appointments  Date Time Provider Bruce Dahl   2018 2:30 PM SCHEDULE, [facility] None       Casey Sánchez RN

## 2017-12-15 ENCOUNTER — CARE COORDINATION (OUTPATIENT)
Dept: CARE COORDINATION | Age: 57
End: 2017-12-15

## 2017-12-15 NOTE — CARE COORDINATION
Ambulatory Care Coordination Note  12/15/2017  CM Risk Score: 13  Samuel Mortality Risk Score:      ACC: Charbel Rose RN    Summary Note: spoke with pt who said he feels lousy he has been sleeping a lot the last few day. He is trying to stay hydrated. He is eating ok. Just feeling tired. He said he has good support and family is helping him. Care Coordination Interventions    Program Enrollment:  Complex Care  Referral from Primary Care Provider:  No  Suggested Interventions and Community Resources  Medi Set or Pill Pack:  Completed  Smoking Cessation:  Declined         Goals Addressed     None          Prior to Admission medications    Medication Sig Start Date End Date Taking? Authorizing Provider   ALPRAZolam (XANAX) 1 MG tablet TAKE 1 TABLET BY MOUTH 2 TIMES DAILY AS NEEDED FOR SLEEP 12/4/17   Gin Patel, DO   carisoprodol (SOMA) 350 MG tablet TAKE 1 TABLET BY MOUTH 2 TIMES DAILY AS NEEDED FOR MUSCLE SPASMS 12/4/17   Gin Patel, DO   meloxicam (MOBIC) 15 MG tablet Take 1 tablet by mouth daily 12/4/17   Gin Patel, DO   nortriptyline (PAMELOR) 25 MG capsule Take 1 capsule by mouth nightly 12/1/17   Gin Patel, DO   buPROPion (WELLBUTRIN XL) 300 MG extended release tablet Take 1 tablet by mouth every morning 11/28/17   Gin Patel, DO   oxyCODONE (OXYCONTIN) 20 MG extended release tablet Take 1 tablet by mouth every 12 hours . 11/28/17 1/27/18  Gin Patel, DO   HYDROcodone-acetaminophen (NORCO) 5-325 MG per tablet 1 tablet every 4 hours as needed for mild to moderate pain, 2 tablets every 4 hours as needed for severe pain.  Earliest Fill Date: 11/10/17 11/10/17   Cresencio Habermann Orlop, DO   ipratropium-albuterol (DUONEB) 0.5-2.5 (3) MG/3ML SOLN nebulizer solution Inhale 3 mLs into the lungs every 4 hours (while awake) 11/10/17   Cresencio Habermann Orlop, DO   sertraline (ZOLOFT) 25 MG tablet Take 1 tablet by mouth daily 11/10/17   Aldair De Santiago, DO   amLODIPine (NORVASC) 5 MG tablet Take 1 tablet by mouth daily 11/10/17   Kayla De Santiago, DO   predniSONE (DELTASONE) 10 MG tablet 4 tablets daily for 3 days, 3 tablets daily for 3 days, 2 tablets daily for 3 days, 1 tablet daily for 3 days 11/10/17   Kayla De Santiago, DO   docusate (COLACE, DULCOLAX) 100 MG CAPS Take 100 mg by mouth 2 times daily 11/10/17   Kayla De Santiago, DO   nystatin (MYCOSTATIN) 629776 UNIT/ML suspension Take 5 mLs by mouth 4 times daily 11/10/17   Kayla De Santiago, DO   budesonide-formoterol (SYMBICORT) 160-4.5 MCG/ACT AERO Inhale 2 puffs into the lungs 2 times daily 11/10/17   Esvin Zamudio CNP   nicotine (NICODERM CQ) 21 MG/24HR Place 1 patch onto the skin daily as needed (if patient smokes/requests nicotine replacent therapy) 11/10/17   Kayla De Santiago, DO   pantoprazole (PROTONIX) 40 MG tablet Take 1 tablet by mouth every morning (before breakfast) 11/4/17   Inna Herman, DO   calcium-vitamin D (Shelly Counts) 500-200 MG-UNIT per tablet Take 1 tablet by mouth 2 times daily    Historical Provider, MD   atenolol (TENORMIN) 50 MG tablet Take 1 tablet by mouth daily 6/30/17   Gin Patel,    megestrol (MEGACE) 40 MG/ML suspension Take 400 mg by mouth daily  5/30/17   Historical Provider, MD   Albuterol Sulfate (VENTOLIN HFA IN) Inhale into the lungs as needed    Historical Provider, MD   prochlorperazine (COMPAZINE) 10 MG tablet Take 10 mg by mouth every 6 hours as needed (nausea)     Historical Provider, MD       Future Appointments  Date Time Provider Bruce Dahl   4/6/2018 2:30 PM SCHEDULE, [facility] None

## 2017-12-22 ENCOUNTER — CARE COORDINATION (OUTPATIENT)
Dept: CARE COORDINATION | Age: 57
End: 2017-12-22

## 2017-12-26 DIAGNOSIS — G89.3 PAIN OF METASTATIC MALIGNANCY: ICD-10-CM

## 2017-12-26 RX ORDER — OXYCODONE HCL 20 MG/1
20 TABLET, FILM COATED, EXTENDED RELEASE ORAL EVERY 12 HOURS
Qty: 60 EACH | Refills: 0 | Status: SHIPPED | OUTPATIENT
Start: 2017-12-26 | End: 2018-01-18 | Stop reason: SDUPTHER

## 2017-12-29 ENCOUNTER — CARE COORDINATION (OUTPATIENT)
Dept: CARE COORDINATION | Age: 57
End: 2017-12-29

## 2018-01-02 DIAGNOSIS — F41.9 ANXIETY: Primary | ICD-10-CM

## 2018-01-03 RX ORDER — ALPRAZOLAM 1 MG/1
TABLET ORAL
Qty: 60 TABLET | Refills: 0 | Status: SHIPPED | OUTPATIENT
Start: 2018-01-03 | End: 2018-01-29 | Stop reason: SDUPTHER

## 2018-01-12 ENCOUNTER — CARE COORDINATION (OUTPATIENT)
Dept: CARE COORDINATION | Age: 58
End: 2018-01-12

## 2018-01-12 NOTE — CARE COORDINATION
Denise De Santiago, DO   sertraline (ZOLOFT) 25 MG tablet Take 1 tablet by mouth daily 11/10/17   Denise De Santiago, DO   amLODIPine (NORVASC) 5 MG tablet Take 1 tablet by mouth daily 11/10/17   Denise De Santiago, DO   predniSONE (DELTASONE) 10 MG tablet 4 tablets daily for 3 days, 3 tablets daily for 3 days, 2 tablets daily for 3 days, 1 tablet daily for 3 days 11/10/17   Denise De Santiago, DO   docusate (COLACE, DULCOLAX) 100 MG CAPS Take 100 mg by mouth 2 times daily 11/10/17   Denise De Santiago, DO   nystatin (MYCOSTATIN) 554199 UNIT/ML suspension Take 5 mLs by mouth 4 times daily 11/10/17   Denise De Santiago, DO   budesonide-formoterol (SYMBICORT) 160-4.5 MCG/ACT AERO Inhale 2 puffs into the lungs 2 times daily 11/10/17   Lebron Huger, CNP   nicotine (NICODERM CQ) 21 MG/24HR Place 1 patch onto the skin daily as needed (if patient smokes/requests nicotine replacent therapy) 11/10/17   Denise De Santiago, DO   pantoprazole (PROTONIX) 40 MG tablet Take 1 tablet by mouth every morning (before breakfast) 11/4/17   Anuj Arrington,    calcium-vitamin D (Ramon Cue) 500-200 MG-UNIT per tablet Take 1 tablet by mouth 2 times daily    Historical Provider, MD   atenolol (TENORMIN) 50 MG tablet Take 1 tablet by mouth daily 6/30/17   Gin Patel,    megestrol (MEGACE) 40 MG/ML suspension Take 400 mg by mouth daily  5/30/17   Historical Provider, MD   Albuterol Sulfate (VENTOLIN HFA IN) Inhale into the lungs as needed    Historical Provider, MD   prochlorperazine (COMPAZINE) 10 MG tablet Take 10 mg by mouth every 6 hours as needed (nausea)     Historical Provider, MD       Future Appointments  Date Time Provider Bruce Dahl   4/6/2018 2:30 PM SCHEDULE, [facility] None

## 2018-01-15 ENCOUNTER — OFFICE VISIT (OUTPATIENT)
Dept: FAMILY MEDICINE CLINIC | Age: 58
End: 2018-01-15
Payer: MEDICARE

## 2018-01-15 ENCOUNTER — HOSPITAL ENCOUNTER (OUTPATIENT)
Age: 58
Setting detail: SPECIMEN
Discharge: HOME | End: 2018-01-15
Payer: MEDICARE

## 2018-01-15 VITALS
OXYGEN SATURATION: 97 % | BODY MASS INDEX: 18.83 KG/M2 | SYSTOLIC BLOOD PRESSURE: 107 MMHG | HEIGHT: 65 IN | RESPIRATION RATE: 16 BRPM | HEART RATE: 139 BPM | WEIGHT: 113 LBS | DIASTOLIC BLOOD PRESSURE: 76 MMHG

## 2018-01-15 DIAGNOSIS — C34.92 SQUAMOUS CELL CARCINOMA OF LEFT LUNG (HCC): ICD-10-CM

## 2018-01-15 DIAGNOSIS — T45.1X5A CHEMOTHERAPY INDUCED NEUTROPENIA (HCC): ICD-10-CM

## 2018-01-15 DIAGNOSIS — Z93.0 STATUS POST TRACHEOSTOMY (HCC): ICD-10-CM

## 2018-01-15 DIAGNOSIS — E87.6 HYPOKALEMIA: Primary | ICD-10-CM

## 2018-01-15 DIAGNOSIS — F32.1 MODERATE SINGLE CURRENT EPISODE OF MAJOR DEPRESSIVE DISORDER (HCC): ICD-10-CM

## 2018-01-15 DIAGNOSIS — R64 CACHECTIC (HCC): ICD-10-CM

## 2018-01-15 DIAGNOSIS — E44.0 MODERATE PROTEIN-CALORIE MALNUTRITION (HCC): ICD-10-CM

## 2018-01-15 DIAGNOSIS — J41.8 MIXED SIMPLE AND MUCOPURULENT CHRONIC BRONCHITIS (HCC): ICD-10-CM

## 2018-01-15 DIAGNOSIS — J44.9 OBSTRUCTIVE CHRONIC BRONCHITIS WITHOUT EXACERBATION (HCC): ICD-10-CM

## 2018-01-15 DIAGNOSIS — F51.01 PRIMARY INSOMNIA: ICD-10-CM

## 2018-01-15 DIAGNOSIS — D70.1 CHEMOTHERAPY INDUCED NEUTROPENIA (HCC): ICD-10-CM

## 2018-01-15 DIAGNOSIS — R50.81 FEBRILE NEUTROPENIA (HCC): ICD-10-CM

## 2018-01-15 DIAGNOSIS — D70.9 FEBRILE NEUTROPENIA (HCC): ICD-10-CM

## 2018-01-15 DIAGNOSIS — D69.6 ACQUIRED THROMBOCYTOPENIA (HCC): ICD-10-CM

## 2018-01-15 DIAGNOSIS — E87.6 HYPOKALEMIA: ICD-10-CM

## 2018-01-15 LAB
ALBUMIN SERPL-MCNC: 3.4 G/DL (ref 3.5–5.2)
ALBUMIN/GLOBULIN RATIO: 1.2 (ref 1–2.5)
ALP BLD-CCNC: 65 U/L (ref 40–129)
ALT SERPL-CCNC: 19 U/L (ref 5–41)
ANION GAP SERPL CALCULATED.3IONS-SCNC: 13 MMOL/L (ref 9–17)
AST SERPL-CCNC: 16 U/L
BILIRUB SERPL-MCNC: 0.33 MG/DL (ref 0.3–1.2)
BUN BLDV-MCNC: 14 MG/DL (ref 6–20)
BUN/CREAT BLD: ABNORMAL (ref 9–20)
CALCIUM SERPL-MCNC: 9.1 MG/DL (ref 8.6–10.4)
CHLORIDE BLD-SCNC: 96 MMOL/L (ref 98–107)
CO2: 28 MMOL/L (ref 20–31)
CREAT SERPL-MCNC: 0.65 MG/DL (ref 0.7–1.2)
GFR AFRICAN AMERICAN: >60 ML/MIN
GFR NON-AFRICAN AMERICAN: >60 ML/MIN
GFR SERPL CREATININE-BSD FRML MDRD: ABNORMAL ML/MIN/{1.73_M2}
GFR SERPL CREATININE-BSD FRML MDRD: ABNORMAL ML/MIN/{1.73_M2}
GLUCOSE BLD-MCNC: 134 MG/DL (ref 70–99)
POTASSIUM SERPL-SCNC: 3.7 MMOL/L (ref 3.7–5.3)
SODIUM BLD-SCNC: 137 MMOL/L (ref 135–144)
TOTAL PROTEIN: 6.3 G/DL (ref 6.4–8.3)

## 2018-01-15 PROCEDURE — G8427 DOCREV CUR MEDS BY ELIG CLIN: HCPCS | Performed by: FAMILY MEDICINE

## 2018-01-15 PROCEDURE — 4004F PT TOBACCO SCREEN RCVD TLK: CPT | Performed by: FAMILY MEDICINE

## 2018-01-15 PROCEDURE — 99214 OFFICE O/P EST MOD 30 MIN: CPT | Performed by: FAMILY MEDICINE

## 2018-01-15 PROCEDURE — G8484 FLU IMMUNIZE NO ADMIN: HCPCS | Performed by: FAMILY MEDICINE

## 2018-01-15 PROCEDURE — G8420 CALC BMI NORM PARAMETERS: HCPCS | Performed by: FAMILY MEDICINE

## 2018-01-15 PROCEDURE — 3023F SPIROM DOC REV: CPT | Performed by: FAMILY MEDICINE

## 2018-01-15 PROCEDURE — 3017F COLORECTAL CA SCREEN DOC REV: CPT | Performed by: FAMILY MEDICINE

## 2018-01-15 PROCEDURE — G8926 SPIRO NO PERF OR DOC: HCPCS | Performed by: FAMILY MEDICINE

## 2018-01-15 RX ORDER — DRONABINOL 10 MG/1
10 CAPSULE ORAL
COMMUNITY
End: 2018-06-20 | Stop reason: ALTCHOICE

## 2018-01-15 RX ORDER — OLANZAPINE 5 MG/1
5 TABLET ORAL NIGHTLY
Qty: 30 TABLET | Refills: 3 | Status: SHIPPED | OUTPATIENT
Start: 2018-01-15 | End: 2018-02-16 | Stop reason: SDUPTHER

## 2018-01-15 RX ORDER — ZOLPIDEM TARTRATE 5 MG/1
5 TABLET ORAL NIGHTLY PRN
Qty: 30 TABLET | Refills: 0 | Status: SHIPPED | OUTPATIENT
Start: 2018-01-15 | End: 2018-02-28 | Stop reason: SDUPTHER

## 2018-01-15 NOTE — PROGRESS NOTES
[facility] PHYSICIANS  COMPREHENSIVE CARE  [address]  [address] [address]  Dept: [facility] Blade Quiros is a 65 y.o. male who presents today for follow up on his  medical conditions as noted below. Chief Complaint   Patient presents with    Hypotension     states his BP has been low when he stands up-he feels like he is going to pass out, dizzy.        Patient Active Problem List:     GERD (gastroesophageal reflux disease)     Chronic obstructive pulmonary disease (HCC)     Anxiety     Hypokalemia     Malnutrition (HCC)     Anemia of chronic disease     Benign essential HTN     Anorexia     Mixed anxiety depressive disorder     Cachectic (HCC)     Pain of metastatic malignancy     Chronic bilateral low back pain without sciatica     Advance directive discussed with patient     Mixed simple and mucopurulent chronic bronchitis ([address]     Encounter for palliative care     Squamous cell carcinoma of lung (HCC)     Constipation due to opioid therapy     Osteoarthritis of spine with radiculopathy, lumbar region     Cervical radiculopathy     Chronic prescription benzodiazepine use     Failed neck syndrome     Acute upper GI bleed     Thrombocytopenia (HCC)     Chemotherapy-induced neutropenia (HCC)     Gastrointestinal hemorrhage     Iron deficiency anemia due to chronic blood loss     Hypomagnesemia     Neutropenia with fever (HCC)     Multifocal pneumonia     H/O: GI bleed     Acute respiratory distress     Gram-negative sepsis (HCC)     Acute febrile illness     Moraxella catarrhalis bronchitis     Acute metabolic encephalopathy     Status post tracheostomy ([address]     Past Medical History:   Diagnosis Date    Benign essential HTN 12/10/2016    Cardiac arrest due to respiratory disorder ([address] 12/10/2016    Cellulitis     Chronic low back pain 8/16/2016    Emphysema of lung ([address]     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  Pulmonary/Chest: Effort normal and breath sounds normal. He has no wheezes. Hehas no rales. Abdominal: Soft. Bowel sounds are normal. He exhibits no distension and no mass. There is no tenderness. There is no rebound and no guarding. Genitourinary/Anorectal:deferred  Musculoskeletal: Normal range of motion. He exhibits no edema or tenderness. Lymphadenopathy: He has no cervical adenopathy. Neurological: He is alert and oriented to person, place, and time. He has normal reflexes. Skin: Skin is warm and dry. No rash noted. Psychiatric: He has a normal mood and affect. His   behavior is normal.       Assessment:      1. Hypokalemia    2. Moderate protein-calorie malnutrition ([address]    3. Squamous cell carcinoma of left lung ([address]    4. Moderate single current episode of major depressive disorder ([address]    5. Primary insomnia    6. Obstructive chronic bronchitis without exacerbation ([address]    7. Status post tracheostomy ([address]    8. Acquired thrombocytopenia ([address]    9. Febrile neutropenia (HCC)    10. Chemotherapy induced neutropenia (HCC)    11. Cachectic ([address]    12. Mixed simple and mucopurulent chronic bronchitis ([address]          Plan:      Call or return to clinic prn if these symptoms worsen or fail to improve as anticipated. I have reviewed the instructions with the patient, answering all questions to his satisfaction. No Follow-up on file. Orders Placed This Encounter   Procedures    Comprehensive Metabolic Panel     Standing Status:   Future     Number of Occurrences:   1     Standing Expiration Date:   1/15/2019     Orders Placed This Encounter   Medications    OLANZapine (ZYPREXA) 5 MG tablet     Sig: Take 1 tablet by mouth nightly     Dispense:  30 tablet     Refill:  3    zolpidem (AMBIEN) 5 MG tablet     Sig: Take 1 tablet by mouth nightly as needed for Sleep for up to 30 days.      Dispense:  30 tablet     Refill:  0     Is to stop his blood pressure medicine  Discussed with him making nutritious protein shake gave him samples of some protein drinks because his insurance will not cover them for him than he can afford them  We'll adjust medication and add Zyprexa hopefully to help with depression and stimulate appetite    Electronically signed by Cindy Whiting DO on 1/15/2018 at 2:50 PM

## 2018-01-15 NOTE — LETTER
Current Outpatient Prescriptions   Medication Sig Dispense Refill    dronabinol (MARINOL) 10 MG capsule Take 10 mg by mouth 2 times daily (before meals).  OLANZapine (ZYPREXA) 5 MG tablet Take 1 tablet by mouth nightly 30 tablet 3    zolpidem (AMBIEN) 5 MG tablet Take 1 tablet by mouth nightly as needed for Sleep for up to 30 days. 30 tablet 0    ALPRAZolam (XANAX) 1 MG tablet TAKE 1 TABLET BY MOUTH 2 TIMES DAILY AS NEEDED FOR SLEEP 60 tablet 0    oxyCODONE (OXYCONTIN) 20 MG extended release tablet Take 1 tablet by mouth every 12 hours . 60 each 0    ipratropium-albuterol (DUONEB) 0.5-2.5 (3) MG/3ML SOLN nebulizer solution Inhale 3 mLs into the lungs every 4 hours (while awake) 360 mL     docusate (COLACE, DULCOLAX) 100 MG CAPS Take 100 mg by mouth 2 times daily (Patient taking differently: Take 100 mg by mouth 2 times daily Patient states he takes this medication as needed)      nystatin (MYCOSTATIN) 517923 UNIT/ML suspension Take 5 mLs by mouth 4 times daily      budesonide-formoterol (SYMBICORT) 160-4.5 MCG/ACT AERO Inhale 2 puffs into the lungs 2 times daily 1 Inhaler 3    pantoprazole (PROTONIX) 40 MG tablet Take 1 tablet by mouth every morning (before breakfast) 30 tablet 3    calcium-vitamin D (OSCAL-500) 500-200 MG-UNIT per tablet Take 1 tablet by mouth 2 times daily      Albuterol Sulfate (VENTOLIN HFA IN) Inhale into the lungs as needed      prochlorperazine (COMPAZINE) 10 MG tablet Take 10 mg by mouth every 6 hours as needed (nausea)        No current facility-administered medications for this visit.       Facility-Administered Medications Ordered in Other Visits   Medication Dose Route Frequency Provider Last Rate Last Dose    0.9 % sodium chloride infusion 250 mL  250 mL Intravenous Once Gin Patel DO

## 2018-01-18 DIAGNOSIS — F17.200 NICOTINE DEPENDENCE, UNCOMPLICATED, UNSPECIFIED NICOTINE PRODUCT TYPE: ICD-10-CM

## 2018-01-18 DIAGNOSIS — G89.3 PAIN OF METASTATIC MALIGNANCY: ICD-10-CM

## 2018-01-18 RX ORDER — BUPROPION HYDROCHLORIDE 300 MG/1
300 TABLET ORAL EVERY MORNING
Qty: 30 TABLET | Refills: 1 | Status: SHIPPED | OUTPATIENT
Start: 2018-01-18 | End: 2018-03-15

## 2018-01-18 RX ORDER — OXYCODONE HCL 20 MG/1
20 TABLET, FILM COATED, EXTENDED RELEASE ORAL EVERY 12 HOURS
Qty: 60 EACH | Refills: 0 | Status: SHIPPED | OUTPATIENT
Start: 2018-01-18 | End: 2018-02-15 | Stop reason: SDUPTHER

## 2018-01-19 ENCOUNTER — CARE COORDINATION (OUTPATIENT)
Dept: CARE COORDINATION | Age: 58
End: 2018-01-19

## 2018-01-23 ENCOUNTER — TELEPHONE (OUTPATIENT)
Dept: FAMILY MEDICINE CLINIC | Age: 58
End: 2018-01-23

## 2018-01-23 ENCOUNTER — CARE COORDINATION (OUTPATIENT)
Dept: CARE COORDINATION | Age: 58
End: 2018-01-23

## 2018-01-23 DIAGNOSIS — C34.92 SQUAMOUS CELL CARCINOMA OF LEFT LUNG (HCC): Primary | ICD-10-CM

## 2018-01-23 DIAGNOSIS — E44.0 MODERATE PROTEIN-CALORIE MALNUTRITION (HCC): ICD-10-CM

## 2018-01-23 DIAGNOSIS — R29.6 FREQUENT FALLS: ICD-10-CM

## 2018-01-23 DIAGNOSIS — G89.3 PAIN OF METASTATIC MALIGNANCY: ICD-10-CM

## 2018-01-23 DIAGNOSIS — R53.1 WEAKNESS: ICD-10-CM

## 2018-01-23 NOTE — CARE COORDINATION
docusate (COLACE, DULCOLAX) 100 MG CAPS Take 100 mg by mouth 2 times daily  Patient taking differently: Take 100 mg by mouth 2 times daily Patient states he takes this medication as needed 11/10/17   Arturo De Santiago DO   nystatin (MYCOSTATIN) 761909 UNIT/ML suspension Take 5 mLs by mouth 4 times daily 11/10/17   Arturo De Santiago DO   budesonide-formoterol (SYMBICORT) 160-4.5 MCG/ACT AERO Inhale 2 puffs into the lungs 2 times daily 11/10/17   Haleigh Kennedy CNP   pantoprazole (PROTONIX) 40 MG tablet Take 1 tablet by mouth every morning (before breakfast) 11/4/17   Padmini Pierce DO   calcium-vitamin D (Janina Favian) 500-200 MG-UNIT per tablet Take 1 tablet by mouth 2 times daily    Historical Provider, MD   Albuterol Sulfate (VENTOLIN HFA IN) Inhale into the lungs as needed    Historical Provider, MD   prochlorperazine (COMPAZINE) 10 MG tablet Take 10 mg by mouth every 6 hours as needed (nausea)     Historical Provider, MD       Future Appointments  Date Time Provider Bruce Dahl   4/6/2018 2:30 PM SCHEDULE, [facility] None

## 2018-01-26 ENCOUNTER — TELEPHONE (OUTPATIENT)
Dept: FAMILY MEDICINE CLINIC | Age: 58
End: 2018-01-26

## 2018-01-26 ENCOUNTER — CARE COORDINATION (OUTPATIENT)
Dept: CARE COORDINATION | Age: 58
End: 2018-01-26

## 2018-01-26 DIAGNOSIS — M47.26 OSTEOARTHRITIS OF SPINE WITH RADICULOPATHY, LUMBAR REGION: Primary | ICD-10-CM

## 2018-01-26 RX ORDER — OXYCODONE HYDROCHLORIDE 10 MG/1
10 TABLET ORAL EVERY 6 HOURS PRN
Qty: 120 TABLET | Refills: 0 | Status: SHIPPED | OUTPATIENT
Start: 2018-01-26 | End: 2018-02-15 | Stop reason: SDUPTHER

## 2018-01-29 DIAGNOSIS — F41.9 ANXIETY: ICD-10-CM

## 2018-01-29 RX ORDER — ALPRAZOLAM 1 MG/1
TABLET ORAL
Qty: 60 TABLET | Refills: 0 | Status: SHIPPED | OUTPATIENT
Start: 2018-01-29 | End: 2018-02-16

## 2018-01-30 ENCOUNTER — CARE COORDINATION (OUTPATIENT)
Dept: CARE COORDINATION | Age: 58
End: 2018-01-30

## 2018-01-30 NOTE — CARE COORDINATION
Ambulatory Care Coordination Note  1/30/2018  CM Risk Score: 13  Samuel Mortality Risk Score:      ACC: Val Mcneal, RN    Summary Note: spoke with pt who said he is doing better. He was able to get through chemo on Friday. He did get the walker, handle and shower chair. He said his is losing his voice. He does feel like he is doing better. Care Coordination Interventions    Program Enrollment:  Complex Care  Referral from Primary Care Provider:  No  Suggested Interventions and Community Resources  Medi Set or Pill Pack:  Completed  Smoking Cessation:  Declined         Goals Addressed     None          Prior to Admission medications    Medication Sig Start Date End Date Taking? Authorizing Provider   ALPRAZolam (XANAX) 1 MG tablet TAKE 1 TABLET BY MOUTH 2 TIMES DAILY AS NEEDED FOR SLEEP 1/29/18 3/1/18  Gin Patel DO   oxyCODONE HCl (OXY-IR) 10 MG immediate release tablet Take 1 tablet by mouth every 6 hours as needed for Pain for up to 30 days. 1/26/18 2/25/18  Gin Patel DO   buPROPion (WELLBUTRIN XL) 300 MG extended release tablet TAKE 1 TABLET BY MOUTH EVERY MORNING 1/18/18   Gin Patel DO   oxyCODONE (OXYCONTIN) 20 MG extended release tablet Take 1 tablet by mouth every 12 hours for 60 days. 1/18/18 3/19/18  Gin Patel DO   dronabinol (MARINOL) 10 MG capsule Take 10 mg by mouth 2 times daily (before meals). Historical Provider, MD   OLANZapine (ZYPREXA) 5 MG tablet Take 1 tablet by mouth nightly 1/15/18   Gin Patel DO   zolpidem (AMBIEN) 5 MG tablet Take 1 tablet by mouth nightly as needed for Sleep for up to 30 days.  1/15/18 2/14/18  Gin Patel DO   ipratropium-albuterol (DUONEB) 0.5-2.5 (3) MG/3ML SOLN nebulizer solution Inhale 3 mLs into the lungs every 4 hours (while awake) 11/10/17   Aldair De Santiago, DO   docusate (COLACE, DULCOLAX) 100 MG CAPS Take 100 mg by mouth 2 times daily  Patient taking differently: Take 100 mg by mouth 2 times daily Patient states he takes this medication as needed 11/10/17   Joy De Santiago DO   nystatin (MYCOSTATIN) 454096 UNIT/ML suspension Take 5 mLs by mouth 4 times daily 11/10/17   Joy De Santiago DO   budesonide-formoterol [facility]) 160-4.5 MCG/ACT AERO Inhale 2 puffs into the lungs 2 times daily 11/10/17   Garcia Nash CNP   pantoprazole (PROTONIX) 40 MG tablet Take 1 tablet by mouth every morning (before breakfast) 11/4/17   Ryan Chu DO   calcium-vitamin D (Waylan Browner) 500-200 MG-UNIT per tablet Take 1 tablet by mouth 2 times daily    Historical Provider, MD   Albuterol Sulfate (VENTOLIN HFA IN) Inhale into the lungs as needed    Historical Provider, MD   prochlorperazine (COMPAZINE) 10 MG tablet Take 10 mg by mouth every 6 hours as needed (nausea)     Historical Provider, MD       Future Appointments  Date Time Provider Bruce Dahl   4/6/2018 2:30 PM SCHEDULE, [facility] None

## 2018-02-05 ENCOUNTER — CARE COORDINATION (OUTPATIENT)
Dept: CARE COORDINATION | Age: 58
End: 2018-02-05

## 2018-02-12 ENCOUNTER — TELEPHONE (OUTPATIENT)
Dept: FAMILY MEDICINE CLINIC | Age: 58
End: 2018-02-12

## 2018-02-12 DIAGNOSIS — D70.1 CHEMOTHERAPY-INDUCED NEUTROPENIA (HCC): ICD-10-CM

## 2018-02-12 DIAGNOSIS — R53.1 WEAKNESS: Primary | ICD-10-CM

## 2018-02-12 DIAGNOSIS — E44.0 MODERATE PROTEIN-CALORIE MALNUTRITION (HCC): ICD-10-CM

## 2018-02-12 DIAGNOSIS — T45.1X5A CHEMOTHERAPY-INDUCED NEUTROPENIA (HCC): ICD-10-CM

## 2018-02-12 DIAGNOSIS — C34.92 SQUAMOUS CELL CARCINOMA OF LEFT LUNG (HCC): ICD-10-CM

## 2018-02-12 DIAGNOSIS — G89.29 CHRONIC BILATERAL LOW BACK PAIN WITHOUT SCIATICA: ICD-10-CM

## 2018-02-12 DIAGNOSIS — M54.50 CHRONIC BILATERAL LOW BACK PAIN WITHOUT SCIATICA: ICD-10-CM

## 2018-02-12 DIAGNOSIS — G89.3 PAIN OF METASTATIC MALIGNANCY: ICD-10-CM

## 2018-02-12 NOTE — TELEPHONE ENCOUNTER
Tanesha with [facility] is with Mukherjee Angelo today and says he is so extremely weak and unsteady, has been falling a lot. Says she thinks he needs home health for PT and OT instead of traveling to [facility]. Can we order and fax to to [facility] please. Please call Tanesha back when this has been completed.

## 2018-02-15 DIAGNOSIS — G89.3 PAIN OF METASTATIC MALIGNANCY: ICD-10-CM

## 2018-02-15 DIAGNOSIS — M47.26 OSTEOARTHRITIS OF SPINE WITH RADICULOPATHY, LUMBAR REGION: ICD-10-CM

## 2018-02-15 RX ORDER — OXYCODONE HYDROCHLORIDE 10 MG/1
TABLET ORAL
Qty: 120 TABLET | Refills: 0 | Status: SHIPPED | OUTPATIENT
Start: 2018-02-15 | End: 2018-03-15 | Stop reason: SDUPTHER

## 2018-02-16 ENCOUNTER — OFFICE VISIT (OUTPATIENT)
Dept: FAMILY MEDICINE CLINIC | Age: 58
End: 2018-02-16
Payer: MEDICARE

## 2018-02-16 VITALS
OXYGEN SATURATION: 95 % | SYSTOLIC BLOOD PRESSURE: 94 MMHG | HEIGHT: 65 IN | RESPIRATION RATE: 16 BRPM | HEART RATE: 136 BPM | DIASTOLIC BLOOD PRESSURE: 65 MMHG | WEIGHT: 116.2 LBS | BODY MASS INDEX: 19.36 KG/M2

## 2018-02-16 DIAGNOSIS — R11.2 CHEMOTHERAPY INDUCED NAUSEA AND VOMITING: ICD-10-CM

## 2018-02-16 DIAGNOSIS — F32.1 MODERATE SINGLE CURRENT EPISODE OF MAJOR DEPRESSIVE DISORDER (HCC): ICD-10-CM

## 2018-02-16 DIAGNOSIS — T45.1X5A CHEMOTHERAPY INDUCED NAUSEA AND VOMITING: ICD-10-CM

## 2018-02-16 DIAGNOSIS — F41.9 ANXIETY: Primary | ICD-10-CM

## 2018-02-16 PROCEDURE — 99214 OFFICE O/P EST MOD 30 MIN: CPT | Performed by: FAMILY MEDICINE

## 2018-02-16 PROCEDURE — 3017F COLORECTAL CA SCREEN DOC REV: CPT | Performed by: FAMILY MEDICINE

## 2018-02-16 PROCEDURE — G8427 DOCREV CUR MEDS BY ELIG CLIN: HCPCS | Performed by: FAMILY MEDICINE

## 2018-02-16 PROCEDURE — G8420 CALC BMI NORM PARAMETERS: HCPCS | Performed by: FAMILY MEDICINE

## 2018-02-16 PROCEDURE — G8484 FLU IMMUNIZE NO ADMIN: HCPCS | Performed by: FAMILY MEDICINE

## 2018-02-16 PROCEDURE — 4004F PT TOBACCO SCREEN RCVD TLK: CPT | Performed by: FAMILY MEDICINE

## 2018-02-16 RX ORDER — LORAZEPAM 0.5 MG/1
0.5 TABLET ORAL EVERY 6 HOURS PRN
Qty: 90 TABLET | Refills: 0 | Status: SHIPPED | OUTPATIENT
Start: 2018-02-16 | End: 2018-03-26 | Stop reason: SDUPTHER

## 2018-02-16 RX ORDER — OLANZAPINE 10 MG/1
10 TABLET ORAL NIGHTLY
Qty: 30 TABLET | Refills: 5 | Status: ON HOLD | OUTPATIENT
Start: 2018-02-16 | End: 2018-10-14

## 2018-02-16 RX ORDER — ONDANSETRON HYDROCHLORIDE 8 MG/1
8 TABLET, FILM COATED ORAL EVERY 8 HOURS PRN
Qty: 30 TABLET | Refills: 0 | Status: SHIPPED | OUTPATIENT
Start: 2018-02-16 | End: 2018-03-26 | Stop reason: SDUPTHER

## 2018-02-16 ASSESSMENT — PATIENT HEALTH QUESTIONNAIRE - PHQ9
2. FEELING DOWN, DEPRESSED OR HOPELESS: 1
1. LITTLE INTEREST OR PLEASURE IN DOING THINGS: 1
SUM OF ALL RESPONSES TO PHQ9 QUESTIONS 1 & 2: 2
SUM OF ALL RESPONSES TO PHQ QUESTIONS 1-9: 2

## 2018-02-16 NOTE — PROGRESS NOTES
radiculopathy, lumbar region 8/15/2017    Reflux     Seizures ([address]     last one Dec 2016    Tremor     hx of    Ulcer [facility])       Past Surgical History:   Procedure Laterality Date    CERVICAL DISC SURGERY  2000    c3-c6 fusion    COLONOSCOPY      CYSTOSCOPY  7/27/2015    ENDOSCOPY, COLON, DIAGNOSTIC      EGD    TRACHEOSTOMY      1984 with broken neck    TUNNELED VENOUS PORT PLACEMENT       Family History   Problem Relation Age of Onset    Heart Disease Mother      CABG    Cancer Mother     Cancer Father      Throat    Heart Disease Father     Stroke Maternal Grandfather     Heart Disease Paternal Grandmother        Current Outpatient Prescriptions   Medication Sig Dispense Refill    OLANZapine (ZYPREXA) 10 MG tablet Take 1 tablet by mouth nightly 30 tablet 5    LORazepam (ATIVAN) 0.5 MG tablet Take 1 tablet by mouth every 6 hours as needed for Anxiety for up to 30 days. 90 tablet 0    ondansetron (ZOFRAN) 8 MG tablet Take 1 tablet by mouth every 8 hours as needed for Nausea or Vomiting 30 tablet 0    OXYCONTIN 20 MG extended release tablet TAKE 1 TABLET BY MOUTH EVERY 12 HOURS 60 tablet 0    oxyCODONE HCl (OXY-IR) 10 MG immediate release tablet TAKE 1 TABLET EVERY 6 HOURS AS NEEDED FOR PAIN 120 tablet 0    buPROPion (WELLBUTRIN XL) 300 MG extended release tablet TAKE 1 TABLET BY MOUTH EVERY MORNING 30 tablet 1    dronabinol (MARINOL) 10 MG capsule Take 10 mg by mouth 2 times daily (before meals).       ipratropium-albuterol (DUONEB) 0.5-2.5 (3) MG/3ML SOLN nebulizer solution Inhale 3 mLs into the lungs every 4 hours (while awake) 360 mL     docusate (COLACE, DULCOLAX) 100 MG CAPS Take 100 mg by mouth 2 times daily (Patient taking differently: Take 100 mg by mouth 2 times daily Patient states he takes this medication as needed)      nystatin (MYCOSTATIN) 899166 UNIT/ML suspension Take 5 mLs by mouth 4 times daily      budesonide-formoterol (SYMBICORT) 160-4.5 MCG/ACT AERO Inhale 2 puffs into the lungs 2 times daily 1 Inhaler 3    pantoprazole (PROTONIX) 40 MG tablet Take 1 tablet by mouth every morning (before breakfast) 30 tablet 3    calcium-vitamin D (OSCAL-500) 500-200 MG-UNIT per tablet Take 1 tablet by mouth 2 times daily      Albuterol Sulfate (VENTOLIN HFA IN) Inhale into the lungs as needed      prochlorperazine (COMPAZINE) 10 MG tablet Take 10 mg by mouth every 6 hours as needed (nausea)        No current facility-administered medications for this visit. Facility-Administered Medications Ordered in Other Visits   Medication Dose Route Frequency Provider Last Rate Last Dose    0.9 % sodium chloride infusion 250 mL  250 mL Intravenous Once Gin Patel,          ALLERGIES:    Allergies   Allergen Reactions    Aspirin     Fish-Derived Products Swelling    Shellfish-Derived Products      Other reaction(s): syncope/severe facial swelling/vomits    Motrin [Ibuprofen Micronized] Other (See Comments)     Upset stomach        Social History   Substance Use Topics    Smoking status: Current Some Day Smoker     Packs/day: 0.50     Years: 40.00     Types: Cigarettes    Smokeless tobacco: Never Used      Comment: Nicotine Patch causes Nausea/vomiting; He states it's too difficult to stop smoking.  Alcohol use No      Comment: none currently        LDL Cholesterol (mg/dL)   Date Value   10/08/2014 77     HDL (mg/dL)   Date Value   10/08/2014 57     BUN (mg/dL)   Date Value   01/15/2018 14     CREATININE (mg/dL)   Date Value   01/15/2018 0.65 (L)     Glucose (mg/dL)   Date Value   01/15/2018 134 (H)   09/08/2011 95     Hemoglobin A1C (%)   Date Value   06/19/2014 5.5              Subjective:      HPI  He is here today not doing well he has been falling and excessive amount after stopping the Xanax this is improved but now his anxiety is terrible is just a nervous wreck. He is taking the Zyprexa at night. He is literally having panic attacks.   He is terribly nauseated and the

## 2018-02-19 ENCOUNTER — CARE COORDINATION (OUTPATIENT)
Dept: CARE COORDINATION | Age: 58
End: 2018-02-19

## 2018-02-28 DIAGNOSIS — F41.9 ANXIETY: ICD-10-CM

## 2018-02-28 DIAGNOSIS — F51.01 PRIMARY INSOMNIA: ICD-10-CM

## 2018-02-28 RX ORDER — ALPRAZOLAM 1 MG/1
TABLET ORAL
Qty: 60 TABLET | Refills: 0 | OUTPATIENT
Start: 2018-02-28 | End: 2018-03-31

## 2018-02-28 RX ORDER — ZOLPIDEM TARTRATE 5 MG/1
TABLET ORAL
Qty: 30 TABLET | Refills: 0 | Status: SHIPPED | OUTPATIENT
Start: 2018-02-28 | End: 2018-03-26 | Stop reason: SDUPTHER

## 2018-03-05 ENCOUNTER — CARE COORDINATION (OUTPATIENT)
Dept: CARE COORDINATION | Age: 58
End: 2018-03-05

## 2018-03-05 NOTE — CARE COORDINATION
Ambulatory Care Coordination Note  3/5/2018  CM Risk Score: 13  Samuel Mortality Risk Score:      ACC: Serafin Simeon RN    Summary Note: spoke with pt who said he is doing ok at home he is feeling better then is was. He is getting home PT. He is to have a CT scan later this week. He is to start chemo this week too.      COPD Assessment    Does the patient understand envrionmental exposure?:  Yes  Is the patient able to verbalize Rescue vs. Long Acting medications?:  Yes  Does the patient have a nebulizer?:  Yes  Does the patient use a space with inhaled medications?:  No     No patient-reported symptoms         Symptoms:      Have you had a recent diagnosis of pneumonia either by PCP or at a hospital?:  No               Care Coordination Interventions    Program Enrollment:  Complex Care  Referral from Primary Care Provider:  No  Suggested Interventions and Community Resources  Medi Set or Pill Pack:  Completed  Smoking Cessation:  Declined         Goals Addressed             Most Recent     Care Coordination Self Management   On track (3/5/2018)             CC Self Management Goal  Patient Goal (What steps will patient take to achieve goal?): go to pain mgt   Patient is able to discuss self-management of condition(s): htn pain   Pt demonstrates adherence to medications  Pt demonstrates understanding of self-monitoring  Patient is able to identify Red Flags:  Alert to potential adverse drug reactions(s) or side effects and actions to take should they arise  Discuss target symptoms and actions to take should they arise  Identify problems that require immediate PCP or specialist visit  Patient demonstrates understanding of access to PCP/Specialist:  Understands about scheduling routine Follow Up appointments   Understands about sick day appointment options for worsening of symptoms/progression (Same Day, E Visits)       Reduce Falls    On track (3/5/2018)             I will reduce my risk of falls by the following: Remove rugs or use non slip rugs  Use walking aids like cane or walker  Follow through on orders for PT    Barriers: none  Plan for overcoming my barriers: Continue OT, Use Cane for ambulation, Safety around his dog  Confidence: 6/10  Anticipated Goal Completion Date: Feb. 1, 2018            Prior to Admission medications    Medication Sig Start Date End Date Taking? Authorizing Provider   zolpidem (AMBIEN) 5 MG tablet TAKE 1 TABLET BY MOUTH NIGHTLY AS NEEDED FOR SLEEP 2/28/18 3/30/18  Gin Patel, DO   OLANZapine (ZYPREXA) 10 MG tablet Take 1 tablet by mouth nightly 2/16/18   Gin Patel, DO   LORazepam (ATIVAN) 0.5 MG tablet Take 1 tablet by mouth every 6 hours as needed for Anxiety for up to 30 days. 2/16/18 3/18/18  Gin Patel, DO   ondansetron (ZOFRAN) 8 MG tablet Take 1 tablet by mouth every 8 hours as needed for Nausea or Vomiting 2/16/18   Gin Patel, DO   OXYCONTIN 20 MG extended release tablet TAKE 1 TABLET BY MOUTH EVERY 12 HOURS 2/15/18 3/17/18  Gin Patel, DO   oxyCODONE HCl (OXY-IR) 10 MG immediate release tablet TAKE 1 TABLET EVERY 6 HOURS AS NEEDED FOR PAIN 2/15/18 3/17/18  Gin Patel, DO   buPROPion (WELLBUTRIN XL) 300 MG extended release tablet TAKE 1 TABLET BY MOUTH EVERY MORNING 1/18/18   Gin Patel, DO   dronabinol (MARINOL) 10 MG capsule Take 10 mg by mouth 2 times daily (before meals).     Historical Provider, MD   ipratropium-albuterol (DUONEB) 0.5-2.5 (3) MG/3ML SOLN nebulizer solution Inhale 3 mLs into the lungs every 4 hours (while awake) 11/10/17   Aldair De Santiago DO   docusate (COLACE, DULCOLAX) 100 MG CAPS Take 100 mg by mouth 2 times daily  Patient taking differently: Take 100 mg by mouth 2 times daily Patient states he takes this medication as needed 11/10/17   Carlene De Santiago,    nystatin (MYCOSTATIN) 710051 UNIT/ML suspension Take 5 mLs by mouth 4 times daily 11/10/17   Aldair De Santiago,    budesonide-formoterol (SYMBICORT) 160-4.5 MCG/ACT AERO Inhale 2 puffs into the lungs 2 times daily 11/10/17   Ranjan Cohen CNP   pantoprazole (PROTONIX) 40 MG tablet Take 1 tablet by mouth every morning (before breakfast) 11/4/17   Omer Reece DO   calcium-vitamin D ([place]) 500-200 MG-UNIT per tablet Take 1 tablet by mouth 2 times daily    Historical Provider, MD   Albuterol Sulfate (VENTOLIN HFA IN) Inhale into the lungs as needed    Historical Provider, MD   prochlorperazine (COMPAZINE) 10 MG tablet Take 10 mg by mouth every 6 hours as needed (nausea)     Historical Provider, MD       Future Appointments  Date Time Provider Bruce Dahl   4/6/2018 2:30 PM SCHEDULE, [facility] None

## 2018-03-15 ENCOUNTER — OFFICE VISIT (OUTPATIENT)
Dept: FAMILY MEDICINE CLINIC | Age: 58
End: 2018-03-15
Payer: MEDICARE

## 2018-03-15 VITALS
WEIGHT: 115 LBS | DIASTOLIC BLOOD PRESSURE: 73 MMHG | HEIGHT: 65 IN | RESPIRATION RATE: 16 BRPM | HEART RATE: 120 BPM | BODY MASS INDEX: 19.16 KG/M2 | SYSTOLIC BLOOD PRESSURE: 106 MMHG

## 2018-03-15 DIAGNOSIS — D70.1 CHEMOTHERAPY INDUCED NEUTROPENIA (HCC): Primary | ICD-10-CM

## 2018-03-15 DIAGNOSIS — T45.1X5A CHEMOTHERAPY INDUCED NEUTROPENIA (HCC): Primary | ICD-10-CM

## 2018-03-15 DIAGNOSIS — G89.3 PAIN OF METASTATIC MALIGNANCY: ICD-10-CM

## 2018-03-15 DIAGNOSIS — Z13.31 POSITIVE DEPRESSION SCREENING: ICD-10-CM

## 2018-03-15 DIAGNOSIS — M47.26 OSTEOARTHRITIS OF SPINE WITH RADICULOPATHY, LUMBAR REGION: ICD-10-CM

## 2018-03-15 PROCEDURE — G8431 POS CLIN DEPRES SCRN F/U DOC: HCPCS | Performed by: FAMILY MEDICINE

## 2018-03-15 PROCEDURE — G8420 CALC BMI NORM PARAMETERS: HCPCS | Performed by: FAMILY MEDICINE

## 2018-03-15 PROCEDURE — 4004F PT TOBACCO SCREEN RCVD TLK: CPT | Performed by: FAMILY MEDICINE

## 2018-03-15 PROCEDURE — G8427 DOCREV CUR MEDS BY ELIG CLIN: HCPCS | Performed by: FAMILY MEDICINE

## 2018-03-15 PROCEDURE — 3017F COLORECTAL CA SCREEN DOC REV: CPT | Performed by: FAMILY MEDICINE

## 2018-03-15 PROCEDURE — G8482 FLU IMMUNIZE ORDER/ADMIN: HCPCS | Performed by: FAMILY MEDICINE

## 2018-03-15 PROCEDURE — 99213 OFFICE O/P EST LOW 20 MIN: CPT | Performed by: FAMILY MEDICINE

## 2018-03-15 RX ORDER — PREDNISONE 10 MG/1
15 TABLET ORAL DAILY
Status: ON HOLD | COMMUNITY
End: 2018-03-21

## 2018-03-15 RX ORDER — VITAMIN E 268 MG
400 CAPSULE ORAL DAILY
COMMUNITY
End: 2018-05-09

## 2018-03-15 RX ORDER — OXYCODONE HYDROCHLORIDE 10 MG/1
TABLET ORAL
Qty: 120 TABLET | Refills: 0 | Status: SHIPPED | OUTPATIENT
Start: 2018-03-15 | End: 2018-04-15

## 2018-03-15 RX ORDER — OXYCODONE HYDROCHLORIDE 30 MG/1
30 TABLET, FILM COATED, EXTENDED RELEASE ORAL 2 TIMES DAILY
Qty: 60 EACH | Refills: 0 | Status: SHIPPED | OUTPATIENT
Start: 2018-03-15 | End: 2018-04-14

## 2018-03-15 NOTE — PROGRESS NOTES
[facility] PHYSICIANS  COMPREHENSIVE CARE  [address]  [address] [address]  Dept: [facility] Junior Paredes is a 65 y.o. male who presents today for follow up on his  medical conditions as noted below. Chief Complaint   Patient presents with    Back Pain     c/o back pain.  pain meds are helping but he wanted to know if they can be increased       Patient Active Problem List:     GERD (gastroesophageal reflux disease)     Chronic obstructive pulmonary disease (HCC)     Anxiety     Hypokalemia     Malnutrition (HCC)     Anemia of chronic disease     Benign essential HTN     Anorexia     Mixed anxiety depressive disorder     Cachectic (HCC)     Pain of metastatic malignancy     Chronic bilateral low back pain without sciatica     Advance directive discussed with patient     Mixed simple and mucopurulent chronic bronchitis ([address]     Encounter for palliative care     Squamous cell carcinoma of lung (HCC)     Constipation due to opioid therapy     Osteoarthritis of spine with radiculopathy, lumbar region     Cervical radiculopathy     Chronic prescription benzodiazepine use     Failed neck syndrome     Acute upper GI bleed     Thrombocytopenia (HCC)     Chemotherapy-induced neutropenia (HCC)     Gastrointestinal hemorrhage     Iron deficiency anemia due to chronic blood loss     Hypomagnesemia     Neutropenia with fever (HCC)     Multifocal pneumonia     H/O: GI bleed     Acute respiratory distress     Gram-negative sepsis (HCC)     Acute febrile illness     Moraxella catarrhalis bronchitis     Acute metabolic encephalopathy     Status post tracheostomy ([address]     Past Medical History:   Diagnosis Date    Benign essential HTN 12/10/2016    Cardiac arrest due to respiratory disorder ([address] 12/10/2016    Cellulitis     Chronic low back pain 8/16/2016    Emphysema of lung ([address]     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 Controlled Substances Monitoring: The Prescription Monitoring Report for this patient was reviewed today. (Worsening of chronic pain secondary to some increased mobility and still on chemotherapy and worsening of cancer) (Gin Patel, DO)    No signs of potential drug abuse or diversion identified. (Gin Patel, DO)         Reviewed the patient's functional status and documentation. (Gin Patel, DO)             Review of Systems:     Constitutional: Negative for fever, appetite change and fatigue. Family social and medical history reviewed and unchanged     HENT: Negative. Negative for nosebleeds, trouble swallowing and neck pain. Eyes: Negative for photophobia and visual disturbance. Respiratory: Negative. Negative for chest tightness and shortness of breath. Cardiovascular: Negative. Negative for chest pain and leg swelling. Gastrointestinal: Negative. Negative for abdominal pain and blood in stool. Endocrine: Negative for cold intolerance and polyuria. Genitourinary: Negative for dysuria and hematuria. Musculoskeletal: Negative. Skin: Negative for rash. Allergic/Immunologic: Negative. Neurological: Negative. Negative for dizziness, weakness and numbness. Hematological: Negative. Negative for adenopathy. Does not bruise/bleed easily. Psychiatric/Behavioral: Negative for sleep disturbance, dysphoric mood and  decreased concentration. The patient is not nervous/anxious. Objective:     Physical Exam:     Nursing note and vitals reviewed. /73   Pulse 120   Resp 16   Ht 5' 5\" (1.651 m)   Wt 115 lb (52.2 kg)   BMI 19.14 kg/m²   Constitutional: He is oriented to person, place, and time.  He   appears

## 2018-03-19 ENCOUNTER — TELEPHONE (OUTPATIENT)
Dept: FAMILY MEDICINE CLINIC | Age: 58
End: 2018-03-19

## 2018-03-19 ENCOUNTER — CARE COORDINATION (OUTPATIENT)
Dept: CARE COORDINATION | Age: 58
End: 2018-03-19

## 2018-03-19 NOTE — CARE COORDINATION
(nausea)     Historical Provider, MD       Future Appointments  Date Time Provider Bruce Dahl   4/6/2018 2:30 PM SCHEDULE, [facility] None

## 2018-03-20 ENCOUNTER — APPOINTMENT (OUTPATIENT)
Dept: GENERAL RADIOLOGY | Age: 58
DRG: 191 | End: 2018-03-20
Payer: MEDICARE

## 2018-03-20 ENCOUNTER — HOSPITAL ENCOUNTER (INPATIENT)
Age: 58
LOS: 1 days | Discharge: HOME | DRG: 191 | End: 2018-03-21
Attending: EMERGENCY MEDICINE | Admitting: INTERNAL MEDICINE
Payer: MEDICARE

## 2018-03-20 DIAGNOSIS — J44.1 COPD EXACERBATION (HCC): Primary | ICD-10-CM

## 2018-03-20 LAB
ABSOLUTE EOS #: 0 K/UL (ref 0–0.4)
ABSOLUTE IMMATURE GRANULOCYTE: ABNORMAL K/UL (ref 0–0.3)
ABSOLUTE LYMPH #: 1.88 K/UL (ref 1–4.8)
ABSOLUTE MONO #: 2.2 K/UL (ref 0.2–0.8)
ALBUMIN SERPL-MCNC: 3.1 G/DL (ref 3.5–5.2)
ALBUMIN/GLOBULIN RATIO: ABNORMAL (ref 1–2.5)
ALP BLD-CCNC: 52 U/L (ref 40–129)
ALT SERPL-CCNC: 32 U/L (ref 5–41)
ANION GAP SERPL CALCULATED.3IONS-SCNC: 15 MMOL/L (ref 9–17)
AST SERPL-CCNC: 42 U/L
BASOPHILS # BLD: 1 %
BASOPHILS ABSOLUTE: 0.16 K/UL (ref 0–0.2)
BILIRUB SERPL-MCNC: 0.9 MG/DL (ref 0.3–1.2)
BILIRUBIN DIRECT: 0.45 MG/DL
BILIRUBIN, INDIRECT: 0.45 MG/DL (ref 0–1)
BNP INTERPRETATION: ABNORMAL
BUN BLDV-MCNC: 14 MG/DL (ref 6–20)
BUN/CREAT BLD: 23 (ref 9–20)
CALCIUM SERPL-MCNC: 7.7 MG/DL (ref 8.6–10.4)
CHLORIDE BLD-SCNC: 98 MMOL/L (ref 98–107)
CO2: 25 MMOL/L (ref 20–31)
CREAT SERPL-MCNC: 0.61 MG/DL (ref 0.7–1.2)
DIFFERENTIAL TYPE: ABNORMAL
EKG ATRIAL RATE: 114 BPM
EKG P AXIS: 67 DEGREES
EKG P-R INTERVAL: 124 MS
EKG Q-T INTERVAL: 318 MS
EKG QRS DURATION: 70 MS
EKG QTC CALCULATION (BAZETT): 438 MS
EKG R AXIS: 54 DEGREES
EKG T AXIS: 79 DEGREES
EKG VENTRICULAR RATE: 114 BPM
EOSINOPHILS RELATIVE PERCENT: 0 % (ref 1–4)
GFR AFRICAN AMERICAN: >60 ML/MIN
GFR NON-AFRICAN AMERICAN: >60 ML/MIN
GFR SERPL CREATININE-BSD FRML MDRD: ABNORMAL ML/MIN/{1.73_M2}
GFR SERPL CREATININE-BSD FRML MDRD: ABNORMAL ML/MIN/{1.73_M2}
GLOBULIN: ABNORMAL G/DL (ref 1.5–3.8)
GLUCOSE BLD-MCNC: 116 MG/DL (ref 70–99)
HCT VFR BLD CALC: 40.4 % (ref 41–53)
HEMOGLOBIN: 13 G/DL (ref 13.5–17.5)
IMMATURE GRANULOCYTES: ABNORMAL %
LIPASE: 73 U/L (ref 13–60)
LYMPHOCYTES # BLD: 12 % (ref 24–44)
MAGNESIUM: 1.8 MG/DL (ref 1.6–2.6)
MCH RBC QN AUTO: 30.7 PG (ref 26–34)
MCHC RBC AUTO-ENTMCNC: 32.1 G/DL (ref 31–37)
MCV RBC AUTO: 95.6 FL (ref 80–100)
MONOCYTES # BLD: 14 % (ref 1–7)
MORPHOLOGY: ABNORMAL
MYOGLOBIN: 28 NG/ML (ref 28–72)
MYOGLOBIN: 31 NG/ML (ref 28–72)
NRBC AUTOMATED: ABNORMAL PER 100 WBC
PDW BLD-RTO: 19 % (ref 11.5–14.5)
PLATELET # BLD: 205 K/UL (ref 130–400)
PLATELET ESTIMATE: ABNORMAL
PMV BLD AUTO: 8.4 FL (ref 6–12)
POTASSIUM SERPL-SCNC: 3.4 MMOL/L (ref 3.7–5.3)
PRO-BNP: 1041 PG/ML
RBC # BLD: 4.22 M/UL (ref 4.5–5.9)
RBC # BLD: ABNORMAL 10*6/UL
SEG NEUTROPHILS: 73 % (ref 36–66)
SEGMENTED NEUTROPHILS ABSOLUTE COUNT: 11.46 K/UL (ref 1.8–7.7)
SODIUM BLD-SCNC: 138 MMOL/L (ref 135–144)
TOTAL PROTEIN: 5.5 G/DL (ref 6.4–8.3)
TROPONIN INTERP: NORMAL
TROPONIN INTERP: NORMAL
TROPONIN T: <0.03 NG/ML
TROPONIN T: <0.03 NG/ML
WBC # BLD: 15.7 K/UL (ref 3.5–11)
WBC # BLD: ABNORMAL 10*3/UL

## 2018-03-20 PROCEDURE — 94761 N-INVAS EAR/PLS OXIMETRY MLT: CPT

## 2018-03-20 PROCEDURE — 99285 EMERGENCY DEPT VISIT HI MDM: CPT

## 2018-03-20 PROCEDURE — 96375 TX/PRO/DX INJ NEW DRUG ADDON: CPT

## 2018-03-20 PROCEDURE — 6370000000 HC RX 637 (ALT 250 FOR IP): Performed by: INTERNAL MEDICINE

## 2018-03-20 PROCEDURE — 94640 AIRWAY INHALATION TREATMENT: CPT

## 2018-03-20 PROCEDURE — 96374 THER/PROPH/DIAG INJ IV PUSH: CPT

## 2018-03-20 PROCEDURE — 99223 1ST HOSP IP/OBS HIGH 75: CPT | Performed by: INTERNAL MEDICINE

## 2018-03-20 PROCEDURE — 6360000002 HC RX W HCPCS: Performed by: NURSE PRACTITIONER

## 2018-03-20 PROCEDURE — 6370000000 HC RX 637 (ALT 250 FOR IP): Performed by: NURSE PRACTITIONER

## 2018-03-20 PROCEDURE — 83874 ASSAY OF MYOGLOBIN: CPT

## 2018-03-20 PROCEDURE — 2580000003 HC RX 258: Performed by: NURSE PRACTITIONER

## 2018-03-20 PROCEDURE — 2580000003 HC RX 258: Performed by: INTERNAL MEDICINE

## 2018-03-20 PROCEDURE — 71045 X-RAY EXAM CHEST 1 VIEW: CPT

## 2018-03-20 PROCEDURE — 80048 BASIC METABOLIC PNL TOTAL CA: CPT

## 2018-03-20 PROCEDURE — 83880 ASSAY OF NATRIURETIC PEPTIDE: CPT

## 2018-03-20 PROCEDURE — 84484 ASSAY OF TROPONIN QUANT: CPT

## 2018-03-20 PROCEDURE — 2700000000 HC OXYGEN THERAPY PER DAY

## 2018-03-20 PROCEDURE — 80076 HEPATIC FUNCTION PANEL: CPT

## 2018-03-20 PROCEDURE — 85025 COMPLETE CBC W/AUTO DIFF WBC: CPT

## 2018-03-20 PROCEDURE — 2060000000 HC ICU INTERMEDIATE R&B

## 2018-03-20 PROCEDURE — 87205 SMEAR GRAM STAIN: CPT

## 2018-03-20 PROCEDURE — 94150 VITAL CAPACITY TEST: CPT

## 2018-03-20 PROCEDURE — 83735 ASSAY OF MAGNESIUM: CPT

## 2018-03-20 PROCEDURE — 87070 CULTURE OTHR SPECIMN AEROBIC: CPT

## 2018-03-20 PROCEDURE — 83690 ASSAY OF LIPASE: CPT

## 2018-03-20 PROCEDURE — 93005 ELECTROCARDIOGRAM TRACING: CPT

## 2018-03-20 RX ORDER — NICOTINE 21 MG/24HR
1 PATCH, TRANSDERMAL 24 HOURS TRANSDERMAL DAILY PRN
Status: DISCONTINUED | OUTPATIENT
Start: 2018-03-20 | End: 2018-03-21 | Stop reason: HOSPADM

## 2018-03-20 RX ORDER — OXYCODONE HYDROCHLORIDE 15 MG/1
30 TABLET, FILM COATED, EXTENDED RELEASE ORAL 2 TIMES DAILY
Status: DISCONTINUED | OUTPATIENT
Start: 2018-03-20 | End: 2018-03-21 | Stop reason: HOSPADM

## 2018-03-20 RX ORDER — ONDANSETRON 2 MG/ML
4 INJECTION INTRAMUSCULAR; INTRAVENOUS ONCE
Status: COMPLETED | OUTPATIENT
Start: 2018-03-20 | End: 2018-03-20

## 2018-03-20 RX ORDER — ONDANSETRON 4 MG/1
8 TABLET, FILM COATED ORAL EVERY 8 HOURS PRN
Status: DISCONTINUED | OUTPATIENT
Start: 2018-03-20 | End: 2018-03-20 | Stop reason: ALTCHOICE

## 2018-03-20 RX ORDER — ALBUTEROL SULFATE 90 UG/1
2 AEROSOL, METERED RESPIRATORY (INHALATION)
Status: DISCONTINUED | OUTPATIENT
Start: 2018-03-20 | End: 2018-03-20

## 2018-03-20 RX ORDER — METHYLPREDNISOLONE SODIUM SUCCINATE 125 MG/2ML
80 INJECTION, POWDER, LYOPHILIZED, FOR SOLUTION INTRAMUSCULAR; INTRAVENOUS EVERY 8 HOURS
Status: DISCONTINUED | OUTPATIENT
Start: 2018-03-21 | End: 2018-03-21 | Stop reason: HOSPADM

## 2018-03-20 RX ORDER — IPRATROPIUM BROMIDE AND ALBUTEROL SULFATE 2.5; .5 MG/3ML; MG/3ML
1 SOLUTION RESPIRATORY (INHALATION)
Status: DISCONTINUED | OUTPATIENT
Start: 2018-03-20 | End: 2018-03-21 | Stop reason: HOSPADM

## 2018-03-20 RX ORDER — LORAZEPAM 0.5 MG/1
0.5 TABLET ORAL EVERY 8 HOURS PRN
COMMUNITY
End: 2018-03-27

## 2018-03-20 RX ORDER — BISACODYL 10 MG
10 SUPPOSITORY, RECTAL RECTAL DAILY PRN
Status: DISCONTINUED | OUTPATIENT
Start: 2018-03-20 | End: 2018-03-21 | Stop reason: HOSPADM

## 2018-03-20 RX ORDER — 0.9 % SODIUM CHLORIDE 0.9 %
1000 INTRAVENOUS SOLUTION INTRAVENOUS ONCE
Status: COMPLETED | OUTPATIENT
Start: 2018-03-20 | End: 2018-03-20

## 2018-03-20 RX ORDER — LOPERAMIDE HYDROCHLORIDE 2 MG/1
2 CAPSULE ORAL 3 TIMES DAILY PRN
Status: ON HOLD | COMMUNITY
End: 2018-08-20 | Stop reason: ALTCHOICE

## 2018-03-20 RX ORDER — MORPHINE SULFATE 4 MG/ML
4 INJECTION, SOLUTION INTRAMUSCULAR; INTRAVENOUS
Status: DISCONTINUED | OUTPATIENT
Start: 2018-03-20 | End: 2018-03-21 | Stop reason: HOSPADM

## 2018-03-20 RX ORDER — ACETAMINOPHEN 325 MG/1
650 TABLET ORAL EVERY 4 HOURS PRN
Status: DISCONTINUED | OUTPATIENT
Start: 2018-03-20 | End: 2018-03-21 | Stop reason: HOSPADM

## 2018-03-20 RX ORDER — DRONABINOL 2.5 MG/1
10 CAPSULE ORAL
Status: DISCONTINUED | OUTPATIENT
Start: 2018-03-21 | End: 2018-03-21 | Stop reason: HOSPADM

## 2018-03-20 RX ORDER — OYSTER SHELL CALCIUM WITH VITAMIN D 500; 200 MG/1; [IU]/1
1 TABLET, FILM COATED ORAL 2 TIMES DAILY
Status: DISCONTINUED | OUTPATIENT
Start: 2018-03-20 | End: 2018-03-20 | Stop reason: CLARIF

## 2018-03-20 RX ORDER — ALBUTEROL SULFATE 2.5 MG/3ML
2.5 SOLUTION RESPIRATORY (INHALATION)
Status: DISCONTINUED | OUTPATIENT
Start: 2018-03-20 | End: 2018-03-21 | Stop reason: HOSPADM

## 2018-03-20 RX ORDER — IPRATROPIUM BROMIDE AND ALBUTEROL SULFATE 2.5; .5 MG/3ML; MG/3ML
1 SOLUTION RESPIRATORY (INHALATION)
Status: DISCONTINUED | OUTPATIENT
Start: 2018-03-20 | End: 2018-03-20

## 2018-03-20 RX ORDER — ALBUTEROL SULFATE 90 UG/1
2 AEROSOL, METERED RESPIRATORY (INHALATION) PRN
Status: DISCONTINUED | OUTPATIENT
Start: 2018-03-20 | End: 2018-03-21 | Stop reason: HOSPADM

## 2018-03-20 RX ORDER — PREDNISONE 10 MG/1
10 TABLET ORAL DAILY
Status: DISCONTINUED | OUTPATIENT
Start: 2018-03-21 | End: 2018-03-21 | Stop reason: HOSPADM

## 2018-03-20 RX ORDER — ZOLPIDEM TARTRATE 5 MG/1
5 TABLET ORAL NIGHTLY PRN
Status: DISCONTINUED | OUTPATIENT
Start: 2018-03-20 | End: 2018-03-21 | Stop reason: HOSPADM

## 2018-03-20 RX ORDER — SODIUM CHLORIDE 9 MG/ML
INJECTION, SOLUTION INTRAVENOUS CONTINUOUS
Status: DISCONTINUED | OUTPATIENT
Start: 2018-03-20 | End: 2018-03-21 | Stop reason: HOSPADM

## 2018-03-20 RX ORDER — VITAMIN E 268 MG
400 CAPSULE ORAL DAILY
Status: DISCONTINUED | OUTPATIENT
Start: 2018-03-21 | End: 2018-03-21 | Stop reason: RX

## 2018-03-20 RX ORDER — AZITHROMYCIN 250 MG/1
250 TABLET, FILM COATED ORAL DAILY
Status: DISCONTINUED | OUTPATIENT
Start: 2018-03-22 | End: 2018-03-21 | Stop reason: HOSPADM

## 2018-03-20 RX ORDER — FENTANYL CITRATE 50 UG/ML
50 INJECTION, SOLUTION INTRAMUSCULAR; INTRAVENOUS ONCE
Status: COMPLETED | OUTPATIENT
Start: 2018-03-20 | End: 2018-03-20

## 2018-03-20 RX ORDER — PROCHLORPERAZINE MALEATE 10 MG
10 TABLET ORAL EVERY 6 HOURS PRN
Status: DISCONTINUED | OUTPATIENT
Start: 2018-03-20 | End: 2018-03-21 | Stop reason: HOSPADM

## 2018-03-20 RX ORDER — SODIUM CHLORIDE 0.9 % (FLUSH) 0.9 %
10 SYRINGE (ML) INJECTION PRN
Status: DISCONTINUED | OUTPATIENT
Start: 2018-03-20 | End: 2018-03-21 | Stop reason: HOSPADM

## 2018-03-20 RX ORDER — METHYLPREDNISOLONE SODIUM SUCCINATE 125 MG/2ML
125 INJECTION, POWDER, LYOPHILIZED, FOR SOLUTION INTRAMUSCULAR; INTRAVENOUS ONCE
Status: COMPLETED | OUTPATIENT
Start: 2018-03-20 | End: 2018-03-20

## 2018-03-20 RX ORDER — CALCIUM CARBONATE/VITAMIN D3 250-3.125
2 TABLET ORAL 2 TIMES DAILY
Status: DISCONTINUED | OUTPATIENT
Start: 2018-03-21 | End: 2018-03-21 | Stop reason: HOSPADM

## 2018-03-20 RX ORDER — ONDANSETRON 2 MG/ML
4 INJECTION INTRAMUSCULAR; INTRAVENOUS EVERY 6 HOURS PRN
Status: DISCONTINUED | OUTPATIENT
Start: 2018-03-20 | End: 2018-03-21 | Stop reason: HOSPADM

## 2018-03-20 RX ORDER — AZITHROMYCIN 250 MG/1
500 TABLET, FILM COATED ORAL DAILY
Status: COMPLETED | OUTPATIENT
Start: 2018-03-21 | End: 2018-03-21

## 2018-03-20 RX ORDER — PANTOPRAZOLE SODIUM 40 MG/1
40 TABLET, DELAYED RELEASE ORAL
Status: DISCONTINUED | OUTPATIENT
Start: 2018-03-21 | End: 2018-03-21 | Stop reason: HOSPADM

## 2018-03-20 RX ORDER — SODIUM CHLORIDE 0.9 % (FLUSH) 0.9 %
10 SYRINGE (ML) INJECTION EVERY 12 HOURS SCHEDULED
Status: DISCONTINUED | OUTPATIENT
Start: 2018-03-20 | End: 2018-03-21 | Stop reason: HOSPADM

## 2018-03-20 RX ORDER — DEXTROMETHORPHAN HYDROBROMIDE AND PROMETHAZINE HYDROCHLORIDE 15; 6.25 MG/5ML; MG/5ML
10 SYRUP ORAL ONCE
Status: COMPLETED | OUTPATIENT
Start: 2018-03-20 | End: 2018-03-20

## 2018-03-20 RX ORDER — MORPHINE SULFATE 2 MG/ML
2 INJECTION, SOLUTION INTRAMUSCULAR; INTRAVENOUS
Status: DISCONTINUED | OUTPATIENT
Start: 2018-03-20 | End: 2018-03-21 | Stop reason: HOSPADM

## 2018-03-20 RX ORDER — IPRATROPIUM BROMIDE AND ALBUTEROL SULFATE 2.5; .5 MG/3ML; MG/3ML
3 SOLUTION RESPIRATORY (INHALATION)
Status: DISCONTINUED | OUTPATIENT
Start: 2018-03-20 | End: 2018-03-20

## 2018-03-20 RX ORDER — FLUTICASONE FUROATE AND VILANTEROL 200; 25 UG/1; UG/1
1 POWDER RESPIRATORY (INHALATION) DAILY
Status: ON HOLD | COMMUNITY
End: 2018-10-14 | Stop reason: HOSPADM

## 2018-03-20 RX ORDER — ALBUTEROL SULFATE 2.5 MG/3ML
5 SOLUTION RESPIRATORY (INHALATION)
Status: DISCONTINUED | OUTPATIENT
Start: 2018-03-20 | End: 2018-03-20

## 2018-03-20 RX ORDER — OXYCODONE HYDROCHLORIDE 5 MG/1
10 TABLET ORAL EVERY 6 HOURS PRN
Status: DISCONTINUED | OUTPATIENT
Start: 2018-03-20 | End: 2018-03-21 | Stop reason: HOSPADM

## 2018-03-20 RX ORDER — FAMOTIDINE 20 MG/1
20 TABLET, FILM COATED ORAL 2 TIMES DAILY
Status: DISCONTINUED | OUTPATIENT
Start: 2018-03-20 | End: 2018-03-20 | Stop reason: ALTCHOICE

## 2018-03-20 RX ORDER — OLANZAPINE 5 MG/1
10 TABLET ORAL NIGHTLY
Status: DISCONTINUED | OUTPATIENT
Start: 2018-03-20 | End: 2018-03-21 | Stop reason: HOSPADM

## 2018-03-20 RX ORDER — HYDROCODONE BITARTRATE AND ACETAMINOPHEN 5; 325 MG/1; MG/1
1 TABLET ORAL EVERY 4 HOURS PRN
Status: DISCONTINUED | OUTPATIENT
Start: 2018-03-20 | End: 2018-03-21 | Stop reason: HOSPADM

## 2018-03-20 RX ADMIN — MOMETASONE FUROATE AND FORMOTEROL FUMARATE DIHYDRATE 2 PUFF: 200; 5 AEROSOL RESPIRATORY (INHALATION) at 21:13

## 2018-03-20 RX ADMIN — OXYCODONE HYDROCHLORIDE 10 MG: 5 TABLET ORAL at 20:02

## 2018-03-20 RX ADMIN — Medication 10 ML: at 17:43

## 2018-03-20 RX ADMIN — ALBUTEROL SULFATE 5 MG: 5 SOLUTION RESPIRATORY (INHALATION) at 16:34

## 2018-03-20 RX ADMIN — ZOLPIDEM TARTRATE 5 MG: 5 TABLET ORAL at 20:02

## 2018-03-20 RX ADMIN — SODIUM CHLORIDE: 9 INJECTION, SOLUTION INTRAVENOUS at 21:15

## 2018-03-20 RX ADMIN — SODIUM CHLORIDE 1000 ML: 9 INJECTION, SOLUTION INTRAVENOUS at 16:33

## 2018-03-20 RX ADMIN — ONDANSETRON 4 MG: 2 INJECTION INTRAMUSCULAR; INTRAVENOUS at 16:49

## 2018-03-20 RX ADMIN — IPRATROPIUM BROMIDE AND ALBUTEROL SULFATE 3 ML: .5; 3 SOLUTION RESPIRATORY (INHALATION) at 21:13

## 2018-03-20 RX ADMIN — METHYLPREDNISOLONE SODIUM SUCCINATE 125 MG: 125 INJECTION, POWDER, FOR SOLUTION INTRAMUSCULAR; INTRAVENOUS at 16:30

## 2018-03-20 RX ADMIN — FENTANYL CITRATE 50 MCG: 50 INJECTION INTRAMUSCULAR; INTRAVENOUS at 16:52

## 2018-03-20 ASSESSMENT — PAIN DESCRIPTION - LOCATION
LOCATION: BACK
LOCATION: BACK

## 2018-03-20 ASSESSMENT — PAIN SCALES - GENERAL
PAINLEVEL_OUTOF10: 6
PAINLEVEL_OUTOF10: 7
PAINLEVEL_OUTOF10: 6
PAINLEVEL_OUTOF10: 4
PAINLEVEL_OUTOF10: 6
PAINLEVEL_OUTOF10: 7
PAINLEVEL_OUTOF10: 9

## 2018-03-20 ASSESSMENT — ENCOUNTER SYMPTOMS
DIARRHEA: 0
WHEEZING: 1
NAUSEA: 0
VOMITING: 0
COUGH: 1
SHORTNESS OF BREATH: 1
ABDOMINAL PAIN: 0

## 2018-03-20 ASSESSMENT — PAIN DESCRIPTION - FREQUENCY: FREQUENCY: CONTINUOUS

## 2018-03-20 ASSESSMENT — PAIN DESCRIPTION - PAIN TYPE
TYPE: CHRONIC PAIN
TYPE: CHRONIC PAIN

## 2018-03-20 ASSESSMENT — PAIN DESCRIPTION - ORIENTATION: ORIENTATION: LOWER

## 2018-03-20 ASSESSMENT — PAIN DESCRIPTION - ONSET: ONSET: ON-GOING

## 2018-03-20 ASSESSMENT — PAIN DESCRIPTION - DESCRIPTORS: DESCRIPTORS: ACHING

## 2018-03-20 NOTE — H&P
<40 U/L    Total Bilirubin 0.90 0.3 - 1.2 mg/dL    Bilirubin, Direct 0.45 (H) <0.31 mg/dL    Bilirubin, Indirect 0.45 0.00 - 1.00 mg/dL    Total Protein 5.5 (L) 6.4 - 8.3 g/dL    Globulin NOT REPORTED 1.5 - 3.8 g/dL    Albumin/Globulin Ratio NOT REPORTED 1.0 - 2.5       Imaging/Diagnostics:    CXR  No significant change from prior study.  COPD.  Opacity at the right lung   base is stable, likely atelectasis secondary to large right upper lung field   bullous disease. Assessment :      Primary Problem  COPD exacerbation [facility])    Active Hospital Problems    Diagnosis Date Noted    COPD exacerbation ([facility] [J44.1] 03/20/2018     Priority: High    Acute respiratory distress [R06.03] 11/04/2017     Priority: High    Benign essential HTN [I10] 12/10/2016    Anorexia [R63.0] 08/16/2016    Cachectic ([facility] [address] [facility] Johnye Chi 08/16/2016    Pain of metastatic malignancy [G89.3] 08/16/2016    Squamous cell carcinoma of lung ([facility] [C34.90] 08/16/2016    Hypokalemia [E87.6] 12/17/2014    Anxiety [F41.9] 04/25/2013    GERD (gastroesophageal reflux disease) [K21.9] 10/03/2012       Plan:     Patient status Admit as inpatient in the  Progressive Unit/Step down    1. Resume home medications  2. Aerosols  3. IV steroids  4. Azithromycin  5. Consult pulmonary  6. DVT prophylaxis  7. Diet as tolerated    Consultations:   IP CONSULT TO HOSPITALIST  IP CONSULT TO PULMONOLOGY     Patient is admitted as inpatient status because of co-morbidities listed above, severity of signs and symptoms as outlined, requirement for current medical therapies and most importantly because of direct risk to patient if care not provided in a hospital setting.     Shashi Manzanares MD  3/20/2018  6:39 PM    Copy sent to Dr. Sara Syed DO

## 2018-03-20 NOTE — ED PROVIDER NOTES
BUDESONIDE-FORMOTEROL (SYMBICORT) 160-4.5 MCG/ACT AERO    Inhale 2 puffs into the lungs 2 times daily    CALCIUM-VITAMIN D (OSCAL-500) 500-200 MG-UNIT PER TABLET    Take 1 tablet by mouth 2 times daily    DOCUSATE (COLACE, DULCOLAX) 100 MG CAPS    Take 100 mg by mouth 2 times daily    DRONABINOL (MARINOL) 10 MG CAPSULE    Take 10 mg by mouth 2 times daily (before meals). IPRATROPIUM-ALBUTEROL (DUONEB) 0.5-2.5 (3) MG/3ML SOLN NEBULIZER SOLUTION    Inhale 3 mLs into the lungs every 4 hours (while awake)    OLANZAPINE (ZYPREXA) 10 MG TABLET    Take 1 tablet by mouth nightly    ONDANSETRON (ZOFRAN) 8 MG TABLET    Take 1 tablet by mouth every 8 hours as needed for Nausea or Vomiting    OXYCODONE (OXYCONTIN) 30 MG T12A EXTENDED RELEASE TABLET    Take 30 mg by mouth 2 times daily for 30 days. OXYCODONE HCL (OXY-IR) 10 MG IMMEDIATE RELEASE TABLET    TAKE 1 TABLET EVERY 6 HOURS AS NEEDED FOR PAIN. PANTOPRAZOLE (PROTONIX) 40 MG TABLET    Take 1 tablet by mouth every morning (before breakfast)    PREDNISONE (DELTASONE) 10 MG TABLET    Take 10 mg by mouth daily    PROCHLORPERAZINE (COMPAZINE) 10 MG TABLET    Take 10 mg by mouth every 6 hours as needed (nausea)     VITAMIN E 400 UNIT CAPSULE    Take 400 Units by mouth daily    ZOLPIDEM (AMBIEN) 5 MG TABLET    TAKE 1 TABLET BY MOUTH NIGHTLY AS NEEDED FOR SLEEP       ALLERGIES     Aspirin; Fish-derived products;  Shellfish-derived products; and Motrin [ibuprofen micronized]    FAMILY HISTORY       Family History   Problem Relation Age of Onset    Heart Disease Mother      CABG    Cancer Mother     Cancer Father      Throat    Heart Disease Father     Stroke Maternal Grandfather     Heart Disease Paternal Grandmother           SOCIAL HISTORY       Social History     Social History    Marital status: Single     Spouse name: N/A    Number of children: N/A    Years of education: N/A     Social History Main Topics    Smoking status: Current Some Day Smoker Packs/day: 0.50     Years: 40.00     Types: Cigarettes    Smokeless tobacco: Never Used      Comment: Nicotine Patch causes Nausea/vomiting; He states it's too difficult to stop smoking.  Alcohol use No      Comment: none currently    Drug use: Yes     Types: Marijuana      Comment: occasionally-with severe pain    Sexual activity: Not Asked     Other Topics Concern    None     Social History Narrative    None         REVIEW OF SYSTEMS    (2-9 systems for level 4, 10 or more for level 5)     Review of Systems   Constitutional: Positive for activity change, appetite change and fatigue. Respiratory: Positive for cough, shortness of breath and wheezing. Cardiovascular: Negative for chest pain and leg swelling. Gastrointestinal: Negative for abdominal pain, diarrhea, nausea and vomiting. All other systems reviewed and are negative. Except as noted above the remainder of the review of systems was reviewed and negative. PHYSICAL EXAM    (up to 7 for level 4, 8 or more for level 5)     ED Triage Vitals [03/20/18 1612]   BP Temp Temp Source Pulse Resp SpO2 Height Weight   96/62 97.5 °F (36.4 °C) Oral 120 20 96 % 5' 5\" (1.651 m) 115 lb (52.2 kg)       Physical Exam   Constitutional: He is oriented to person, place, and time. He appears cachectic. He is active. HENT:   Head: Normocephalic and atraumatic. Right Ear: Hearing and external ear normal.   Left Ear: Hearing and external ear normal.   Nose: Nose normal.   Mouth/Throat: Uvula is midline, oropharynx is clear and moist and mucous membranes are normal.   Eyes: Conjunctivae, EOM and lids are normal. Pupils are equal, round, and reactive to light. Neck: Trachea normal, normal range of motion and full passive range of motion without pain. Neck supple. Cardiovascular: Regular rhythm, S1 normal, S2 normal, normal heart sounds, intact distal pulses and normal pulses. Tachycardia present. Pulmonary/Chest: Tachypnea noted.  He has wheezes in

## 2018-03-21 ENCOUNTER — APPOINTMENT (OUTPATIENT)
Dept: GENERAL RADIOLOGY | Age: 58
DRG: 191 | End: 2018-03-21
Payer: MEDICARE

## 2018-03-21 VITALS
WEIGHT: 115.9 LBS | OXYGEN SATURATION: 96 % | HEIGHT: 65 IN | RESPIRATION RATE: 18 BRPM | SYSTOLIC BLOOD PRESSURE: 112 MMHG | DIASTOLIC BLOOD PRESSURE: 66 MMHG | TEMPERATURE: 98.6 F | BODY MASS INDEX: 19.31 KG/M2 | HEART RATE: 116 BPM

## 2018-03-21 LAB
ANION GAP SERPL CALCULATED.3IONS-SCNC: 12 MMOL/L (ref 9–17)
BILIRUBIN URINE: NEGATIVE
BUN BLDV-MCNC: 14 MG/DL (ref 6–20)
BUN/CREAT BLD: 22 (ref 9–20)
CALCIUM SERPL-MCNC: 7.7 MG/DL (ref 8.6–10.4)
CHLORIDE BLD-SCNC: 99 MMOL/L (ref 98–107)
CO2: 24 MMOL/L (ref 20–31)
COLOR: YELLOW
COMMENT UA: ABNORMAL
CREAT SERPL-MCNC: 0.63 MG/DL (ref 0.7–1.2)
CULTURE: NORMAL
DIRECT EXAM: NORMAL
GFR AFRICAN AMERICAN: >60 ML/MIN
GFR NON-AFRICAN AMERICAN: >60 ML/MIN
GFR SERPL CREATININE-BSD FRML MDRD: ABNORMAL ML/MIN/{1.73_M2}
GFR SERPL CREATININE-BSD FRML MDRD: ABNORMAL ML/MIN/{1.73_M2}
GLUCOSE BLD-MCNC: 219 MG/DL (ref 70–99)
GLUCOSE URINE: ABNORMAL
HCT VFR BLD CALC: 34.3 % (ref 41–53)
HEMOGLOBIN: 10.9 G/DL (ref 13.5–17.5)
KETONES, URINE: ABNORMAL
LEUKOCYTE ESTERASE, URINE: NEGATIVE
Lab: NORMAL
MCH RBC QN AUTO: 30.9 PG (ref 26–34)
MCHC RBC AUTO-ENTMCNC: 31.9 G/DL (ref 31–37)
MCV RBC AUTO: 97 FL (ref 80–100)
NITRITE, URINE: NEGATIVE
NRBC AUTOMATED: ABNORMAL PER 100 WBC
PDW BLD-RTO: 19.2 % (ref 11.5–14.5)
PH UA: 6.5 (ref 5–8)
PLATELET # BLD: 161 K/UL (ref 130–400)
PMV BLD AUTO: 8.1 FL (ref 6–12)
POTASSIUM SERPL-SCNC: 4.2 MMOL/L (ref 3.7–5.3)
PROTEIN UA: NEGATIVE
RBC # BLD: 3.54 M/UL (ref 4.5–5.9)
SODIUM BLD-SCNC: 135 MMOL/L (ref 135–144)
SPECIFIC GRAVITY UA: 1.01 (ref 1–1.03)
SPECIMEN DESCRIPTION: NORMAL
SPECIMEN DESCRIPTION: NORMAL
STATUS: NORMAL
TURBIDITY: CLEAR
URINE HGB: NEGATIVE
UROBILINOGEN, URINE: NORMAL
WBC # BLD: 18 K/UL (ref 3.5–11)

## 2018-03-21 PROCEDURE — 71045 X-RAY EXAM CHEST 1 VIEW: CPT

## 2018-03-21 PROCEDURE — 2700000000 HC OXYGEN THERAPY PER DAY

## 2018-03-21 PROCEDURE — 36415 COLL VENOUS BLD VENIPUNCTURE: CPT

## 2018-03-21 PROCEDURE — 85027 COMPLETE CBC AUTOMATED: CPT

## 2018-03-21 PROCEDURE — 94640 AIRWAY INHALATION TREATMENT: CPT

## 2018-03-21 PROCEDURE — 99232 SBSQ HOSP IP/OBS MODERATE 35: CPT | Performed by: INTERNAL MEDICINE

## 2018-03-21 PROCEDURE — 2580000003 HC RX 258: Performed by: INTERNAL MEDICINE

## 2018-03-21 PROCEDURE — 6370000000 HC RX 637 (ALT 250 FOR IP): Performed by: INTERNAL MEDICINE

## 2018-03-21 PROCEDURE — 6360000002 HC RX W HCPCS: Performed by: INTERNAL MEDICINE

## 2018-03-21 PROCEDURE — 81003 URINALYSIS AUTO W/O SCOPE: CPT

## 2018-03-21 PROCEDURE — 6370000000 HC RX 637 (ALT 250 FOR IP): Performed by: NURSE PRACTITIONER

## 2018-03-21 PROCEDURE — 80048 BASIC METABOLIC PNL TOTAL CA: CPT

## 2018-03-21 RX ORDER — POTASSIUM CHLORIDE 7.45 MG/ML
10 INJECTION INTRAVENOUS PRN
Status: DISCONTINUED | OUTPATIENT
Start: 2018-03-21 | End: 2018-03-21 | Stop reason: HOSPADM

## 2018-03-21 RX ORDER — PREDNISONE 20 MG/1
20 TABLET ORAL DAILY
Qty: 30 TABLET | Refills: 0 | Status: ON HOLD | OUTPATIENT
Start: 2018-03-21 | End: 2018-03-25 | Stop reason: HOSPADM

## 2018-03-21 RX ORDER — POTASSIUM CHLORIDE 20MEQ/15ML
40 LIQUID (ML) ORAL PRN
Status: DISCONTINUED | OUTPATIENT
Start: 2018-03-21 | End: 2018-03-21 | Stop reason: HOSPADM

## 2018-03-21 RX ORDER — POTASSIUM CHLORIDE 20 MEQ/1
40 TABLET, EXTENDED RELEASE ORAL PRN
Status: DISCONTINUED | OUTPATIENT
Start: 2018-03-21 | End: 2018-03-21 | Stop reason: HOSPADM

## 2018-03-21 RX ADMIN — DRONABINOL 10 MG: 2.5 CAPSULE ORAL at 06:11

## 2018-03-21 RX ADMIN — PANTOPRAZOLE SODIUM 40 MG: 40 TABLET, DELAYED RELEASE ORAL at 06:11

## 2018-03-21 RX ADMIN — AZITHROMYCIN 500 MG: 250 TABLET, FILM COATED ORAL at 01:04

## 2018-03-21 RX ADMIN — MOMETASONE FUROATE AND FORMOTEROL FUMARATE DIHYDRATE 2 PUFF: 200; 5 AEROSOL RESPIRATORY (INHALATION) at 06:21

## 2018-03-21 RX ADMIN — METHYLPREDNISOLONE SODIUM SUCCINATE 80 MG: 125 INJECTION, POWDER, FOR SOLUTION INTRAMUSCULAR; INTRAVENOUS at 00:20

## 2018-03-21 RX ADMIN — OXYCODONE HYDROCHLORIDE 10 MG: 5 TABLET ORAL at 06:31

## 2018-03-21 RX ADMIN — SODIUM CHLORIDE: 9 INJECTION, SOLUTION INTRAVENOUS at 06:25

## 2018-03-21 RX ADMIN — OXYCODONE HYDROCHLORIDE 30 MG: 15 TABLET, FILM COATED, EXTENDED RELEASE ORAL at 00:21

## 2018-03-21 RX ADMIN — Medication 500 MG: at 00:22

## 2018-03-21 RX ADMIN — ENOXAPARIN SODIUM 40 MG: 40 INJECTION SUBCUTANEOUS at 09:13

## 2018-03-21 RX ADMIN — OXYCODONE HYDROCHLORIDE 30 MG: 15 TABLET, FILM COATED, EXTENDED RELEASE ORAL at 09:13

## 2018-03-21 RX ADMIN — IPRATROPIUM BROMIDE AND ALBUTEROL SULFATE 1 AMPULE: .5; 3 SOLUTION RESPIRATORY (INHALATION) at 06:21

## 2018-03-21 RX ADMIN — OLANZAPINE 10 MG: 5 TABLET, FILM COATED ORAL at 00:21

## 2018-03-21 RX ADMIN — METHYLPREDNISOLONE SODIUM SUCCINATE 80 MG: 125 INJECTION, POWDER, FOR SOLUTION INTRAMUSCULAR; INTRAVENOUS at 09:13

## 2018-03-21 RX ADMIN — POTASSIUM CHLORIDE 40 MEQ: 40 SOLUTION ORAL at 01:04

## 2018-03-21 RX ADMIN — Medication 500 MG: at 09:13

## 2018-03-21 ASSESSMENT — PAIN SCALES - GENERAL
PAINLEVEL_OUTOF10: 6
PAINLEVEL_OUTOF10: 6

## 2018-03-21 ASSESSMENT — PAIN DESCRIPTION - PAIN TYPE: TYPE: CHRONIC PAIN

## 2018-03-21 NOTE — PROGRESS NOTES
All patient belongings collected. IV and telemetry removed. All discharge paperwork given and explained to patient. Patient waiting for a ride home at this time per family. See discharge event for further details.
· Bronchodilator assessment   []    Bronchodilator Assessment    FEV1 % PREDICTED 26  FEV1 actual: 0.8  PEFR  68  PEFR % Predicted 14  RR 16  Bronchodilator assessment at level  4  BRONCHODILATOR ASSESSMENT SCORE  Score 1 2 3 4   Breath Sounds   []  Clear []  Mild Wheezing with good aeration [x]  Moderate I/E wheezing with adequate aeration []  Poor Aeration or diffuse wheezing   Respiratory Rate [x]  Less than 20 []  20-25 []  Greater than 25  []  Greater than 35    Dyspnea []  No SOB  []  SOB with minimal activity []  Speaking in partial sentences [x]  Acute/ At rest   Peakflow (asthma) []  80 % or greater predicted/PB  []  Unable []  70% or greater predicted/PB  []  Unable []  51%-70% predicted/PB  []  Unable [x]  Less than 50% predicted/PB  []  Unable due to distress   FEV1 % Predicted []  Greater than 69%  []  Unable  []  Less than 50%-69%  []  Unable  []  Less than 35%-49%  []  Unable  [x]  Less than 35%  []  Unable due to distress     · Bronchodilator assessment   []    Bronchodilator Assessment    FEV1 % PREDICTED 23  FEV1 actual: 0.7  PEFR  67  PEFR % Predicted 13  RR 19  Bronchodilator assessment at level  4  BRONCHODILATOR ASSESSMENT SCORE  Score 1 2 3 4   Breath Sounds   []  Clear []  Mild Wheezing with good aeration [x]  Moderate I/E wheezing with adequate aeration []  Poor Aeration or diffuse wheezing   Respiratory Rate [x]  Less than 20 []  20-25 []  Greater than 25  []  Greater than 35    Dyspnea []  No SOB  []  SOB with minimal activity []  Speaking in partial sentences [x]  Acute/ At rest   Peakflow (asthma) []  80 % or greater predicted/PB  []  Unable []  70% or greater predicted/PB  []  Unable []  51%-70% predicted/PB  []  Unable [x]  Less than 50% predicted/PB  []  Unable due to distress   FEV1 % Predicted []  Greater than 69%  []  Unable  []  Less than 50%-69%  []  Unable  []  Less than 35%-49%  []  Unable  [x]  Less than 35%  []  Unable due to distress   · Bronchodilator assessment   []
no masses, hepatomegaly, splenomegaly  Extremities:  no edema, redness, tenderness in the calves  Skin:  no gross lesions, rashes, induration    Assessment:        Primary Problem  COPD exacerbation [facility])  Dehydration-improved  Active Hospital Problems    Diagnosis Date Noted    COPD exacerbation ([facility] [J44.1] 03/20/2018     Priority: High    Acute respiratory distress [R06.03] 11/04/2017     Priority: High    Benign essential HTN [I10] 12/10/2016    Anorexia [R63.0] 08/16/2016    Cachectic ([facility] Jamshid Record 08/16/2016    Pain of metastatic malignancy [G89.3] 08/16/2016    Squamous cell carcinoma of lung ([facility] [C34.90] 08/16/2016    Hypokalemia [E87.6] 12/17/2014    Anxiety [F41.9] 04/25/2013    GERD (gastroesophageal reflux disease) [K21.9] 10/03/2012       Plan:        1. Discharge home  2. Increase prednisone to 20 mg daily orally at home  3.  Follow with pulmonologist and oncologist as before    Donta Sun MD  3/21/2018  9:00 AM

## 2018-03-21 NOTE — CARE COORDINATION
Case Management Initial Discharge Plan  Benedict A Leopold Hilda,         Readmission Risk              Readmission Risk:        29       Age 72 or Greater:  0    Admitted from SNF or Requires Paid or Family Care:  0    Currently has CHF,COPD,ARF,CRI,or is on dialysis:  4    Takes more than 5 Prescription Medications:  4    Takes Digoxin,Insulin,Anticoagulants,Narcotics or ASA/Plavix:  [address] in Past 12 Months:  10    On Disability:  3    Patient Considers own Health:  5            Met with:patient to discuss discharge plans.    Information verified: address, contacts, phone number, , insurance Yes  PCP: Tank Tilley DO  Date of last visit: 3/15/18    Insurance Provider: Nick Wall and medicaid     Discharge Planning  Current Residence:  Private home   Living Arrangements:  Spouse/Significant Other, Other (Comment) (roommate Janice Zavaleta)   Home has 1 stories/4  stairs to climb  Support Systems:  Family Members, Spouse/Significant Other     Current Services PTA:  Oxygen 24/7 both portable and concentrators  Agency: [facility]     Patient able to perform ADL's:Independent  DME in home:  Oxygen 24/7, w/c walker, cane and nebulizer   DME used to aid ambulation prior to admission:  walker  DME used during admission:  02    Potential Assistance Needed:  [address]: Norvin Dance at [address] Medications:  No  Does patient want to participate in local refill/ meds to beds program?  No    Patient agreeable to home care: No  [place] of choice provided:  n/a      Type of Home Care Services:  Aide Services, PT, OT  Patient expects to be discharged to:  home    Prior SNF/Rehab Placement and Facility: none   Agreeable to SNF/Rehab: No  [place] of choice provided: n/a   Evaluation: n/a    Expected Discharge date:  18  Follow Up Appointment: Best Day/ Time: Monday AM    Transportation provider: per family  Transportation arrangements needed for discharge: No    Discharge Plan:   Patient lives with significant other in home. Has 02 but complains of issues with concentrator and out of portable tanks. Patient is very anxious to discharge home today. Call to [facility] and notified of difficulty with tanks and they will call patient to set up a RT to come to the home to evaluate and get more portable tanks. Patient currently undergoing chemo under Dr Hanane Altman on every 3 week schedule for lung cancer. He thought he had home care but unsure of who comes in, stated that is starts with \"s\" . Listed all [facility] agencies and denied them and wonder if it could be [facility] for palliative care. Patient declining any services with exception of the oxygen  Assistance. No other needs will follow.      Electronically signed by Hernan Gill RN on 3/21/18 at 11:22 AM

## 2018-03-21 NOTE — DISCHARGE SUMMARY
Any suggestions for removal    Anahy Ham 19    Discharge Summary     Patient ID: Sherin Candelaria  :  1960   MRN: [id number]     ACCOUNT:  [de-identified]   Patient's PCP: Shirley Bautista DO  Admit Date: 3/20/2018   Discharge Date: 3/21/2018     Length of Stay: 1  Code Status:  Full Code  Admitting Physician: Adrian Vargas MD  Discharge Physician: Adrian Vargas MD     Active Discharge Diagnoses:     Primary Problem  COPD exacerbation [facility])    [address] [facility]    Diagnosis Date Noted    COPD exacerbation ([facility] [J44.1] 2018     Priority: High    Acute respiratory distress [R06.03] 2017     Priority: High    Benign essential HTN [I10] 12/10/2016    Anorexia [R63.0] 2016    Cachectic ([facility] Michelle Mutton 2016    Pain of metastatic malignancy [G89.3] 2016    Squamous cell carcinoma of lung ([facility] [C34.90] 2016    Hypokalemia [E87.6] 2014    Anxiety [F41.9] 2013    GERD (gastroesophageal reflux disease) [K21.9] 10/03/2012       Admission Condition:  poor     Discharged Condition: good    Hospital Stay:   Admitted through ER with following history;   Ad Fitch is a 65 y. o. male who presents to the emergency department By private auto for evaluation of cough and shortness of breath for the past 2 days.  Patient states he also feels dehydrated.  He has a history of lung cancer. Bertha Suarez is currently undergoing chemotherapy at [facility] last chemo infusion was 3 weeks ago. Bertha Suarez denies chest pain, abdominal pain, nausea or vomiting.  No fever.  He is current daily smoker.  Says history of COPD, hypertension, hepatitis, and seizures.   He has not been eating and drinking much for a few days  In ER he appeared to be dehydrated and tachycardic, he is having cough and mild shortness of breath, he is not able to expectorate much  [facility] TRIG 69 10/08/2014    HDL 57 10/08/2014     U/A:    Lab Results   Component Value Date    COLORU YELLOW 03/21/2018    TURBIDITY CLEAR 03/21/2018    SPECGRAV 1.015 03/21/2018    HGBUR NEGATIVE 03/21/2018    PHUR 6.5 03/21/2018    PROTEINU NEGATIVE 03/21/2018    GLUCOSEU 2+ 03/21/2018    KETUA TRACE 03/21/2018    BILIRUBINUR NEGATIVE 03/21/2018    UROBILINOGEN Normal 03/21/2018    NITRU NEGATIVE 03/21/2018    LEUKOCYTESUR NEGATIVE 03/21/2018     TSH:    Lab Results   Component Value Date    TSH 2.33 10/08/2014         Radiology:    Xr Chest Portable    Result Date: 3/20/2018  EXAMINATION: SINGLE VIEW OF THE CHEST 3/20/2018 4:56 pm COMPARISON: 8 November 2017 HISTORY: ORDERING SYSTEM PROVIDED HISTORY: SOB TECHNOLOGIST PROVIDED HISTORY: Reason for exam:->SOB Ordering Physician Provided Reason for Exam: SOB Acuity: Acute Type of Exam: Initial FINDINGS: AP portable view of the chest time stamped at 1653 hours demonstrates overlying cardiac monitoring electrodes. Findings of COPD are present. Chronic volume loss with hazy opacity in the right lower lung field in the area of the right lower lobe is re-demonstrated. Large bulla is present in the right upper lung field. Infusion port enters from the right, terminating in the distal superior vena cava. No vascular congestion, effusion or pneumothorax is noted. Osseous structures are age appropriate. No significant change from prior study. COPD. Opacity at the right lung base is stable, likely atelectasis secondary to large right upper lung field bullous disease. Consultations:    Consults:     Final Specialist Recommendations/Findings:   IP CONSULT TO HOSPITALIST  IP CONSULT TO PULMONOLOGY      The patient was seen and examined on day of discharge and this discharge summary is in conjunction with any daily progress note from day of discharge.     Discharge plan:     Disposition: Home    Physician Follow Up:     Susana Leiva, DO  [address]

## 2018-03-22 ENCOUNTER — CARE COORDINATION (OUTPATIENT)
Dept: CASE MANAGEMENT | Age: 58
End: 2018-03-22

## 2018-03-22 ENCOUNTER — CARE COORDINATION (OUTPATIENT)
Dept: CARE COORDINATION | Age: 58
End: 2018-03-22

## 2018-03-22 ENCOUNTER — TELEPHONE (OUTPATIENT)
Dept: FAMILY MEDICINE CLINIC | Age: 58
End: 2018-03-22

## 2018-03-22 NOTE — CARE COORDINATION
[facility] Transitions Initial Follow Up Call    Call within 2 business days of discharge: Yes    Patient: Genaro Vieira Patient : 1960   MRN: <[id number]>  Reason for Admission: There are no discharge diagnoses documented for the most recent discharge. Discharge Date: 3/21/18 RARS: [facility] Risk Score: 28     Spoke with: [address]  [facility] [address]:  [facility]    Non-face-to-face services provided:  Obtained and reviewed discharge summary and/or continuity of care documents    Care Transitions 24 Hour Call    Do you have any ongoing symptoms?:  No  Do you have all of your prescriptions and are they filled?:  Yes  Have you been contacted by a [address]?:  No  Have you scheduled your follow up appointment?:  No  Were you discharged with any Home Care or Post Acute Services:  Yes  Post Acute Services:  Home Health (Comment: Spoke with [address] re: timing of initial visit)  Patient DME:  Wheelchair, Teresa Puffer, Straight cane  Patient Home Equipment:  Nebulizer, Oxygen  Do you have support at home?:  Partner/Spouse/SO  Do you feel like you have everything you need to keep you well at home?:  Yes  Are you an active caregiver in your home?:  No  Care Transitions Interventions         Follow Up : spoke with patient who states he is doing well and is using his oxygen at 2 1/2 litres continuous and states he has plenty of oxygen. Pt seemed unsure of some of his medications. Pt agreed to a follow up CTC call Pt said that his girlfriend Pravin Reeves helps him with his medications. Pt knows some of his medicines but not all. Pt said he does have home care but did not know the name of it but said Pravin Reeves knows who it is and she is at work right now. Pt denies fever, chills, does continue with a productive cough of clear mucus. Will continue to follow for Care Transitions.    Future Appointments  Date Time Provider Bruce Dahl   2018 2:30 PM SCHEDULE, [facility] None Krys Todd, RN

## 2018-03-22 NOTE — CARE COORDINATION
Take 1 tablet by mouth every 8 hours as needed for Nausea or Vomiting 2/16/18   Gin Patel, DO   dronabinol (MARINOL) 10 MG capsule Take 10 mg by mouth 2 times daily (before meals).     Historical Provider, MD   ipratropium-albuterol (DUONEB) 0.5-2.5 (3) MG/3ML SOLN nebulizer solution Inhale 3 mLs into the lungs every 4 hours (while awake) 11/10/17   Aldair De Santiago, DO   pantoprazole (PROTONIX) 40 MG tablet Take 1 tablet by mouth every morning (before breakfast) 11/4/17   Miles Jay, DO   calcium-vitamin D (Katheen Susan) 500-200 MG-UNIT per tablet Take 1 tablet by mouth 2 times daily    Historical Provider, MD   Albuterol Sulfate (VENTOLIN HFA IN) Inhale 2 puffs into the lungs every 6 hours as needed     Historical Provider, MD   prochlorperazine (COMPAZINE) 10 MG tablet Take 10 mg by mouth every 6 hours as needed (nausea)     Historical Provider, MD       Future Appointments  Date Time Provider Bruce Dahl   4/6/2018 2:30 PM SCHEDULE, [facility] None

## 2018-03-23 ENCOUNTER — HOSPITAL ENCOUNTER (INPATIENT)
Age: 58
LOS: 2 days | Discharge: HOME | DRG: 190 | End: 2018-03-25
Attending: EMERGENCY MEDICINE | Admitting: INTERNAL MEDICINE
Payer: MEDICARE

## 2018-03-23 ENCOUNTER — APPOINTMENT (OUTPATIENT)
Dept: GENERAL RADIOLOGY | Age: 58
DRG: 190 | End: 2018-03-23
Payer: MEDICARE

## 2018-03-23 DIAGNOSIS — J44.1 COPD EXACERBATION (HCC): ICD-10-CM

## 2018-03-23 DIAGNOSIS — J18.9 PNEUMONIA DUE TO ORGANISM: Primary | ICD-10-CM

## 2018-03-23 DIAGNOSIS — Z78.9 FAILURE OF OUTPATIENT TREATMENT: ICD-10-CM

## 2018-03-23 DIAGNOSIS — Z85.9 HISTORY OF CANCER: ICD-10-CM

## 2018-03-23 LAB
ABSOLUTE EOS #: 0 K/UL (ref 0–0.4)
ABSOLUTE IMMATURE GRANULOCYTE: ABNORMAL K/UL (ref 0–0.3)
ABSOLUTE LYMPH #: 1.2 K/UL (ref 1–4.8)
ABSOLUTE MONO #: 1.1 K/UL (ref 0.2–0.8)
ANION GAP SERPL CALCULATED.3IONS-SCNC: 9 MMOL/L (ref 9–17)
BASOPHILS # BLD: 1 % (ref 0–2)
BASOPHILS ABSOLUTE: 0.1 K/UL (ref 0–0.2)
BUN BLDV-MCNC: 16 MG/DL (ref 6–20)
BUN/CREAT BLD: 32 (ref 9–20)
CALCIUM SERPL-MCNC: 8.1 MG/DL (ref 8.6–10.4)
CHLORIDE BLD-SCNC: 89 MMOL/L (ref 98–107)
CO2: 31 MMOL/L (ref 20–31)
CREAT SERPL-MCNC: 0.5 MG/DL (ref 0.7–1.2)
DIFFERENTIAL TYPE: ABNORMAL
DIRECT EXAM: NORMAL
EKG ATRIAL RATE: 112 BPM
EKG P AXIS: 77 DEGREES
EKG P-R INTERVAL: 126 MS
EKG Q-T INTERVAL: 320 MS
EKG QRS DURATION: 74 MS
EKG QTC CALCULATION (BAZETT): 436 MS
EKG R AXIS: 73 DEGREES
EKG T AXIS: 88 DEGREES
EKG VENTRICULAR RATE: 112 BPM
EOSINOPHILS RELATIVE PERCENT: 0 % (ref 1–4)
GFR AFRICAN AMERICAN: >60 ML/MIN
GFR NON-AFRICAN AMERICAN: >60 ML/MIN
GFR SERPL CREATININE-BSD FRML MDRD: ABNORMAL ML/MIN/{1.73_M2}
GFR SERPL CREATININE-BSD FRML MDRD: ABNORMAL ML/MIN/{1.73_M2}
GLUCOSE BLD-MCNC: 142 MG/DL (ref 75–110)
GLUCOSE BLD-MCNC: 92 MG/DL (ref 70–99)
HCT VFR BLD CALC: 35.9 % (ref 41–53)
HEMOGLOBIN: 11.3 G/DL (ref 13.5–17.5)
IMMATURE GRANULOCYTES: ABNORMAL %
LACTIC ACID: 0.9 MMOL/L (ref 0.5–2.2)
LYMPHOCYTES # BLD: 9 % (ref 24–44)
Lab: NORMAL
MCH RBC QN AUTO: 30.6 PG (ref 26–34)
MCHC RBC AUTO-ENTMCNC: 31.5 G/DL (ref 31–37)
MCV RBC AUTO: 96.9 FL (ref 80–100)
MONOCYTES # BLD: 8 % (ref 1–7)
NRBC AUTOMATED: ABNORMAL PER 100 WBC
PDW BLD-RTO: 18.9 % (ref 11.5–14.5)
PLATELET # BLD: 150 K/UL (ref 130–400)
PLATELET ESTIMATE: ABNORMAL
PMV BLD AUTO: 8 FL (ref 6–12)
POTASSIUM SERPL-SCNC: 3.8 MMOL/L (ref 3.7–5.3)
RBC # BLD: 3.71 M/UL (ref 4.5–5.9)
RBC # BLD: ABNORMAL 10*6/UL
SEG NEUTROPHILS: 82 % (ref 36–66)
SEGMENTED NEUTROPHILS ABSOLUTE COUNT: 10.9 K/UL (ref 1.8–7.7)
SODIUM BLD-SCNC: 129 MMOL/L (ref 135–144)
SPECIMEN DESCRIPTION: NORMAL
SPECIMEN DESCRIPTION: NORMAL
STATUS: NORMAL
WBC # BLD: 13.4 K/UL (ref 3.5–11)
WBC # BLD: ABNORMAL 10*3/UL

## 2018-03-23 PROCEDURE — 80048 BASIC METABOLIC PNL TOTAL CA: CPT

## 2018-03-23 PROCEDURE — 1200000000 HC SEMI PRIVATE

## 2018-03-23 PROCEDURE — 6360000002 HC RX W HCPCS: Performed by: NURSE PRACTITIONER

## 2018-03-23 PROCEDURE — 87205 SMEAR GRAM STAIN: CPT

## 2018-03-23 PROCEDURE — 6360000002 HC RX W HCPCS: Performed by: INTERNAL MEDICINE

## 2018-03-23 PROCEDURE — 87040 BLOOD CULTURE FOR BACTERIA: CPT

## 2018-03-23 PROCEDURE — 6370000000 HC RX 637 (ALT 250 FOR IP): Performed by: NURSE PRACTITIONER

## 2018-03-23 PROCEDURE — 2500000003 HC RX 250 WO HCPCS: Performed by: NURSE PRACTITIONER

## 2018-03-23 PROCEDURE — 96375 TX/PRO/DX INJ NEW DRUG ADDON: CPT

## 2018-03-23 PROCEDURE — 2580000003 HC RX 258: Performed by: NURSE PRACTITIONER

## 2018-03-23 PROCEDURE — 96365 THER/PROPH/DIAG IV INF INIT: CPT

## 2018-03-23 PROCEDURE — 99223 1ST HOSP IP/OBS HIGH 75: CPT | Performed by: INTERNAL MEDICINE

## 2018-03-23 PROCEDURE — 93005 ELECTROCARDIOGRAM TRACING: CPT

## 2018-03-23 PROCEDURE — 6370000000 HC RX 637 (ALT 250 FOR IP): Performed by: INTERNAL MEDICINE

## 2018-03-23 PROCEDURE — 71045 X-RAY EXAM CHEST 1 VIEW: CPT

## 2018-03-23 PROCEDURE — 82947 ASSAY GLUCOSE BLOOD QUANT: CPT

## 2018-03-23 PROCEDURE — 83605 ASSAY OF LACTIC ACID: CPT

## 2018-03-23 PROCEDURE — 36415 COLL VENOUS BLD VENIPUNCTURE: CPT

## 2018-03-23 PROCEDURE — 87070 CULTURE OTHR SPECIMN AEROBIC: CPT

## 2018-03-23 PROCEDURE — 99285 EMERGENCY DEPT VISIT HI MDM: CPT

## 2018-03-23 PROCEDURE — 87449 NOS EACH ORGANISM AG IA: CPT

## 2018-03-23 PROCEDURE — 87086 URINE CULTURE/COLONY COUNT: CPT

## 2018-03-23 PROCEDURE — 94640 AIRWAY INHALATION TREATMENT: CPT

## 2018-03-23 PROCEDURE — 85025 COMPLETE CBC W/AUTO DIFF WBC: CPT

## 2018-03-23 RX ORDER — OXYCODONE HYDROCHLORIDE 5 MG/1
10 TABLET ORAL EVERY 6 HOURS PRN
Status: DISCONTINUED | OUTPATIENT
Start: 2018-03-23 | End: 2018-03-25 | Stop reason: HOSPADM

## 2018-03-23 RX ORDER — SODIUM CHLORIDE 9 MG/ML
INJECTION, SOLUTION INTRAVENOUS CONTINUOUS
Status: DISCONTINUED | OUTPATIENT
Start: 2018-03-23 | End: 2018-03-25

## 2018-03-23 RX ORDER — LORAZEPAM 1 MG/1
0.5 TABLET ORAL EVERY 8 HOURS PRN
Status: DISCONTINUED | OUTPATIENT
Start: 2018-03-23 | End: 2018-03-25 | Stop reason: HOSPADM

## 2018-03-23 RX ORDER — NICOTINE 21 MG/24HR
1 PATCH, TRANSDERMAL 24 HOURS TRANSDERMAL DAILY
Status: DISCONTINUED | OUTPATIENT
Start: 2018-03-23 | End: 2018-03-25 | Stop reason: HOSPADM

## 2018-03-23 RX ORDER — IPRATROPIUM BROMIDE AND ALBUTEROL SULFATE 2.5; .5 MG/3ML; MG/3ML
1 SOLUTION RESPIRATORY (INHALATION) EVERY 4 HOURS PRN
Status: DISCONTINUED | OUTPATIENT
Start: 2018-03-23 | End: 2018-03-25 | Stop reason: HOSPADM

## 2018-03-23 RX ORDER — VITAMIN E 268 MG
400 CAPSULE ORAL DAILY
Status: DISCONTINUED | OUTPATIENT
Start: 2018-03-23 | End: 2018-03-23 | Stop reason: RX

## 2018-03-23 RX ORDER — ONDANSETRON 2 MG/ML
4 INJECTION INTRAMUSCULAR; INTRAVENOUS EVERY 6 HOURS PRN
Status: DISCONTINUED | OUTPATIENT
Start: 2018-03-23 | End: 2018-03-25 | Stop reason: HOSPADM

## 2018-03-23 RX ORDER — ZOLPIDEM TARTRATE 5 MG/1
5 TABLET ORAL NIGHTLY
Status: DISCONTINUED | OUTPATIENT
Start: 2018-03-23 | End: 2018-03-25 | Stop reason: HOSPADM

## 2018-03-23 RX ORDER — METHYLPREDNISOLONE SODIUM SUCCINATE 125 MG/2ML
125 INJECTION, POWDER, LYOPHILIZED, FOR SOLUTION INTRAMUSCULAR; INTRAVENOUS ONCE
Status: COMPLETED | OUTPATIENT
Start: 2018-03-23 | End: 2018-03-23

## 2018-03-23 RX ORDER — SODIUM CHLORIDE 0.9 % (FLUSH) 0.9 %
10 SYRINGE (ML) INJECTION PRN
Status: DISCONTINUED | OUTPATIENT
Start: 2018-03-23 | End: 2018-03-25 | Stop reason: HOSPADM

## 2018-03-23 RX ORDER — LOPERAMIDE HYDROCHLORIDE 2 MG/1
2 CAPSULE ORAL 3 TIMES DAILY PRN
Status: DISCONTINUED | OUTPATIENT
Start: 2018-03-23 | End: 2018-03-25 | Stop reason: HOSPADM

## 2018-03-23 RX ORDER — VANCOMYCIN HYDROCHLORIDE 1 G/200ML
1000 INJECTION, SOLUTION INTRAVENOUS EVERY 12 HOURS
Status: DISCONTINUED | OUTPATIENT
Start: 2018-03-23 | End: 2018-03-24

## 2018-03-23 RX ORDER — DOCUSATE SODIUM 100 MG/1
100 CAPSULE, LIQUID FILLED ORAL 2 TIMES DAILY
Status: DISCONTINUED | OUTPATIENT
Start: 2018-03-23 | End: 2018-03-25 | Stop reason: HOSPADM

## 2018-03-23 RX ORDER — ALBUTEROL SULFATE 2.5 MG/3ML
2.5 SOLUTION RESPIRATORY (INHALATION)
Status: DISCONTINUED | OUTPATIENT
Start: 2018-03-23 | End: 2018-03-25 | Stop reason: HOSPADM

## 2018-03-23 RX ORDER — CALCIUM CARBONATE/VITAMIN D3 600 MG-10
1 TABLET ORAL 2 TIMES DAILY
Status: DISCONTINUED | OUTPATIENT
Start: 2018-03-23 | End: 2018-03-25 | Stop reason: HOSPADM

## 2018-03-23 RX ORDER — OLANZAPINE 5 MG/1
10 TABLET ORAL NIGHTLY
Status: DISCONTINUED | OUTPATIENT
Start: 2018-03-23 | End: 2018-03-25 | Stop reason: HOSPADM

## 2018-03-23 RX ORDER — ALBUTEROL SULFATE 2.5 MG/3ML
2.5 SOLUTION RESPIRATORY (INHALATION) ONCE
Status: COMPLETED | OUTPATIENT
Start: 2018-03-23 | End: 2018-03-23

## 2018-03-23 RX ORDER — METHYLPREDNISOLONE SODIUM SUCCINATE 125 MG/2ML
80 INJECTION, POWDER, LYOPHILIZED, FOR SOLUTION INTRAMUSCULAR; INTRAVENOUS EVERY 6 HOURS
Status: DISCONTINUED | OUTPATIENT
Start: 2018-03-23 | End: 2018-03-24

## 2018-03-23 RX ORDER — ACETAMINOPHEN 325 MG/1
650 TABLET ORAL EVERY 4 HOURS PRN
Status: DISCONTINUED | OUTPATIENT
Start: 2018-03-23 | End: 2018-03-25 | Stop reason: HOSPADM

## 2018-03-23 RX ORDER — PROCHLORPERAZINE MALEATE 10 MG
10 TABLET ORAL EVERY 6 HOURS PRN
Status: DISCONTINUED | OUTPATIENT
Start: 2018-03-23 | End: 2018-03-25 | Stop reason: HOSPADM

## 2018-03-23 RX ORDER — PANTOPRAZOLE SODIUM 40 MG/1
40 TABLET, DELAYED RELEASE ORAL
Status: DISCONTINUED | OUTPATIENT
Start: 2018-03-23 | End: 2018-03-25 | Stop reason: HOSPADM

## 2018-03-23 RX ORDER — BISACODYL 10 MG
10 SUPPOSITORY, RECTAL RECTAL DAILY PRN
Status: DISCONTINUED | OUTPATIENT
Start: 2018-03-23 | End: 2018-03-25 | Stop reason: HOSPADM

## 2018-03-23 RX ORDER — OXYCODONE HYDROCHLORIDE 15 MG/1
30 TABLET, FILM COATED, EXTENDED RELEASE ORAL 2 TIMES DAILY
Status: DISCONTINUED | OUTPATIENT
Start: 2018-03-23 | End: 2018-03-25 | Stop reason: HOSPADM

## 2018-03-23 RX ORDER — SODIUM CHLORIDE 0.9 % (FLUSH) 0.9 %
10 SYRINGE (ML) INJECTION EVERY 12 HOURS SCHEDULED
Status: DISCONTINUED | OUTPATIENT
Start: 2018-03-23 | End: 2018-03-25 | Stop reason: HOSPADM

## 2018-03-23 RX ORDER — DRONABINOL 2.5 MG/1
10 CAPSULE ORAL
Status: DISCONTINUED | OUTPATIENT
Start: 2018-03-23 | End: 2018-03-25 | Stop reason: HOSPADM

## 2018-03-23 RX ORDER — METHYLPREDNISOLONE SODIUM SUCCINATE 40 MG/ML
40 INJECTION, POWDER, LYOPHILIZED, FOR SOLUTION INTRAMUSCULAR; INTRAVENOUS EVERY 12 HOURS
Status: DISCONTINUED | OUTPATIENT
Start: 2018-03-23 | End: 2018-03-23

## 2018-03-23 RX ORDER — NICOTINE 21 MG/24HR
1 PATCH, TRANSDERMAL 24 HOURS TRANSDERMAL DAILY PRN
Status: DISCONTINUED | OUTPATIENT
Start: 2018-03-23 | End: 2018-03-23 | Stop reason: SDUPTHER

## 2018-03-23 RX ORDER — FLUTICASONE FUROATE AND VILANTEROL 200; 25 UG/1; UG/1
1 POWDER RESPIRATORY (INHALATION) DAILY
Status: DISCONTINUED | OUTPATIENT
Start: 2018-03-23 | End: 2018-03-25 | Stop reason: HOSPADM

## 2018-03-23 RX ORDER — OYSTER SHELL CALCIUM WITH VITAMIN D 500; 200 MG/1; [IU]/1
1 TABLET, FILM COATED ORAL 2 TIMES DAILY
Status: DISCONTINUED | OUTPATIENT
Start: 2018-03-23 | End: 2018-03-23 | Stop reason: CLARIF

## 2018-03-23 RX ADMIN — AZITHROMYCIN MONOHYDRATE 500 MG: 500 INJECTION, POWDER, LYOPHILIZED, FOR SOLUTION INTRAVENOUS at 01:55

## 2018-03-23 RX ADMIN — ENOXAPARIN SODIUM 40 MG: 40 INJECTION SUBCUTANEOUS at 10:04

## 2018-03-23 RX ADMIN — VANCOMYCIN HYDROCHLORIDE 1000 MG: 1 INJECTION, SOLUTION INTRAVENOUS at 04:02

## 2018-03-23 RX ADMIN — METHYLPREDNISOLONE SODIUM SUCCINATE 40 MG: 40 INJECTION, POWDER, FOR SOLUTION INTRAMUSCULAR; INTRAVENOUS at 12:45

## 2018-03-23 RX ADMIN — CEFTRIAXONE SODIUM 1 G: 10 INJECTION, POWDER, FOR SOLUTION INTRAVENOUS at 03:05

## 2018-03-23 RX ADMIN — OXYCODONE HYDROCHLORIDE 30 MG: 15 TABLET, FILM COATED, EXTENDED RELEASE ORAL at 11:53

## 2018-03-23 RX ADMIN — METHYLPREDNISOLONE SODIUM SUCCINATE 80 MG: 125 INJECTION, POWDER, FOR SOLUTION INTRAMUSCULAR; INTRAVENOUS at 18:02

## 2018-03-23 RX ADMIN — OXYCODONE HYDROCHLORIDE 30 MG: 15 TABLET, FILM COATED, EXTENDED RELEASE ORAL at 21:17

## 2018-03-23 RX ADMIN — Medication 1 TABLET: at 21:17

## 2018-03-23 RX ADMIN — DOCUSATE SODIUM 100 MG: 100 CAPSULE, LIQUID FILLED ORAL at 21:17

## 2018-03-23 RX ADMIN — OLANZAPINE 10 MG: 5 TABLET, FILM COATED ORAL at 21:17

## 2018-03-23 RX ADMIN — VANCOMYCIN HYDROCHLORIDE 1000 MG: 1 INJECTION, SOLUTION INTRAVENOUS at 15:26

## 2018-03-23 RX ADMIN — PANTOPRAZOLE SODIUM 40 MG: 40 TABLET, DELAYED RELEASE ORAL at 11:53

## 2018-03-23 RX ADMIN — ZOLPIDEM TARTRATE 5 MG: 5 TABLET ORAL at 21:17

## 2018-03-23 RX ADMIN — SODIUM CHLORIDE: 9 INJECTION, SOLUTION INTRAVENOUS at 19:54

## 2018-03-23 RX ADMIN — ALBUTEROL SULFATE 2.5 MG: 2.5 SOLUTION RESPIRATORY (INHALATION) at 04:56

## 2018-03-23 RX ADMIN — SODIUM CHLORIDE: 9 INJECTION, SOLUTION INTRAVENOUS at 04:15

## 2018-03-23 RX ADMIN — ALBUTEROL SULFATE 2.5 MG: 2.5 SOLUTION RESPIRATORY (INHALATION) at 01:41

## 2018-03-23 RX ADMIN — METHYLPREDNISOLONE SODIUM SUCCINATE 125 MG: 125 INJECTION, POWDER, FOR SOLUTION INTRAMUSCULAR; INTRAVENOUS at 00:55

## 2018-03-23 ASSESSMENT — ENCOUNTER SYMPTOMS
COUGH: 1
ABDOMINAL PAIN: 0
NAUSEA: 0
VOMITING: 0
CONSTIPATION: 0
CHEST TIGHTNESS: 1
SHORTNESS OF BREATH: 1

## 2018-03-23 ASSESSMENT — PAIN SCALES - GENERAL: PAINLEVEL_OUTOF10: 0

## 2018-03-23 NOTE — ED PROVIDER NOTES
FAMILY HISTORY           Problem Relation Age of Onset    Heart Disease Mother      CABG    Cancer Mother     Cancer Father      Throat    Heart Disease Father     Stroke Maternal Grandfather     Heart Disease Paternal Grandmother      Family Status   Relation Status    Mother Alive    Father     Sister Alive    Brother Alive    Brother Alive    Sister Alive    Sister Alive    Maternal Grandfather     Paternal Grandmother         SOCIAL HISTORY      reports that he has been smoking Cigarettes. He has a 20.00 pack-year smoking history. He has never used smokeless tobacco. He reports that he uses drugs, including Marijuana. He reports that he does not drink alcohol. REVIEW OF SYSTEMS    (2-9 systems for level 4, 10 or more for level 5)     Review of Systems   Constitutional: Positive for activity change and fatigue. Negative for fever. Respiratory: Positive for cough, chest tightness and shortness of breath. Cardiovascular: Negative for chest pain. Gastrointestinal: Negative for abdominal pain, constipation, nausea and vomiting. Musculoskeletal: Negative for arthralgias and myalgias. Neurological: Negative for dizziness and headaches. Psychiatric/Behavioral: Positive for confusion. Except as noted above the remainder of the review of systems was reviewed and negative. PHYSICAL EXAM    (up to 7 for level 4, 8 or more for level 5)     ED Triage Vitals [18 0011]   BP Temp Temp Source Pulse Resp SpO2 Height Weight   (!) 169/87 97.5 °F (36.4 °C) Oral 117 22 97 % 5' 5\" (1.651 m) 115 lb (52.2 kg)       Physical Exam   Constitutional: He is oriented to person, place, and time. He appears well-developed. He appears ill. HENT:   Head: Normocephalic and atraumatic. Cardiovascular: Regular rhythm. Tachycardia present. Pulmonary/Chest:   Able to speak in short sentences. Lungs are diminished throughout   Abdominal: Soft. He exhibits no distension.  There is
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
## 2018-03-24 ENCOUNTER — APPOINTMENT (OUTPATIENT)
Dept: GENERAL RADIOLOGY | Age: 58
DRG: 190 | End: 2018-03-24
Payer: MEDICARE

## 2018-03-24 ENCOUNTER — APPOINTMENT (OUTPATIENT)
Dept: CT IMAGING | Age: 58
DRG: 190 | End: 2018-03-24
Payer: MEDICARE

## 2018-03-24 LAB
ANION GAP SERPL CALCULATED.3IONS-SCNC: 9 MMOL/L (ref 9–17)
BUN BLDV-MCNC: 10 MG/DL (ref 6–20)
BUN/CREAT BLD: 20 (ref 9–20)
CALCIUM SERPL-MCNC: 8.4 MG/DL (ref 8.6–10.4)
CHLORIDE BLD-SCNC: 96 MMOL/L (ref 98–107)
CO2: 35 MMOL/L (ref 20–31)
CREAT SERPL-MCNC: 0.5 MG/DL (ref 0.7–1.2)
GFR AFRICAN AMERICAN: >60 ML/MIN
GFR NON-AFRICAN AMERICAN: >60 ML/MIN
GFR SERPL CREATININE-BSD FRML MDRD: ABNORMAL ML/MIN/{1.73_M2}
GFR SERPL CREATININE-BSD FRML MDRD: ABNORMAL ML/MIN/{1.73_M2}
GLUCOSE BLD-MCNC: 99 MG/DL (ref 70–99)
HCT VFR BLD CALC: 36.7 % (ref 41–53)
HEMOGLOBIN: 11.6 G/DL (ref 13.5–17.5)
MAGNESIUM: 2.1 MG/DL (ref 1.6–2.6)
MCH RBC QN AUTO: 30.5 PG (ref 26–34)
MCHC RBC AUTO-ENTMCNC: 31.5 G/DL (ref 31–37)
MCV RBC AUTO: 96.9 FL (ref 80–100)
NRBC AUTOMATED: ABNORMAL PER 100 WBC
PDW BLD-RTO: 18.7 % (ref 11.5–14.5)
PLATELET # BLD: 151 K/UL (ref 130–400)
PMV BLD AUTO: 8.3 FL (ref 6–12)
POTASSIUM SERPL-SCNC: 3.5 MMOL/L (ref 3.7–5.3)
RBC # BLD: 3.79 M/UL (ref 4.5–5.9)
SODIUM BLD-SCNC: 140 MMOL/L (ref 135–144)
VANCOMYCIN TROUGH DATE LAST DOSE: NORMAL
VANCOMYCIN TROUGH DOSE AMOUNT: NORMAL
VANCOMYCIN TROUGH TIME LAST DOSE: NORMAL
VANCOMYCIN TROUGH: 11.8 UG/ML (ref 10–20)
WBC # BLD: 11.3 K/UL (ref 3.5–11)

## 2018-03-24 PROCEDURE — 6360000002 HC RX W HCPCS: Performed by: INTERNAL MEDICINE

## 2018-03-24 PROCEDURE — 6360000002 HC RX W HCPCS: Performed by: NURSE PRACTITIONER

## 2018-03-24 PROCEDURE — 6370000000 HC RX 637 (ALT 250 FOR IP): Performed by: INTERNAL MEDICINE

## 2018-03-24 PROCEDURE — 36415 COLL VENOUS BLD VENIPUNCTURE: CPT

## 2018-03-24 PROCEDURE — 83735 ASSAY OF MAGNESIUM: CPT

## 2018-03-24 PROCEDURE — 6370000000 HC RX 637 (ALT 250 FOR IP): Performed by: NURSE PRACTITIONER

## 2018-03-24 PROCEDURE — 1200000000 HC SEMI PRIVATE

## 2018-03-24 PROCEDURE — 85027 COMPLETE CBC AUTOMATED: CPT

## 2018-03-24 PROCEDURE — 71250 CT THORAX DX C-: CPT

## 2018-03-24 PROCEDURE — 71046 X-RAY EXAM CHEST 2 VIEWS: CPT

## 2018-03-24 PROCEDURE — 80202 ASSAY OF VANCOMYCIN: CPT

## 2018-03-24 PROCEDURE — 2580000003 HC RX 258: Performed by: NURSE PRACTITIONER

## 2018-03-24 PROCEDURE — 80048 BASIC METABOLIC PNL TOTAL CA: CPT

## 2018-03-24 PROCEDURE — 99232 SBSQ HOSP IP/OBS MODERATE 35: CPT | Performed by: INTERNAL MEDICINE

## 2018-03-24 RX ORDER — DIPHENHYDRAMINE HYDROCHLORIDE 50 MG/ML
25 INJECTION INTRAMUSCULAR; INTRAVENOUS NIGHTLY PRN
Status: DISCONTINUED | OUTPATIENT
Start: 2018-03-24 | End: 2018-03-24

## 2018-03-24 RX ORDER — DIAZEPAM 5 MG/1
5 TABLET ORAL NIGHTLY PRN
Status: DISCONTINUED | OUTPATIENT
Start: 2018-03-24 | End: 2018-03-25 | Stop reason: HOSPADM

## 2018-03-24 RX ORDER — METHYLPREDNISOLONE SODIUM SUCCINATE 40 MG/ML
40 INJECTION, POWDER, LYOPHILIZED, FOR SOLUTION INTRAMUSCULAR; INTRAVENOUS EVERY 6 HOURS
Status: DISCONTINUED | OUTPATIENT
Start: 2018-03-24 | End: 2018-03-25

## 2018-03-24 RX ORDER — GUAIFENESIN/DEXTROMETHORPHAN 100-10MG/5
10 SYRUP ORAL 3 TIMES DAILY
Status: DISCONTINUED | OUTPATIENT
Start: 2018-03-24 | End: 2018-03-25 | Stop reason: HOSPADM

## 2018-03-24 RX ORDER — DIPHENHYDRAMINE HYDROCHLORIDE 50 MG/ML
25 INJECTION INTRAMUSCULAR; INTRAVENOUS NIGHTLY PRN
Status: DISCONTINUED | OUTPATIENT
Start: 2018-03-24 | End: 2018-03-25 | Stop reason: HOSPADM

## 2018-03-24 RX ORDER — VANCOMYCIN HYDROCHLORIDE 1 G/200ML
1000 INJECTION, SOLUTION INTRAVENOUS EVERY 8 HOURS
Status: DISCONTINUED | OUTPATIENT
Start: 2018-03-25 | End: 2018-03-25 | Stop reason: HOSPADM

## 2018-03-24 RX ADMIN — OXYCODONE HYDROCHLORIDE 30 MG: 15 TABLET, FILM COATED, EXTENDED RELEASE ORAL at 21:25

## 2018-03-24 RX ADMIN — OXYCODONE HYDROCHLORIDE 10 MG: 5 TABLET ORAL at 23:35

## 2018-03-24 RX ADMIN — PANTOPRAZOLE SODIUM 40 MG: 40 TABLET, DELAYED RELEASE ORAL at 06:34

## 2018-03-24 RX ADMIN — LORAZEPAM 0.5 MG: 1 TABLET ORAL at 07:08

## 2018-03-24 RX ADMIN — OXYCODONE HYDROCHLORIDE 10 MG: 5 TABLET ORAL at 04:57

## 2018-03-24 RX ADMIN — ENOXAPARIN SODIUM 40 MG: 40 INJECTION SUBCUTANEOUS at 07:52

## 2018-03-24 RX ADMIN — METHYLPREDNISOLONE SODIUM SUCCINATE 40 MG: 125 INJECTION, POWDER, FOR SOLUTION INTRAMUSCULAR; INTRAVENOUS at 17:39

## 2018-03-24 RX ADMIN — ZOLPIDEM TARTRATE 5 MG: 5 TABLET ORAL at 20:14

## 2018-03-24 RX ADMIN — METHYLPREDNISOLONE SODIUM SUCCINATE 80 MG: 125 INJECTION, POWDER, FOR SOLUTION INTRAMUSCULAR; INTRAVENOUS at 06:34

## 2018-03-24 RX ADMIN — CEFTRIAXONE SODIUM 1 G: 10 INJECTION, POWDER, FOR SOLUTION INTRAVENOUS at 04:41

## 2018-03-24 RX ADMIN — DRONABINOL 10 MG: 2.5 CAPSULE ORAL at 15:54

## 2018-03-24 RX ADMIN — LORAZEPAM 0.5 MG: 1 TABLET ORAL at 16:00

## 2018-03-24 RX ADMIN — DRONABINOL 10 MG: 2.5 CAPSULE ORAL at 07:51

## 2018-03-24 RX ADMIN — GUAIFENESIN AND DEXTROMETHORPHAN 10 ML: 100; 10 SYRUP ORAL at 20:23

## 2018-03-24 RX ADMIN — METHYLPREDNISOLONE SODIUM SUCCINATE 80 MG: 125 INJECTION, POWDER, FOR SOLUTION INTRAMUSCULAR; INTRAVENOUS at 11:47

## 2018-03-24 RX ADMIN — VANCOMYCIN HYDROCHLORIDE 1000 MG: 1 INJECTION, SOLUTION INTRAVENOUS at 17:39

## 2018-03-24 RX ADMIN — Medication 1 TABLET: at 20:14

## 2018-03-24 RX ADMIN — ONDANSETRON 4 MG: 2 INJECTION INTRAMUSCULAR; INTRAVENOUS at 20:14

## 2018-03-24 RX ADMIN — GUAIFENESIN AND DEXTROMETHORPHAN 10 ML: 100; 10 SYRUP ORAL at 13:43

## 2018-03-24 RX ADMIN — METHYLPREDNISOLONE SODIUM SUCCINATE 40 MG: 125 INJECTION, POWDER, FOR SOLUTION INTRAMUSCULAR; INTRAVENOUS at 23:36

## 2018-03-24 RX ADMIN — SODIUM CHLORIDE: 9 INJECTION, SOLUTION INTRAVENOUS at 15:55

## 2018-03-24 RX ADMIN — DIPHENHYDRAMINE HYDROCHLORIDE 25 MG: 50 INJECTION, SOLUTION INTRAMUSCULAR; INTRAVENOUS at 04:40

## 2018-03-24 RX ADMIN — VANCOMYCIN HYDROCHLORIDE 1000 MG: 1 INJECTION, SOLUTION INTRAVENOUS at 06:25

## 2018-03-24 RX ADMIN — OXYCODONE HYDROCHLORIDE 10 MG: 5 TABLET ORAL at 13:43

## 2018-03-24 RX ADMIN — OXYCODONE HYDROCHLORIDE 30 MG: 15 TABLET, FILM COATED, EXTENDED RELEASE ORAL at 11:45

## 2018-03-24 RX ADMIN — AZITHROMYCIN MONOHYDRATE 500 MG: 500 INJECTION, POWDER, LYOPHILIZED, FOR SOLUTION INTRAVENOUS at 02:07

## 2018-03-24 RX ADMIN — Medication 1 TABLET: at 07:43

## 2018-03-24 RX ADMIN — METHYLPREDNISOLONE SODIUM SUCCINATE 80 MG: 125 INJECTION, POWDER, FOR SOLUTION INTRAMUSCULAR; INTRAVENOUS at 01:08

## 2018-03-24 RX ADMIN — OLANZAPINE 10 MG: 5 TABLET, FILM COATED ORAL at 20:27

## 2018-03-24 ASSESSMENT — PAIN SCALES - GENERAL
PAINLEVEL_OUTOF10: 3
PAINLEVEL_OUTOF10: 3
PAINLEVEL_OUTOF10: 8
PAINLEVEL_OUTOF10: 3
PAINLEVEL_OUTOF10: 7
PAINLEVEL_OUTOF10: 7

## 2018-03-24 ASSESSMENT — PAIN DESCRIPTION - DESCRIPTORS: DESCRIPTORS: ACHING

## 2018-03-24 ASSESSMENT — PAIN DESCRIPTION - PAIN TYPE
TYPE: ACUTE PAIN;CHRONIC PAIN
TYPE: CHRONIC PAIN
TYPE: CHRONIC PAIN

## 2018-03-24 ASSESSMENT — PAIN DESCRIPTION - ONSET: ONSET: ON-GOING

## 2018-03-24 ASSESSMENT — PAIN DESCRIPTION - LOCATION
LOCATION: CHEST
LOCATION: BACK

## 2018-03-24 ASSESSMENT — PAIN DESCRIPTION - ORIENTATION: ORIENTATION: LOWER

## 2018-03-24 ASSESSMENT — PAIN DESCRIPTION - FREQUENCY: FREQUENCY: CONTINUOUS

## 2018-03-24 NOTE — CARE COORDINATION
Case Management Initial Discharge Plan  Ad Rodriguez,         Readmission Risk              Readmission Risk:        29       Age 72 or Greater:  0    Admitted from SNF or Requires Paid or Family Care:  0    Currently has CHF,COPD,ARF,CRI,or is on dialysis:  4    Takes more than 5 Prescription Medications:  4    Takes Digoxin,Insulin,Anticoagulants,Narcotics or ASA/Plavix:  [address] in Past 12 Months:  10    On Disability:  3    Patient Considers own Health:  5            Met with:patient to discuss discharge plans. Information verified: address, contacts, phone number, , insurance Yes  PCP: Everett Michael DO  Date of last visit: 3/15/18    Insurance Provider: Letta Angelucci and medicaid     Discharge Planning  Current Residence:  Private house   Living Arrangements:  Spouse/Significant Other   Home has 1 stories/4 stairs to climb  Support Systems:  Spouse/Significant Other, Family Members     Current Services PTA:  Home care and 0   Agency:  [facility] and 02 with [facility]    Patient able to perform ADL's:Assisted  DME in home:   at 3 liters with concentrator and portability.  w/c, walkers, cane and nebulizer   DME used to aid ambulation prior to admission: all  DME used during admission:  02    Potential Assistance Needed:  [address]: carla at [facility] Medications:  No  Does patient want to participate in local refill/ meds to beds program?  No    Patient agreeable to home care: Yes  Freedom of choice provided:  yes      Type of Home Care Services:  Aide Services, PT, OT  Patient expects to be discharged to:  home    Prior SNF/Rehab Placement and Facility: none   Agreeable to SNF/Rehab: No  [place] of choice provided: n/a   Evaluation: n/a    Expected Discharge date:  18  Follow Up Appointment: Best Day/ Time: Monday AM    Transportation provider: per family  Transportation arrangements needed for discharge:

## 2018-03-24 NOTE — PLAN OF CARE
[id number] [facility]    Second Visit Note  For more detailed information please refer to the progress note of the day      3/24/2018    6:00 PM    Name:   Wes Murphy  MRN:     [id number]     Acct:      [de-identified]   Room:   2026/2026-01   Day:  1  Admit Date:  3/23/2018 12:13 AM    PCP:   Ute Gonzalez DO  Code Status:  Full Code        Pt vitals were reviewed   New labs were reviewed   Patient was seen    Updated plan :     1. Feeling better compared to morning  2. Wants something for sleep  3.  We will like to go home in morning        Teri Hoffman MD  3/24/2018  6:00 PM

## 2018-03-25 VITALS
BODY MASS INDEX: 19.01 KG/M2 | DIASTOLIC BLOOD PRESSURE: 94 MMHG | RESPIRATION RATE: 18 BRPM | OXYGEN SATURATION: 95 % | SYSTOLIC BLOOD PRESSURE: 168 MMHG | HEART RATE: 88 BPM | TEMPERATURE: 98.2 F | WEIGHT: 114.1 LBS | HEIGHT: 65 IN

## 2018-03-25 LAB
CULTURE: NO GROWTH
CULTURE: NORMAL
Lab: NORMAL
SPECIMEN DESCRIPTION: NORMAL
SPECIMEN DESCRIPTION: NORMAL
STATUS: NORMAL

## 2018-03-25 PROCEDURE — 6360000002 HC RX W HCPCS: Performed by: INTERNAL MEDICINE

## 2018-03-25 PROCEDURE — 2500000003 HC RX 250 WO HCPCS: Performed by: NURSE PRACTITIONER

## 2018-03-25 PROCEDURE — 99239 HOSP IP/OBS DSCHRG MGMT >30: CPT | Performed by: INTERNAL MEDICINE

## 2018-03-25 PROCEDURE — 2580000003 HC RX 258: Performed by: NURSE PRACTITIONER

## 2018-03-25 PROCEDURE — 94664 DEMO&/EVAL PT USE INHALER: CPT

## 2018-03-25 PROCEDURE — 6370000000 HC RX 637 (ALT 250 FOR IP): Performed by: INTERNAL MEDICINE

## 2018-03-25 PROCEDURE — 6370000000 HC RX 637 (ALT 250 FOR IP): Performed by: NURSE PRACTITIONER

## 2018-03-25 PROCEDURE — 94640 AIRWAY INHALATION TREATMENT: CPT

## 2018-03-25 PROCEDURE — 6360000002 HC RX W HCPCS: Performed by: NURSE PRACTITIONER

## 2018-03-25 RX ORDER — PREDNISONE 10 MG/1
40 TABLET ORAL DAILY
Qty: 30 TABLET | Refills: 0 | Status: SHIPPED | OUTPATIENT
Start: 2018-03-26 | End: 2018-06-06

## 2018-03-25 RX ORDER — PREDNISONE 20 MG/1
40 TABLET ORAL DAILY
Status: DISCONTINUED | OUTPATIENT
Start: 2018-03-25 | End: 2018-03-25 | Stop reason: HOSPADM

## 2018-03-25 RX ORDER — GUAIFENESIN/DEXTROMETHORPHAN 100-10MG/5
10 SYRUP ORAL 3 TIMES DAILY PRN
Qty: 120 ML | Refills: 0 | Status: SHIPPED | OUTPATIENT
Start: 2018-03-25 | End: 2018-06-06

## 2018-03-25 RX ORDER — CEFUROXIME AXETIL 250 MG/1
250 TABLET ORAL EVERY 12 HOURS SCHEDULED
Qty: 14 TABLET | Refills: 0 | Status: SHIPPED | OUTPATIENT
Start: 2018-03-25 | End: 2018-04-01

## 2018-03-25 RX ORDER — NICOTINE 21 MG/24HR
1 PATCH, TRANSDERMAL 24 HOURS TRANSDERMAL DAILY
Qty: 30 PATCH | Refills: 0 | Status: SHIPPED | OUTPATIENT
Start: 2018-03-26 | End: 2018-06-20 | Stop reason: ALTCHOICE

## 2018-03-25 RX ORDER — AZITHROMYCIN 250 MG/1
250 TABLET, FILM COATED ORAL DAILY
Status: DISCONTINUED | OUTPATIENT
Start: 2018-03-25 | End: 2018-03-25 | Stop reason: HOSPADM

## 2018-03-25 RX ORDER — CEFUROXIME AXETIL 500 MG/1
500 TABLET ORAL EVERY 12 HOURS SCHEDULED
Status: DISCONTINUED | OUTPATIENT
Start: 2018-03-25 | End: 2018-03-25 | Stop reason: HOSPADM

## 2018-03-25 RX ADMIN — PREDNISONE 40 MG: 20 TABLET ORAL at 09:00

## 2018-03-25 RX ADMIN — METHYLPREDNISOLONE SODIUM SUCCINATE 40 MG: 125 INJECTION, POWDER, FOR SOLUTION INTRAMUSCULAR; INTRAVENOUS at 05:37

## 2018-03-25 RX ADMIN — AZITHROMYCIN MONOHYDRATE 500 MG: 500 INJECTION, POWDER, LYOPHILIZED, FOR SOLUTION INTRAVENOUS at 02:28

## 2018-03-25 RX ADMIN — OXYCODONE HYDROCHLORIDE 10 MG: 5 TABLET ORAL at 05:37

## 2018-03-25 RX ADMIN — VANCOMYCIN HYDROCHLORIDE 1000 MG: 1 INJECTION, SOLUTION INTRAVENOUS at 04:02

## 2018-03-25 RX ADMIN — GUAIFENESIN AND DEXTROMETHORPHAN 10 ML: 100; 10 SYRUP ORAL at 09:03

## 2018-03-25 RX ADMIN — DRONABINOL 10 MG: 2.5 CAPSULE ORAL at 06:54

## 2018-03-25 RX ADMIN — CEFTRIAXONE SODIUM 1 G: 10 INJECTION, POWDER, FOR SOLUTION INTRAVENOUS at 04:02

## 2018-03-25 RX ADMIN — Medication 1 TABLET: at 08:59

## 2018-03-25 RX ADMIN — ENOXAPARIN SODIUM 40 MG: 40 INJECTION SUBCUTANEOUS at 09:00

## 2018-03-25 RX ADMIN — OXYCODONE HYDROCHLORIDE 30 MG: 15 TABLET, FILM COATED, EXTENDED RELEASE ORAL at 09:00

## 2018-03-25 RX ADMIN — TIOTROPIUM BROMIDE 18 MCG: 18 CAPSULE ORAL; RESPIRATORY (INHALATION) at 09:28

## 2018-03-25 RX ADMIN — CEFUROXIME AXETIL 500 MG: 500 TABLET ORAL at 09:01

## 2018-03-25 RX ADMIN — PANTOPRAZOLE SODIUM 40 MG: 40 TABLET, DELAYED RELEASE ORAL at 05:37

## 2018-03-25 ASSESSMENT — PAIN SCALES - GENERAL
PAINLEVEL_OUTOF10: 6
PAINLEVEL_OUTOF10: 4

## 2018-03-25 ASSESSMENT — PAIN DESCRIPTION - LOCATION: LOCATION: BACK

## 2018-03-25 ASSESSMENT — PAIN DESCRIPTION - PAIN TYPE
TYPE: CHRONIC PAIN
TYPE: CHRONIC PAIN

## 2018-03-25 NOTE — DISCHARGE SUMMARY
09/08/2011     03/24/2018    K 3.5 03/24/2018    CL 96 03/24/2018    CO2 35 03/24/2018    ANIONGAP 9 03/24/2018    BUN 10 03/24/2018    CREATININE 0.50 03/24/2018    BUNCRER 20 03/24/2018    CALCIUM 8.4 03/24/2018    LABGLOM >60 03/24/2018    GFRAA >60 03/24/2018    GFR      03/24/2018    GFR NOT REPORTED 03/24/2018     HFP:    Lab Results   Component Value Date    PROT 5.5 03/20/2018     CMP:    Lab Results   Component Value Date    GLUCOSE 99 03/24/2018    GLUCOSE 95 09/08/2011     03/24/2018    K 3.5 03/24/2018    CL 96 03/24/2018    CO2 35 03/24/2018    BUN 10 03/24/2018    CREATININE 0.50 03/24/2018    ANIONGAP 9 03/24/2018    ALKPHOS 52 03/20/2018    ALT 32 03/20/2018    AST 42 03/20/2018    BILITOT 0.90 03/20/2018    LABALBU 3.1 03/20/2018    LABALBU 4.1 09/08/2011    ALBUMIN NOT REPORTED 03/20/2018    LABGLOM >60 03/24/2018    GFRAA >60 03/24/2018    GFR      03/24/2018    GFR NOT REPORTED 03/24/2018    PROT 5.5 03/20/2018    CALCIUM 8.4 03/24/2018     PT/INR:  No results found for: PTINR  PTT:   Lab Results   Component Value Date    APTT 25.1 10/30/2017     FLP:    Lab Results   Component Value Date    CHOL 148 10/08/2014    TRIG 69 10/08/2014    HDL 57 10/08/2014     U/A:    Lab Results   Component Value Date    COLORU YELLOW 03/21/2018    TURBIDITY CLEAR 03/21/2018    SPECGRAV 1.015 03/21/2018    HGBUR NEGATIVE 03/21/2018    PHUR 6.5 03/21/2018    PROTEINU NEGATIVE 03/21/2018    GLUCOSEU 2+ 03/21/2018    KETUA TRACE 03/21/2018    BILIRUBINUR NEGATIVE 03/21/2018    UROBILINOGEN Normal 03/21/2018    NITRU NEGATIVE 03/21/2018    LEUKOCYTESUR NEGATIVE 03/21/2018     TSH:    Lab Results   Component Value Date    TSH 2.33 10/08/2014         Radiology:    Xr Chest Standard (2 Vw)    Result Date: 3/24/2018  EXAMINATION: TWO VIEWS OF THE CHEST 3/24/2018 12:24 pm COMPARISON: 03/23/2018 HISTORY: ORDERING SYSTEM PROVIDED HISTORY: Pneumonia TECHNOLOGIST PROVIDED HISTORY: Reason for exam:->Pneumonia Acuity: Unknown Type of Exam: Unknown FINDINGS: Cardiomediastinal silhouette is within normal limits. Right chest port is stable in position. There is re-demonstration of a linear opacity in the right lung base, improved when compared to the 03/23/2018 radiograph. This appears to localize to the right lower lobe on lateral view and is most compatible with scarring or atelectasis. No new airspace consolidation. No evidence of pneumothorax or pleural effusion. No free air beneath the diaphragm. No evidence of acute osseous abnormality. Right basilar opacity is improved when compared to 03/23/2018. The appearance on today's study suggests atelectasis or scarring in the right lower lobe. No acute findings. Ct Chest Wo Contrast    Result Date: 3/24/2018  EXAMINATION: CT OF THE CHEST WITHOUT CONTRAST 3/24/2018 1:30 pm TECHNIQUE: CT of the chest was performed without the administration of intravenous contrast. Multiplanar reformatted images are provided for review. Dose modulation, iterative reconstruction, and/or weight based adjustment of the mA/kV was utilized to reduce the radiation dose to as low as reasonably achievable. COMPARISON: CT abdomen and pelvis dated 10/14/2017 and PET/CT dated 09/06/2017. CT chest dated 12/23/2014. HISTORY: ORDERING SYSTEM PROVIDED HISTORY: SHORTNESS OF BREATH FINDINGS: Mediastinum: The heart is normal in size. There is no pericardial effusion. There is extensive coronary artery calcification. Thoracic aorta and pulmonary arteries are within normal limits. There is a right chest port in place with distal tip in the superior vena cava. No mediastinal lymphadenopathy. No obvious hilar lymphadenopathy, although limited without IV contrast.  No axillary lymphadenopathy. Lungs/pleura: There is unchanged right apical pleural scarring. There is moderate centrilobular emphysema.   A 6 mm irregular nodule with partial calcification in the right upper lobe appears similar in size to the previous PET/CT, although the calcifications were not seen on the previous PET. There is a new irregular linear opacity in the anterior mid right lung, measuring 1.1 x 0.5 cm, not seen on the previous PET/CT. There is mild ground-glass attenuation in the anterior right lower lung. There is chronic atelectasis and consolidation in the right lower lobe with bronchiectasis and subjacent mild pleural thickening, which is unchanged from previous PET/CT and CT abdomen and pelvis dated 10/14/2017. A 1.1 cm nodule in the medial right lung base is unchanged from previous PET/CT. Another 1 cm nodule slightly more anterior in the medial right lung base is also unchanged. No airspace consolidation or nodular opacities in the left lung. No pneumothorax or pleural effusion. Upper Abdomen: Limited visualized upper abdominal structures are unremarkable. Adrenal glands are within normal limits. Soft Tissues/Bones: There is no acute or suspicious osseous abnormality. Visualized superficial soft tissues are within normal limits. 1.  Chronic airspace consolidation with associated volume loss, bronchiectasis, and pleural thickening in the right lower lobe, not significantly changed when compared to a prior PET/CT dated 09/06/2017. No evidence of acute pneumonia or pulmonary edema. 2.  Unchanged pulmonary nodules in the right lung. A new small linear opacity in the mid right lung is probably related to a focus of scarring. Attention on follow-up studies. 3.  Moderate centrilobular emphysema. Xr Chest Portable    Result Date: 3/23/2018  EXAMINATION: SINGLE VIEW OF THE CHEST 3/23/2018 12:37 am COMPARISON: 1 day prior HISTORY: ORDERING SYSTEM PROVIDED HISTORY: sob TECHNOLOGIST PROVIDED HISTORY: Reason for exam:->sob FINDINGS: There is increasing airspace disease in the right lower lobe with volume loss. Right upper lobe and left lung are clear. Cardiomediastinal silhouette and pulmonary vasculature within normal limits. There is no pleural space disease. Port-A-Cath in place at the right chest wall. Progressing right lower lobe atelectasis, underlying consolidation not excluded. Xr Chest Portable    Result Date: 3/21/2018  EXAMINATION: SINGLE VIEW OF THE CHEST 3/21/2018 9:26 am COMPARISON: March 20, 2018, November 8, 2017 HISTORY: ORDERING SYSTEM PROVIDED HISTORY: AECOPD TECHNOLOGIST PROVIDED HISTORY: Reason for exam:->AECOPD Ordering Physician Provided Reason for Exam: AECOPD,  AP UPRIGHT PORTABLE CHEST Acuity: Acute Type of Exam: Subsequent/Follow-up FINDINGS: The cardiac and mediastinal contours are unchanged in appearance. The opacity in the right lung base is unchanged. No new airspace disease identified. Findings of emphysema are again demonstrated. There is no pneumothorax or evidence for effusion. The right internal jugular Port-A-Cath terminates at the mid SVC. Cervical spine fixation hardware is partially visualized. Unchanged appearance of the chest with findings of emphysema and persistent opacity in the right lung base, which may represent atelectasis or scarring. Xr Chest Portable    Result Date: 3/20/2018  EXAMINATION: SINGLE VIEW OF THE CHEST 3/20/2018 4:56 pm COMPARISON: 8 November 2017 HISTORY: ORDERING SYSTEM PROVIDED HISTORY: SOB TECHNOLOGIST PROVIDED HISTORY: Reason for exam:->SOB Ordering Physician Provided Reason for Exam: SOB Acuity: Acute Type of Exam: Initial FINDINGS: AP portable view of the chest time stamped at 1653 hours demonstrates overlying cardiac monitoring electrodes. Findings of COPD are present. Chronic volume loss with hazy opacity in the right lower lung field in the area of the right lower lobe is re-demonstrated. Large bulla is present in the right upper lung field. Infusion port enters from the right, terminating in the distal superior vena cava. No vascular congestion, effusion or pneumothorax is noted. Osseous structures are age appropriate.      No significant Aepb  Generic drug:  Umeclidinium Bromide     ipratropium-albuterol 0.5-2.5 (3) MG/3ML Soln nebulizer solution  Commonly known as:  DUONEB  Inhale 3 mLs into the lungs every 4 hours (while awake)     loperamide 2 MG capsule  Commonly known as:  IMODIUM     LORazepam 0.5 MG tablet  Commonly known as:  ATIVAN     MARINOL 10 MG capsule  Generic drug:  dronabinol     OLANZapine 10 MG tablet  Commonly known as:  zyPREXA  Take 1 tablet by mouth nightly     ondansetron 8 MG tablet  Commonly known as:  ZOFRAN  Take 1 tablet by mouth every 8 hours as needed for Nausea or Vomiting     * oxyCODONE 30 MG T12a extended release tablet  Commonly known as:  OXYCONTIN  Take 30 mg by mouth 2 times daily for 30 days. * oxyCODONE HCl 10 MG immediate release tablet  Commonly known as:  OXY-IR  TAKE 1 TABLET EVERY 6 HOURS AS NEEDED FOR PAIN. pantoprazole 40 MG tablet  Commonly known as:  PROTONIX  Take 1 tablet by mouth every morning (before breakfast)     prochlorperazine 10 MG tablet  Commonly known as:  COMPAZINE     VENTOLIN HFA IN     vitamin E 400 UNIT capsule     zolpidem 5 MG tablet  Commonly known as:  AMBIEN  TAKE 1 TABLET BY MOUTH NIGHTLY AS NEEDED FOR SLEEP        * This list has 2 medication(s) that are the same as other medications prescribed for you. Read the directions carefully, and ask your doctor or other care provider to review them with you. Where to Get Your Medications      You can get these medications from any pharmacy    Bring a paper prescription for each of these medications  · cefUROXime 250 MG tablet  · guaiFENesin-dextromethorphan 100-10 MG/5ML syrup  · nicotine 21 MG/24HR  · predniSONE 10 MG tablet         Time Spent on discharge is  35 mins in patient examination, evaluation, counseling as well as medication reconciliation, prescriptions for required medications, discharge plan and follow up.     Electronically signed by   Mable Rubio MD  3/25/2018  10:23 AM      Thank you  [address] [place] [address] for the opportunity to be involved in this patient's care.

## 2018-03-25 NOTE — PROGRESS NOTES
Ad Fitch was ordered Vitamin E 400 units. As per the [facility], herbals and certain dietary supplements will be discontinued. The herbal or dietary supplement may be continued after discharge from the hospital.     If you feel the patient needs to continue their home herbal therapy during the inpatient stay, the patient may bring an unopened bottle for verification and administration pursuant to our home medication use policy. Please contact the pharmacy with any questions or concerns. Thank you.   CHAU NAGY   3/23/2018  6:49 AM
Laurence Grullon faxed to [facility]
Learning About the Safe Use of Antibiotics  Introduction  Antibiotics are drugs used to kill bacteria. Bacteria can cause infections. These include strep throat, ear infections, and pneumonia. These medicines can't cure everything. They don't kill viruses or help with allergies. They don't help illnesses such as the common cold, the flu, or a runny nose. And they can cause side effects. There are many types of antibiotics. Your doctor will decide which one will work best for your infection. Examples include:  · Amoxicillin. · Cephalexin (Keflex). · Ciprofloxacin (Cipro). What are the possible side effects? Side effects can include:  · Nausea. · Diarrhea. · Skin rash. · Yeast infection. · A severe allergic reaction. It may cause itching, swelling, and breathing problems. This is rare. You may have other side effects or reactions not listed here. Check the information that comes with your medicine. Should you take antibiotics just in case? Don't take antibiotics when you don't need them. If you do that, they may not work when you do need them. Each time you take antibiotics, you are more likely to have some bacteria that survive and aren't killed by the medicine. Bacteria that don't die can change and become even harder to kill. These are called antibiotic-resistant bacteria. They can cause longer and more serious infections. To treat them, you may need different, stronger antibiotics that have more side effects and may cost more. So always ask your doctor if antibiotics are the best treatment. Explain that you do not want antibiotics unless you need them. Help protect the community  Using antibiotics when they're not needed leads to the development of antibiotic-resistant bacteria. These tougher bacteria can spread to family members, children, and coworkers. People in your community will have a risk of getting an infection that is harder to cure and that costs more to treat.   How can you take
Disease Mother      CABG    Cancer Mother     Cancer Father      Throat    Heart Disease Father     Stroke Maternal Grandfather     Heart Disease Paternal Grandmother        Vitals:  BP (!) 145/76   Pulse 96   Temp 98.1 °F (36.7 °C) (Oral)   Resp 16   Ht 5' 5\" (1.651 m)   Wt 114 lb 12.8 oz (52.1 kg)   SpO2 97%   BMI 19.10 kg/m²   Temp (24hrs), Av.1 °F (36.7 °C), Min:97.5 °F (36.4 °C), Max:98.6 °F (37 °C)    Recent Labs      18   1705   POCGLU  142*       I/O (24Hr): Intake/Output Summary (Last 24 hours) at 18 1123  Last data filed at 18 1805   Gross per 24 hour   Intake              788 ml   Output              125 ml   Net              663 ml       Labs:    Hematology:  Recent Labs      18   0049  18   0517   WBC  13.4*  11.3*   HGB  11.3*  11.6*   HCT  35.9*  36.7*   PLT  150  151     Chemistry:  Recent Labs      18   0049  18   0517   NA  129*  140   K  3.8  3.5*   CL  89*  96*   CO2  31  35*   GLUCOSE  92  99   BUN  16  10   CREATININE  0.50*  0.50*   MG   --   2.1   ANIONGAP  9  9   LABGLOM  >60  >60   GFRAA  >60  >60   CALCIUM  8.1*  8.4*     No results for input(s): PROT, LABALBU, LABA1C, M0WIBWI, X2ZCDWX, FT4, TSH, AST, ALT, LDH, GGT, ALKPHOS, BILITOT, BILIDIR, AMMONIA, AMYLASE, LIPASE, LACTATE, CHOL, HDL, LDLCHOLESTEROL, CHOLHDLRATIO, TRIG, VLDL, PHENYTOIN, PHENYF in the last 72 hours.       Lab Results   Component Value Date/Time    SPECIAL NOT REPORTED 2018 03:35 PM     Lab Results   Component Value Date/Time    CULTURE CULTURE IN PROGRESS 2018 03:35 PM    CULTURE  2018 03:35 PM     Performed at [address] [place] [address] [phone]       Lab Results   Component Value Date    POCPH 7.34 12/10/2016    PHART 7.44 2013    POCPCO2 38 12/10/2016    HVI0PJS 34 2013    POCPO2 166 12/10/2016    PO2ART 112 2013    POCHCO3 20.8 12/10/2016    FOU4GTZ 22.7 2013    NBEA 5 12/10/2016    PBEA
20.8 12/10/2016    TIU4ZDJ 22.7 02/14/2013    NBEA 5 12/10/2016    PBEA NOT REPORTED 12/10/2016    XFN8FGU 22 12/10/2016    TMKY7NQV 99 12/10/2016    M6VNPEPM 99.2 02/14/2013    FIO2 40.0 12/10/2016       Radiology:  CXR  CXR  Progressing right lower lobe atelectasis, underlying consolidation not   excluded. CT chest without contrast  1.  Chronic airspace consolidation with associated volume loss,   bronchiectasis, and pleural thickening in the right lower lobe, not   significantly changed when compared to a prior PET/CT dated 09/06/2017.  No   evidence of acute pneumonia or pulmonary edema.       2.  Unchanged pulmonary nodules in the right lung.  A new small linear   opacity in the mid right lung is probably related to a focus of scarring. Attention on follow-up studies.       3.  Moderate centrilobular emphysema.            Physical Examination:        General appearance:  alert, cooperative and no distress  Mental Status:  oriented to person, place and time and normal affect  Lungs: Prolonged expiratory phase, diminished breath sounds at bases, few scattered wheezes  Heart:  regular rate and rhythm, no murmur  Abdomen:  soft, nontender, nondistended, normal bowel sounds, no masses, hepatomegaly, splenomegaly  Extremities:  no edema, redness, tenderness in the calves  Skin:  no gross lesions, rashes, induration    Assessment:        Primary Problem  Pneumonia-Right lower lobe  Undernourished /underweight-BMI 19.9  Active Hospital Problems    Diagnosis Date Noted    Pneumonia [J18.9] 03/23/2018     Priority: High    COPD exacerbation ([facility] [J44.1] 03/20/2018     Priority: High    Acute metabolic encephalopathy [X07.06] 11/07/2017     Priority: High    Acute febrile illness [R50.9]      Priority: High    Acute respiratory distress [R06.03] 11/04/2017     Priority: High    Anorexia [R63.0] 08/16/2016     Priority: Medium    Cachectic ([address] Cherl Evens 08/16/2016     Priority: Medium    Anxiety [F41.9]

## 2018-03-26 ENCOUNTER — CARE COORDINATION (OUTPATIENT)
Dept: CASE MANAGEMENT | Age: 58
End: 2018-03-26

## 2018-03-26 NOTE — CARE COORDINATION
patient, 1111F order completed.  Patient was on his way to his chemo appointment when writer talked to him, he agreed to care transitions, when writer spoke with Dianelys Crowley she stated she is also following patient pretty closely//ju    Follow Up  Future Appointments  Date Time Provider Bruce Dahl   4/6/2018 2:30 PM SCHEDULE, [address] RAD ONC [facility] STA RAD None       Jany Saldana RN

## 2018-03-28 ENCOUNTER — CARE COORDINATION (OUTPATIENT)
Dept: CASE MANAGEMENT | Age: 58
End: 2018-03-28

## 2018-03-28 LAB
CULTURE: NORMAL
DIRECT EXAM: NORMAL
DIRECT EXAM: NORMAL
Lab: NORMAL
SPECIMEN DESCRIPTION: NORMAL
SPECIMEN DESCRIPTION: NORMAL
STATUS: NORMAL

## 2018-03-29 LAB
CULTURE: NORMAL
Lab: NORMAL
Lab: NORMAL
SPECIMEN DESCRIPTION: NORMAL
STATUS: NORMAL
STATUS: NORMAL

## 2018-04-02 ENCOUNTER — CARE COORDINATION (OUTPATIENT)
Dept: CASE MANAGEMENT | Age: 58
End: 2018-04-02

## 2018-04-04 ENCOUNTER — OFFICE VISIT (OUTPATIENT)
Dept: FAMILY MEDICINE CLINIC | Age: 58
End: 2018-04-04
Payer: MEDICARE

## 2018-04-04 VITALS
RESPIRATION RATE: 16 BRPM | DIASTOLIC BLOOD PRESSURE: 89 MMHG | OXYGEN SATURATION: 98 % | HEART RATE: 119 BPM | WEIGHT: 115.6 LBS | BODY MASS INDEX: 19.26 KG/M2 | HEIGHT: 65 IN | SYSTOLIC BLOOD PRESSURE: 128 MMHG

## 2018-04-04 DIAGNOSIS — T45.1X5A CHEMOTHERAPY INDUCED NEUTROPENIA (HCC): ICD-10-CM

## 2018-04-04 DIAGNOSIS — G89.3 PAIN OF METASTATIC MALIGNANCY: ICD-10-CM

## 2018-04-04 DIAGNOSIS — D70.1 CHEMOTHERAPY INDUCED NEUTROPENIA (HCC): ICD-10-CM

## 2018-04-04 PROCEDURE — G8420 CALC BMI NORM PARAMETERS: HCPCS | Performed by: FAMILY MEDICINE

## 2018-04-04 PROCEDURE — 4004F PT TOBACCO SCREEN RCVD TLK: CPT | Performed by: FAMILY MEDICINE

## 2018-04-04 PROCEDURE — 1111F DSCHRG MED/CURRENT MED MERGE: CPT | Performed by: FAMILY MEDICINE

## 2018-04-04 PROCEDURE — 3017F COLORECTAL CA SCREEN DOC REV: CPT | Performed by: FAMILY MEDICINE

## 2018-04-04 PROCEDURE — G8427 DOCREV CUR MEDS BY ELIG CLIN: HCPCS | Performed by: FAMILY MEDICINE

## 2018-04-04 PROCEDURE — 99213 OFFICE O/P EST LOW 20 MIN: CPT | Performed by: FAMILY MEDICINE

## 2018-04-04 RX ORDER — OXYCODONE HYDROCHLORIDE 20 MG/1
20 TABLET ORAL EVERY 6 HOURS PRN
Qty: 120 TABLET | Refills: 0 | Status: SHIPPED | OUTPATIENT
Start: 2018-04-04 | End: 2018-04-30 | Stop reason: SDUPTHER

## 2018-04-04 RX ORDER — OXYCODONE HCL 40 MG/1
40 TABLET, FILM COATED, EXTENDED RELEASE ORAL EVERY 12 HOURS
Qty: 60 EACH | Refills: 0 | Status: SHIPPED | OUTPATIENT
Start: 2018-04-04 | End: 2018-04-30 | Stop reason: SDUPTHER

## 2018-04-04 ASSESSMENT — PATIENT HEALTH QUESTIONNAIRE - PHQ9
1. LITTLE INTEREST OR PLEASURE IN DOING THINGS: 0
SUM OF ALL RESPONSES TO PHQ9 QUESTIONS 1 & 2: 0
2. FEELING DOWN, DEPRESSED OR HOPELESS: 0
SUM OF ALL RESPONSES TO PHQ QUESTIONS 1-9: 0

## 2018-04-06 ENCOUNTER — CARE COORDINATION (OUTPATIENT)
Dept: CASE MANAGEMENT | Age: 58
End: 2018-04-06

## 2018-04-09 ENCOUNTER — CARE COORDINATION (OUTPATIENT)
Dept: CASE MANAGEMENT | Age: 58
End: 2018-04-09

## 2018-04-12 ENCOUNTER — CARE COORDINATION (OUTPATIENT)
Dept: CARE COORDINATION | Age: 58
End: 2018-04-12

## 2018-04-19 RX ORDER — PANTOPRAZOLE SODIUM 40 MG/1
40 TABLET, DELAYED RELEASE ORAL
Qty: 30 TABLET | Refills: 11 | Status: SHIPPED | OUTPATIENT
Start: 2018-04-19

## 2018-04-26 ENCOUNTER — CARE COORDINATION (OUTPATIENT)
Dept: CARE COORDINATION | Age: 58
End: 2018-04-26

## 2018-04-30 ENCOUNTER — OFFICE VISIT (OUTPATIENT)
Dept: FAMILY MEDICINE CLINIC | Age: 58
End: 2018-04-30
Payer: MEDICARE

## 2018-04-30 VITALS
SYSTOLIC BLOOD PRESSURE: 95 MMHG | DIASTOLIC BLOOD PRESSURE: 72 MMHG | RESPIRATION RATE: 16 BRPM | HEIGHT: 65 IN | BODY MASS INDEX: 18.16 KG/M2 | HEART RATE: 119 BPM | WEIGHT: 109 LBS

## 2018-04-30 DIAGNOSIS — F41.9 ANXIETY: ICD-10-CM

## 2018-04-30 DIAGNOSIS — D70.1 CHEMOTHERAPY INDUCED NEUTROPENIA (HCC): ICD-10-CM

## 2018-04-30 DIAGNOSIS — G89.3 PAIN OF METASTATIC MALIGNANCY: ICD-10-CM

## 2018-04-30 DIAGNOSIS — R60.0 EDEMA OF BOTH LEGS: Primary | ICD-10-CM

## 2018-04-30 DIAGNOSIS — F51.01 PRIMARY INSOMNIA: ICD-10-CM

## 2018-04-30 DIAGNOSIS — T45.1X5A CHEMOTHERAPY INDUCED NEUTROPENIA (HCC): ICD-10-CM

## 2018-04-30 DIAGNOSIS — T45.1X5A CHEMOTHERAPY INDUCED NAUSEA AND VOMITING: ICD-10-CM

## 2018-04-30 DIAGNOSIS — R11.2 CHEMOTHERAPY INDUCED NAUSEA AND VOMITING: ICD-10-CM

## 2018-04-30 PROCEDURE — G8418 CALC BMI BLW LOW PARAM F/U: HCPCS | Performed by: FAMILY MEDICINE

## 2018-04-30 PROCEDURE — 99213 OFFICE O/P EST LOW 20 MIN: CPT | Performed by: FAMILY MEDICINE

## 2018-04-30 PROCEDURE — G8427 DOCREV CUR MEDS BY ELIG CLIN: HCPCS | Performed by: FAMILY MEDICINE

## 2018-04-30 PROCEDURE — 4004F PT TOBACCO SCREEN RCVD TLK: CPT | Performed by: FAMILY MEDICINE

## 2018-04-30 PROCEDURE — 3017F COLORECTAL CA SCREEN DOC REV: CPT | Performed by: FAMILY MEDICINE

## 2018-04-30 RX ORDER — ONDANSETRON HYDROCHLORIDE 8 MG/1
8 TABLET, FILM COATED ORAL EVERY 8 HOURS PRN
Qty: 30 TABLET | Refills: 0 | Status: SHIPPED | OUTPATIENT
Start: 2018-04-30 | End: 2018-10-10

## 2018-04-30 RX ORDER — OXYCODONE HYDROCHLORIDE 10 MG/1
10 TABLET ORAL EVERY 6 HOURS PRN
Qty: 120 TABLET | Refills: 0 | Status: SHIPPED | OUTPATIENT
Start: 2018-04-30 | End: 2018-05-24 | Stop reason: SDUPTHER

## 2018-04-30 RX ORDER — LORAZEPAM 0.5 MG/1
0.5 TABLET ORAL EVERY 6 HOURS PRN
Qty: 90 TABLET | Refills: 0 | Status: SHIPPED | OUTPATIENT
Start: 2018-04-30 | End: 2018-05-24 | Stop reason: SDUPTHER

## 2018-04-30 RX ORDER — ZOLPIDEM TARTRATE 5 MG/1
TABLET ORAL
Qty: 30 TABLET | Refills: 0 | Status: SHIPPED | OUTPATIENT
Start: 2018-04-30 | End: 2018-06-01 | Stop reason: SDUPTHER

## 2018-04-30 RX ORDER — OXYCODONE HCL 40 MG/1
40 TABLET, FILM COATED, EXTENDED RELEASE ORAL EVERY 12 HOURS
Qty: 60 EACH | Refills: 0 | Status: SHIPPED | OUTPATIENT
Start: 2018-04-30 | End: 2018-05-24 | Stop reason: SDUPTHER

## 2018-05-08 ENCOUNTER — CARE COORDINATION (OUTPATIENT)
Dept: CARE COORDINATION | Age: 58
End: 2018-05-08

## 2018-05-08 DIAGNOSIS — J44.1 COPD EXACERBATION (HCC): ICD-10-CM

## 2018-05-08 DIAGNOSIS — C34.92 SQUAMOUS CELL CARCINOMA OF LEFT LUNG (HCC): Primary | ICD-10-CM

## 2018-05-09 ENCOUNTER — APPOINTMENT (OUTPATIENT)
Dept: GENERAL RADIOLOGY | Age: 58
End: 2018-05-09
Payer: MEDICARE

## 2018-05-09 ENCOUNTER — HOSPITAL ENCOUNTER (EMERGENCY)
Age: 58
Discharge: HOME | End: 2018-05-09
Attending: EMERGENCY MEDICINE
Payer: MEDICARE

## 2018-05-09 VITALS
OXYGEN SATURATION: 94 % | TEMPERATURE: 98.3 F | DIASTOLIC BLOOD PRESSURE: 67 MMHG | WEIGHT: 110 LBS | BODY MASS INDEX: 18.33 KG/M2 | HEIGHT: 65 IN | RESPIRATION RATE: 18 BRPM | SYSTOLIC BLOOD PRESSURE: 101 MMHG | HEART RATE: 110 BPM

## 2018-05-09 DIAGNOSIS — S92.501A CLOSED FRACTURE OF PHALANX OF RIGHT FIFTH TOE, INITIAL ENCOUNTER: Primary | ICD-10-CM

## 2018-05-09 PROCEDURE — 73630 X-RAY EXAM OF FOOT: CPT

## 2018-05-09 PROCEDURE — 99283 EMERGENCY DEPT VISIT LOW MDM: CPT

## 2018-05-09 PROCEDURE — 6360000002 HC RX W HCPCS: Performed by: PHYSICIAN ASSISTANT

## 2018-05-09 PROCEDURE — 73610 X-RAY EXAM OF ANKLE: CPT

## 2018-05-09 PROCEDURE — 96372 THER/PROPH/DIAG INJ SC/IM: CPT

## 2018-05-09 RX ORDER — ONDANSETRON 4 MG/1
4 TABLET, ORALLY DISINTEGRATING ORAL ONCE
Status: COMPLETED | OUTPATIENT
Start: 2018-05-09 | End: 2018-05-09

## 2018-05-09 RX ADMIN — ONDANSETRON 4 MG: 4 TABLET, ORALLY DISINTEGRATING ORAL at 16:53

## 2018-05-09 RX ADMIN — Medication 1 MG: at 16:53

## 2018-05-09 ASSESSMENT — PAIN SCALES - GENERAL
PAINLEVEL_OUTOF10: 8
PAINLEVEL_OUTOF10: 8

## 2018-05-09 ASSESSMENT — PAIN DESCRIPTION - LOCATION: LOCATION: ANKLE;FOOT

## 2018-05-09 ASSESSMENT — PAIN DESCRIPTION - DESCRIPTORS: DESCRIPTORS: THROBBING;CONSTANT

## 2018-05-09 ASSESSMENT — PAIN DESCRIPTION - FREQUENCY: FREQUENCY: CONTINUOUS

## 2018-05-09 ASSESSMENT — PAIN DESCRIPTION - ORIENTATION: ORIENTATION: RIGHT

## 2018-05-09 ASSESSMENT — PAIN SCALES - WONG BAKER: WONGBAKER_NUMERICALRESPONSE: 8

## 2018-05-10 ENCOUNTER — CARE COORDINATION (OUTPATIENT)
Dept: CARE COORDINATION | Age: 58
End: 2018-05-10

## 2018-05-15 ENCOUNTER — CARE COORDINATION (OUTPATIENT)
Dept: CARE COORDINATION | Age: 58
End: 2018-05-15

## 2018-05-22 ENCOUNTER — TELEPHONE (OUTPATIENT)
Dept: FAMILY MEDICINE CLINIC | Age: 58
End: 2018-05-22

## 2018-05-22 ENCOUNTER — CARE COORDINATION (OUTPATIENT)
Dept: CARE COORDINATION | Age: 58
End: 2018-05-22

## 2018-05-24 DIAGNOSIS — D70.1 CHEMOTHERAPY INDUCED NEUTROPENIA (HCC): ICD-10-CM

## 2018-05-24 DIAGNOSIS — T45.1X5A CHEMOTHERAPY INDUCED NEUTROPENIA (HCC): ICD-10-CM

## 2018-05-24 DIAGNOSIS — F41.9 ANXIETY: ICD-10-CM

## 2018-05-24 DIAGNOSIS — G89.3 PAIN OF METASTATIC MALIGNANCY: ICD-10-CM

## 2018-05-24 RX ORDER — OXYCODONE HYDROCHLORIDE 10 MG/1
TABLET ORAL
Qty: 120 TABLET | Refills: 0 | Status: SHIPPED | OUTPATIENT
Start: 2018-05-24 | End: 2018-06-20 | Stop reason: SDUPTHER

## 2018-05-24 RX ORDER — LORAZEPAM 0.5 MG/1
0.5 TABLET ORAL EVERY 6 HOURS PRN
Qty: 90 TABLET | Refills: 0 | Status: SHIPPED | OUTPATIENT
Start: 2018-05-24 | End: 2018-06-22 | Stop reason: SDUPTHER

## 2018-05-29 ENCOUNTER — CARE COORDINATION (OUTPATIENT)
Dept: CARE COORDINATION | Age: 58
End: 2018-05-29

## 2018-06-01 DIAGNOSIS — F51.01 PRIMARY INSOMNIA: ICD-10-CM

## 2018-06-01 RX ORDER — ZOLPIDEM TARTRATE 5 MG/1
TABLET ORAL
Qty: 30 TABLET | Refills: 0 | Status: SHIPPED | OUTPATIENT
Start: 2018-06-01 | End: 2018-07-02 | Stop reason: SDUPTHER

## 2018-06-06 ENCOUNTER — OFFICE VISIT (OUTPATIENT)
Dept: FAMILY MEDICINE CLINIC | Age: 58
End: 2018-06-06
Payer: MEDICARE

## 2018-06-06 VITALS
HEART RATE: 102 BPM | WEIGHT: 112 LBS | RESPIRATION RATE: 16 BRPM | HEIGHT: 65 IN | BODY MASS INDEX: 18.66 KG/M2 | SYSTOLIC BLOOD PRESSURE: 136 MMHG | DIASTOLIC BLOOD PRESSURE: 89 MMHG | OXYGEN SATURATION: 97 %

## 2018-06-06 DIAGNOSIS — R60.0 EDEMA OF BOTH LEGS: ICD-10-CM

## 2018-06-06 DIAGNOSIS — G62.0 CHEMOTHERAPY-INDUCED NEUROPATHY (HCC): ICD-10-CM

## 2018-06-06 DIAGNOSIS — G89.3 PAIN OF METASTATIC MALIGNANCY: Primary | ICD-10-CM

## 2018-06-06 DIAGNOSIS — T45.1X5A CHEMOTHERAPY-INDUCED NEUROPATHY (HCC): ICD-10-CM

## 2018-06-06 PROCEDURE — 3017F COLORECTAL CA SCREEN DOC REV: CPT | Performed by: FAMILY MEDICINE

## 2018-06-06 PROCEDURE — G8427 DOCREV CUR MEDS BY ELIG CLIN: HCPCS | Performed by: FAMILY MEDICINE

## 2018-06-06 PROCEDURE — 99214 OFFICE O/P EST MOD 30 MIN: CPT | Performed by: FAMILY MEDICINE

## 2018-06-06 PROCEDURE — G8420 CALC BMI NORM PARAMETERS: HCPCS | Performed by: FAMILY MEDICINE

## 2018-06-06 PROCEDURE — 4004F PT TOBACCO SCREEN RCVD TLK: CPT | Performed by: FAMILY MEDICINE

## 2018-06-06 RX ORDER — DEXAMETHASONE 4 MG/1
TABLET ORAL
Refills: 0 | Status: ON HOLD | COMMUNITY
Start: 2018-06-01 | End: 2018-06-26 | Stop reason: ALTCHOICE

## 2018-06-06 ASSESSMENT — PATIENT HEALTH QUESTIONNAIRE - PHQ9
SUM OF ALL RESPONSES TO PHQ9 QUESTIONS 1 & 2: 0
2. FEELING DOWN, DEPRESSED OR HOPELESS: 0
1. LITTLE INTEREST OR PLEASURE IN DOING THINGS: 0
SUM OF ALL RESPONSES TO PHQ QUESTIONS 1-9: 0

## 2018-06-16 ENCOUNTER — APPOINTMENT (OUTPATIENT)
Dept: CT IMAGING | Age: 58
End: 2018-06-16
Payer: MEDICARE

## 2018-06-16 ENCOUNTER — HOSPITAL ENCOUNTER (EMERGENCY)
Age: 58
Discharge: HOME | End: 2018-06-16
Attending: EMERGENCY MEDICINE
Payer: MEDICARE

## 2018-06-16 ENCOUNTER — APPOINTMENT (OUTPATIENT)
Dept: GENERAL RADIOLOGY | Age: 58
End: 2018-06-16
Payer: MEDICARE

## 2018-06-16 VITALS
DIASTOLIC BLOOD PRESSURE: 65 MMHG | WEIGHT: 120 LBS | TEMPERATURE: 97.9 F | BODY MASS INDEX: 19.99 KG/M2 | RESPIRATION RATE: 18 BRPM | SYSTOLIC BLOOD PRESSURE: 110 MMHG | HEART RATE: 105 BPM | OXYGEN SATURATION: 96 % | HEIGHT: 65 IN

## 2018-06-16 DIAGNOSIS — S32.010A CLOSED COMPRESSION FRACTURE OF FIRST LUMBAR VERTEBRA, INITIAL ENCOUNTER: Primary | ICD-10-CM

## 2018-06-16 PROCEDURE — 6360000002 HC RX W HCPCS: Performed by: EMERGENCY MEDICINE

## 2018-06-16 PROCEDURE — 72131 CT LUMBAR SPINE W/O DYE: CPT

## 2018-06-16 PROCEDURE — 99283 EMERGENCY DEPT VISIT LOW MDM: CPT

## 2018-06-16 PROCEDURE — 96372 THER/PROPH/DIAG INJ SC/IM: CPT

## 2018-06-16 PROCEDURE — 72100 X-RAY EXAM L-S SPINE 2/3 VWS: CPT

## 2018-06-16 RX ORDER — MORPHINE SULFATE 10 MG/ML
10 INJECTION, SOLUTION INTRAMUSCULAR; INTRAVENOUS ONCE
Status: COMPLETED | OUTPATIENT
Start: 2018-06-16 | End: 2018-06-16

## 2018-06-16 RX ADMIN — MORPHINE SULFATE 10 MG: 10 INJECTION, SOLUTION INTRAMUSCULAR; INTRAVENOUS at 08:53

## 2018-06-16 RX ADMIN — MORPHINE SULFATE 10 MG: 10 INJECTION, SOLUTION INTRAMUSCULAR; INTRAVENOUS at 07:24

## 2018-06-16 ASSESSMENT — ENCOUNTER SYMPTOMS
EYE REDNESS: 0
ABDOMINAL PAIN: 0
DIARRHEA: 0
SHORTNESS OF BREATH: 0
CONSTIPATION: 0
BACK PAIN: 1
VOMITING: 0
COLOR CHANGE: 0
COUGH: 0
EYE DISCHARGE: 0
FACIAL SWELLING: 0

## 2018-06-16 ASSESSMENT — PAIN SCALES - GENERAL
PAINLEVEL_OUTOF10: 10
PAINLEVEL_OUTOF10: 10
PAINLEVEL_OUTOF10: 5
PAINLEVEL_OUTOF10: 7

## 2018-06-18 ENCOUNTER — OFFICE VISIT (OUTPATIENT)
Dept: FAMILY MEDICINE CLINIC | Age: 58
End: 2018-06-18
Payer: MEDICARE

## 2018-06-18 VITALS
SYSTOLIC BLOOD PRESSURE: 140 MMHG | HEART RATE: 142 BPM | HEIGHT: 65 IN | DIASTOLIC BLOOD PRESSURE: 90 MMHG | BODY MASS INDEX: 18.66 KG/M2 | WEIGHT: 112 LBS | RESPIRATION RATE: 16 BRPM

## 2018-06-18 DIAGNOSIS — C34.92 SQUAMOUS CELL CARCINOMA OF LEFT LUNG (HCC): ICD-10-CM

## 2018-06-18 DIAGNOSIS — R09.02 HYPOXEMIA: ICD-10-CM

## 2018-06-18 DIAGNOSIS — S32.010A CLOSED COMPRESSION FRACTURE OF FIRST LUMBAR VERTEBRA, INITIAL ENCOUNTER: Primary | ICD-10-CM

## 2018-06-18 PROCEDURE — 4004F PT TOBACCO SCREEN RCVD TLK: CPT | Performed by: FAMILY MEDICINE

## 2018-06-18 PROCEDURE — G8420 CALC BMI NORM PARAMETERS: HCPCS | Performed by: FAMILY MEDICINE

## 2018-06-18 PROCEDURE — 3017F COLORECTAL CA SCREEN DOC REV: CPT | Performed by: FAMILY MEDICINE

## 2018-06-18 PROCEDURE — G8427 DOCREV CUR MEDS BY ELIG CLIN: HCPCS | Performed by: FAMILY MEDICINE

## 2018-06-18 PROCEDURE — 99214 OFFICE O/P EST MOD 30 MIN: CPT | Performed by: FAMILY MEDICINE

## 2018-06-18 RX ORDER — OXYCODONE AND ACETAMINOPHEN 10; 325 MG/1; MG/1
1 TABLET ORAL EVERY 6 HOURS PRN
Qty: 42 TABLET | Refills: 0 | Status: SHIPPED | OUTPATIENT
Start: 2018-06-18 | End: 2018-06-25

## 2018-06-19 ENCOUNTER — CARE COORDINATION (OUTPATIENT)
Dept: CARE COORDINATION | Age: 58
End: 2018-06-19

## 2018-06-20 ENCOUNTER — HOSPITAL ENCOUNTER (OUTPATIENT)
Dept: PREADMISSION TESTING | Age: 58
Discharge: HOME | End: 2018-06-24
Payer: MEDICARE

## 2018-06-20 VITALS
OXYGEN SATURATION: 94 % | WEIGHT: 115.3 LBS | RESPIRATION RATE: 18 BRPM | HEART RATE: 117 BPM | DIASTOLIC BLOOD PRESSURE: 80 MMHG | SYSTOLIC BLOOD PRESSURE: 117 MMHG | BODY MASS INDEX: 19.21 KG/M2 | HEIGHT: 65 IN

## 2018-06-20 DIAGNOSIS — D70.1 CHEMOTHERAPY INDUCED NEUTROPENIA (HCC): ICD-10-CM

## 2018-06-20 DIAGNOSIS — G89.3 PAIN OF METASTATIC MALIGNANCY: ICD-10-CM

## 2018-06-20 DIAGNOSIS — T45.1X5A CHEMOTHERAPY INDUCED NEUTROPENIA (HCC): ICD-10-CM

## 2018-06-20 LAB
EKG ATRIAL RATE: 92 BPM
EKG P AXIS: 72 DEGREES
EKG P-R INTERVAL: 126 MS
EKG Q-T INTERVAL: 342 MS
EKG QRS DURATION: 72 MS
EKG QTC CALCULATION (BAZETT): 422 MS
EKG R AXIS: 44 DEGREES
EKG T AXIS: 80 DEGREES
EKG VENTRICULAR RATE: 92 BPM

## 2018-06-20 PROCEDURE — 93005 ELECTROCARDIOGRAM TRACING: CPT

## 2018-06-20 RX ORDER — OXYCODONE HYDROCHLORIDE 10 MG/1
TABLET ORAL
Qty: 120 TABLET | Refills: 0 | Status: SHIPPED | OUTPATIENT
Start: 2018-06-20 | End: 2018-07-20 | Stop reason: SDUPTHER

## 2018-06-20 ASSESSMENT — PAIN DESCRIPTION - PAIN TYPE: TYPE: ACUTE PAIN

## 2018-06-20 ASSESSMENT — PAIN DESCRIPTION - PROGRESSION: CLINICAL_PROGRESSION: GRADUALLY WORSENING

## 2018-06-20 ASSESSMENT — PAIN DESCRIPTION - ONSET: ONSET: ON-GOING

## 2018-06-20 ASSESSMENT — PAIN DESCRIPTION - FREQUENCY: FREQUENCY: CONTINUOUS

## 2018-06-20 ASSESSMENT — PAIN DESCRIPTION - LOCATION: LOCATION: BACK

## 2018-06-20 ASSESSMENT — PAIN SCALES - GENERAL: PAINLEVEL_OUTOF10: 8

## 2018-06-20 ASSESSMENT — PAIN DESCRIPTION - DESCRIPTORS: DESCRIPTORS: STABBING;CONSTANT

## 2018-06-21 ENCOUNTER — HOSPITAL ENCOUNTER (OUTPATIENT)
Dept: MRI IMAGING | Age: 58
Discharge: HOME | End: 2018-06-23
Payer: MEDICARE

## 2018-06-21 ENCOUNTER — HOSPITAL ENCOUNTER (OUTPATIENT)
Age: 58
Discharge: HOME | End: 2018-06-21
Payer: MEDICARE

## 2018-06-21 LAB
ABSOLUTE EOS #: 0.2 K/UL (ref 0–0.4)
ABSOLUTE IMMATURE GRANULOCYTE: ABNORMAL K/UL (ref 0–0.3)
ABSOLUTE LYMPH #: 0.8 K/UL (ref 1–4.8)
ABSOLUTE MONO #: 0.6 K/UL (ref 0.2–0.8)
ANION GAP SERPL CALCULATED.3IONS-SCNC: 13 MMOL/L (ref 9–17)
BASOPHILS # BLD: 0 % (ref 0–2)
BASOPHILS ABSOLUTE: 0 K/UL (ref 0–0.2)
BUN BLDV-MCNC: 18 MG/DL (ref 6–20)
CHLORIDE BLD-SCNC: 96 MMOL/L (ref 98–107)
CO2: 28 MMOL/L (ref 20–31)
CREAT SERPL-MCNC: 0.7 MG/DL (ref 0.7–1.2)
DIFFERENTIAL TYPE: ABNORMAL
EOSINOPHILS RELATIVE PERCENT: 2 % (ref 1–4)
GFR AFRICAN AMERICAN: >60 ML/MIN
GFR NON-AFRICAN AMERICAN: >60 ML/MIN
GFR SERPL CREATININE-BSD FRML MDRD: NORMAL ML/MIN/{1.73_M2}
GFR SERPL CREATININE-BSD FRML MDRD: NORMAL ML/MIN/{1.73_M2}
HCT VFR BLD CALC: 38.2 % (ref 41–53)
HEMOGLOBIN: 12.3 G/DL (ref 13.5–17.5)
IMMATURE GRANULOCYTES: ABNORMAL %
LYMPHOCYTES # BLD: 7 % (ref 24–44)
MCH RBC QN AUTO: 29.2 PG (ref 26–34)
MCHC RBC AUTO-ENTMCNC: 32.2 G/DL (ref 31–37)
MCV RBC AUTO: 90.8 FL (ref 80–100)
MONOCYTES # BLD: 5 % (ref 1–7)
NRBC AUTOMATED: ABNORMAL PER 100 WBC
PDW BLD-RTO: 19.8 % (ref 11.5–14.5)
PLATELET # BLD: 194 K/UL (ref 130–400)
PLATELET ESTIMATE: ABNORMAL
PMV BLD AUTO: 7.5 FL (ref 6–12)
POTASSIUM SERPL-SCNC: 4.4 MMOL/L (ref 3.7–5.3)
RBC # BLD: 4.2 M/UL (ref 4.5–5.9)
RBC # BLD: ABNORMAL 10*6/UL
SEG NEUTROPHILS: 86 % (ref 36–66)
SEGMENTED NEUTROPHILS ABSOLUTE COUNT: 10.4 K/UL (ref 1.8–7.7)
SODIUM BLD-SCNC: 137 MMOL/L (ref 135–144)
WBC # BLD: 12.1 K/UL (ref 3.5–11)
WBC # BLD: ABNORMAL 10*3/UL

## 2018-06-21 PROCEDURE — 84520 ASSAY OF UREA NITROGEN: CPT

## 2018-06-21 PROCEDURE — 82565 ASSAY OF CREATININE: CPT

## 2018-06-21 PROCEDURE — 85025 COMPLETE CBC W/AUTO DIFF WBC: CPT

## 2018-06-21 PROCEDURE — 36415 COLL VENOUS BLD VENIPUNCTURE: CPT

## 2018-06-21 PROCEDURE — 80051 ELECTROLYTE PANEL: CPT

## 2018-06-21 PROCEDURE — 72146 MRI CHEST SPINE W/O DYE: CPT

## 2018-06-21 PROCEDURE — 72148 MRI LUMBAR SPINE W/O DYE: CPT

## 2018-06-22 DIAGNOSIS — F41.9 ANXIETY: ICD-10-CM

## 2018-06-22 RX ORDER — LORAZEPAM 0.5 MG/1
TABLET ORAL
Qty: 90 TABLET | Refills: 0 | Status: SHIPPED | OUTPATIENT
Start: 2018-06-22 | End: 2018-07-22

## 2018-06-25 ENCOUNTER — ANESTHESIA EVENT (OUTPATIENT)
Dept: OPERATING ROOM | Age: 58
End: 2018-06-25
Payer: MEDICARE

## 2018-06-26 ENCOUNTER — HOSPITAL ENCOUNTER (OUTPATIENT)
Age: 58
Setting detail: OUTPATIENT SURGERY
Discharge: HOME HEALTH | End: 2018-06-26
Attending: ORTHOPAEDIC SURGERY | Admitting: ORTHOPAEDIC SURGERY
Payer: MEDICARE

## 2018-06-26 ENCOUNTER — ANESTHESIA (OUTPATIENT)
Dept: OPERATING ROOM | Age: 58
End: 2018-06-26
Payer: MEDICARE

## 2018-06-26 ENCOUNTER — CARE COORDINATION (OUTPATIENT)
Dept: CARE COORDINATION | Age: 58
End: 2018-06-26

## 2018-06-26 ENCOUNTER — APPOINTMENT (OUTPATIENT)
Dept: GENERAL RADIOLOGY | Age: 58
End: 2018-06-26
Attending: ORTHOPAEDIC SURGERY
Payer: MEDICARE

## 2018-06-26 VITALS
RESPIRATION RATE: 16 BRPM | SYSTOLIC BLOOD PRESSURE: 135 MMHG | BODY MASS INDEX: 19.21 KG/M2 | HEIGHT: 65 IN | DIASTOLIC BLOOD PRESSURE: 76 MMHG | WEIGHT: 115.3 LBS | TEMPERATURE: 97.5 F | OXYGEN SATURATION: 83 % | HEART RATE: 96 BPM

## 2018-06-26 PROBLEM — M80.88XA PATHOLOGICAL FRACTURE OF LUMBAR VERTEBRA DUE TO SECONDARY OSTEOPOROSIS (HCC): Status: ACTIVE | Noted: 2018-06-26

## 2018-06-26 PROBLEM — M80.00XA OSTEOPOROSIS WITH CURRENT PATHOLOGICAL FRACTURE: Status: ACTIVE | Noted: 2018-06-26

## 2018-06-26 PROCEDURE — 71045 X-RAY EXAM CHEST 1 VIEW: CPT

## 2018-06-26 PROCEDURE — 2580000003 HC RX 258: Performed by: ANESTHESIOLOGY

## 2018-06-26 RX ORDER — SODIUM CHLORIDE, SODIUM LACTATE, POTASSIUM CHLORIDE, CALCIUM CHLORIDE 600; 310; 30; 20 MG/100ML; MG/100ML; MG/100ML; MG/100ML
INJECTION, SOLUTION INTRAVENOUS CONTINUOUS
Status: DISCONTINUED | OUTPATIENT
Start: 2018-06-27 | End: 2018-06-26 | Stop reason: HOSPADM

## 2018-06-26 RX ORDER — LIDOCAINE HYDROCHLORIDE 10 MG/ML
1 INJECTION, SOLUTION EPIDURAL; INFILTRATION; INTRACAUDAL; PERINEURAL
Status: DISCONTINUED | OUTPATIENT
Start: 2018-06-26 | End: 2018-06-26 | Stop reason: HOSPADM

## 2018-06-26 RX ORDER — SODIUM CHLORIDE 9 MG/ML
INJECTION, SOLUTION INTRAVENOUS CONTINUOUS
Status: DISCONTINUED | OUTPATIENT
Start: 2018-06-27 | End: 2018-06-26

## 2018-06-26 RX ORDER — SODIUM CHLORIDE 0.9 % (FLUSH) 0.9 %
10 SYRINGE (ML) INJECTION PRN
Status: DISCONTINUED | OUTPATIENT
Start: 2018-06-26 | End: 2018-06-26 | Stop reason: HOSPADM

## 2018-06-26 RX ORDER — SODIUM CHLORIDE 0.9 % (FLUSH) 0.9 %
10 SYRINGE (ML) INJECTION EVERY 12 HOURS SCHEDULED
Status: DISCONTINUED | OUTPATIENT
Start: 2018-06-26 | End: 2018-06-26 | Stop reason: HOSPADM

## 2018-06-26 RX ADMIN — SODIUM CHLORIDE, POTASSIUM CHLORIDE, SODIUM LACTATE AND CALCIUM CHLORIDE: 600; 310; 30; 20 INJECTION, SOLUTION INTRAVENOUS at 06:52

## 2018-06-26 ASSESSMENT — PAIN DESCRIPTION - DESCRIPTORS: DESCRIPTORS: STABBING;CONSTANT

## 2018-07-02 DIAGNOSIS — F51.01 PRIMARY INSOMNIA: ICD-10-CM

## 2018-07-03 ENCOUNTER — CARE COORDINATION (OUTPATIENT)
Dept: CARE COORDINATION | Age: 58
End: 2018-07-03

## 2018-07-03 RX ORDER — ZOLPIDEM TARTRATE 5 MG/1
TABLET ORAL
Qty: 30 TABLET | Refills: 0 | Status: SHIPPED | OUTPATIENT
Start: 2018-07-03 | End: 2018-07-09 | Stop reason: SDUPTHER

## 2018-07-09 DIAGNOSIS — F51.01 PRIMARY INSOMNIA: ICD-10-CM

## 2018-07-09 RX ORDER — ZOLPIDEM TARTRATE 5 MG/1
TABLET ORAL
Qty: 30 TABLET | Refills: 0 | Status: SHIPPED | OUTPATIENT
Start: 2018-07-09 | End: 2018-08-07 | Stop reason: SDUPTHER

## 2018-07-10 ENCOUNTER — CARE COORDINATION (OUTPATIENT)
Dept: CARE COORDINATION | Age: 58
End: 2018-07-10

## 2018-07-10 NOTE — CARE COORDINATION
NAUSEA OR VOMITING 4/30/18   Gin Patel, DO   pantoprazole (PROTONIX) 40 MG tablet Take 1 tablet by mouth every morning (before breakfast) 4/19/18   Gin Patel, DO   Fluticasone Furoate-Vilanterol (BREO ELLIPTA) 200-25 MCG/INH AEPB Inhale 1 puff into the lungs daily    Historical Provider, MD   Umeclidinium Bromide (INCRUSE ELLIPTA) 62.5 MCG/INH AEPB Inhale 1 puff into the lungs daily    Historical Provider, MD   loperamide (IMODIUM) 2 MG capsule Take 2 mg by mouth 3 times daily as needed for Diarrhea    Historical Provider, MD   OLANZapine (ZYPREXA) 10 MG tablet Take 1 tablet by mouth nightly 2/16/18   Gin Patel, DO   ipratropium-albuterol (DUONEB) 0.5-2.5 (3) MG/3ML SOLN nebulizer solution Inhale 3 mLs into the lungs every 4 hours (while awake) 11/10/17   Aldair De Santiago,    calcium-vitamin D (OSCAL-500) 500-200 MG-UNIT per tablet Take 1 tablet by mouth 2 times daily    Historical Provider, MD   Albuterol Sulfate (VENTOLIN HFA IN) Inhale 2 puffs into the lungs every 6 hours as needed     Historical Provider, MD       Future Appointments  Date Time Provider Bruce Dahl   7/13/2018 3:30 PM STA PAT RM 2 ST[facility] PAT [facility]

## 2018-07-11 RX ORDER — DEXAMETHASONE 4 MG/1
4 TABLET ORAL
Qty: 30 TABLET | Refills: 0 | Status: SHIPPED | OUTPATIENT
Start: 2018-07-11 | End: 2018-10-10 | Stop reason: ALTCHOICE

## 2018-07-13 ENCOUNTER — HOSPITAL ENCOUNTER (OUTPATIENT)
Dept: PREADMISSION TESTING | Age: 58
Discharge: HOME | End: 2018-07-17
Payer: MEDICARE

## 2018-07-13 VITALS
TEMPERATURE: 98.2 F | HEART RATE: 122 BPM | OXYGEN SATURATION: 96 % | RESPIRATION RATE: 17 BRPM | SYSTOLIC BLOOD PRESSURE: 116 MMHG | WEIGHT: 110.7 LBS | HEIGHT: 66 IN | DIASTOLIC BLOOD PRESSURE: 75 MMHG | BODY MASS INDEX: 17.79 KG/M2

## 2018-07-13 LAB
ABSOLUTE EOS #: 0 K/UL (ref 0–0.4)
ABSOLUTE IMMATURE GRANULOCYTE: ABNORMAL K/UL (ref 0–0.3)
ABSOLUTE LYMPH #: 0.8 K/UL (ref 1–4.8)
ABSOLUTE MONO #: 0.5 K/UL (ref 0.2–0.8)
BASOPHILS # BLD: 2 % (ref 0–2)
BASOPHILS ABSOLUTE: 0.3 K/UL (ref 0–0.2)
DIFFERENTIAL TYPE: ABNORMAL
EOSINOPHILS RELATIVE PERCENT: 0 % (ref 1–4)
HCT VFR BLD CALC: 34.8 % (ref 41–53)
HEMOGLOBIN: 11.2 G/DL (ref 13.5–17.5)
IMMATURE GRANULOCYTES: ABNORMAL %
LYMPHOCYTES # BLD: 7 % (ref 24–44)
MCH RBC QN AUTO: 29.6 PG (ref 26–34)
MCHC RBC AUTO-ENTMCNC: 32.1 G/DL (ref 31–37)
MCV RBC AUTO: 92.3 FL (ref 80–100)
MONOCYTES # BLD: 4 % (ref 1–7)
NRBC AUTOMATED: ABNORMAL PER 100 WBC
PDW BLD-RTO: 19.6 % (ref 11.5–14.5)
PLATELET # BLD: 197 K/UL (ref 130–400)
PLATELET ESTIMATE: ABNORMAL
PMV BLD AUTO: 7 FL (ref 6–12)
RBC # BLD: 3.77 M/UL (ref 4.5–5.9)
RBC # BLD: ABNORMAL 10*6/UL
SEG NEUTROPHILS: 87 % (ref 36–66)
SEGMENTED NEUTROPHILS ABSOLUTE COUNT: 10.4 K/UL (ref 1.8–7.7)
WBC # BLD: 12 K/UL (ref 3.5–11)
WBC # BLD: ABNORMAL 10*3/UL

## 2018-07-13 PROCEDURE — 85025 COMPLETE CBC W/AUTO DIFF WBC: CPT

## 2018-07-13 PROCEDURE — 36415 COLL VENOUS BLD VENIPUNCTURE: CPT

## 2018-07-13 ASSESSMENT — PAIN DESCRIPTION - PROGRESSION: CLINICAL_PROGRESSION: NOT CHANGED

## 2018-07-13 ASSESSMENT — PAIN DESCRIPTION - ONSET: ONSET: AWAKENED FROM SLEEP

## 2018-07-13 ASSESSMENT — PAIN DESCRIPTION - FREQUENCY: FREQUENCY: CONTINUOUS

## 2018-07-13 ASSESSMENT — PAIN SCALES - GENERAL: PAINLEVEL_OUTOF10: 7

## 2018-07-13 ASSESSMENT — PAIN DESCRIPTION - ORIENTATION: ORIENTATION: LOWER

## 2018-07-13 ASSESSMENT — PAIN DESCRIPTION - PAIN TYPE: TYPE: CHRONIC PAIN

## 2018-07-13 ASSESSMENT — PAIN DESCRIPTION - LOCATION: LOCATION: BACK

## 2018-07-13 ASSESSMENT — PAIN DESCRIPTION - DESCRIPTORS: DESCRIPTORS: STABBING

## 2018-07-13 NOTE — H&P
History and Physical Service   [facility]    HISTORY AND PHYSICAL EXAMINATION            Date of Evaluation: 7/13/2018  Patient name:  Radha Jones  MRN:   [id number]  YOB: 1960  PCP:    Bebo Tomas DO    History Obtained From:     Patient    History of Present Illness: This is Radha Jones a 63 y.o. male who presents for a pre-admission testing appointment for an upcoming kyphoplasty L1 by Dr. Rocio Stewart scheduled on 07- at 1100 due to L1 compression fracture. The  patient's chief complaint is constant, 2-10/10 back pain that radiates to the bilateral thighs and has progressively worsened since 06- when the pt fell after \"losing balance\". Pt denies head injury with the fall. Pt went to [facility] after the fall and had a CT scan of his back. Back pain is aggravated by bending over, standing, and walking; and is minimally relieved with rest, oxycontin, and oxycodone. Chronic numbness, tingling, and weakness in bilateral legs. Pt states he has generalized weakness from chemotherapy. Pt has lung cancer and is oxygen dependent. Last chemotherapy treatment was on 07-. Pt fell on 06-, but denies head injury with the fall. Denies falls and injury since 06-. Denies loss of urinary and bowel function. Patient presents for surgical correction. History of hypertension, cardiac arrest due to a respiratory disorder, COPD, epidermoid carcinoma of lung, head injury in 1984, and seizures (Last seizure was December 2016). Residual right sided weakness due to head injury. Stress test: 10-. Alert and oriented without apparent distress. Denies chest pain, dyspnea, and dizziness. Follows-up with [facility] pulmonology. Pt was scheduled for a kyphoplasty L1 on 06- by Dr. Rocio Stewart. This surgery was canceled by Dr. Be Gresham from anesthesiology due to compromised respiratory status.   A chest X-ray was ordered on 06-. Please see results below. Past Medical History:     Past Medical History:   Diagnosis Date    Benign essential HTN 12/10/2016    no cardiologist and on no medication    Cardiac arrest due to respiratory disorder ([address] 12/10/2016    \"had a seizure and aspirated vomit, then arrested\"    Cellulitis     Chronic low back pain 8/16/2016    Compression fracture of L1 lumbar vertebra ([address] 06/2018    COPD (chronic obstructive pulmonary disease) ([address] 2013    Emphysema of lung ([address]     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    last dose of chemo was 6/19/2018    Head injury     July 1984, [place] hit head on bottom, Halo placed    Hepatitis     patient unsure    Insomnia     Osteoarthritis of spine with radiculopathy, lumbar region 8/15/2017    Oxygen dependent     2l NC at HS and when out in car or errands per pt.  Reflux     Right sided weakness     due to head injury in 1984    Seizures ([address]     last one Dec 2016 no problems since and no medication (patient stated he stopped drinking)    Tremor     hx of tremor    Ulcer ([address]         Past Surgical History:     Past Surgical History:   Procedure Laterality Date    CERVICAL DISC SURGERY  2000    c3-c6 fusion    COLONOSCOPY      CYSTOSCOPY  7/27/2015    DILATATION, ESOPHAGUS      ulcer repair    ENDOSCOPY, COLON, DIAGNOSTIC      EGD    TRACHEOSTOMY      1984 with broken neck    TUNNELED VENOUS PORT PLACEMENT          Medications Prior to Admission:     Prior to Admission medications    Medication Sig Start Date End Date Taking? Authorizing Provider   dexamethasone (DECADRON) 4 MG tablet Take 1 tablet by mouth daily (with breakfast) 7/11/18   Gin Patel DO   LYRICA 25 MG capsule Take 1 capsule by mouth 3 times daily for 30 days. . 7/9/18 8/8/18  Gin Patel DO   zolpidem (AMBIEN) 5 MG tablet TAKE 1 TABLET BY MOUTH NIGHTLY AS NEEDED FOR SLEEP. 7/9/18 8/8/18  Gin Patel DO   LORazepam (ATIVAN) 0.5 MG tablet TAKE 1 TABLET BY MOUTH EVERY 6 HOURS AS NEEDED FOR ANXIETY (FILL 5/26) 6/22/18 7/22/18  Gin Patel DO   oxyCODONE HCl (OXY-IR) 10 MG immediate release tablet TAKE 1 TABLET BY MOUTH EVERY 6 HOURS AS NEEDED FOR PAIN 6/20/18 7/20/18  Gin Patel, DO   OXYCONTIN 40 MG extended release tablet Take 1 tablet by mouth every 12 hours for 30 days. . 6/20/18 7/20/18  Gin Patel, DO   ondansetron (ZOFRAN) 8 MG tablet TAKE 1 TABLET BY MOUTH EVERY 8 HOURS AS NEEDED FOR NAUSEA OR VOMITING 4/30/18   Gin Patel, DO   pantoprazole (PROTONIX) 40 MG tablet Take 1 tablet by mouth every morning (before breakfast) 4/19/18   Gin Patel, DO   Fluticasone Furoate-Vilanterol (BREO ELLIPTA) 200-25 MCG/INH AEPB Inhale 1 puff into the lungs daily    Historical Provider, MD   Umeclidinium Bromide (INCRUSE ELLIPTA) 62.5 MCG/INH AEPB Inhale 1 puff into the lungs daily    Historical Provider, MD   loperamide (IMODIUM) 2 MG capsule Take 2 mg by mouth 3 times daily as needed for Diarrhea    Historical Provider, MD   OLANZapine (ZYPREXA) 10 MG tablet Take 1 tablet by mouth nightly 2/16/18   Gin Patel DO   ipratropium-albuterol (DUONEB) 0.5-2.5 (3) MG/3ML SOLN nebulizer solution Inhale 3 mLs into the lungs every 4 hours (while awake) 11/10/17   Aldair De Santiago, DO   calcium-vitamin D (OSCAL-500) 500-200 MG-UNIT per tablet Take 1 tablet by mouth 2 times daily    Historical Provider, MD   Albuterol Sulfate (VENTOLIN HFA IN) Inhale 2 puffs into the lungs every 6 hours as needed     Historical Provider, MD        Allergies:     Aspirin; Fish-derived products; Shellfish-derived products; and Motrin [ibuprofen micronized]    Social History:     Tobacco:    reports that he quit smoking 5 days ago. His smoking use included Cigarettes. He has a 20.00 pack-year smoking history. He has never used smokeless tobacco.  Alcohol:      reports that he does not drink alcohol. Drug Use:  reports that he does not use drugs.   Safety: Pt has a shower 11.2 (L) 13.5 - 17.5 g/dL    Hematocrit 34.8 (L) 41 - 53 %    MCV 92.3 80 - 100 fL    MCH 29.6 26 - 34 pg    MCHC 32.1 31 - 37 g/dL    RDW 19.6 (H) 11.5 - 14.5 %    Platelets 602 236 - 659 k/uL    MPV 7.0 6.0 - 12.0 fL    NRBC Automated NOT REPORTED per 100 WBC    Differential Type NOT REPORTED     Immature Granulocytes NOT REPORTED 0 %    Absolute Immature Granulocyte NOT REPORTED 0.00 - 0.30 k/uL    WBC Morphology NOT REPORTED     RBC Morphology NOT REPORTED     Platelet Estimate NOT REPORTED     Seg Neutrophils 87 (H) 36 - 66 %    Lymphocytes 7 (L) 24 - 44 %    Monocytes 4 1 - 7 %    Eosinophils % 0 (L) 1 - 4 %    Basophils 2 0 - 2 %    Segs Absolute 10.40 (H) 1.8 - 7.7 k/uL    Absolute Lymph # 0.80 (L) 1.0 - 4.8 k/uL    Absolute Mono # 0.50 0.2 - 0.8 k/uL    Absolute Eos # 0.00 0.0 - 0.4 k/uL    Basophils # 0.30 (H) 0.0 - 0.2 k/uL       Recent Labs      07/13/18   1536  06/21/18   1108   HGB  11.2*  12.3*   HCT  34.8*  38.2*   WBC  12.0*  12.1*   MCV  92.3  90.8   NA   --   137   K   --   4.4   CL   --   96*   CO2   --   28   BUN   --   18   CREATININE   --   0.70       Imaging/Diagnostics:    Xr Lumbar Spine (2-3 Views)  Result Date: 6/16/2018    Impression   Mild interval decrease height of L1 vertebral body with anterior wedging an   some superior endplate irregularity most suggestive of acute fracture   involving the superior endplate.  Please correlate with point tenderness. CT Lumbar Spine Wo Contrast  Result Date: 6/16/2018  Impression   1. Acute fracture involving superior endplate of L1 with minimal decrease in   height. 2. Incidental note of right lower lobe posteromedial cluster of lung nodules   about 12 mm.  This has been reported on the prior chest CT of 03/24/2018. The appears slightly increased in size and there also 2 new 5 mm adjacent   nodules.  Dedicated CT chest may be obtained on non emergency basis.         MRI Thoracic Spine Wo Contrast  Result Date: 6/21/2018  Impression   1.

## 2018-07-13 NOTE — PRE-PROCEDURE INSTRUCTIONS
[facility]IVE AT [facility] ON Thursday, 7/19/18 at 9:00 AM    Once you enter the hospital lobby, take the elevators to the second floor. Check-In is at the surgery registration desk. If you have been given a blood band, you must bring it with you the day of surgery. Continue to take your home medications as you normally do up to and including the night before surgery with the exception of any blood thinning medications. Please stop any blood thinning medications as directed by your surgeon or prescribing physician. Failure to stop certain medications may interfere with your scheduled surgery. These may include:  Aspirin, Warfarin (Coumadin), Clopidogrel (Plavix), Ibuprofen (Motrin, Advil), Naproxen (Aleve), Meloxicam (Mobic), Celecoxib (Celebrex), Eliquis, Pradaxa, Xarelto, Effient, Fish Oil, Herbal supplements. If you are diabetic, do not take any of your diabetic medications by mouth the morning of surgery. If you are taking insulin contact the doctor that manages your diabetes for instructions about any changes to your insulin dosages the day before surgery. Do not inject insulin or other injectable diabetic medications the morning of surgery unless otherwise instructed by the doctor who manages your diabetes. Please take the following medication(s) the day of surgery with a small sip of water:  Lyrica, Pantoprazole. Please use your inhalers at home the day of surgery. PREPARING FOR YOUR SURGERY:     Before surgery, you can play an important role in your own health. Because skin is not sterile, we need to be sure that your skin is as free of germs as possible before surgery by carefully washing before surgery. Preparing or prepping skin before surgery can reduce the risk of a surgical site infection.   Do not shave the area of your body where your surgery will be performed unless you received specific permission from your physician.     You will need to shower at home the night medications you take, along with the dose of the medications and how often you take it. If more convenient bring the pharmacy bottles in a zip lock bag.  Brush your teeth but do not swallow water.  Bring your eyeglasses and case with you. No contacts are to be worn the day of surgery. You also may bring your hearing aids.  If you are on C-PAP or Bi-PAP at home and plan on staying in the hospital overnight for your surgery please bring the machine with you. · Do not wear any jewelry or body piercings day of surgery. Also, NO lotion, perfume or deodorant to be used the day of surgery. No nail polish on the operative extremity (arm/leg surgeries)    · Do not bring any valuables such as jewelry, cash, or credit cards. If you are staying overnight with us, please bring a small bag of personal items.  Please wear loose, comfortable clothing. If you are potentially going to have a cast or brace bring clothing that will fit over them.  In case of illness - If you have cold or flu like symptoms (high fever, runny nose, sore throat, cough, etc.) rash, nausea, vomiting, loose stools, and/or recent contact with someone who has a contagious disease (chicken pox, measles, etc.) Please call your doctor before coming to the hospital.     If your child is having surgery please make arrangements for any other children to be cared for at home on the day of surgery. Other children are not permitted in recovery room and we want you to be able to spend time with the patient. If other arrangements are not available then we suggest that you have a second adult to stay in the waiting room. Day of Surgery/Procedure:    As a patient at [place] [address] you can expect quality medical and nursing care that is centered on your individual needs.   Our goal is to make your surgical experience as comfortable as possible    . Transportation After Your Surgery/Procedure: You will need a friend or family member to drive you home after your procedure. Your  must be 25years of age or older and able to sign off on your discharge instructions. A taxi cab or any other form of public transportation is not acceptable. Your friend or family member must stay at the hospital throughout your procedure. Someone must remain with you for the first 24 hours after your surgery if you receive anesthesia or medication. If you do not have someone to stay with you, your procedure may be cancelled.       If you have any other questions regarding your procedure or the day of surgery, please call [phone]      _________________________  ____________________________  Signature (Patient)              Signature (Provider) & date

## 2018-07-18 ENCOUNTER — ANESTHESIA EVENT (OUTPATIENT)
Dept: OPERATING ROOM | Age: 58
End: 2018-07-18
Payer: MEDICARE

## 2018-07-19 ENCOUNTER — APPOINTMENT (OUTPATIENT)
Dept: GENERAL RADIOLOGY | Age: 58
End: 2018-07-19
Attending: ORTHOPAEDIC SURGERY
Payer: MEDICARE

## 2018-07-19 ENCOUNTER — HOSPITAL ENCOUNTER (OUTPATIENT)
Age: 58
Setting detail: OUTPATIENT SURGERY
Discharge: HOME | End: 2018-07-19
Attending: ORTHOPAEDIC SURGERY | Admitting: ORTHOPAEDIC SURGERY
Payer: MEDICARE

## 2018-07-19 ENCOUNTER — ANESTHESIA (OUTPATIENT)
Dept: OPERATING ROOM | Age: 58
End: 2018-07-19
Payer: MEDICARE

## 2018-07-19 VITALS
BODY MASS INDEX: 17.68 KG/M2 | OXYGEN SATURATION: 97 % | HEART RATE: 112 BPM | SYSTOLIC BLOOD PRESSURE: 120 MMHG | DIASTOLIC BLOOD PRESSURE: 90 MMHG | RESPIRATION RATE: 17 BRPM | WEIGHT: 110 LBS | HEIGHT: 66 IN | TEMPERATURE: 97.2 F

## 2018-07-19 VITALS — OXYGEN SATURATION: 100 % | DIASTOLIC BLOOD PRESSURE: 62 MMHG | SYSTOLIC BLOOD PRESSURE: 87 MMHG

## 2018-07-19 DIAGNOSIS — M80.88XA PATHOLOGICAL FRACTURE OF LUMBAR VERTEBRA DUE TO SECONDARY OSTEOPOROSIS (HCC): Primary | ICD-10-CM

## 2018-07-19 PROBLEM — Z79.899 CHRONIC PRESCRIPTION BENZODIAZEPINE USE: Status: RESOLVED | Noted: 2017-08-15 | Resolved: 2018-07-19

## 2018-07-19 PROCEDURE — 72100 X-RAY EXAM L-S SPINE 2/3 VWS: CPT

## 2018-07-19 PROCEDURE — 88342 IMHCHEM/IMCYTCHM 1ST ANTB: CPT

## 2018-07-19 PROCEDURE — 6360000002 HC RX W HCPCS: Performed by: ORTHOPAEDIC SURGERY

## 2018-07-19 PROCEDURE — 88341 IMHCHEM/IMCYTCHM EA ADD ANTB: CPT

## 2018-07-19 PROCEDURE — 6370000000 HC RX 637 (ALT 250 FOR IP): Performed by: NURSE ANESTHETIST, CERTIFIED REGISTERED

## 2018-07-19 PROCEDURE — 6360000002 HC RX W HCPCS: Performed by: NURSE ANESTHETIST, CERTIFIED REGISTERED

## 2018-07-19 PROCEDURE — 3600000012 HC SURGERY LEVEL 2 ADDTL 15MIN: Performed by: ORTHOPAEDIC SURGERY

## 2018-07-19 PROCEDURE — 2500000003 HC RX 250 WO HCPCS: Performed by: ORTHOPAEDIC SURGERY

## 2018-07-19 PROCEDURE — 2580000003 HC RX 258: Performed by: NURSE ANESTHETIST, CERTIFIED REGISTERED

## 2018-07-19 PROCEDURE — 3700000001 HC ADD 15 MINUTES (ANESTHESIA): Performed by: ORTHOPAEDIC SURGERY

## 2018-07-19 PROCEDURE — 2500000003 HC RX 250 WO HCPCS: Performed by: NURSE ANESTHETIST, CERTIFIED REGISTERED

## 2018-07-19 PROCEDURE — 7100000001 HC PACU RECOVERY - ADDTL 15 MIN: Performed by: ORTHOPAEDIC SURGERY

## 2018-07-19 PROCEDURE — C1713 ANCHOR/SCREW BN/BN,TIS/BN: HCPCS | Performed by: ORTHOPAEDIC SURGERY

## 2018-07-19 PROCEDURE — 88307 TISSUE EXAM BY PATHOLOGIST: CPT

## 2018-07-19 PROCEDURE — 3700000000 HC ANESTHESIA ATTENDED CARE: Performed by: ORTHOPAEDIC SURGERY

## 2018-07-19 PROCEDURE — 3600000002 HC SURGERY LEVEL 2 BASE: Performed by: ORTHOPAEDIC SURGERY

## 2018-07-19 PROCEDURE — 2709999900 HC NON-CHARGEABLE SUPPLY: Performed by: ORTHOPAEDIC SURGERY

## 2018-07-19 PROCEDURE — 6370000000 HC RX 637 (ALT 250 FOR IP): Performed by: ANESTHESIOLOGY

## 2018-07-19 PROCEDURE — C1894 INTRO/SHEATH, NON-LASER: HCPCS | Performed by: ORTHOPAEDIC SURGERY

## 2018-07-19 PROCEDURE — 7100000000 HC PACU RECOVERY - FIRST 15 MIN: Performed by: ORTHOPAEDIC SURGERY

## 2018-07-19 PROCEDURE — 88311 DECALCIFY TISSUE: CPT

## 2018-07-19 PROCEDURE — 2580000003 HC RX 258: Performed by: ANESTHESIOLOGY

## 2018-07-19 PROCEDURE — 6360000004 HC RX CONTRAST MEDICATION: Performed by: ORTHOPAEDIC SURGERY

## 2018-07-19 PROCEDURE — 3209999900 FLUORO FOR SURGICAL PROCEDURES

## 2018-07-19 PROCEDURE — 2720000010 HC SURG SUPPLY STERILE: Performed by: ORTHOPAEDIC SURGERY

## 2018-07-19 DEVICE — CEMENT C01A KYPHX HV-R BONE CEMENT EN
Type: IMPLANTABLE DEVICE | Site: SPINE LUMBAR | Status: FUNCTIONAL
Brand: KYPHON® HV-R® BONE CEMENT

## 2018-07-19 RX ORDER — IPRATROPIUM BROMIDE AND ALBUTEROL SULFATE 2.5; .5 MG/3ML; MG/3ML
1 SOLUTION RESPIRATORY (INHALATION) ONCE
Status: COMPLETED | OUTPATIENT
Start: 2018-07-19 | End: 2018-07-19

## 2018-07-19 RX ORDER — SODIUM CHLORIDE 9 MG/ML
INJECTION, SOLUTION INTRAVENOUS CONTINUOUS
Status: DISCONTINUED | OUTPATIENT
Start: 2018-07-20 | End: 2018-07-19 | Stop reason: HOSPADM

## 2018-07-19 RX ORDER — PROPOFOL 10 MG/ML
INJECTION, EMULSION INTRAVENOUS PRN
Status: DISCONTINUED | OUTPATIENT
Start: 2018-07-19 | End: 2018-07-19 | Stop reason: SDUPTHER

## 2018-07-19 RX ORDER — FENTANYL CITRATE 50 UG/ML
INJECTION, SOLUTION INTRAMUSCULAR; INTRAVENOUS PRN
Status: DISCONTINUED | OUTPATIENT
Start: 2018-07-19 | End: 2018-07-19 | Stop reason: SDUPTHER

## 2018-07-19 RX ORDER — ONDANSETRON 2 MG/ML
4 INJECTION INTRAMUSCULAR; INTRAVENOUS
Status: DISCONTINUED | OUTPATIENT
Start: 2018-07-19 | End: 2018-07-19 | Stop reason: HOSPADM

## 2018-07-19 RX ORDER — ALBUTEROL SULFATE 90 UG/1
AEROSOL, METERED RESPIRATORY (INHALATION) PRN
Status: DISCONTINUED | OUTPATIENT
Start: 2018-07-19 | End: 2018-07-19 | Stop reason: SDUPTHER

## 2018-07-19 RX ORDER — SODIUM CHLORIDE 0.9 % (FLUSH) 0.9 %
10 SYRINGE (ML) INJECTION EVERY 12 HOURS SCHEDULED
Status: DISCONTINUED | OUTPATIENT
Start: 2018-07-19 | End: 2018-07-19 | Stop reason: HOSPADM

## 2018-07-19 RX ORDER — SUCCINYLCHOLINE CHLORIDE 20 MG/ML
INJECTION INTRAMUSCULAR; INTRAVENOUS PRN
Status: DISCONTINUED | OUTPATIENT
Start: 2018-07-19 | End: 2018-07-19 | Stop reason: SDUPTHER

## 2018-07-19 RX ORDER — LIDOCAINE HYDROCHLORIDE 10 MG/ML
1 INJECTION, SOLUTION EPIDURAL; INFILTRATION; INTRACAUDAL; PERINEURAL
Status: DISCONTINUED | OUTPATIENT
Start: 2018-07-20 | End: 2018-07-19 | Stop reason: HOSPADM

## 2018-07-19 RX ORDER — SODIUM CHLORIDE 0.9 % (FLUSH) 0.9 %
10 SYRINGE (ML) INJECTION PRN
Status: DISCONTINUED | OUTPATIENT
Start: 2018-07-19 | End: 2018-07-19 | Stop reason: HOSPADM

## 2018-07-19 RX ORDER — SODIUM CHLORIDE, SODIUM LACTATE, POTASSIUM CHLORIDE, CALCIUM CHLORIDE 600; 310; 30; 20 MG/100ML; MG/100ML; MG/100ML; MG/100ML
INJECTION, SOLUTION INTRAVENOUS CONTINUOUS PRN
Status: DISCONTINUED | OUTPATIENT
Start: 2018-07-19 | End: 2018-07-19 | Stop reason: SDUPTHER

## 2018-07-19 RX ORDER — SODIUM CHLORIDE, SODIUM LACTATE, POTASSIUM CHLORIDE, CALCIUM CHLORIDE 600; 310; 30; 20 MG/100ML; MG/100ML; MG/100ML; MG/100ML
INJECTION, SOLUTION INTRAVENOUS CONTINUOUS
Status: DISCONTINUED | OUTPATIENT
Start: 2018-07-20 | End: 2018-07-19 | Stop reason: HOSPADM

## 2018-07-19 RX ORDER — BUPIVACAINE HYDROCHLORIDE AND EPINEPHRINE 5; 5 MG/ML; UG/ML
INJECTION, SOLUTION EPIDURAL; INTRACAUDAL; PERINEURAL PRN
Status: DISCONTINUED | OUTPATIENT
Start: 2018-07-19 | End: 2018-07-19 | Stop reason: HOSPADM

## 2018-07-19 RX ORDER — FENTANYL CITRATE 50 UG/ML
50 INJECTION, SOLUTION INTRAMUSCULAR; INTRAVENOUS EVERY 5 MIN PRN
Status: DISCONTINUED | OUTPATIENT
Start: 2018-07-19 | End: 2018-07-19 | Stop reason: HOSPADM

## 2018-07-19 RX ORDER — FENTANYL CITRATE 50 UG/ML
25 INJECTION, SOLUTION INTRAMUSCULAR; INTRAVENOUS EVERY 5 MIN PRN
Status: DISCONTINUED | OUTPATIENT
Start: 2018-07-19 | End: 2018-07-19 | Stop reason: HOSPADM

## 2018-07-19 RX ORDER — LIDOCAINE HYDROCHLORIDE 20 MG/ML
INJECTION, SOLUTION EPIDURAL; INFILTRATION; INTRACAUDAL; PERINEURAL PRN
Status: DISCONTINUED | OUTPATIENT
Start: 2018-07-19 | End: 2018-07-19 | Stop reason: SDUPTHER

## 2018-07-19 RX ADMIN — PHENYLEPHRINE HYDROCHLORIDE 200 MCG: 10 INJECTION INTRAVENOUS at 11:19

## 2018-07-19 RX ADMIN — PHENYLEPHRINE HYDROCHLORIDE 100 MCG: 10 INJECTION INTRAVENOUS at 11:22

## 2018-07-19 RX ADMIN — Medication 60 MG: at 11:09

## 2018-07-19 RX ADMIN — PROPOFOL 100 MG: 10 INJECTION, EMULSION INTRAVENOUS at 11:09

## 2018-07-19 RX ADMIN — FENTANYL CITRATE 50 MCG: 50 INJECTION, SOLUTION INTRAMUSCULAR; INTRAVENOUS at 11:09

## 2018-07-19 RX ADMIN — CEFAZOLIN SODIUM 2 G: 2 SOLUTION INTRAVENOUS at 11:19

## 2018-07-19 RX ADMIN — SODIUM CHLORIDE, POTASSIUM CHLORIDE, SODIUM LACTATE AND CALCIUM CHLORIDE: 600; 310; 30; 20 INJECTION, SOLUTION INTRAVENOUS at 09:53

## 2018-07-19 RX ADMIN — LIDOCAINE HYDROCHLORIDE 50 MG: 20 INJECTION, SOLUTION EPIDURAL; INFILTRATION; INTRACAUDAL; PERINEURAL at 11:09

## 2018-07-19 RX ADMIN — ALBUTEROL SULFATE 6 PUFF: 90 AEROSOL, METERED RESPIRATORY (INHALATION) at 11:24

## 2018-07-19 RX ADMIN — IPRATROPIUM BROMIDE AND ALBUTEROL SULFATE 1 AMPULE: .5; 3 SOLUTION RESPIRATORY (INHALATION) at 12:31

## 2018-07-19 RX ADMIN — PHENYLEPHRINE HYDROCHLORIDE 100 MCG: 10 INJECTION INTRAVENOUS at 11:21

## 2018-07-19 RX ADMIN — SODIUM CHLORIDE, POTASSIUM CHLORIDE, SODIUM LACTATE AND CALCIUM CHLORIDE: 600; 310; 30; 20 INJECTION, SOLUTION INTRAVENOUS at 11:02

## 2018-07-19 ASSESSMENT — PULMONARY FUNCTION TESTS
PIF_VALUE: 27
PIF_VALUE: 35
PIF_VALUE: 31
PIF_VALUE: 24
PIF_VALUE: 25
PIF_VALUE: 35
PIF_VALUE: 23
PIF_VALUE: 15
PIF_VALUE: 24
PIF_VALUE: 2
PIF_VALUE: 24
PIF_VALUE: 26
PIF_VALUE: 24
PIF_VALUE: 36
PIF_VALUE: 35
PIF_VALUE: 3
PIF_VALUE: 24
PIF_VALUE: 17
PIF_VALUE: 23
PIF_VALUE: 24
PIF_VALUE: 23
PIF_VALUE: 36
PIF_VALUE: 25
PIF_VALUE: 29
PIF_VALUE: 24
PIF_VALUE: 24
PIF_VALUE: 36
PIF_VALUE: 30
PIF_VALUE: 23
PIF_VALUE: 0
PIF_VALUE: 0
PIF_VALUE: 3
PIF_VALUE: 20
PIF_VALUE: 24
PIF_VALUE: 35
PIF_VALUE: 1
PIF_VALUE: 25
PIF_VALUE: 26
PIF_VALUE: 24

## 2018-07-19 ASSESSMENT — PAIN SCALES - GENERAL
PAINLEVEL_OUTOF10: 0

## 2018-07-19 ASSESSMENT — LIFESTYLE VARIABLES: SMOKING_STATUS: 1

## 2018-07-19 ASSESSMENT — PAIN - FUNCTIONAL ASSESSMENT: PAIN_FUNCTIONAL_ASSESSMENT: 0-10

## 2018-07-19 ASSESSMENT — PAIN DESCRIPTION - DESCRIPTORS: DESCRIPTORS: STABBING

## 2018-07-19 NOTE — H&P (VIEW-ONLY)
History and Physical Service   [place] [id number]    HISTORY AND PHYSICAL EXAMINATION            Date of Evaluation: 7/13/2018  Patient name:  Ade Roger  MRN:   [id number]  YOB: 1960  PCP:    Alma Fitzgerald DO    History Obtained From:     Patient    History of Present Illness: This is Ade Roger a 63 y.o. male who presents for a pre-admission testing appointment for an upcoming kyphoplasty L1 by Dr. Michelle Parson scheduled on 07- at 1100 due to L1 compression fracture. The  patient's chief complaint is constant, 2-10/10 back pain that radiates to the bilateral thighs and has progressively worsened since 06- when the pt fell after \"losing balance\". Pt denies head injury with the fall. Pt went to [facility] after the fall and had a CT scan of his back. Back pain is aggravated by bending over, standing, and walking; and is minimally relieved with rest, oxycontin, and oxycodone. Chronic numbness, tingling, and weakness in bilateral legs. Pt states he has generalized weakness from chemotherapy. Pt has lung cancer and is oxygen dependent. Last chemotherapy treatment was on 07-. Pt fell on 06-, but denies head injury with the fall. Denies falls and injury since 06-. Denies loss of urinary and bowel function. Patient presents for surgical correction. History of hypertension, cardiac arrest due to a respiratory disorder, COPD, epidermoid carcinoma of lung, head injury in 1984, and seizures (Last seizure was December 2016). Residual right sided weakness due to head injury. Stress test: 10-. Alert and oriented without apparent distress. Denies chest pain, dyspnea, and dizziness. Follows-up with [facility] pulmonology. Pt was scheduled for a kyphoplasty L1 on 06- by Dr. Michelle Parson. This surgery was canceled by Dr. Josiane Lopez from anesthesiology due to compromised respiratory status.   A chest X-ray was ordered on TAKE 1 TABLET BY MOUTH EVERY 6 HOURS AS NEEDED FOR ANXIETY (FILL 5/26) 6/22/18 7/22/18  Gin Patel DO   oxyCODONE HCl (OXY-IR) 10 MG immediate release tablet TAKE 1 TABLET BY MOUTH EVERY 6 HOURS AS NEEDED FOR PAIN 6/20/18 7/20/18  Gin Patel, DO   OXYCONTIN 40 MG extended release tablet Take 1 tablet by mouth every 12 hours for 30 days. . 6/20/18 7/20/18  Gin Patel, DO   ondansetron (ZOFRAN) 8 MG tablet TAKE 1 TABLET BY MOUTH EVERY 8 HOURS AS NEEDED FOR NAUSEA OR VOMITING 4/30/18   Gin Patel, DO   pantoprazole (PROTONIX) 40 MG tablet Take 1 tablet by mouth every morning (before breakfast) 4/19/18   Gin Patel, DO   Fluticasone Furoate-Vilanterol (BREO ELLIPTA) 200-25 MCG/INH AEPB Inhale 1 puff into the lungs daily    Historical Provider, MD   Umeclidinium Bromide (INCRUSE ELLIPTA) 62.5 MCG/INH AEPB Inhale 1 puff into the lungs daily    Historical Provider, MD   loperamide (IMODIUM) 2 MG capsule Take 2 mg by mouth 3 times daily as needed for Diarrhea    Historical Provider, MD   OLANZapine (ZYPREXA) 10 MG tablet Take 1 tablet by mouth nightly 2/16/18   Gin Patel DO   ipratropium-albuterol (DUONEB) 0.5-2.5 (3) MG/3ML SOLN nebulizer solution Inhale 3 mLs into the lungs every 4 hours (while awake) 11/10/17   Aldair De Santiago, DO   calcium-vitamin D (OSCAL-500) 500-200 MG-UNIT per tablet Take 1 tablet by mouth 2 times daily    Historical Provider, MD   Albuterol Sulfate (VENTOLIN HFA IN) Inhale 2 puffs into the lungs every 6 hours as needed     Historical Provider, MD        Allergies:     Aspirin; Fish-derived products; Shellfish-derived products; and Motrin [ibuprofen micronized]    Social History:     Tobacco:    reports that he quit smoking 5 days ago. His smoking use included Cigarettes. He has a 20.00 pack-year smoking history. He has never used smokeless tobacco.  Alcohol:      reports that he does not drink alcohol. Drug Use:  reports that he does not use drugs.   Safety: Pt has a shower numbness and tingling. Residual right sided weakness. Negative for headaches. BEHAVIOR/PSYCH: \"A little bit of depression and anxiety\" due to cancer. Physical Exam:   /75   Pulse 122   Temp 98.2 °F (36.8 °C) (Oral)   Resp 17   Ht 5' 6\" (1.676 m)   Wt 110 lb 11.2 oz (50.2 kg)   SpO2 96%   BMI 17.87 kg/m²     No results for input(s): POCGLU in the last 72 hours. General Appearance:  Alert, well appearing, and in no acute distress. Emaciated. Poor hygiene. Mental status: Oriented to person, place, and time. Head: Normocephalic and atraumatic. Eye: No icterus, redness, pupils equal and reactive, extraocular eye movements intact, and conjunctiva clear. Ear: Hearing grossly intact. Nose: No drainage noted. Mouth: Missing dentition. Upper denture noted. Airway is patent. Mucous membranes moist.  Neck: Supple and no carotid bruits noted. Lungs: Right lower lobe rhonchi. Diminished lung sounds in the right lower lobe. Pt is wearing 2 liters of oxygen per nasal cannula. Chemo port noted in right upper anterior chest. Lung sounds are clear in the left lobes, right middle lobe, and right upper lobe. No wheezing. Normal effort. Cardiovascular: Normal rate, regular rhythm, no murmur, gallop, and rub. Abdomen: Soft, non-tender, non-distended, and active bowel sounds. Neurologic: Right dorsal plantar flexion is 2/5. Left dorsal plantar flexion is 3/5. Bilateral hand grasps are 3/5. Voice is hoarse. Cranial nerves II through XII grossly intact. Skin: No gross lesions, rashes, bruising, or bleeding on exposed skin area. Extremities: Posterior tibial pulses 2+ bilaterally. No pedal edema. No calf tenderness with palpation. Psych: Agitated.      Investigations:      Laboratory Testing:  Recent Results (from the past 24 hour(s))   CBC Auto Differential    Collection Time: 07/13/18  3:36 PM   Result Value Ref Range    WBC 12.0 (H) 3.5 - 11.0 k/uL    RBC 3.77 (L) 4.5 - 5.9 m/uL    Hemoglobin Partially imaged L1 compression fracture.  Please see separate MRI lumbar   spine report.       2. No acute findings within the thoracic spine.  No significant disc   herniation, spinal canal stenosis, or neural foraminal stenosis.       3. Nonspecific pulmonary opacity within right lung, with apparent right lower   lobe collapse.  Recommend dedicated evaluation with CT chest, if not   previously performed.       4. Degenerative changes of the cervical spine is only seen on localization   images. Previous postsurgical changes.           MRI Lumbar Spine Wo Contrast  Result Date: 2018    Impression   1. Acute to subacute superior endplate fracture of the L1 vertebral body   involving anterior and middle columns, with a mild amount of edema extending   into bilateral pedicles.  No evidence of significant retropulsion into spinal   canal.  No evidence of spondylolisthesis.       2. Markedly distended urinary bladder, with mild bilateral   hydroureteronephrosis.  Clinical correlation with chronic urinary bladder   outlet obstruction is recommended.  Consider follow-up renal ultrasound to   ensure resolution of the hydronephrosis.           Xr Chest Portable  Result Date: 2018    Impression   Persistent right basilar opacity, favoring combination of atelectasis and   scarring.  Superimposed airspace disease/ infectious process is possible in   the proper clinical setting.         [id number]. See paper chart    Diagnosis:      1. L1 compression fracture    Plans:     1.  Kyphoplasty L1       Annette Ratliff, MICHELLE - CNP  2018  4:47 PM

## 2018-07-19 NOTE — BRIEF OP NOTE
Brief Postoperative Note  ______________________________________________________________    Patient: Cherylene Nest  YOB: 1960  MRN: [id number]  Date of Procedure: 7/19/2018    Pre-Op Diagnosis: L1 COMPRESSION FX    Post-Op Diagnosis: Same       Procedure(s):  KYPHOPLASTY L1; biopsy; ap lat fluoro L spine    Anesthesia: Anesthesia type not filed in the log. Surgeon(s): Pavel Aldridge MD    Staff:  Crystal Fuelling Person First: Juan Manuel Snowden     Estimated Blood Loss: * No values recorded between 7/19/2018 11:02 AM and 7/19/2018 18:64 AM * mL    Complications: None    Specimens:   ID Type Source Tests Collected by Time Destination   A : L1 BONE BIOPSY Bone Spine SURGICAL PATHOLOGY Pavel Aldridge MD 7/19/2018 1128        Implants:    Implant Name Type Inv.  Item Serial No.  Lot No. LRB No. Used   CEMENT [facility] SPINAL KT Spine CEMENT [address] [place] [address] [id number] N/A 1         Drains:      Findings:     Pavel Aldridge MD  Date: 7/19/2018  Time: 11:35 AM

## 2018-07-19 NOTE — ANESTHESIA PRE PROCEDURE
SOLN nebulizer solution Inhale 3 mLs into the lungs every 4 hours (while awake) 11/10/17   Aldair De Santiago DO   calcium-vitamin D (OSCAL-500) 500-200 MG-UNIT per tablet Take 1 tablet by mouth 2 times daily    Historical Provider, MD   Albuterol Sulfate (VENTOLIN HFA IN) Inhale 2 puffs into the lungs every 6 hours as needed     Historical Provider, MD       Current medications:    No current facility-administered medications for this encounter. Facility-Administered Medications Ordered in Other Encounters   Medication Dose Route Frequency Provider Last Rate Last Dose    0.9 % sodium chloride infusion 250 mL  250 mL Intravenous Once Gin Patel DO           Allergies:     Allergies   Allergen Reactions    Aspirin     Fish-Derived Products Swelling    Shellfish-Derived Products      Other reaction(s): syncope/severe facial swelling/vomits    Motrin [Ibuprofen Micronized] Other (See Comments)     Upset stomach        Problem List:    Patient Active Problem List   Diagnosis Code    GERD (gastroesophageal reflux disease) K21.9    Chronic obstructive pulmonary disease (HCC) J44.9    Anxiety F41.9    Hypokalemia E87.6    Malnutrition ([address] E46    Anemia of chronic disease D63.8    Benign essential HTN I10    Anorexia R63.0    Mixed anxiety depressive disorder F41.8    Cachectic ([address] R64    Pain of metastatic malignancy G89.3    Chronic bilateral low back pain without sciatica M54.5, G89.29    Advance directive discussed with patient Z71.89    Mixed simple and mucopurulent chronic bronchitis ([facility]) J41.8    Encounter for palliative care Z51.5    Squamous cell carcinoma of lung ([facility]) C34.90    Constipation due to opioid therapy K59.03, T40.2X5A    Osteoarthritis of spine with radiculopathy, lumbar region M47.26    Cervical radiculopathy M54.12    Chronic prescription benzodiazepine use Z79.899    Failed neck syndrome M96.1    Acute upper GI bleed K92.2    Thrombocytopenia ([facility]) D69.6    [facility] SURGERY  2000    c3-c6 fusion    COLONOSCOPY      CYSTOSCOPY  7/27/2015    DILATATION, ESOPHAGUS      ulcer repair    ENDOSCOPY, COLON, DIAGNOSTIC      EGD    TRACHEOSTOMY      1984 with broken neck    TUNNELED VENOUS PORT PLACEMENT         Social History:    Social History   Substance Use Topics    Smoking status: Former Smoker     Packs/day: 0.50     Years: 40.00     Types: Cigarettes     Quit date: 7/8/2018    Smokeless tobacco: Never Used      Comment: Nicotine Patch causes Nausea/vomiting; He states it's too difficult to stop smoking.  Alcohol use No      Comment: quit 12/9/2017                                Counseling given: Not Answered      Vital Signs (Current): There were no vitals filed for this visit.                                            BP Readings from Last 3 Encounters:   07/13/18 116/75   06/26/18 135/76   06/20/18 117/80       NPO Status:                                                                                 BMI:   Wt Readings from Last 3 Encounters:   07/13/18 110 lb 11.2 oz (50.2 kg)   06/26/18 115 lb 4.8 oz (52.3 kg)   06/20/18 115 lb 4.8 oz (52.3 kg)     There is no height or weight on file to calculate BMI.    CBC:   Lab Results   Component Value Date    WBC 12.0 07/13/2018    RBC 3.77 07/13/2018    RBC 4.69 09/08/2011    HGB 11.2 07/13/2018    HCT 34.8 07/13/2018    MCV 92.3 07/13/2018    RDW 19.6 07/13/2018     07/13/2018     09/08/2011       CMP:   Lab Results   Component Value Date     06/21/2018    K 4.4 06/21/2018    CL 96 06/21/2018    CO2 28 06/21/2018    BUN 18 06/21/2018    CREATININE 0.70 06/21/2018    GFRAA >60 06/21/2018    LABGLOM >60 06/21/2018    GLUCOSE 99 03/24/2018    GLUCOSE 95 09/08/2011    PROT 5.5 03/20/2018    CALCIUM 8.4 03/24/2018    BILITOT 0.90 03/20/2018    ALKPHOS 52 03/20/2018    AST 42 03/20/2018    ALT 32 03/20/2018       POC Tests: No results for input(s): POCGLU, POCNA, POCK, POCCL, POCBUN,

## 2018-07-19 NOTE — INTERVAL H&P NOTE
AS NEEDED FOR NAUSEA OR VOMITING 4/30/18  Yes Gin Patel, DO   pantoprazole (PROTONIX) 40 MG tablet Take 1 tablet by mouth every morning (before breakfast) 4/19/18  Yes Gin Patel, DO   Fluticasone Furoate-Vilanterol (BREO ELLIPTA) 200-25 MCG/INH AEPB Inhale 1 puff into the lungs daily   Yes Historical Provider, MD   Umeclidinium Bromide (INCRUSE ELLIPTA) 62.5 MCG/INH AEPB Inhale 1 puff into the lungs daily   Yes Historical Provider, MD   loperamide (IMODIUM) 2 MG capsule Take 2 mg by mouth 3 times daily as needed for Diarrhea   Yes Historical Provider, MD   OLANZapine (ZYPREXA) 10 MG tablet Take 1 tablet by mouth nightly 2/16/18  Yes Gin Patel, DO   ipratropium-albuterol (DUONEB) 0.5-2.5 (3) MG/3ML SOLN nebulizer solution Inhale 3 mLs into the lungs every 4 hours (while awake) 11/10/17  Yes Aldair De Santiago, DO   calcium-vitamin D (Zaheer Grumet) 500-200 MG-UNIT per tablet Take 1 tablet by mouth 2 times daily   Yes Historical Provider, MD   Albuterol Sulfate (VENTOLIN HFA IN) Inhale 2 puffs into the lungs every 6 hours as needed    Yes Historical Provider, MD       This is a 63 y.o. thin male who is pleasant, cooperative, alert and oriented x3, in no acute distress. Heart: Heart sounds are normal.   Tachycardic rate and regular rhythm without symptoms. No murmur, gallop or rub. Lungs: Wearing O2 @ 2L/nc. Diminished breath sounds with wheezing and cough, no increased respiratory effort or use of accessory muscles. Abdomen: soft, nontender, nondistended with bowel sounds .        Labs:  Recent Labs      07/13/18   1536  06/21/18   1108   HGB  11.2*  12.3*   HCT  34.8*  38.2*   WBC  12.0*  12.1*   MCV  92.3  90.8   PLT  197  194   NA   --   137   K   --   4.4   CL   --   96*   CO2   --   28   BUN   --   18   CREATININE   --   0.70       Gurpreet Pinon  APRN, ANP-BC  Electronically signed 7/19/2018 at 9:34 AM

## 2018-07-20 DIAGNOSIS — D70.1 CHEMOTHERAPY INDUCED NEUTROPENIA (HCC): ICD-10-CM

## 2018-07-20 DIAGNOSIS — G89.3 PAIN OF METASTATIC MALIGNANCY: ICD-10-CM

## 2018-07-20 DIAGNOSIS — T45.1X5A CHEMOTHERAPY INDUCED NEUTROPENIA (HCC): ICD-10-CM

## 2018-07-20 LAB — SURGICAL PATHOLOGY REPORT: NORMAL

## 2018-07-20 RX ORDER — OXYCODONE HYDROCHLORIDE 10 MG/1
TABLET ORAL
Qty: 120 TABLET | Refills: 0 | Status: SHIPPED | OUTPATIENT
Start: 2018-07-20 | End: 2018-08-14 | Stop reason: SDUPTHER

## 2018-07-20 NOTE — OP NOTE
brought to the operating room  and given general anesthetic. He was given prophylactic antibiotics per  SCIP protocol. He was carefully rotated to the prone position on to a  well-padded Eduardo frame. Two C-arms were brought in. True AP and true  lateral images were obtained. The spine was therefore scrubbed, prepped  and draped in routine sterile fashion with ChloraPrep. A skin incision was made with a 11 blade and 15.3 instrumentation was  utilized. It was docked on the 9 o'clock and 3 o'clock position for the  left and right pedicles respectively. The coronal dimensions could  accommodate the implant. Once the body was encountered, diagnostic  trabecular biopsy was obtained with a trocar through the cannula. This was  followed by hand drill and IBT. There was slight correction of anterior  column height loss. AP and lateral fluoroscopy was used throughout the operative procedure. The images were interpreted by myself regarding fracture reduction and  implant position. On a back table, methylmethacrylate was prepared. It was warmed to the  proper consistency and then injected in the void created. The fill was  satisfactory throughout the anterior column, superior to inferior endplate  without canal extrusions. Satisfied with the result, the portals were  infiltrated with 30 mL of Marcaine. They were closed with a single stitch  of 3-0 nylon and 4x4's, ABD dressing was secured. Having tolerated the  procedure well, the patient was transferred to the recovery room in stable  condition.         CHEY COPELAND    D: 07/19/2018 11:49:51       T: 07/19/2018 20:44:28     [email or web address]  Job#: [id number]     Doc#: [id number]    CC:  Catie Copeland

## 2018-07-24 ENCOUNTER — CARE COORDINATION (OUTPATIENT)
Dept: CARE COORDINATION | Age: 58
End: 2018-07-24

## 2018-07-24 ENCOUNTER — TELEPHONE (OUTPATIENT)
Dept: FAMILY MEDICINE CLINIC | Age: 58
End: 2018-07-24

## 2018-07-24 NOTE — CARE COORDINATION
Ambulatory Care Coordination Note  7/24/2018  CM Risk Score: 13  Samuel Mortality Risk Score:      ACC: Mary Humphreys RN    Summary Note: spoke with pt who said his back surgery did go well he still has pain but it is better. He is getting chemo today. He did have a ct scan done this am. He has not received the portable tanks yet noted is the call from [facility] needing additional documentation that was sent today. He denies any other needs at this time           Care Coordination Interventions    Program Enrollment:  Complex Care  Referral from Primary Care Provider:  No  Suggested Interventions and Community Resources  Medi Set or Pill Pack:  Completed  Smoking Cessation: In Process  Zone Management Tools:  Not Started         Goals Addressed     None          Prior to Admission medications    Medication Sig Start Date End Date Taking? Authorizing Provider   OXYCONTIN 40 MG extended release tablet Take 1 tablet by mouth every 12 hours for 30 days. . 7/20/18 8/19/18  Gin Patel, DO   oxyCODONE HCl (OXY-IR) 10 MG immediate release tablet TAKE 1 TABLET BY MOUTH EVERY 6 HOURS AS NEEDED FOR PAIN 7/20/18 8/19/18  Gin Patel, DO   dexamethasone (DECADRON) 4 MG tablet Take 1 tablet by mouth daily (with breakfast) 7/11/18   Gin Patel, DO   LYRICA 25 MG capsule Take 1 capsule by mouth 3 times daily for 30 days. . 7/9/18 8/8/18  Gin Patel, DO   zolpidem (AMBIEN) 5 MG tablet TAKE 1 TABLET BY MOUTH NIGHTLY AS NEEDED FOR SLEEP. 7/9/18 8/8/18  Gin Patel, DO   ondansetron (ZOFRAN) 8 MG tablet TAKE 1 TABLET BY MOUTH EVERY 8 HOURS AS NEEDED FOR NAUSEA OR VOMITING 4/30/18   Gin Patel, DO   pantoprazole (PROTONIX) 40 MG tablet Take 1 tablet by mouth every morning (before breakfast) 4/19/18   Gin Patel, DO   Fluticasone Furoate-Vilanterol (BREO ELLIPTA) 200-25 MCG/INH AEPB Inhale 1 puff into the lungs daily    Historical Provider, MD   Umeclidinium Bromide (INCRUSE ELLIPTA) 62.5 MCG/INH AEPB Inhale 1 puff into the

## 2018-07-31 ENCOUNTER — CARE COORDINATION (OUTPATIENT)
Dept: FAMILY MEDICINE CLINIC | Age: 58
End: 2018-07-31

## 2018-07-31 NOTE — CARE COORDINATION
Gin Patel, DO   Fluticasone Furoate-Vilanterol (BREO ELLIPTA) 200-25 MCG/INH AEPB Inhale 1 puff into the lungs daily    Historical Provider, MD   Umeclidinium Bromide (INCRUSE ELLIPTA) 62.5 MCG/INH AEPB Inhale 1 puff into the lungs daily    Historical Provider, MD   loperamide (IMODIUM) 2 MG capsule Take 2 mg by mouth 3 times daily as needed for Diarrhea    Historical Provider, MD   OLANZapine (ZYPREXA) 10 MG tablet Take 1 tablet by mouth nightly 2/16/18   Gin Patel, DO   ipratropium-albuterol (DUONEB) 0.5-2.5 (3) MG/3ML SOLN nebulizer solution Inhale 3 mLs into the lungs every 4 hours (while awake) 11/10/17   Aldair De Santiago, DO   calcium-vitamin D (OSCAL-500) 500-200 MG-UNIT per tablet Take 1 tablet by mouth 2 times daily    Historical Provider, MD   Albuterol Sulfate (VENTOLIN HFA IN) Inhale 2 puffs into the lungs every 6 hours as needed     Historical Provider, MD       No future appointments.

## 2018-08-07 ENCOUNTER — CARE COORDINATION (OUTPATIENT)
Dept: CARE COORDINATION | Age: 58
End: 2018-08-07

## 2018-08-07 DIAGNOSIS — F51.01 PRIMARY INSOMNIA: ICD-10-CM

## 2018-08-07 RX ORDER — ZOLPIDEM TARTRATE 5 MG/1
TABLET ORAL
Qty: 30 TABLET | Refills: 0 | Status: ON HOLD | OUTPATIENT
Start: 2018-08-07 | End: 2018-08-25 | Stop reason: HOSPADM

## 2018-08-07 NOTE — CARE COORDINATION
Ambulatory Care Coordination Note  8/7/2018  CM Risk Score: 13  Samuel Mortality Risk Score:      ACC: Zoe Donahue, RN    Summary Note: spoke with pt who said he is on his way to get his chemo treatment. He would like [facility] to call down to have his emmla cream ready for him. [facility] clinic and let them know he is on his way and would like his cream to be ready they said they would work on that. Tavares Clifton also said he did get his oxygen and is using it now he is happy with this. He said he is tolerating chemo although it does make him ill. He denies any needs at this time. Care Coordination Interventions    Program Enrollment:  Complex Care  Referral from Primary Care Provider:  No  Suggested Interventions and Community Resources  Medi Set or Pill Pack:  Completed  Smoking Cessation: In Process  Zone Management Tools:  Not Started         Goals Addressed             Most Recent     Conditions and Symptoms   Improving (8/7/2018)             I will schedule office visits, as directed by my provider. I will notify my provider of any symptoms that indicate a worsening of my condition. Barriers: overwhelmed by complexity of regimen  Plan for overcoming my barriers: N/A  Confidence: 7/10  Anticipated Goal Completion Date: 8/29/18              Prior to Admission medications    Medication Sig Start Date End Date Taking? Authorizing Provider   LYRICA 25 MG capsule Take 1 capsule by mouth 3 times daily for 30 days. . 8/7/18 9/6/18  Gin Patel, DO   zolpidem (AMBIEN) 5 MG tablet TAKE 1 TABLET BY MOUTH NIGHTLY AS NEEDED FOR SLEEP. 8/7/18 9/6/18  Gin Patel DO   OXYCONTIN 40 MG extended release tablet Take 1 tablet by mouth every 12 hours for 30 days. . 7/20/18 8/19/18  Gin Patel, DO   oxyCODONE HCl (OXY-IR) 10 MG immediate release tablet TAKE 1 TABLET BY MOUTH EVERY 6 HOURS AS NEEDED FOR PAIN 7/20/18 8/19/18  Gin Patel DO   dexamethasone (DECADRON) 4 MG tablet Take 1 tablet by mouth daily (with

## 2018-08-14 DIAGNOSIS — G89.3 PAIN OF METASTATIC MALIGNANCY: ICD-10-CM

## 2018-08-14 DIAGNOSIS — T45.1X5A CHEMOTHERAPY INDUCED NEUTROPENIA (HCC): ICD-10-CM

## 2018-08-14 DIAGNOSIS — D70.1 CHEMOTHERAPY INDUCED NEUTROPENIA (HCC): ICD-10-CM

## 2018-08-14 RX ORDER — OXYCODONE HYDROCHLORIDE 10 MG/1
TABLET ORAL
Qty: 120 TABLET | Refills: 0 | Status: SHIPPED | OUTPATIENT
Start: 2018-08-14 | End: 2018-09-13

## 2018-08-17 ENCOUNTER — TELEPHONE (OUTPATIENT)
Dept: FAMILY MEDICINE CLINIC | Age: 58
End: 2018-08-17

## 2018-08-18 ENCOUNTER — APPOINTMENT (OUTPATIENT)
Dept: GENERAL RADIOLOGY | Age: 58
DRG: 191 | End: 2018-08-18
Payer: MEDICARE

## 2018-08-18 ENCOUNTER — HOSPITAL ENCOUNTER (INPATIENT)
Age: 58
LOS: 7 days | Discharge: NURSING, REHAB OR LONG-TERM CARE | DRG: 191 | End: 2018-08-25
Attending: FAMILY MEDICINE | Admitting: INTERNAL MEDICINE
Payer: MEDICARE

## 2018-08-18 DIAGNOSIS — G89.3 PAIN OF METASTATIC MALIGNANCY: ICD-10-CM

## 2018-08-18 DIAGNOSIS — F41.8 MIXED ANXIETY DEPRESSIVE DISORDER: ICD-10-CM

## 2018-08-18 DIAGNOSIS — J44.1 COPD WITH ACUTE EXACERBATION (HCC): Primary | ICD-10-CM

## 2018-08-18 PROBLEM — C34.90 SQUAMOUS CELL CARCINOMA OF LUNG (HCC): Status: ACTIVE | Noted: 2018-08-18

## 2018-08-18 LAB
ABSOLUTE EOS #: 0 K/UL (ref 0–0.4)
ABSOLUTE IMMATURE GRANULOCYTE: ABNORMAL K/UL (ref 0–0.3)
ABSOLUTE LYMPH #: 1.8 K/UL (ref 1–4.8)
ABSOLUTE MONO #: 0.8 K/UL (ref 0.2–0.8)
ALBUMIN SERPL-MCNC: 3.1 G/DL (ref 3.5–5.2)
ALBUMIN/GLOBULIN RATIO: ABNORMAL (ref 1–2.5)
ALP BLD-CCNC: 92 U/L (ref 40–129)
ALT SERPL-CCNC: 55 U/L (ref 5–41)
ANION GAP SERPL CALCULATED.3IONS-SCNC: 11 MMOL/L (ref 9–17)
AST SERPL-CCNC: 29 U/L
BASOPHILS # BLD: 0 % (ref 0–2)
BASOPHILS ABSOLUTE: 0 K/UL (ref 0–0.2)
BILIRUB SERPL-MCNC: 0.19 MG/DL (ref 0.3–1.2)
BILIRUBIN DIRECT: 0.09 MG/DL
BILIRUBIN, INDIRECT: 0.1 MG/DL (ref 0–1)
BUN BLDV-MCNC: 15 MG/DL (ref 6–20)
BUN/CREAT BLD: 25 (ref 9–20)
CALCIUM SERPL-MCNC: 8.7 MG/DL (ref 8.6–10.4)
CHLORIDE BLD-SCNC: 94 MMOL/L (ref 98–107)
CO2: 32 MMOL/L (ref 20–31)
CREAT SERPL-MCNC: 0.59 MG/DL (ref 0.7–1.2)
DIFFERENTIAL TYPE: ABNORMAL
EKG ATRIAL RATE: 127 BPM
EKG P AXIS: 78 DEGREES
EKG P-R INTERVAL: 118 MS
EKG Q-T INTERVAL: 304 MS
EKG QRS DURATION: 76 MS
EKG QTC CALCULATION (BAZETT): 441 MS
EKG R AXIS: 53 DEGREES
EKG T AXIS: 91 DEGREES
EKG VENTRICULAR RATE: 127 BPM
EOSINOPHILS RELATIVE PERCENT: 0 % (ref 1–4)
GFR AFRICAN AMERICAN: >60 ML/MIN
GFR NON-AFRICAN AMERICAN: >60 ML/MIN
GFR SERPL CREATININE-BSD FRML MDRD: ABNORMAL ML/MIN/{1.73_M2}
GFR SERPL CREATININE-BSD FRML MDRD: ABNORMAL ML/MIN/{1.73_M2}
GLOBULIN: ABNORMAL G/DL (ref 1.5–3.8)
GLUCOSE BLD-MCNC: 168 MG/DL (ref 70–99)
HCT VFR BLD CALC: 35.7 % (ref 41–53)
HEMOGLOBIN: 11.7 G/DL (ref 13.5–17.5)
IMMATURE GRANULOCYTES: ABNORMAL %
LACTIC ACID: 2.3 MMOL/L (ref 0.5–2.2)
LACTIC ACID: 3.9 MMOL/L (ref 0.5–2.2)
LYMPHOCYTES # BLD: 15 % (ref 24–44)
MCH RBC QN AUTO: 30.7 PG (ref 26–34)
MCHC RBC AUTO-ENTMCNC: 32.7 G/DL (ref 31–37)
MCV RBC AUTO: 94.1 FL (ref 80–100)
MONOCYTES # BLD: 6 % (ref 1–7)
NRBC AUTOMATED: ABNORMAL PER 100 WBC
PDW BLD-RTO: 19.3 % (ref 11.5–14.5)
PLATELET # BLD: 262 K/UL (ref 130–400)
PLATELET ESTIMATE: ABNORMAL
PMV BLD AUTO: 7.6 FL (ref 6–12)
POTASSIUM SERPL-SCNC: 4.6 MMOL/L (ref 3.7–5.3)
RBC # BLD: 3.79 M/UL (ref 4.5–5.9)
RBC # BLD: ABNORMAL 10*6/UL
SEG NEUTROPHILS: 79 % (ref 36–66)
SEGMENTED NEUTROPHILS ABSOLUTE COUNT: 9.7 K/UL (ref 1.8–7.7)
SODIUM BLD-SCNC: 137 MMOL/L (ref 135–144)
TOTAL PROTEIN: 5.6 G/DL (ref 6.4–8.3)
TROPONIN INTERP: NORMAL
TROPONIN T: <0.03 NG/ML
WBC # BLD: 12.2 K/UL (ref 3.5–11)
WBC # BLD: ABNORMAL 10*3/UL

## 2018-08-18 PROCEDURE — 80048 BASIC METABOLIC PNL TOTAL CA: CPT

## 2018-08-18 PROCEDURE — 96375 TX/PRO/DX INJ NEW DRUG ADDON: CPT

## 2018-08-18 PROCEDURE — 96368 THER/DIAG CONCURRENT INF: CPT

## 2018-08-18 PROCEDURE — 6360000002 HC RX W HCPCS: Performed by: NURSE PRACTITIONER

## 2018-08-18 PROCEDURE — 94640 AIRWAY INHALATION TREATMENT: CPT

## 2018-08-18 PROCEDURE — 93005 ELECTROCARDIOGRAM TRACING: CPT

## 2018-08-18 PROCEDURE — 99285 EMERGENCY DEPT VISIT HI MDM: CPT

## 2018-08-18 PROCEDURE — 2060000000 HC ICU INTERMEDIATE R&B

## 2018-08-18 PROCEDURE — 71045 X-RAY EXAM CHEST 1 VIEW: CPT

## 2018-08-18 PROCEDURE — 83605 ASSAY OF LACTIC ACID: CPT

## 2018-08-18 PROCEDURE — 84484 ASSAY OF TROPONIN QUANT: CPT

## 2018-08-18 PROCEDURE — 80076 HEPATIC FUNCTION PANEL: CPT

## 2018-08-18 PROCEDURE — 87040 BLOOD CULTURE FOR BACTERIA: CPT

## 2018-08-18 PROCEDURE — 6360000002 HC RX W HCPCS: Performed by: FAMILY MEDICINE

## 2018-08-18 PROCEDURE — 2580000003 HC RX 258: Performed by: NURSE PRACTITIONER

## 2018-08-18 PROCEDURE — 6370000000 HC RX 637 (ALT 250 FOR IP): Performed by: INTERNAL MEDICINE

## 2018-08-18 PROCEDURE — 6370000000 HC RX 637 (ALT 250 FOR IP): Performed by: NURSE PRACTITIONER

## 2018-08-18 PROCEDURE — 2580000003 HC RX 258: Performed by: INTERNAL MEDICINE

## 2018-08-18 PROCEDURE — 85025 COMPLETE CBC W/AUTO DIFF WBC: CPT

## 2018-08-18 PROCEDURE — 96365 THER/PROPH/DIAG IV INF INIT: CPT

## 2018-08-18 PROCEDURE — 94664 DEMO&/EVAL PT USE INHALER: CPT

## 2018-08-18 RX ORDER — ALBUTEROL SULFATE 90 UG/1
2 AEROSOL, METERED RESPIRATORY (INHALATION)
Status: DISCONTINUED | OUTPATIENT
Start: 2018-08-18 | End: 2018-08-18

## 2018-08-18 RX ORDER — PREGABALIN 25 MG/1
25 CAPSULE ORAL 3 TIMES DAILY
Status: DISCONTINUED | OUTPATIENT
Start: 2018-08-18 | End: 2018-08-25 | Stop reason: HOSPADM

## 2018-08-18 RX ORDER — GUAIFENESIN/DEXTROMETHORPHAN 100-10MG/5
10 SYRUP ORAL EVERY 4 HOURS PRN
Status: DISCONTINUED | OUTPATIENT
Start: 2018-08-18 | End: 2018-08-25 | Stop reason: HOSPADM

## 2018-08-18 RX ORDER — IPRATROPIUM BROMIDE AND ALBUTEROL SULFATE 2.5; .5 MG/3ML; MG/3ML
1 SOLUTION RESPIRATORY (INHALATION) 4 TIMES DAILY
Status: DISCONTINUED | OUTPATIENT
Start: 2018-08-19 | End: 2018-08-19

## 2018-08-18 RX ORDER — CALCIUM CARBONATE/VITAMIN D3 600 MG-10
1 TABLET ORAL 2 TIMES DAILY
Status: DISCONTINUED | OUTPATIENT
Start: 2018-08-18 | End: 2018-08-25 | Stop reason: HOSPADM

## 2018-08-18 RX ORDER — LORAZEPAM 2 MG/ML
1 INJECTION INTRAMUSCULAR ONCE
Status: COMPLETED | OUTPATIENT
Start: 2018-08-18 | End: 2018-08-18

## 2018-08-18 RX ORDER — VANCOMYCIN HYDROCHLORIDE 1 G/200ML
1000 INJECTION, SOLUTION INTRAVENOUS EVERY 12 HOURS
Status: DISCONTINUED | OUTPATIENT
Start: 2018-08-19 | End: 2018-08-20

## 2018-08-18 RX ORDER — VANCOMYCIN HYDROCHLORIDE 1 G/200ML
1000 INJECTION, SOLUTION INTRAVENOUS ONCE
Status: COMPLETED | OUTPATIENT
Start: 2018-08-18 | End: 2018-08-18

## 2018-08-18 RX ORDER — OXYCODONE HCL 40 MG/1
40 TABLET, FILM COATED, EXTENDED RELEASE ORAL EVERY 12 HOURS
Status: DISCONTINUED | OUTPATIENT
Start: 2018-08-18 | End: 2018-08-25 | Stop reason: HOSPADM

## 2018-08-18 RX ORDER — LOPERAMIDE HYDROCHLORIDE 2 MG/1
2 CAPSULE ORAL 3 TIMES DAILY PRN
Status: DISCONTINUED | OUTPATIENT
Start: 2018-08-18 | End: 2018-08-25 | Stop reason: HOSPADM

## 2018-08-18 RX ORDER — IPRATROPIUM BROMIDE AND ALBUTEROL SULFATE 2.5; .5 MG/3ML; MG/3ML
1 SOLUTION RESPIRATORY (INHALATION)
Status: DISCONTINUED | OUTPATIENT
Start: 2018-08-18 | End: 2018-08-18

## 2018-08-18 RX ORDER — SODIUM CHLORIDE 9 MG/ML
INJECTION, SOLUTION INTRAVENOUS CONTINUOUS
Status: DISCONTINUED | OUTPATIENT
Start: 2018-08-18 | End: 2018-08-18

## 2018-08-18 RX ORDER — ONDANSETRON 4 MG/1
8 TABLET, FILM COATED ORAL EVERY 8 HOURS PRN
Status: DISCONTINUED | OUTPATIENT
Start: 2018-08-18 | End: 2018-08-25 | Stop reason: HOSPADM

## 2018-08-18 RX ORDER — DRONABINOL 2.5 MG/1
2.5 CAPSULE ORAL
Status: DISCONTINUED | OUTPATIENT
Start: 2018-08-19 | End: 2018-08-25 | Stop reason: HOSPADM

## 2018-08-18 RX ORDER — OXYCODONE HYDROCHLORIDE 5 MG/1
10 TABLET ORAL EVERY 6 HOURS PRN
Status: DISCONTINUED | OUTPATIENT
Start: 2018-08-18 | End: 2018-08-25 | Stop reason: HOSPADM

## 2018-08-18 RX ORDER — SODIUM CHLORIDE 9 MG/ML
INJECTION, SOLUTION INTRAVENOUS CONTINUOUS
Status: DISCONTINUED | OUTPATIENT
Start: 2018-08-18 | End: 2018-08-25 | Stop reason: HOSPADM

## 2018-08-18 RX ORDER — ALBUTEROL SULFATE 2.5 MG/3ML
5 SOLUTION RESPIRATORY (INHALATION)
Status: DISCONTINUED | OUTPATIENT
Start: 2018-08-18 | End: 2018-08-18

## 2018-08-18 RX ORDER — OLANZAPINE 5 MG/1
10 TABLET ORAL NIGHTLY
Status: DISCONTINUED | OUTPATIENT
Start: 2018-08-18 | End: 2018-08-25 | Stop reason: HOSPADM

## 2018-08-18 RX ORDER — ZOLPIDEM TARTRATE 5 MG/1
5 TABLET ORAL NIGHTLY PRN
Status: DISCONTINUED | OUTPATIENT
Start: 2018-08-18 | End: 2018-08-25 | Stop reason: HOSPADM

## 2018-08-18 RX ORDER — ALBUTEROL SULFATE 2.5 MG/3ML
2.5 SOLUTION RESPIRATORY (INHALATION)
Status: DISCONTINUED | OUTPATIENT
Start: 2018-08-18 | End: 2018-08-25 | Stop reason: HOSPADM

## 2018-08-18 RX ORDER — PANTOPRAZOLE SODIUM 40 MG/1
40 TABLET, DELAYED RELEASE ORAL
Status: DISCONTINUED | OUTPATIENT
Start: 2018-08-19 | End: 2018-08-25 | Stop reason: HOSPADM

## 2018-08-18 RX ORDER — DEXAMETHASONE 4 MG/1
4 TABLET ORAL
Status: DISCONTINUED | OUTPATIENT
Start: 2018-08-19 | End: 2018-08-19

## 2018-08-18 RX ORDER — 0.9 % SODIUM CHLORIDE 0.9 %
1000 INTRAVENOUS SOLUTION INTRAVENOUS ONCE
Status: COMPLETED | OUTPATIENT
Start: 2018-08-18 | End: 2018-08-18

## 2018-08-18 RX ORDER — DRONABINOL 10 MG/1
10 CAPSULE ORAL
COMMUNITY

## 2018-08-18 RX ADMIN — HYDROMORPHONE HYDROCHLORIDE 1 MG: 1 INJECTION, SOLUTION INTRAMUSCULAR; INTRAVENOUS; SUBCUTANEOUS at 16:29

## 2018-08-18 RX ADMIN — PREGABALIN 25 MG: 25 CAPSULE ORAL at 23:37

## 2018-08-18 RX ADMIN — Medication 1 TABLET: at 23:37

## 2018-08-18 RX ADMIN — ALBUTEROL SULFATE 5 MG: 5 SOLUTION RESPIRATORY (INHALATION) at 16:13

## 2018-08-18 RX ADMIN — ZOLPIDEM TARTRATE 5 MG: 5 TABLET ORAL at 23:32

## 2018-08-18 RX ADMIN — AZITHROMYCIN MONOHYDRATE 500 MG: 500 INJECTION, POWDER, LYOPHILIZED, FOR SOLUTION INTRAVENOUS at 19:06

## 2018-08-18 RX ADMIN — LORAZEPAM 1 MG: 2 INJECTION, SOLUTION INTRAMUSCULAR; INTRAVENOUS at 19:05

## 2018-08-18 RX ADMIN — VANCOMYCIN HYDROCHLORIDE 1000 MG: 1 INJECTION, SOLUTION INTRAVENOUS at 20:43

## 2018-08-18 RX ADMIN — IPRATROPIUM BROMIDE AND ALBUTEROL SULFATE 1 AMPULE: .5; 3 SOLUTION RESPIRATORY (INHALATION) at 22:48

## 2018-08-18 RX ADMIN — GUAIFENESIN AND DEXTROMETHORPHAN 10 ML: 100; 10 SYRUP ORAL at 23:32

## 2018-08-18 RX ADMIN — OLANZAPINE 10 MG: 5 TABLET, FILM COATED ORAL at 23:37

## 2018-08-18 RX ADMIN — OXYCODONE HYDROCHLORIDE 40 MG: 40 TABLET, FILM COATED, EXTENDED RELEASE ORAL at 21:45

## 2018-08-18 RX ADMIN — SODIUM CHLORIDE: 9 INJECTION, SOLUTION INTRAVENOUS at 23:02

## 2018-08-18 RX ADMIN — CEFTRIAXONE SODIUM 1 G: 1 INJECTION, POWDER, FOR SOLUTION INTRAMUSCULAR; INTRAVENOUS at 19:05

## 2018-08-18 RX ADMIN — SODIUM CHLORIDE 1000 ML: 9 INJECTION, SOLUTION INTRAVENOUS at 18:01

## 2018-08-18 ASSESSMENT — ENCOUNTER SYMPTOMS
CONSTIPATION: 0
DIARRHEA: 0
ABDOMINAL PAIN: 0
COLOR CHANGE: 0
COUGH: 1
EYE PAIN: 0
BACK PAIN: 1
SHORTNESS OF BREATH: 1
SORE THROAT: 0
WHEEZING: 1

## 2018-08-18 ASSESSMENT — PAIN SCALES - GENERAL
PAINLEVEL_OUTOF10: 8
PAINLEVEL_OUTOF10: 8

## 2018-08-18 NOTE — ED PROVIDER NOTES
[address] [place] [address] ED  eMERGENCY dEPARTMENT eNCOUnter      Pt Name: Jm Woo  MRN: [id number]  [place] 1960  Date of evaluation: 8/18/2018  Provider: MICHELLE Delatorre [id number]       Chief Complaint   Patient presents with    Shortness of Breath     increasing since yesterday         HISTORY OF PRESENT ILLNESS  (Location/Symptom, Timing/Onset, Context/Setting, Quality, Duration, Modifying Factors, Severity.)   Jm Woo is a 63 y.o. male who presents to the emergency department With complaints of cough chest tightness and wheezing. Onset yesterday, worse today. He states he feels his lungs are very tight and he is unable to produce any sputum with cough. Cough has kept him awake. He denies any fever or chills although he does complain of feeling \"not too good\". He states he used albuterol once at home however he does not like using albuterol because it causes tachycardia and he feels jittery. He is using his other medications as prescribed. He states the chest tightness and wheezing increase with exertion and decreased mildly with rest.  He denies any other associated symptoms. He has not used any albuterol today. Patient has a history of lung cancer (squama cell carcinoma) and is receiving immunotherapy. He has a MediPort in his right anterior chest.      Nursing Notes were reviewed. ALLERGIES     Aspirin; Fish-derived products; Shellfish-derived products; and Motrin [ibuprofen micronized]    CURRENT MEDICATIONS       Previous Medications    ALBUTEROL SULFATE (VENTOLIN HFA IN)    Inhale 2 puffs into the lungs every 6 hours as needed     CALCIUM-VITAMIN D (OSCAL-500) 500-200 MG-UNIT PER TABLET    Take 1 tablet by mouth 2 times daily    DEXAMETHASONE (DECADRON) 4 MG TABLET    Take 1 tablet by mouth daily (with breakfast)    DRONABINOL (MARINOL) 2.5 MG CAPSULE    Take 2.5 mg by mouth 2 times daily (before meals). .    FLUTICASONE FUROATE-VILANTEROL [place] ELLIPTA) 200-25 MCG/INH AEPB    Inhale 1 puff into the lungs daily    IPRATROPIUM-ALBUTEROL (DUONEB) 0.5-2.5 (3) MG/3ML SOLN NEBULIZER SOLUTION    Inhale 3 mLs into the lungs every 4 hours (while awake)    LOPERAMIDE (IMODIUM) 2 MG CAPSULE    Take 2 mg by mouth 3 times daily as needed for Diarrhea    LYRICA 25 MG CAPSULE    Take 1 capsule by mouth 3 times daily for 30 days. [place] OLANZAPINE (ZYPREXA) 10 MG TABLET    Take 1 tablet by mouth nightly    ONDANSETRON (ZOFRAN) 8 MG TABLET    TAKE 1 TABLET BY MOUTH EVERY 8 HOURS AS NEEDED FOR NAUSEA OR VOMITING    OXYCODONE HCL (OXY-IR) 10 MG IMMEDIATE RELEASE TABLET    TAKE 1 TABLET BY MOUTH EVERY 6 HOURS AS NEEDED FOR PAIN    OXYCONTIN 40 MG EXTENDED RELEASE TABLET    Take 1 tablet by mouth every 12 hours for 30 days. [place] PANTOPRAZOLE (PROTONIX) 40 MG TABLET    Take 1 tablet by mouth every morning (before breakfast)    UMECLIDINIUM BROMIDE (INCRUSE ELLIPTA) 62.5 MCG/INH AEPB    Inhale 1 puff into the lungs daily    ZOLPIDEM (AMBIEN) 5 MG TABLET    TAKE 1 TABLET BY MOUTH NIGHTLY AS NEEDED FOR SLEEP. 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     Reflux     Right sided weakness     due to head injury in 1984    Seizures ([address] last one Dec 2016 no problems since and no medication (patient stated he stopped drinking)    Tremor     hx of tremor    Ulcer        SURGICAL HISTORY           Procedure Laterality Date    CERVICAL [facility] [address] [facility] SURGERY      c3-c6 fusion    COLONOSCOPY      CYSTOSCOPY  2015    DILATATION, ESOPHAGUS      ulcer repair    ENDOSCOPY, COLON, DIAGNOSTIC      EGD    KYPHOSIS SURGERY  2018    L1    HI PERQ VERTEBROPLASTY UNI/BI INJX CERVICOTHORACIC N/A 2018    KYPHOPLASTY L1 performed by Travis Hernandez MD at [address] [place] [address] with broken neck    TUNNELED VENOUS PORT PLACEMENT           FAMILY HISTORY       Family History   Problem Relation Age of Onset    Heart Disease Mother         CABG    Cancer Mother     Cancer Father         Throat    Heart Disease Father     Stroke Maternal Grandfather     Heart Disease Paternal Grandmother     Diabetes Neg Hx      Family Status   Relation Status    Mother Alive    Father     Sister Alive    Brother Alive    Brother Alive    Sister Alive    Sister Alive    MGF (Not Specified)    PGM (Not Specified)    Neg Hx (Not Specified)        SOCIAL HISTORY      reports that he quit smoking about 5 weeks ago. His smoking use included Cigarettes. He has a 20.00 pack-year smoking history. He has never used smokeless tobacco. He reports that he does not drink alcohol or use drugs. REVIEW OF SYSTEMS    (2-9 systems for level 4, 10 or more for level 5)     Review of Systems   Constitutional: Positive for activity change. Negative for appetite change, chills and fever. HENT: Negative for congestion, ear pain, postnasal drip and sore throat. Eyes: Negative for pain and visual disturbance. Respiratory: Positive for cough, shortness of breath and wheezing. Cardiovascular: Negative for chest pain, palpitations and leg swelling. Gastrointestinal: Negative for abdominal pain, constipation and diarrhea.    Genitourinary: URINE RT REFLEX TO CULTURE     . All other labs were within normal range or not returned as of this dictation. EMERGENCY DEPARTMENT COURSE and DIFFERENTIAL DIAGNOSIS/MDM:   Vitals:    Vitals:    08/18/18 1947 08/18/18 2002 08/18/18 2017 08/18/18 2032   BP: 125/65 139/69 124/68 124/71   Pulse: 115 116 118 116   Resp: 21 19 20 18   Temp:       TempSrc:       SpO2: 99% 99% 99% 99%   Weight:       Height:           Provider Repeat Focused Exam    Vitals:    08/18/18 2032   BP: 124/71   Pulse: 116   Resp: 18   Temp:    SpO2: 99%       Focused assessment of respiratory, cardiovascular, and skin was performed as documented below:    Lungs: Occasional wheezes and decreased in the bases    Heart:  Monitor remains in sinus tach 112 bpm.  No JVD. Peripheral Pulses:  Full pulses 3+/4. No distal swelling    Skin:  Skin is warm, dry and pink    Capillary Refill Time: Less than 3 seconds  in fingertips      Electronically signed by MICHELLE Burger CNP on 8/18/2018 at 9:05 PM      MDM: 64-year-old male who presents for shortness of breath with history of squamous cell carcinoma. He is currently undergoing immunotherapy. Initial lactic acid was 3.9. Fluids were initiated and cultures were obtained prior to antibiotic administration. Repeat lactic acid was 2.3. Review of CBC revealed a mild leukocytosis at 12.2.. BMP was unremarkable with exception of elevated CO2. This was treated with respiratory treatments and oxygen administration. Patient improved while in the emergency department however he will be admitted to the hospital for IV antibiotics. Neurology and oncology will be consulted.     CONSULTS:  IP CONSULT TO PULMONOLOGY  IP CONSULT TO ONCOLOGY    PROCEDURES:  Procedures    FINAL IMPRESSION      1. COPD with acute exacerbation ([facility]          DISPOSITION/PLAN   DISPOSITION        PATIENT REFERRED TO:   Quita Gitelman, DO  [address] [facility] [address] [facility]

## 2018-08-18 NOTE — ED PROVIDER NOTES
[facility] ED     EMERGENCY DEPARTMENT ENCOUNTER     FACULTY ATTESTATION      Situation:   Chief Complaint   Patient presents with    Shortness of Breath     increasing since yesterday     Background:    A 21-year-old  male comes in for Anthonette Otter of breath and cough. He's been coughing quite a bit since yesterday. Actually said this started in the last 2 days. He was been sick around him. He is now unable to get anything out. And wheezing also. Current every day smoker. Really denies any chest pain or shortness of breath. Allgy:  Allergies   Allergen Reactions    Aspirin     Fish-Derived Products Swelling    Shellfish-Derived Products      Other reaction(s): syncope/severe facial swelling/vomits    Motrin [Ibuprofen Micronized] Other (See Comments)     Upset stomach      Medications:  Prior to Admission medications    Medication Sig Start Date End Date Taking? Authorizing Provider   dronabinol (MARINOL) 2.5 MG capsule Take 2.5 mg by mouth 2 times daily (before meals). .   Yes Historical Provider, MD   Fluticasone Furoate-Vilanterol (BREO ELLIPTA) 200-25 MCG/INH AEPB Inhale 1 puff into the lungs daily   Yes Historical Provider, MD   ipratropium-albuterol (DUONEB) 0.5-2.5 (3) MG/3ML SOLN nebulizer solution Inhale 3 mLs into the lungs every 4 hours (while awake) 11/10/17  Yes Aldair De Santiago,    Albuterol Sulfate (VENTOLIN HFA IN) Inhale 2 puffs into the lungs every 6 hours as needed    Yes Historical Provider, MD   oxyCODONE HCl (OXY-IR) 10 MG immediate release tablet TAKE 1 TABLET BY MOUTH EVERY 6 HOURS AS NEEDED FOR PAIN 8/14/18 9/13/18  Gin Patel,    OXYCONTIN 40 MG extended release tablet Take 1 tablet by mouth every 12 hours for 30 days. . 8/14/18 9/13/18  Gin Patel DO   LYRICA 25 MG capsule Take 1 capsule by mouth 3 times daily for 30 days. . 8/7/18 9/6/18  Gin Patel DO   zolpidem (AMBIEN) 5 MG tablet TAKE 1 TABLET BY MOUTH NIGHTLY AS NEEDED FOR SLEEP. 8/7/18 9/6/18 Gin Patel, DO   dexamethasone (DECADRON) 4 MG tablet Take 1 tablet by mouth daily (with breakfast) 7/11/18   Gin Patel DO   ondansetron (ZOFRAN) 8 MG tablet TAKE 1 TABLET BY MOUTH EVERY 8 HOURS AS NEEDED FOR NAUSEA OR VOMITING 4/30/18   Gin Patel DO   pantoprazole (PROTONIX) 40 MG tablet Take 1 tablet by mouth every morning (before breakfast) 4/19/18   Gin Patel DO   Umeclidinium Bromide (INCRUSE ELLIPTA) 62.5 MCG/INH AEPB Inhale 1 puff into the lungs daily    Historical Provider, MD   loperamide (IMODIUM) 2 MG capsule Take 2 mg by mouth 3 times daily as needed for Diarrhea    Historical Provider, MD   OLANZapine (ZYPREXA) 10 MG tablet Take 1 tablet by mouth nightly 2/16/18   Gin Patel DO   calcium-vitamin D (OSCAL-500) 500-200 MG-UNIT per tablet Take 1 tablet by mouth 2 times daily    Historical Provider, MD     Current vitals: /73   Pulse 132   Temp 98.1 °F (36.7 °C) (Oral)   Resp 26   Ht 5' 5\" (1.651 m)   Wt 120 lb (54.4 kg)   SpO2 100% Comment: on 3L O2 per n/c  BMI 19.97 kg/m²   Exam Brief:  General:  A&Ox3, in NAD. HEENT: NC AT, PERRL, EOMI, TMs clear bilaterally. NECK:  Soft & supple without adenopathy. Thyroid normal size & shape. LUNGS: CTA, with distant breath sounds bilaterally. Barrel chest appearance. Cry throughout  HEART:  Rapid RRR without murmur or rub. Faintly heard. ABDOMEN: Bowel sounds present, soft, non-tender. EXTREMITIES: No c/c/e. Labs Reviewed   CBC WITH AUTO DIFFERENTIAL   BASIC METABOLIC PANEL W/ REFLEX TO MG FOR LOW K    TROPONIN   LACTIC ACID   HEPATIC FUNCTION PANEL   URINE RT REFLEX TO CULTURE       No results found.     Current orders:  Orders Placed This Encounter   Procedures    XR CHEST PORTABLE     Standing Status:   Standing     Number of Occurrences:   1     Order Specific Question:   Reason for exam:     Answer:   dyspnea    CBC auto differential     Standing Status:   Standing     Number of Occurrences:   1    Basic progress. Respiratory Education:   \" Asthma education  \" Smoking cessation  \" MDI instruction    If patient is admitted, place patient on in-patient order set. Standing Status:   Standing     Number of Occurrences:   1    Respiratory care evaluation only     Standing Status:   Standing     Number of Occurrences:   88114    HHN Treatment     The frequency of this modality order must match the frequency of the associated medication order. By protocol, RT can modify this frequency to accurately reflect changes made to the medication by the physician. Standing Status:   Standing     Number of Occurrences:   [id number]    HHN Treatment     The frequency of this modality order must match the frequency of the associated medication order. By protocol, RT can modify this frequency to accurately reflect changes made to the medication by the physician. Standing Status:   Standing     Number of Occurrences:   69194    HHN Treatment     The frequency of this modality order must match the frequency of the associated medication order. By protocol, RT can modify this frequency to accurately reflect changes made to the medication by the physician. Standing Status:   Standing     Number of Occurrences:   89374    MDI Treatment     The frequency of this modality order must match the frequency of the associated medication order. By protocol, RT can modify this frequency to accurately reflect changes made to the medication by the physician. Standing Status:   Standing     Number of Occurrences:   10382    MDI Treatment     The frequency of this modality order must match the frequency of the associated medication order. By protocol, RT can modify this frequency to accurately reflect changes made to the medication by the physician.       Standing Status:   Standing     Number of Occurrences:   [id number]    HHN Treatment     The frequency of this modality order must match the frequency of the associated medication order. By protocol, RT can modify this frequency to accurately reflect changes made to the medication by the physician. Standing Status:   Standing     Number of Occurrences:   [id number]    EKG 12 lead     Standing Status:   Standing     Number of Occurrences:   1     Order Specific Question:   Reason for Exam?     Answer:   Chest pain    Insert peripheral IV     Standing Status:   Standing     Number of Occurrences:   1       RN Notes: None documented up to this point 4:20 PM    ED Course:     8:18 PM I did brief the internist on this patient and he is okay with admission. This patient is hemodynamically stable on reevaluation with good cap refill reading is better. His pulse rate still is elevated and his blood pressure is still low. We are still hydrating him he has received antibiotics he has been cultured. Assesment:    There are no active hospital problems to display for this patient. Cough and tachycardia  Acute exacerbations COPD    Recommendations: 1. Await laboratory and chest x-ray results  Report Delivered to: GENO Bales DO  Report Received from: RIOS Branham NP      I performed a history and physical examination of the patient and discussed management with the resident. I reviewed the residents note and agree with the documented findings and plan of care. Any areas of disagreement are noted on the chart. I was personally present for the key portions of any procedures. I have documented in the chart those procedures where I was not present during the key portions. I have reviewed the emergency nurses triage note. I agree with the chief complaint, past medical history, past surgical history, allergies, medications, social and family history as documented unless otherwise noted below. Documentation of the HPI, Physical Exam and Medical Decision Making performed by medical students or scribes is based on my personal performance of the HPI, PE and MDM.  For Phys Assistant/ Nurse

## 2018-08-18 NOTE — ED NOTES
Patient presents to the er with c/o SOB, verbalizing that he started coughing last night \"feels congested in his chest\" patient currently under going chemo treatments for RLL Lung  cancer approximately 4 months ago. States this is his second bout with lung. Patient has hx of COPD and wears home O2. Patient verbalizing non-productive cough. Patient denies fever or chills. On monitor patient showing Sinus Tach.       Trang Coy RN  08/18/18 4434

## 2018-08-18 NOTE — PROGRESS NOTES
·  Bronchodilator assessment   []    Bronchodilator Assessment    FEV1 % PREDICTED unable due to coughFEV1 actual: na  PEFR  na  PEFR % Predicted na  RR 22  Bronchodilator assessment at level  3  BRONCHODILATOR ASSESSMENT SCORE  Score 1 2 3 4   Breath Sounds   []  Clear []  Mild Wheezing with good aeration [x]  Moderate I/E wheezing with adequate aeration []  Poor Aeration or diffuse wheezing   Respiratory Rate []  Less than 20 [x]  20-25 []  Greater than 25  []  Greater than 35    Dyspnea []  No SOB  []  SOB with minimal activity []  Speaking in partial sentences [x]  Acute/ At rest   Peakflow (asthma) []  80 % or greater predicted/PB  []  Unable []  70% or greater predicted/PB  []  Unable []  51%-70% predicted/PB  [x]  Unable []  Less than 50% predicted/PB  []  Unable due to distress   FEV1 % Predicted []  Greater than 69%  []  Unable  []  Less than 50%-69%  []  Unable  []  Less than 35%-49%  [x]  Unable  []  Less than 35%  []  Unable due to distress     · Bronchodilator assessment   []    Bronchodilator Assessment    FEV1 % PREDICTED unable due to cough  FEV1 actual: na  PEFR  na  PEFR % Predicted na  RR 25  Bronchodilator assessment at level  Hold tx HR increase to 125  BRONCHODILATOR ASSESSMENT SCORE  Score 1 2 3 4   Breath Sounds   []  Clear []  Mild Wheezing with good aeration [x]  Moderate I/E wheezing with adequate aeration []  Poor Aeration or diffuse wheezing   Respiratory Rate []  Less than 20 [x]  20-25 []  Greater than 25  []  Greater than 35    Dyspnea []  No SOB  [x]  SOB with minimal activity []  Speaking in partial sentences []  Acute/ At rest   Peakflow (asthma) []  80 % or greater predicted/PB  []  Unable []  70% or greater predicted/PB  [x]  Unable []  51%-70% predicted/PB  []  Unable []  Less than 50% predicted/PB  []  Unable due to distress   FEV1 % Predicted []  Greater than 69%  []  Unable  []  Less than 50%-69%  [x]  Unable  []  Less than 35%-49%  []  Unable  []  Less than 35%  []  Unable due to distress   · Bronchodilator assessment   []    Bronchodilator Assessment    FEV1 % PREDICTED naFEV1 actual: na  PEFR  na  PEFR % Predicted na  RR na  Bronchodilator assessment at level  na  BRONCHODILATOR ASSESSMENT SCORE  Score 1 2 3 4   Breath Sounds   []  Clear []  Mild Wheezing with good aeration []  Moderate I/E wheezing with adequate aeration []  Poor Aeration or diffuse wheezing   Respiratory Rate []  Less than 20 []  20-25 []  Greater than 25  []  Greater than 35    Dyspnea []  No SOB  []  SOB with minimal activity []  Speaking in partial sentences []  Acute/ At rest   Peakflow (asthma) []  80 % or greater predicted/PB  []  Unable []  70% or greater predicted/PB  []  Unable []  51%-70% predicted/PB  []  Unable []  Less than 50% predicted/PB  []  Unable due to distress   FEV1 % Predicted []  Greater than 69%  []  Unable  []  Less than 50%-69%  []  Unable  []  Less than 35%-49%  []  Unable  []  Less than 35%  []  Unable due to distress     MDI Instruction

## 2018-08-19 ENCOUNTER — APPOINTMENT (OUTPATIENT)
Dept: CT IMAGING | Age: 58
DRG: 191 | End: 2018-08-19
Payer: MEDICARE

## 2018-08-19 PROBLEM — J96.21 ACUTE ON CHRONIC RESPIRATORY FAILURE WITH HYPOXIA AND HYPERCAPNIA (HCC): Status: ACTIVE | Noted: 2018-08-19

## 2018-08-19 PROBLEM — J96.22 ACUTE ON CHRONIC RESPIRATORY FAILURE WITH HYPOXIA AND HYPERCAPNIA (HCC): Status: ACTIVE | Noted: 2018-08-19

## 2018-08-19 LAB
FIO2: 28
GLUCOSE BLD-MCNC: 176 MG/DL (ref 75–110)
GLUCOSE BLD-MCNC: 254 MG/DL (ref 75–110)
NEGATIVE BASE EXCESS, ART: ABNORMAL (ref 0–2)
O2 DEVICE/FLOW/%: ABNORMAL
PATIENT TEMP: ABNORMAL
POC HCO3: 37.2 MMOL/L (ref 22–27)
POC O2 SATURATION: 90 %
POC PCO2 TEMP: ABNORMAL MM HG
POC PCO2: 55 MM HG (ref 32–45)
POC PH TEMP: ABNORMAL
POC PH: 7.44 (ref 7.35–7.45)
POC PO2 TEMP: ABNORMAL MM HG
POC PO2: 58 MM HG (ref 75–95)
POSITIVE BASE EXCESS, ART: 13 (ref 0–2)
TCO2 (CALC), ART: 39 MMOL/L (ref 23–28)

## 2018-08-19 PROCEDURE — 94761 N-INVAS EAR/PLS OXIMETRY MLT: CPT

## 2018-08-19 PROCEDURE — 2500000003 HC RX 250 WO HCPCS: Performed by: INTERNAL MEDICINE

## 2018-08-19 PROCEDURE — 82947 ASSAY GLUCOSE BLOOD QUANT: CPT

## 2018-08-19 PROCEDURE — 94640 AIRWAY INHALATION TREATMENT: CPT

## 2018-08-19 PROCEDURE — 71250 CT THORAX DX C-: CPT

## 2018-08-19 PROCEDURE — 2060000000 HC ICU INTERMEDIATE R&B

## 2018-08-19 PROCEDURE — 82803 BLOOD GASES ANY COMBINATION: CPT

## 2018-08-19 PROCEDURE — 2700000000 HC OXYGEN THERAPY PER DAY

## 2018-08-19 PROCEDURE — 6360000002 HC RX W HCPCS: Performed by: INTERNAL MEDICINE

## 2018-08-19 PROCEDURE — 36600 WITHDRAWAL OF ARTERIAL BLOOD: CPT

## 2018-08-19 PROCEDURE — 6370000000 HC RX 637 (ALT 250 FOR IP): Performed by: INTERNAL MEDICINE

## 2018-08-19 PROCEDURE — 2580000003 HC RX 258: Performed by: INTERNAL MEDICINE

## 2018-08-19 PROCEDURE — 99223 1ST HOSP IP/OBS HIGH 75: CPT | Performed by: INTERNAL MEDICINE

## 2018-08-19 RX ORDER — NICOTINE POLACRILEX 4 MG
15 LOZENGE BUCCAL PRN
Status: DISCONTINUED | OUTPATIENT
Start: 2018-08-19 | End: 2018-08-25 | Stop reason: HOSPADM

## 2018-08-19 RX ORDER — ACETYLCYSTEINE 200 MG/ML
600 SOLUTION ORAL; RESPIRATORY (INHALATION) 2 TIMES DAILY
Status: DISCONTINUED | OUTPATIENT
Start: 2018-08-19 | End: 2018-08-25

## 2018-08-19 RX ORDER — METHYLPREDNISOLONE SODIUM SUCCINATE 125 MG/2ML
60 INJECTION, POWDER, LYOPHILIZED, FOR SOLUTION INTRAMUSCULAR; INTRAVENOUS EVERY 8 HOURS
Status: DISCONTINUED | OUTPATIENT
Start: 2018-08-19 | End: 2018-08-20

## 2018-08-19 RX ORDER — LORAZEPAM 0.5 MG/1
0.5 TABLET ORAL EVERY 6 HOURS PRN
Status: DISCONTINUED | OUTPATIENT
Start: 2018-08-19 | End: 2018-08-25 | Stop reason: HOSPADM

## 2018-08-19 RX ORDER — IPRATROPIUM BROMIDE AND ALBUTEROL SULFATE 2.5; .5 MG/3ML; MG/3ML
1 SOLUTION RESPIRATORY (INHALATION)
Status: DISCONTINUED | OUTPATIENT
Start: 2018-08-19 | End: 2018-08-21

## 2018-08-19 RX ORDER — LORAZEPAM 0.5 MG/1
0.5 TABLET ORAL EVERY 6 HOURS PRN
COMMUNITY
End: 2018-10-10 | Stop reason: ALTCHOICE

## 2018-08-19 RX ORDER — DEXTROSE MONOHYDRATE 25 G/50ML
12.5 INJECTION, SOLUTION INTRAVENOUS PRN
Status: DISCONTINUED | OUTPATIENT
Start: 2018-08-19 | End: 2018-08-25 | Stop reason: HOSPADM

## 2018-08-19 RX ORDER — DEXTROSE MONOHYDRATE 50 MG/ML
100 INJECTION, SOLUTION INTRAVENOUS PRN
Status: DISCONTINUED | OUTPATIENT
Start: 2018-08-19 | End: 2018-08-25 | Stop reason: HOSPADM

## 2018-08-19 RX ORDER — BUDESONIDE 0.5 MG/2ML
0.5 INHALANT ORAL 2 TIMES DAILY
Status: DISCONTINUED | OUTPATIENT
Start: 2018-08-19 | End: 2018-08-25 | Stop reason: HOSPADM

## 2018-08-19 RX ORDER — METOPROLOL TARTRATE 5 MG/5ML
5 INJECTION INTRAVENOUS ONCE
Status: COMPLETED | OUTPATIENT
Start: 2018-08-19 | End: 2018-08-19

## 2018-08-19 RX ADMIN — OLANZAPINE 10 MG: 5 TABLET, FILM COATED ORAL at 21:00

## 2018-08-19 RX ADMIN — VANCOMYCIN HYDROCHLORIDE 1000 MG: 1 INJECTION, SOLUTION INTRAVENOUS at 23:53

## 2018-08-19 RX ADMIN — ZOLPIDEM TARTRATE 5 MG: 5 TABLET ORAL at 21:00

## 2018-08-19 RX ADMIN — OXYCODONE HYDROCHLORIDE 40 MG: 40 TABLET, FILM COATED, EXTENDED RELEASE ORAL at 09:39

## 2018-08-19 RX ADMIN — IPRATROPIUM BROMIDE AND ALBUTEROL SULFATE 1 AMPULE: .5; 3 SOLUTION RESPIRATORY (INHALATION) at 06:29

## 2018-08-19 RX ADMIN — LORAZEPAM 0.5 MG: 0.5 TABLET ORAL at 17:07

## 2018-08-19 RX ADMIN — PREGABALIN 25 MG: 25 CAPSULE ORAL at 09:39

## 2018-08-19 RX ADMIN — OXYCODONE HYDROCHLORIDE 40 MG: 40 TABLET, FILM COATED, EXTENDED RELEASE ORAL at 21:00

## 2018-08-19 RX ADMIN — PREGABALIN 25 MG: 25 CAPSULE ORAL at 15:51

## 2018-08-19 RX ADMIN — DRONABINOL 2.5 MG: 2.5 CAPSULE ORAL at 17:30

## 2018-08-19 RX ADMIN — ACETYLCYSTEINE 600 MG: 200 SOLUTION ORAL; RESPIRATORY (INHALATION) at 19:53

## 2018-08-19 RX ADMIN — PREGABALIN 25 MG: 25 CAPSULE ORAL at 21:01

## 2018-08-19 RX ADMIN — GUAIFENESIN AND DEXTROMETHORPHAN 10 ML: 100; 10 SYRUP ORAL at 06:45

## 2018-08-19 RX ADMIN — IPRATROPIUM BROMIDE AND ALBUTEROL SULFATE 1 AMPULE: .5; 3 SOLUTION RESPIRATORY (INHALATION) at 15:13

## 2018-08-19 RX ADMIN — BUDESONIDE 500 MCG: 0.5 SUSPENSION RESPIRATORY (INHALATION) at 11:52

## 2018-08-19 RX ADMIN — BUDESONIDE 500 MCG: 0.5 SUSPENSION RESPIRATORY (INHALATION) at 19:53

## 2018-08-19 RX ADMIN — METHYLPREDNISOLONE SODIUM SUCCINATE 60 MG: 125 INJECTION, POWDER, FOR SOLUTION INTRAMUSCULAR; INTRAVENOUS at 21:27

## 2018-08-19 RX ADMIN — OXYCODONE HYDROCHLORIDE 10 MG: 5 TABLET ORAL at 04:24

## 2018-08-19 RX ADMIN — ACETYLCYSTEINE 600 MG: 200 SOLUTION ORAL; RESPIRATORY (INHALATION) at 11:52

## 2018-08-19 RX ADMIN — METHYLPREDNISOLONE SODIUM SUCCINATE 60 MG: 125 INJECTION, POWDER, FOR SOLUTION INTRAMUSCULAR; INTRAVENOUS at 12:59

## 2018-08-19 RX ADMIN — ONDANSETRON HYDROCHLORIDE 8 MG: 4 TABLET, FILM COATED ORAL at 22:03

## 2018-08-19 RX ADMIN — VANCOMYCIN HYDROCHLORIDE 1000 MG: 1 INJECTION, SOLUTION INTRAVENOUS at 09:39

## 2018-08-19 RX ADMIN — OXYCODONE HYDROCHLORIDE 10 MG: 5 TABLET ORAL at 15:51

## 2018-08-19 RX ADMIN — IPRATROPIUM BROMIDE AND ALBUTEROL SULFATE 1 AMPULE: .5; 3 SOLUTION RESPIRATORY (INHALATION) at 11:52

## 2018-08-19 RX ADMIN — METOPROLOL TARTRATE 5 MG: 5 INJECTION, SOLUTION INTRAVENOUS at 22:25

## 2018-08-19 RX ADMIN — DRONABINOL 2.5 MG: 2.5 CAPSULE ORAL at 06:11

## 2018-08-19 RX ADMIN — Medication 1 TABLET: at 09:39

## 2018-08-19 RX ADMIN — AZITHROMYCIN MONOHYDRATE 500 MG: 500 INJECTION, POWDER, LYOPHILIZED, FOR SOLUTION INTRAVENOUS at 21:27

## 2018-08-19 RX ADMIN — INSULIN LISPRO 6 UNITS: 100 INJECTION, SOLUTION INTRAVENOUS; SUBCUTANEOUS at 18:59

## 2018-08-19 RX ADMIN — CEFTRIAXONE SODIUM 1 G: 1 INJECTION, POWDER, FOR SOLUTION INTRAMUSCULAR; INTRAVENOUS at 20:44

## 2018-08-19 RX ADMIN — PANTOPRAZOLE SODIUM 40 MG: 40 TABLET, DELAYED RELEASE ORAL at 06:11

## 2018-08-19 RX ADMIN — IPRATROPIUM BROMIDE AND ALBUTEROL SULFATE 1 AMPULE: .5; 3 SOLUTION RESPIRATORY (INHALATION) at 19:53

## 2018-08-19 RX ADMIN — Medication 1 TABLET: at 21:00

## 2018-08-19 RX ADMIN — DEXAMETHASONE 4 MG: 4 TABLET ORAL at 09:42

## 2018-08-19 RX ADMIN — INSULIN LISPRO 1 UNITS: 100 INJECTION, SOLUTION INTRAVENOUS; SUBCUTANEOUS at 21:27

## 2018-08-19 ASSESSMENT — PAIN SCALES - GENERAL
PAINLEVEL_OUTOF10: 0
PAINLEVEL_OUTOF10: 7
PAINLEVEL_OUTOF10: 8
PAINLEVEL_OUTOF10: 6
PAINLEVEL_OUTOF10: 8

## 2018-08-19 NOTE — PROGRESS NOTES
general   []  Abdominal or thoracic surgery  []  Abdominal or thoracic   Chronic Pulmonary Historyre []  No []  Yes []  Yes     []  Secretion Management Assessment  Score 1 2 3   Bilateral Breath Sounds   []  Occasional Rhonchi []  Scattered Rhonchi []  Course Rhonchi and/or poor aeration   Sputum    []  Small amount of thin secretions []  Moderate amount of viscous secretions []  Copius, Viscious Yellow/ Secretions   CXR as reported by physician []  clear  []  Unavailable []  Infiltrates and/or consolidation  []  Unavailable []  Mucus Plugging and or lobar consolidation  []  Unavailable   Cough []  Strong, productive cough []  Weak productive cough []  No cough or weak non-productive cough

## 2018-08-19 NOTE — PLAN OF CARE
Problem: Breathing Pattern - Ineffective:  Goal: Ability to achieve and maintain a regular respiratory rate will improve  Ability to achieve and maintain a regular respiratory rate will improve  Outcome: Ongoing  Pt admitted with COPD exacerbation. Pt has hx of R lung ca and wears 3L of O2 regularly at home. Pt has SOB on exertion and at rest. Pt instructed to call when he needs assistance. Will continue to monitor.     Comments: Treatment plan discussed with patient

## 2018-08-19 NOTE — CONSULTS
_                         Today's Date: 8/19/2018  Patient Name: Marvin Paz  Date of admission: 8/18/2018  3:29 PM  Patient's age: 63 y.o., 1960  Admission Dx: COPD exacerbation ([address] [J44.1]  COPD with exacerbation ([address] [J44.1]      Requesting Physician: Bennett Underwood MD    CHIEF COMPLAINT:  Shortness of breath. COPD exacerbation. Consulted for lung cancer. History Obtained From:  patient, electronic medical record    HISTORY OF PRESENT ILLNESS:      The patient is a 63 y.o. male who is admitted to the hospital for further management of shortness of breath COPD with acute exacerbation. The patient had increasing shortness of breath for the last few days. He has cough with clear sputum. No hemoptysis. No fever or chills. No chest pains. He had imaging which showed mild bilateral lung infiltrates. Possibly chronic. Patient has history of squamous cell carcinoma. He is receiving palliative immunotherapy with Opdivo since March 2018. Next treatment is next week. No side effects related to treatment. Past Medical History:   has a past medical history of Benign essential HTN; Cardiac arrest due to respiratory disorder ([address] Cellulitis; Chronic low back pain; Compression fracture of L1 lumbar vertebra (HCC); COPD (chronic obstructive pulmonary disease) ([address] Emphysema of lung ([address] Epidermoid carcinoma of lung ([address] Head injury; Hepatitis; Insomnia; Osteoarthritis of spine with radiculopathy, lumbar region; Oxygen dependent; Reflux; Right sided weakness; Seizures ([address] Tremor; and Ulcer. Past Surgical History:   has a past surgical history that includes Cervical disc surgery (2000); Cystocopy (7/27/2015); Colonoscopy; Endoscopy, colon, diagnostic; tracheostomy; Tunneled venous port placement; Dilatation, esophagus; Kyphosis surgery (07/19/2018); and pr perq vertebroplasty uni/bi injx cervicothoracic (N/A, 7/19/2018).    Family 08/18/18 2302      Calcium Carb-Cholecalciferol 600-400 MG-UNIT TABS 1 tablet  1 tablet Oral BID Simon Nguyen MD   1 tablet at 08/19/18 8771    dronabinol (MARINOL) capsule 2.5 mg  2.5 mg Oral BID AC Lizzette Guzman MD   2.5 mg at 08/19/18 1730    loperamide (IMODIUM) capsule 2 mg  2 mg Oral TID PRN Simon Nguyen MD        pregabalin (LYRICA) capsule 25 mg  25 mg Oral TID Simon Nguyen MD   25 mg at 08/19/18 1551    OLANZapine (ZYPREXA) tablet 10 mg  10 mg Oral Nightly Simon Nguyen MD   10 mg at 08/18/18 2337    ondansetron (ZOFRAN) tablet 8 mg  8 mg Oral Q8H PRN Simon Nguyen MD        oxyCODONE (ROXICODONE) immediate release tablet 10 mg  10 mg Oral Q6H PRN Simon Nguyen MD   10 mg at 08/19/18 1551    oxyCODONE (OXYCONTIN) extended release tablet 40 mg  40 mg Oral Q12H Lizzette Guzman MD   40 mg at 08/19/18 0939    pantoprazole (PROTONIX) tablet 40 mg  40 mg Oral QAM AC Lizzette Guzman MD   40 mg at 08/19/18 0450    cefTRIAXone (ROCEPHIN) 1 g IVPB in 50 mL D5W minibag  1 g Intravenous Q24H Simon Nguyen MD        azithromycin (ZITHROMAX) 500 mg in D5W 250ml addavial  500 mg Intravenous Q24H Simon Nguyen MD        albuterol (PROVENTIL) nebulizer solution 2.5 mg  2.5 mg Nebulization As Directed RT PRN Simon Nguyen MD        vancomycin (VANCOCIN) 1000 mg in dextrose 5% 200 mL IVPB  1,000 mg Intravenous Q12H Simon Nguyen MD   Stopped at 08/19/18 1039    vancomycin (VANCOCIN) intermittent dosing (placeholder)   Other RX Placeholder Simon Nguyen MD        zolpidem (AMBIEN) tablet 5 mg  5 mg Oral Nightly PRN Simon Nguyen MD   5 mg at 08/18/18 2332    guaiFENesin-dextromethorphan (ROBITUSSIN DM) 100-10 MG/5ML syrup 10 mL  10 mL Oral Q4H PRN Simon Nguyen MD   10 mL at 08/19/18 0645     Facility-Administered Medications Ordered in Other Encounters   Medication Dose Route Frequency Provider Last Rate Last Dose    0.9 % sodium appearing, not in pain or distress  Mental status - good mood, alert and oriented  Eyes - pupils equal and reactive, extraocular eye movements intact  Ears - bilateral TM's and external ear canals normal  Nose - normal and patent, no erythema, discharge or polyps  Mouth - mucous membranes moist, pharynx normal without lesions  Neck - supple, no significant adenopathy  Lymphatics - no palpable lymphadenopathy, no hepatosplenomegaly  Chest - clear to auscultation, no wheezes, rales or rhonchi, symmetric air entry  Heart - normal rate, regular rhythm, normal S1, S2, no murmurs, rubs, clicks or gallops  Abdomen - soft, nontender, nondistended, no masses or organomegaly  Neurological - alert, oriented, normal speech, no focal findings or movement disorder noted  Musculoskeletal - no joint tenderness, deformity or swelling  Extremities - peripheral pulses normal, no pedal edema, no clubbing or cyanosis  Skin - normal coloration and turgor, no rashes, no suspicious skin lesions noted           DATA:      Labs:       CBC:   Recent Labs      08/18/18   1557   WBC  12.2*   HGB  11.7*   HCT  35.7*   PLT  262     BMP:   Recent Labs      08/18/18   1557   NA  137   K  4.6   CO2  32*   BUN  15   CREATININE  0.59*   LABGLOM  >60   GLUCOSE  168*     PT/INR: No results for input(s): PROTIME, INR in the last 72 hours. APTT:No results for input(s): APTT in the last 72 hours. LIVER PROFILE:  Recent Labs      08/18/18   1557   AST  29   ALT  55*   LABALBU  3.1*               IMPRESSION:    Primary Problem  <principal problem not specified>    Active Hospital Problems    Diagnosis Date Noted    Acute on chronic respiratory failure with hypoxia and hypercapnia (HCC) [T01.69, J96.22] 08/19/2018    COPD with acute exacerbation ([facility] [J44.1] 08/18/2018    Squamous cell carcinoma of lung ([email or web address] [C34.90] 08/18/2018    COPD with exacerbation ([email or web address] [J44.1] 08/18/2018       RECOMMENDATIONS:  1. Patient has COPD exacerbation.   He has

## 2018-08-19 NOTE — H&P
BMI above normal limits, recommended weight loss, improve diet and follow up with internist. N/A 7/19/2018    KYPHOPLASTY L1 performed by Aimee Diamond MD at [facility] with broken neck    TUNNELED VENOUS PORT PLACEMENT         Current Facility-Administered Medications on File Prior to Encounter   Medication Dose Route Frequency Provider Last Rate Last Dose    0.9 % sodium chloride infusion 250 mL  250 mL Intravenous Once Gin Patel, DO         Current Outpatient Prescriptions on File Prior to Encounter   Medication Sig Dispense Refill    Fluticasone Furoate-Vilanterol (BREO ELLIPTA) 200-25 MCG/INH AEPB Inhale 1 puff into the lungs daily      ipratropium-albuterol (DUONEB) 0.5-2.5 (3) MG/3ML SOLN nebulizer solution Inhale 3 mLs into the lungs every 4 hours (while awake) 360 mL     Albuterol Sulfate (VENTOLIN HFA IN) Inhale 2 puffs into the lungs every 6 hours as needed       oxyCODONE HCl (OXY-IR) 10 MG immediate release tablet TAKE 1 TABLET BY MOUTH EVERY 6 HOURS AS NEEDED FOR PAIN 120 tablet 0    OXYCONTIN 40 MG extended release tablet Take 1 tablet by mouth every 12 hours for 30 days. . 60 tablet 0    LYRICA 25 MG capsule Take 1 capsule by mouth 3 times daily for 30 days. . 90 capsule 0    zolpidem (AMBIEN) 5 MG tablet TAKE 1 TABLET BY MOUTH NIGHTLY AS NEEDED FOR SLEEP.  30 tablet 0    dexamethasone (DECADRON) 4 MG tablet Take 1 tablet by mouth daily (with breakfast) 30 tablet 0    ondansetron (ZOFRAN) 8 MG tablet TAKE 1 TABLET BY MOUTH EVERY 8 HOURS AS NEEDED FOR NAUSEA OR VOMITING 30 tablet 0    pantoprazole (PROTONIX) 40 MG tablet Take 1 tablet by mouth every morning (before breakfast) 30 tablet 11    Umeclidinium Bromide (INCRUSE ELLIPTA) 62.5 MCG/INH AEPB Inhale 1 puff into the lungs daily      loperamide (IMODIUM) 2 MG capsule Take 2 mg by mouth 3 times daily as needed for Diarrhea      OLANZapine (ZYPREXA) 10 MG tablet Take 1 tablet by mouth nightly 30 tablet 5    calcium-vitamin D (OSCAL-500) 500-200 MG-UNIT per tablet Take 1 tablet by mouth 2 times daily Allergies:  Aspirin; Fish-derived products; Shellfish-derived products; and Motrin [ibuprofen micronized]    Social History:   Social History     Social History    Marital status: Single     Spouse name: N/A    Number of children: N/A    Years of education: N/A     Occupational History    Not on file. Social History Main Topics    Smoking status: Former Smoker     Packs/day: 0.50     Years: 40.00     Types: Cigarettes     Quit date: 7/8/2018    Smokeless tobacco: Never Used      Comment: Nicotine Patch causes Nausea/vomiting; He states it's too difficult to stop smoking.  Alcohol use No      Comment: quit 12/9/2017    Drug use: No    Sexual activity: Not on file     Other Topics Concern    Not on file     Social History Narrative    No narrative on file       Family History:   Malignancy, stroke, heart disease. REVIEW OF SYSTEMS:  CONSTITUTIONAL:  negative for  chills and sweats  EYES:  negative for  double vision, blurred vision, dry eyes and blind spots  HEENT:  negative for  hearing loss, ear drainage, earaches and epistaxis  RESPIRATORY:  See present illness. CARDIOVASCULAR:  negative for  palpitations, PND, exertional chest pressure/discomfort, syncope  GASTROINTESTINAL:  negative for change in bowel habits, dysphagia, hematemesis and hemtochezia  GENITOURINARY:  negative for nocturia, decreased stream and hematuria  INTEGUMENT/BREAST:  negative for rash and skin lesion(s)  HEMATOLOGIC/LYMPHATIC:  negative for easy bruising, bleeding and lymphadenopathy  ALLERGIC/IMMUNOLOGIC:  See list.  ENDOCRINE:  negative for heat intolerance and cold intolerance  MUSCULOSKELETAL:  Chronic pain. NEUROLOGICAL:  negative for memory problems, speech problems and visual disturbance  BEHAVIOR/PSYCH:  Chronic anxiety.     PHYSICAL EXAM:  Vitals:  /68   Pulse 118   Temp 98.1 °F (36.7 °C) (Oral)   Resp 20   Ht 5' 5\" (1.651 m)   Wt 120 lb (54.4 kg)   SpO2 99%   BMI 19.97 kg/m²     General Appearance: alert and oriented to person, place and time and in no acute distress  Skin: warm and dry and no rash or erythema  Head: normocephalic and atraumatic  Eyes: pupils equal, round, and reactive to light, extraocular eye movements intact and sclera anicteric  ENT: oropharynx clear and moist with normal mucous membranes  Neck: neck supple and non tender without mass, no thyromegaly and no thyroid nodules   Pulmonary/Chest: diminished breath sounds,  Rhonchi both lung fields. No rales. Cardiovascular: normal rate, regular rhythm, normal S1 and S2, no murmurs, no gallops, intact distal pulses, no carotid bruits and no JVD  Abdomen: soft, non-tender, non-distended, bowel sounds normal and no abdominal bruits  Genitourinary: deferred. Extremities: no cyanosis, no clubbing, no edema and Anirudh's sign negative bilaterally  Musculoskeletal: no joint deformity, no tender joints and no crepitus  Neurologic: no cranial nerve deficit and speech normal. Gait not tested. DATA:  Reviewed. ASSESSMENT AND PLAN:      Active Problems:    COPD with acute exacerbation (HCC)    Squamous cell carcinoma of lung ([facility]  Resolved Problems:    * No resolved hospital problems. *    Discussed with Dr. Jael Nickerson  ER labs/imaging studies report noted. Home meds as listed reviewed/reconciled. Admitting orders made.     Electronically signed by Alberto Tavarez MD on 8/18/2018 at 8:29 PM

## 2018-08-19 NOTE — CARE COORDINATION
Case Management Initial Discharge Plan  Ad MANDI [place],         Readmission Risk              Risk of Unplanned Readmission:        35               Met with:patient to discuss discharge plans. Information verified: address, contacts, phone number, , insurance Yes  PCP: Sarmad Snowden DO  Date of last visit:  Aug 2018    Insurance Provider: Medicare/ Medicaid    Discharge Planning  Current Residence:  Private residence  Living Arrangements:  Spouse/Significant Other   Home has 1 stories/4 stairs to climb  Support Systems:  Friends/Neighbors  Current Services PTA:  Skilled Nurse from [facility]: [facility]  Patient able to perform ADL's:Assisted  DME in home:  Reggy Hem, cane, wheelchair, nebulizer, O2 concentrator and portable tanks that he can refill from [facility] [place]  DME used to aid ambulation prior to admission:   Reggy Hem, wheelchair  DME used during admission:  TBD    Potential Assistance Needed:  [id number]: Cj Rangel at [place] Medications:  No  Does patient want to participate in local refill/ meds to beds program?  No    Patient agreeable to home care: Yes  Freedom of choice provided:  Yes, patient current with [facility]      Type of Home Care Services:  OT, PT  Patient expects to be discharged to:  home    Prior SNF/Rehab Placement and Facility: none  Agreeable to SNF/Rehab: No  [place] of choice provided: n/a   Evaluation: n/a    Expected Discharge date:  18  Follow Up Appointment: Best Day/ Time: Monday AM    Transportation provider: Significant other Yumiko  Transportation arrangements needed for discharge: No    Discharge Plan:   Met with patient to review plan of care. Patient states he received small portable tanks that he can refill on his own from [address]. He is pleased that he was able to get these tanks and it has made it easier for him.     Pt states he has [facility] for weekly skilled  Nurse and would like to continue that service. Phone call to [facility] and confirmed with Yvette that pts services are current and is just a weekly nurse. Patient is also receiving services from [facility] palliative. Pt denies any need for any more DME or services. He plans to go home with resumation of [place]. DARIUS initiated. Plan:  Home with yoele [facility].   Has home O2 concentrator and portability, walker, w/c nebulizer        Electronically signed by Halima Lopez on 8/19/18 at 10:55 AM
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
## 2018-08-20 LAB
GLUCOSE BLD-MCNC: 183 MG/DL (ref 75–110)
GLUCOSE BLD-MCNC: 189 MG/DL (ref 75–110)
GLUCOSE BLD-MCNC: 196 MG/DL (ref 75–110)
GLUCOSE BLD-MCNC: 257 MG/DL (ref 75–110)
LV EF: 65 %
LVEF MODALITY: NORMAL
VANCOMYCIN TROUGH DATE LAST DOSE: ABNORMAL
VANCOMYCIN TROUGH DOSE AMOUNT: ABNORMAL
VANCOMYCIN TROUGH TIME LAST DOSE: ABNORMAL
VANCOMYCIN TROUGH: 23.2 UG/ML (ref 10–20)

## 2018-08-20 PROCEDURE — 6360000002 HC RX W HCPCS: Performed by: INTERNAL MEDICINE

## 2018-08-20 PROCEDURE — 6370000000 HC RX 637 (ALT 250 FOR IP): Performed by: INTERNAL MEDICINE

## 2018-08-20 PROCEDURE — 99232 SBSQ HOSP IP/OBS MODERATE 35: CPT | Performed by: INTERNAL MEDICINE

## 2018-08-20 PROCEDURE — 2580000003 HC RX 258: Performed by: INTERNAL MEDICINE

## 2018-08-20 PROCEDURE — 94640 AIRWAY INHALATION TREATMENT: CPT

## 2018-08-20 PROCEDURE — 2060000000 HC ICU INTERMEDIATE R&B

## 2018-08-20 PROCEDURE — 80202 ASSAY OF VANCOMYCIN: CPT

## 2018-08-20 PROCEDURE — 87641 MR-STAPH DNA AMP PROBE: CPT

## 2018-08-20 PROCEDURE — 82947 ASSAY GLUCOSE BLOOD QUANT: CPT

## 2018-08-20 PROCEDURE — 93306 TTE W/DOPPLER COMPLETE: CPT

## 2018-08-20 PROCEDURE — 2700000000 HC OXYGEN THERAPY PER DAY

## 2018-08-20 PROCEDURE — 94761 N-INVAS EAR/PLS OXIMETRY MLT: CPT

## 2018-08-20 PROCEDURE — 36415 COLL VENOUS BLD VENIPUNCTURE: CPT

## 2018-08-20 RX ORDER — METHYLPREDNISOLONE SODIUM SUCCINATE 40 MG/ML
40 INJECTION, POWDER, LYOPHILIZED, FOR SOLUTION INTRAMUSCULAR; INTRAVENOUS EVERY 8 HOURS
Status: DISCONTINUED | OUTPATIENT
Start: 2018-08-20 | End: 2018-08-22

## 2018-08-20 RX ADMIN — ACETYLCYSTEINE 600 MG: 200 SOLUTION ORAL; RESPIRATORY (INHALATION) at 19:47

## 2018-08-20 RX ADMIN — IPRATROPIUM BROMIDE AND ALBUTEROL SULFATE 1 AMPULE: .5; 3 SOLUTION RESPIRATORY (INHALATION) at 15:45

## 2018-08-20 RX ADMIN — METHYLPREDNISOLONE SODIUM SUCCINATE 60 MG: 125 INJECTION, POWDER, FOR SOLUTION INTRAMUSCULAR; INTRAVENOUS at 11:47

## 2018-08-20 RX ADMIN — IPRATROPIUM BROMIDE AND ALBUTEROL SULFATE 1 AMPULE: .5; 3 SOLUTION RESPIRATORY (INHALATION) at 19:47

## 2018-08-20 RX ADMIN — VANCOMYCIN HYDROCHLORIDE 750 MG: 750 INJECTION, POWDER, LYOPHILIZED, FOR SOLUTION INTRAVENOUS at 10:43

## 2018-08-20 RX ADMIN — ACETYLCYSTEINE: 200 SOLUTION ORAL; RESPIRATORY (INHALATION) at 11:22

## 2018-08-20 RX ADMIN — INSULIN LISPRO 1 UNITS: 100 INJECTION, SOLUTION INTRAVENOUS; SUBCUTANEOUS at 21:11

## 2018-08-20 RX ADMIN — SODIUM CHLORIDE: 9 INJECTION, SOLUTION INTRAVENOUS at 23:54

## 2018-08-20 RX ADMIN — BUDESONIDE 0.5 MG: 0.5 SUSPENSION RESPIRATORY (INHALATION) at 19:47

## 2018-08-20 RX ADMIN — ZOLPIDEM TARTRATE 5 MG: 5 TABLET ORAL at 21:01

## 2018-08-20 RX ADMIN — DRONABINOL 2.5 MG: 2.5 CAPSULE ORAL at 05:52

## 2018-08-20 RX ADMIN — OXYCODONE HYDROCHLORIDE 10 MG: 5 TABLET ORAL at 17:27

## 2018-08-20 RX ADMIN — PREGABALIN 25 MG: 25 CAPSULE ORAL at 09:19

## 2018-08-20 RX ADMIN — OXYCODONE HYDROCHLORIDE 10 MG: 5 TABLET ORAL at 11:47

## 2018-08-20 RX ADMIN — OXYCODONE HYDROCHLORIDE 10 MG: 5 TABLET ORAL at 23:54

## 2018-08-20 RX ADMIN — VANCOMYCIN HYDROCHLORIDE 750 MG: 750 INJECTION, POWDER, LYOPHILIZED, FOR SOLUTION INTRAVENOUS at 22:04

## 2018-08-20 RX ADMIN — DRONABINOL 2.5 MG: 2.5 CAPSULE ORAL at 17:27

## 2018-08-20 RX ADMIN — INSULIN LISPRO 6 UNITS: 100 INJECTION, SOLUTION INTRAVENOUS; SUBCUTANEOUS at 09:19

## 2018-08-20 RX ADMIN — OXYCODONE HYDROCHLORIDE 10 MG: 5 TABLET ORAL at 05:52

## 2018-08-20 RX ADMIN — INSULIN LISPRO 2 UNITS: 100 INJECTION, SOLUTION INTRAVENOUS; SUBCUTANEOUS at 17:28

## 2018-08-20 RX ADMIN — PANTOPRAZOLE SODIUM 40 MG: 40 TABLET, DELAYED RELEASE ORAL at 05:52

## 2018-08-20 RX ADMIN — METHYLPREDNISOLONE SODIUM SUCCINATE 40 MG: 40 INJECTION, POWDER, FOR SOLUTION INTRAMUSCULAR; INTRAVENOUS at 20:09

## 2018-08-20 RX ADMIN — IPRATROPIUM BROMIDE AND ALBUTEROL SULFATE 1 AMPULE: .5; 3 SOLUTION RESPIRATORY (INHALATION) at 07:36

## 2018-08-20 RX ADMIN — OXYCODONE HYDROCHLORIDE 40 MG: 40 TABLET, FILM COATED, EXTENDED RELEASE ORAL at 21:19

## 2018-08-20 RX ADMIN — IPRATROPIUM BROMIDE AND ALBUTEROL SULFATE 1 AMPULE: .5; 3 SOLUTION RESPIRATORY (INHALATION) at 11:22

## 2018-08-20 RX ADMIN — PREGABALIN 25 MG: 25 CAPSULE ORAL at 14:10

## 2018-08-20 RX ADMIN — PREGABALIN 25 MG: 25 CAPSULE ORAL at 21:01

## 2018-08-20 RX ADMIN — METHYLPREDNISOLONE SODIUM SUCCINATE 60 MG: 125 INJECTION, POWDER, FOR SOLUTION INTRAMUSCULAR; INTRAVENOUS at 04:50

## 2018-08-20 RX ADMIN — CEFTRIAXONE SODIUM 1 G: 1 INJECTION, POWDER, FOR SOLUTION INTRAMUSCULAR; INTRAVENOUS at 20:00

## 2018-08-20 RX ADMIN — BUDESONIDE 500 MCG: 0.5 SUSPENSION RESPIRATORY (INHALATION) at 07:36

## 2018-08-20 RX ADMIN — Medication 1 TABLET: at 20:19

## 2018-08-20 RX ADMIN — OXYCODONE HYDROCHLORIDE 40 MG: 40 TABLET, FILM COATED, EXTENDED RELEASE ORAL at 09:19

## 2018-08-20 RX ADMIN — Medication 1 TABLET: at 09:19

## 2018-08-20 RX ADMIN — AZITHROMYCIN MONOHYDRATE 500 MG: 500 INJECTION, POWDER, LYOPHILIZED, FOR SOLUTION INTRAVENOUS at 20:00

## 2018-08-20 RX ADMIN — INSULIN LISPRO 2 UNITS: 100 INJECTION, SOLUTION INTRAVENOUS; SUBCUTANEOUS at 11:47

## 2018-08-20 RX ADMIN — OLANZAPINE 10 MG: 5 TABLET, FILM COATED ORAL at 20:19

## 2018-08-20 ASSESSMENT — PAIN SCALES - GENERAL
PAINLEVEL_OUTOF10: 6
PAINLEVEL_OUTOF10: 6
PAINLEVEL_OUTOF10: 8
PAINLEVEL_OUTOF10: 9
PAINLEVEL_OUTOF10: 4
PAINLEVEL_OUTOF10: 7
PAINLEVEL_OUTOF10: 7

## 2018-08-20 ASSESSMENT — PAIN DESCRIPTION - PAIN TYPE
TYPE: CHRONIC PAIN
TYPE: CHRONIC PAIN
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
## 2018-08-20 NOTE — PROGRESS NOTES
QD  · Albuterol and Ipratropium Q 4 hours and prn  · Pulmicort 0.5 every 12 hours  · Rocephin Zithromax and Vanco  · Oncology on consult  · IV fluids  · Nicotine patch  · Smoking cessation advised  · pain control  · Discussed with RN  · DVT prophylaxis with low molecular weight heparin    Leslee Carlson MD, [facility]  Pulmonary Critical Care and Sleep Medicine,  [facility]  Cell: [phone]  Office: [phone]

## 2018-08-20 NOTE — PROGRESS NOTES
States breathing is better than baseline. Hemodynamically stable. Diminished breath sounds but no wheezing/rales. Negative Anirudh's. Continuing current regimen. Specialty notes appreciated.

## 2018-08-20 NOTE — FLOWSHEET NOTE
Patient receives Sacrament of the Sick (anointing) from [facility]. [facility] will follow as needed. (writer charting for [employer]     01/43/25 0935   Encounter Summary   Services provided to: Patient   Referral/Consult From: Rounding   Continue Visiting (8/20/18 anointed)   Complexity of Encounter Low   Length of Encounter 15 minutes   Routine   Type Follow up   Sacraments   Sacrament of Sick-Anointing Anointed  (8/20/18 Fr. Enzo Love)

## 2018-08-20 NOTE — CARE COORDINATION
([facility]    [facility]    COPD with exacerbation ([facility]    Acute on chronic respiratory failure with hypoxia and hypercapnia [facility])       Inpatient Assessment  Care Transitions Summary    Care Transitions Inpatient Review  Medication Review  Do you have all of your prescriptions and are they filled?:  Yes   Barriers to Medication Adherence:  Forgets to take  Are you able to afford your medications?:  Yes  How often do you have difficulty taking your medications?:  Sometimes I take them as prescribed. Housing Review  Who do you live with?:  Partner/Spouse/SO  Are you an active caregiver in your home?:  No  Does the person that you care for see a [facility] PCP?:  No  Social Support  Do you have a [address] [place] [address]?:  Yes  [facility] Name:  AMIE MCCORMACK [address]:  [address] [facility] Equipment  Patient DME:  Wheelchair, Sherrine Specking, Straight cane  Patient Home Equipment:  Nebulizer, Oxygen  Functional Review  Ability to seek help/take action for Emergent/Urgent situations i.e. fire, crime, inclement weather or health crisis. :  Independent  Ability handle personal hygiene needs (bathing/dressing/grooming): Independent  Ability to manage medications: Independent  Ability to prepare food:  Independent  Ability to maintain home (clean home, laundry): Independent  Ability to drive and/or has transportation:  Dependent  Ability to do shopping:  Dependent  Ability to manage finances: Independent  Is patient able to live independently?:  Yes  Hearing and Vision  Visual Impairment:  Reading glasses  Hearing Impairment:  None  Care Transitions Interventions     Other Services:  (Comment: [facility])          Follow Up  No future appointments. Health Maintenance  There are no preventive care reminders to display for this patient.     Maria Teresa Cleveland RN

## 2018-08-21 ENCOUNTER — APPOINTMENT (OUTPATIENT)
Dept: GENERAL RADIOLOGY | Age: 58
DRG: 191 | End: 2018-08-21
Payer: MEDICARE

## 2018-08-21 LAB
ABSOLUTE EOS #: 0 K/UL (ref 0–0.4)
ABSOLUTE IMMATURE GRANULOCYTE: ABNORMAL K/UL (ref 0–0.3)
ABSOLUTE LYMPH #: 0.55 K/UL (ref 1–4.8)
ABSOLUTE MONO #: 0.92 K/UL (ref 0.2–0.8)
ANION GAP SERPL CALCULATED.3IONS-SCNC: 8 MMOL/L (ref 9–17)
BASOPHILS # BLD: 0 %
BASOPHILS ABSOLUTE: 0 K/UL (ref 0–0.2)
BUN BLDV-MCNC: 14 MG/DL (ref 6–20)
BUN BLDV-MCNC: 15 MG/DL (ref 6–20)
BUN/CREAT BLD: 17 (ref 9–20)
CALCIUM SERPL-MCNC: 8.7 MG/DL (ref 8.6–10.4)
CHLORIDE BLD-SCNC: 95 MMOL/L (ref 98–107)
CO2: 35 MMOL/L (ref 20–31)
CREAT SERPL-MCNC: 0.56 MG/DL (ref 0.7–1.2)
CREAT SERPL-MCNC: 0.87 MG/DL (ref 0.7–1.2)
DIFFERENTIAL TYPE: ABNORMAL
EOSINOPHILS RELATIVE PERCENT: 0 % (ref 1–4)
GFR AFRICAN AMERICAN: >60 ML/MIN
GFR AFRICAN AMERICAN: >60 ML/MIN
GFR NON-AFRICAN AMERICAN: >60 ML/MIN
GFR NON-AFRICAN AMERICAN: >60 ML/MIN
GFR SERPL CREATININE-BSD FRML MDRD: ABNORMAL ML/MIN/{1.73_M2}
GLUCOSE BLD-MCNC: 105 MG/DL (ref 70–99)
GLUCOSE BLD-MCNC: 145 MG/DL (ref 75–110)
GLUCOSE BLD-MCNC: 148 MG/DL (ref 75–110)
GLUCOSE BLD-MCNC: 185 MG/DL (ref 75–110)
GLUCOSE BLD-MCNC: 267 MG/DL (ref 75–110)
HCT VFR BLD CALC: 32.5 % (ref 41–53)
HEMOGLOBIN: 10.4 G/DL (ref 13.5–17.5)
IMMATURE GRANULOCYTES: ABNORMAL %
LYMPHOCYTES # BLD: 3 % (ref 24–44)
MCH RBC QN AUTO: 30.3 PG (ref 26–34)
MCHC RBC AUTO-ENTMCNC: 32.1 G/DL (ref 31–37)
MCV RBC AUTO: 94.4 FL (ref 80–100)
MONOCYTES # BLD: 5 % (ref 1–7)
MRSA, DNA, NASAL: NORMAL
NRBC AUTOMATED: ABNORMAL PER 100 WBC
PDW BLD-RTO: 18.1 % (ref 11.5–14.5)
PLATELET # BLD: 200 K/UL (ref 130–400)
PLATELET ESTIMATE: ABNORMAL
PMV BLD AUTO: 6.7 FL (ref 6–12)
POTASSIUM SERPL-SCNC: 4.3 MMOL/L (ref 3.7–5.3)
RBC # BLD: 3.44 M/UL (ref 4.5–5.9)
RBC # BLD: ABNORMAL 10*6/UL
SEG NEUTROPHILS: 92 % (ref 36–66)
SEGMENTED NEUTROPHILS ABSOLUTE COUNT: 16.93 K/UL (ref 1.8–7.7)
SODIUM BLD-SCNC: 138 MMOL/L (ref 135–144)
SPECIMEN DESCRIPTION: NORMAL
WBC # BLD: 18.4 K/UL (ref 3.5–11)
WBC # BLD: ABNORMAL 10*3/UL

## 2018-08-21 PROCEDURE — 36415 COLL VENOUS BLD VENIPUNCTURE: CPT

## 2018-08-21 PROCEDURE — 51702 INSERT TEMP BLADDER CATH: CPT

## 2018-08-21 PROCEDURE — 6370000000 HC RX 637 (ALT 250 FOR IP): Performed by: INTERNAL MEDICINE

## 2018-08-21 PROCEDURE — 84520 ASSAY OF UREA NITROGEN: CPT

## 2018-08-21 PROCEDURE — 6360000002 HC RX W HCPCS: Performed by: INTERNAL MEDICINE

## 2018-08-21 PROCEDURE — 82565 ASSAY OF CREATININE: CPT

## 2018-08-21 PROCEDURE — 80048 BASIC METABOLIC PNL TOTAL CA: CPT

## 2018-08-21 PROCEDURE — 82947 ASSAY GLUCOSE BLOOD QUANT: CPT

## 2018-08-21 PROCEDURE — 2580000003 HC RX 258: Performed by: INTERNAL MEDICINE

## 2018-08-21 PROCEDURE — 85025 COMPLETE CBC W/AUTO DIFF WBC: CPT

## 2018-08-21 PROCEDURE — 94640 AIRWAY INHALATION TREATMENT: CPT

## 2018-08-21 PROCEDURE — 71045 X-RAY EXAM CHEST 1 VIEW: CPT

## 2018-08-21 PROCEDURE — 99232 SBSQ HOSP IP/OBS MODERATE 35: CPT | Performed by: INTERNAL MEDICINE

## 2018-08-21 PROCEDURE — 2060000000 HC ICU INTERMEDIATE R&B

## 2018-08-21 RX ORDER — LEVALBUTEROL 1.25 MG/.5ML
1.25 SOLUTION, CONCENTRATE RESPIRATORY (INHALATION)
Status: DISCONTINUED | OUTPATIENT
Start: 2018-08-21 | End: 2018-08-25 | Stop reason: HOSPADM

## 2018-08-21 RX ADMIN — METHYLPREDNISOLONE SODIUM SUCCINATE 40 MG: 40 INJECTION, POWDER, FOR SOLUTION INTRAMUSCULAR; INTRAVENOUS at 21:03

## 2018-08-21 RX ADMIN — IPRATROPIUM BROMIDE AND ALBUTEROL SULFATE 1 AMPULE: .5; 3 SOLUTION RESPIRATORY (INHALATION) at 11:48

## 2018-08-21 RX ADMIN — ACETYLCYSTEINE 600 MG: 200 SOLUTION ORAL; RESPIRATORY (INHALATION) at 19:14

## 2018-08-21 RX ADMIN — DRONABINOL 2.5 MG: 2.5 CAPSULE ORAL at 18:05

## 2018-08-21 RX ADMIN — VANCOMYCIN HYDROCHLORIDE 750 MG: 750 INJECTION, POWDER, LYOPHILIZED, FOR SOLUTION INTRAVENOUS at 11:53

## 2018-08-21 RX ADMIN — PANTOPRAZOLE SODIUM 40 MG: 40 TABLET, DELAYED RELEASE ORAL at 05:33

## 2018-08-21 RX ADMIN — VANCOMYCIN HYDROCHLORIDE 750 MG: 750 INJECTION, POWDER, LYOPHILIZED, FOR SOLUTION INTRAVENOUS at 22:20

## 2018-08-21 RX ADMIN — OXYCODONE HYDROCHLORIDE 40 MG: 40 TABLET, FILM COATED, EXTENDED RELEASE ORAL at 08:48

## 2018-08-21 RX ADMIN — DRONABINOL 2.5 MG: 2.5 CAPSULE ORAL at 05:55

## 2018-08-21 RX ADMIN — LEVALBUTEROL 1.25 MG: 1.25 SOLUTION, CONCENTRATE RESPIRATORY (INHALATION) at 19:13

## 2018-08-21 RX ADMIN — Medication 1 TABLET: at 21:03

## 2018-08-21 RX ADMIN — OLANZAPINE 10 MG: 5 TABLET, FILM COATED ORAL at 21:03

## 2018-08-21 RX ADMIN — IPRATROPIUM BROMIDE 0.5 MG: 0.5 SOLUTION RESPIRATORY (INHALATION) at 19:14

## 2018-08-21 RX ADMIN — PREGABALIN 25 MG: 25 CAPSULE ORAL at 13:24

## 2018-08-21 RX ADMIN — PREGABALIN 25 MG: 25 CAPSULE ORAL at 08:48

## 2018-08-21 RX ADMIN — INSULIN LISPRO 2 UNITS: 100 INJECTION, SOLUTION INTRAVENOUS; SUBCUTANEOUS at 08:48

## 2018-08-21 RX ADMIN — INSULIN LISPRO 3 UNITS: 100 INJECTION, SOLUTION INTRAVENOUS; SUBCUTANEOUS at 22:20

## 2018-08-21 RX ADMIN — LEVALBUTEROL 1.25 MG: 1.25 SOLUTION, CONCENTRATE RESPIRATORY (INHALATION) at 15:39

## 2018-08-21 RX ADMIN — Medication 1 TABLET: at 08:48

## 2018-08-21 RX ADMIN — INSULIN LISPRO 2 UNITS: 100 INJECTION, SOLUTION INTRAVENOUS; SUBCUTANEOUS at 13:25

## 2018-08-21 RX ADMIN — METHYLPREDNISOLONE SODIUM SUCCINATE 40 MG: 40 INJECTION, POWDER, FOR SOLUTION INTRAMUSCULAR; INTRAVENOUS at 11:53

## 2018-08-21 RX ADMIN — BUDESONIDE 500 MCG: 0.5 SUSPENSION RESPIRATORY (INHALATION) at 19:14

## 2018-08-21 RX ADMIN — INSULIN LISPRO 2 UNITS: 100 INJECTION, SOLUTION INTRAVENOUS; SUBCUTANEOUS at 18:05

## 2018-08-21 RX ADMIN — ZOLPIDEM TARTRATE 5 MG: 5 TABLET ORAL at 21:06

## 2018-08-21 RX ADMIN — OXYCODONE HYDROCHLORIDE 10 MG: 5 TABLET ORAL at 13:24

## 2018-08-21 RX ADMIN — PREGABALIN 25 MG: 25 CAPSULE ORAL at 21:03

## 2018-08-21 RX ADMIN — BUDESONIDE 500 MCG: 0.5 SUSPENSION RESPIRATORY (INHALATION) at 07:49

## 2018-08-21 RX ADMIN — OXYCODONE HYDROCHLORIDE 40 MG: 40 TABLET, FILM COATED, EXTENDED RELEASE ORAL at 20:27

## 2018-08-21 RX ADMIN — IPRATROPIUM BROMIDE 0.5 MG: 0.5 SOLUTION RESPIRATORY (INHALATION) at 15:39

## 2018-08-21 RX ADMIN — AZITHROMYCIN MONOHYDRATE 500 MG: 500 INJECTION, POWDER, LYOPHILIZED, FOR SOLUTION INTRAVENOUS at 20:27

## 2018-08-21 RX ADMIN — OXYCODONE HYDROCHLORIDE 10 MG: 5 TABLET ORAL at 05:55

## 2018-08-21 RX ADMIN — METHYLPREDNISOLONE SODIUM SUCCINATE 40 MG: 40 INJECTION, POWDER, FOR SOLUTION INTRAMUSCULAR; INTRAVENOUS at 03:11

## 2018-08-21 RX ADMIN — IPRATROPIUM BROMIDE AND ALBUTEROL SULFATE 1 AMPULE: .5; 3 SOLUTION RESPIRATORY (INHALATION) at 07:49

## 2018-08-21 RX ADMIN — CEFTRIAXONE SODIUM 1 G: 1 INJECTION, POWDER, FOR SOLUTION INTRAMUSCULAR; INTRAVENOUS at 18:16

## 2018-08-21 RX ADMIN — OXYCODONE HYDROCHLORIDE 10 MG: 5 TABLET ORAL at 20:27

## 2018-08-21 RX ADMIN — LORAZEPAM 0.5 MG: 0.5 TABLET ORAL at 14:11

## 2018-08-21 RX ADMIN — SODIUM CHLORIDE: 9 INJECTION, SOLUTION INTRAVENOUS at 11:52

## 2018-08-21 ASSESSMENT — PAIN SCALES - GENERAL
PAINLEVEL_OUTOF10: 6
PAINLEVEL_OUTOF10: 9
PAINLEVEL_OUTOF10: 3
PAINLEVEL_OUTOF10: 8
PAINLEVEL_OUTOF10: 5
PAINLEVEL_OUTOF10: 5
PAINLEVEL_OUTOF10: 8
PAINLEVEL_OUTOF10: 7
PAINLEVEL_OUTOF10: 9

## 2018-08-21 ASSESSMENT — PAIN DESCRIPTION - PAIN TYPE: TYPE: ACUTE PAIN;CHRONIC PAIN

## 2018-08-21 ASSESSMENT — PAIN DESCRIPTION - LOCATION: LOCATION: CHEST;BACK

## 2018-08-21 NOTE — PROGRESS NOTES
1.25 and Artrovent  by nebulizer every 4 hours and as needed   · Mucomyst by nebulizer every 12 hours  · Keep IV solu-medrol 40 every 8; off Decadron 4 m po QD  · Pulmicort 0.5 every 12 hours  · Repeat CBC and BMP  · Rocephin Zithromax and Vanco  · Oncology on consult  · IV fluids  · Nicotine patch  · Anxiety control  · Smoking cessation advised  · pain control  · Discussed with RN  · DVT prophylaxis with low molecular weight heparin    Servando Keene MD, [facility]  Pulmonary Critical Care and Sleep Medicine,  [facility]  Cell: [phone]  Office: [phone]

## 2018-08-21 NOTE — PROGRESS NOTES
Patient had reported around 1400 his port had accidentally became de-accessed. Pt states he thinks he tugged on it somehow. Site cleansed and bandaid placed. Dr. Daryn Abreu updated. Port re-accessed using sterile technique. No problems noted. Good blood return and flushes easily. Hooked up to IVF. Will monitor.

## 2018-08-21 NOTE — PROGRESS NOTES
RN getting patient up from bed to bathroom. Patient unable to remain patient and calm saying it needed to be urgent and that he needed to go. RN untangling IV and oxygen cords in preparation to get patient to the walker. Patient offered urinal and patient refuses. Patient is impulsive and began to get up to sitting position and about to stand while RN repeating hold on and do not get up. Patient buckled his knees when attempting to get up by self. RN placed arm under patients arm as he was buckling his knees and lowered him to the floor. Patient was in a rush to get up from the ground. Instructed to stay in that position. Called for PCT to the hallway and got him up from his knees. RN asked patient if he was hurt and checked patient's knees. Notified appropriate staff members. Patient is a Stay with me. Bed alarm still in place from last fall incident. Educated provided and patient agreeable to follow instructions better. Call out to Dr. Nuha Haq. Vitals obtained. No sign of injury noted. Patient did not hit head. Patient apologetic. Will continue to monitor and round hourly.

## 2018-08-21 NOTE — PLAN OF CARE
Problem: Falls - Risk of:  Goal: Will remain free from falls  Will remain free from falls   Outcome: Ongoing  Fall precautions in place. Bed in low and locked position. Call light within reach and side rails up by two. Problem: Activity Intolerance:  Goal: Ability to tolerate increased activity will improve  Ability to tolerate increased activity will improve   Outcome: Ongoing  Patient remains on oxygen therapy and educated on activity with dyspnea. Problem: Risk for Impaired Skin Integrity  Goal: Tissue integrity - skin and mucous membranes  Structural intactness and normal physiological function of skin and  mucous membranes. Outcome: Ongoing  Patient skin assessed. Patient able to turn self and ambulate. Edison scale assessed every shift and labs assessed daily. Will continue to monitor.

## 2018-08-21 NOTE — PLAN OF CARE
Problem: Falls - Risk of: Intervention: Postfall assessment  Patient sustained no injuries from a fall that happened around 1335. A description of the fall event is located in the notes. Will continue to educate patient on risk for falls and the need to call out appropriately. Will also monitor patient for understanding and compliance of education. Bed alarm in use!

## 2018-08-21 NOTE — PROGRESS NOTES
RN/  entered room to notice the significant other took the patient to the restroom. Was getting the patient off of the toilet when the patient did not appropriately grab onto the wife's arm for support. Patient was not using appropriate assistive device, the walker for transfer to the bathroom. Patient attached to oxygen, continuous pulse ox, IV line as well as telemetry. Did not have gown on top put tele in pocket. RN was untangling the oxygen and IV tubing when the fall event happened. Patient's wife standing in the doorway. Patient fell to his knees, RN noticed and immediately asked patient if harm was done, also if he had hit his head. Patient denied any injury. RN checked for injuries before getting patient up from the floor. Patient used walker to get back to bed from bathroom with RN and SO's help. Patient states understanding of the need to call out and have staff in the room before getting out of bed. Normally complies with directions from staff. Patient is now on a bed alarm, fall sign still posted and patient re-educated on risk of falls, and the need to call out appropriately. Nurse manager, lead nurse and Dr. Jim Olszewski notified. Will continue to monitor.
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
## 2018-08-21 NOTE — PROGRESS NOTES
Dr. Jim Olszewski updated patient had a fall. No injuries noted and patient did not hit head. Patient had fallen to knees only and assisted back to bed. Patient denies any injuries as well. Will monitor.

## 2018-08-22 PROBLEM — J96.10 CHRONIC RESPIRATORY FAILURE (HCC): Status: ACTIVE | Noted: 2018-08-19

## 2018-08-22 LAB
-: NORMAL
AMORPHOUS: NORMAL
BACTERIA: NORMAL
BILIRUBIN URINE: NEGATIVE
CASTS UA: NORMAL /LPF
COLOR: YELLOW
COMMENT UA: ABNORMAL
CRYSTALS, UA: NORMAL /HPF
EPITHELIAL CELLS UA: NORMAL /HPF (ref 0–5)
GLUCOSE BLD-MCNC: 172 MG/DL (ref 75–110)
GLUCOSE BLD-MCNC: 204 MG/DL (ref 75–110)
GLUCOSE BLD-MCNC: 258 MG/DL (ref 75–110)
GLUCOSE BLD-MCNC: 292 MG/DL (ref 75–110)
GLUCOSE URINE: NEGATIVE
KETONES, URINE: NEGATIVE
LEUKOCYTE ESTERASE, URINE: NEGATIVE
MUCUS: NORMAL
NITRITE, URINE: NEGATIVE
OTHER OBSERVATIONS UA: NORMAL
PH UA: 7 (ref 5–8)
PROTEIN UA: NEGATIVE
RBC UA: NORMAL /HPF (ref 0–2)
RENAL EPITHELIAL, UA: NORMAL /HPF
SPECIFIC GRAVITY UA: 1.01 (ref 1–1.03)
TRICHOMONAS: NORMAL
TURBIDITY: CLEAR
URINE HGB: ABNORMAL
UROBILINOGEN, URINE: NORMAL
WBC UA: NORMAL /HPF (ref 0–5)
YEAST: NORMAL

## 2018-08-22 PROCEDURE — 97110 THERAPEUTIC EXERCISES: CPT

## 2018-08-22 PROCEDURE — 6370000000 HC RX 637 (ALT 250 FOR IP): Performed by: INTERNAL MEDICINE

## 2018-08-22 PROCEDURE — G8979 MOBILITY GOAL STATUS: HCPCS

## 2018-08-22 PROCEDURE — G8978 MOBILITY CURRENT STATUS: HCPCS

## 2018-08-22 PROCEDURE — 81001 URINALYSIS AUTO W/SCOPE: CPT

## 2018-08-22 PROCEDURE — 94761 N-INVAS EAR/PLS OXIMETRY MLT: CPT

## 2018-08-22 PROCEDURE — 2500000003 HC RX 250 WO HCPCS: Performed by: INTERNAL MEDICINE

## 2018-08-22 PROCEDURE — 6360000002 HC RX W HCPCS: Performed by: INTERNAL MEDICINE

## 2018-08-22 PROCEDURE — 82947 ASSAY GLUCOSE BLOOD QUANT: CPT

## 2018-08-22 PROCEDURE — 2060000000 HC ICU INTERMEDIATE R&B

## 2018-08-22 PROCEDURE — 94640 AIRWAY INHALATION TREATMENT: CPT

## 2018-08-22 PROCEDURE — 97530 THERAPEUTIC ACTIVITIES: CPT

## 2018-08-22 PROCEDURE — 99232 SBSQ HOSP IP/OBS MODERATE 35: CPT | Performed by: INTERNAL MEDICINE

## 2018-08-22 PROCEDURE — 97162 PT EVAL MOD COMPLEX 30 MIN: CPT

## 2018-08-22 PROCEDURE — 2580000003 HC RX 258: Performed by: INTERNAL MEDICINE

## 2018-08-22 PROCEDURE — 94760 N-INVAS EAR/PLS OXIMETRY 1: CPT

## 2018-08-22 RX ORDER — BUMETANIDE 0.25 MG/ML
0.5 INJECTION, SOLUTION INTRAMUSCULAR; INTRAVENOUS ONCE
Status: COMPLETED | OUTPATIENT
Start: 2018-08-22 | End: 2018-08-22

## 2018-08-22 RX ORDER — METHYLPREDNISOLONE SODIUM SUCCINATE 40 MG/ML
40 INJECTION, POWDER, LYOPHILIZED, FOR SOLUTION INTRAMUSCULAR; INTRAVENOUS EVERY 12 HOURS
Status: DISCONTINUED | OUTPATIENT
Start: 2018-08-23 | End: 2018-08-24

## 2018-08-22 RX ADMIN — BUMETANIDE 0.5 MG: 0.25 INJECTION INTRAMUSCULAR; INTRAVENOUS at 16:49

## 2018-08-22 RX ADMIN — LORAZEPAM 0.5 MG: 0.5 TABLET ORAL at 16:49

## 2018-08-22 RX ADMIN — LORAZEPAM 0.5 MG: 0.5 TABLET ORAL at 10:35

## 2018-08-22 RX ADMIN — BUDESONIDE 500 MCG: 0.5 SUSPENSION RESPIRATORY (INHALATION) at 07:35

## 2018-08-22 RX ADMIN — ONDANSETRON HYDROCHLORIDE 8 MG: 4 TABLET, FILM COATED ORAL at 11:51

## 2018-08-22 RX ADMIN — IPRATROPIUM BROMIDE 0.5 MG: 0.5 SOLUTION RESPIRATORY (INHALATION) at 10:49

## 2018-08-22 RX ADMIN — BUDESONIDE 500 MCG: 0.5 SUSPENSION RESPIRATORY (INHALATION) at 19:46

## 2018-08-22 RX ADMIN — DRONABINOL 2.5 MG: 2.5 CAPSULE ORAL at 16:49

## 2018-08-22 RX ADMIN — CEFTRIAXONE SODIUM 1 G: 1 INJECTION, POWDER, FOR SOLUTION INTRAMUSCULAR; INTRAVENOUS at 17:53

## 2018-08-22 RX ADMIN — OLANZAPINE 10 MG: 5 TABLET, FILM COATED ORAL at 20:54

## 2018-08-22 RX ADMIN — PREGABALIN 25 MG: 25 CAPSULE ORAL at 15:42

## 2018-08-22 RX ADMIN — LEVALBUTEROL 1.25 MG: 1.25 SOLUTION, CONCENTRATE RESPIRATORY (INHALATION) at 15:31

## 2018-08-22 RX ADMIN — INSULIN LISPRO 6 UNITS: 100 INJECTION, SOLUTION INTRAVENOUS; SUBCUTANEOUS at 13:17

## 2018-08-22 RX ADMIN — IPRATROPIUM BROMIDE 0.5 MG: 0.5 SOLUTION RESPIRATORY (INHALATION) at 15:31

## 2018-08-22 RX ADMIN — INSULIN LISPRO 2 UNITS: 100 INJECTION, SOLUTION INTRAVENOUS; SUBCUTANEOUS at 17:52

## 2018-08-22 RX ADMIN — IPRATROPIUM BROMIDE 0.5 MG: 0.5 SOLUTION RESPIRATORY (INHALATION) at 19:46

## 2018-08-22 RX ADMIN — INSULIN LISPRO 3 UNITS: 100 INJECTION, SOLUTION INTRAVENOUS; SUBCUTANEOUS at 21:49

## 2018-08-22 RX ADMIN — LEVALBUTEROL 1.25 MG: 1.25 SOLUTION, CONCENTRATE RESPIRATORY (INHALATION) at 10:49

## 2018-08-22 RX ADMIN — VANCOMYCIN HYDROCHLORIDE 750 MG: 750 INJECTION, POWDER, LYOPHILIZED, FOR SOLUTION INTRAVENOUS at 22:20

## 2018-08-22 RX ADMIN — VANCOMYCIN HYDROCHLORIDE 750 MG: 750 INJECTION, POWDER, LYOPHILIZED, FOR SOLUTION INTRAVENOUS at 08:27

## 2018-08-22 RX ADMIN — Medication 1 TABLET: at 20:54

## 2018-08-22 RX ADMIN — METHYLPREDNISOLONE SODIUM SUCCINATE 40 MG: 40 INJECTION, POWDER, FOR SOLUTION INTRAMUSCULAR; INTRAVENOUS at 13:22

## 2018-08-22 RX ADMIN — PANTOPRAZOLE SODIUM 40 MG: 40 TABLET, DELAYED RELEASE ORAL at 06:04

## 2018-08-22 RX ADMIN — ACETYLCYSTEINE 600 MG: 200 SOLUTION ORAL; RESPIRATORY (INHALATION) at 07:35

## 2018-08-22 RX ADMIN — OXYCODONE HYDROCHLORIDE 40 MG: 40 TABLET, FILM COATED, EXTENDED RELEASE ORAL at 08:23

## 2018-08-22 RX ADMIN — IPRATROPIUM BROMIDE 0.5 MG: 0.5 SOLUTION RESPIRATORY (INHALATION) at 07:35

## 2018-08-22 RX ADMIN — OXYCODONE HYDROCHLORIDE 10 MG: 5 TABLET ORAL at 09:48

## 2018-08-22 RX ADMIN — LEVALBUTEROL 1.25 MG: 1.25 SOLUTION, CONCENTRATE RESPIRATORY (INHALATION) at 19:46

## 2018-08-22 RX ADMIN — ACETYLCYSTEINE 600 MG: 200 SOLUTION ORAL; RESPIRATORY (INHALATION) at 19:46

## 2018-08-22 RX ADMIN — AZITHROMYCIN MONOHYDRATE 500 MG: 500 INJECTION, POWDER, LYOPHILIZED, FOR SOLUTION INTRAVENOUS at 20:50

## 2018-08-22 RX ADMIN — Medication 1 TABLET: at 08:24

## 2018-08-22 RX ADMIN — PREGABALIN 25 MG: 25 CAPSULE ORAL at 08:24

## 2018-08-22 RX ADMIN — LEVALBUTEROL 1.25 MG: 1.25 SOLUTION, CONCENTRATE RESPIRATORY (INHALATION) at 07:35

## 2018-08-22 RX ADMIN — OXYCODONE HYDROCHLORIDE 40 MG: 40 TABLET, FILM COATED, EXTENDED RELEASE ORAL at 21:44

## 2018-08-22 RX ADMIN — OXYCODONE HYDROCHLORIDE 10 MG: 5 TABLET ORAL at 15:42

## 2018-08-22 RX ADMIN — METHYLPREDNISOLONE SODIUM SUCCINATE 40 MG: 40 INJECTION, POWDER, FOR SOLUTION INTRAMUSCULAR; INTRAVENOUS at 04:10

## 2018-08-22 RX ADMIN — LORAZEPAM 0.5 MG: 0.5 TABLET ORAL at 21:45

## 2018-08-22 RX ADMIN — DRONABINOL 2.5 MG: 2.5 CAPSULE ORAL at 06:04

## 2018-08-22 RX ADMIN — SODIUM CHLORIDE: 9 INJECTION, SOLUTION INTRAVENOUS at 08:27

## 2018-08-22 RX ADMIN — PREGABALIN 25 MG: 25 CAPSULE ORAL at 20:54

## 2018-08-22 RX ADMIN — INSULIN LISPRO 4 UNITS: 100 INJECTION, SOLUTION INTRAVENOUS; SUBCUTANEOUS at 08:23

## 2018-08-22 ASSESSMENT — PAIN SCALES - GENERAL
PAINLEVEL_OUTOF10: 0
PAINLEVEL_OUTOF10: 9
PAINLEVEL_OUTOF10: 10
PAINLEVEL_OUTOF10: 9
PAINLEVEL_OUTOF10: 10

## 2018-08-22 ASSESSMENT — PAIN DESCRIPTION - ONSET
ONSET: ON-GOING
ONSET: AWAKENED FROM SLEEP

## 2018-08-22 ASSESSMENT — PAIN DESCRIPTION - DESCRIPTORS
DESCRIPTORS: ACHING
DESCRIPTORS: ACHING

## 2018-08-22 ASSESSMENT — PAIN DESCRIPTION - FREQUENCY
FREQUENCY: CONTINUOUS
FREQUENCY: CONTINUOUS

## 2018-08-22 ASSESSMENT — PAIN DESCRIPTION - LOCATION
LOCATION: BACK
LOCATION: BACK

## 2018-08-22 ASSESSMENT — PAIN DESCRIPTION - PAIN TYPE
TYPE: CHRONIC PAIN
TYPE: CHRONIC PAIN

## 2018-08-22 NOTE — PROGRESS NOTES
ANTIMICROBIAL STEWARDSHIP  Upon review of the patient's chart, the committee has the following recommendation for your consideration:     Patient has received vancomycin/zithromax/rocephin for 5 days to treat COPD exacerbation. MRSA nasal swab was negative. No positive cx. Mostly dry cough. Afebrile. On steroid. Please discontinue vancomycin and de-escalate antibiotic therapy. Temp Readings from Last 3 Encounters:   08/22/18 98.1 °F (36.7 °C) (Oral)   07/19/18 97.2 °F (36.2 °C) (Temporal)   07/13/18 98.2 °F (36.8 °C) (Oral)        Recent Labs      08/21/18   1605   WBC  18.4*         Thank you. Erin Liao, PharmD  8/22/2018  9:46 AM    The Antimicrobial Stewardship Committee at [facility] is led by  Nav Kumar MD, Infectious Diseases Section Chair.

## 2018-08-22 NOTE — PROGRESS NOTES
Pulmonary Critical Care Progress Note    Patient seen for the follow up of  exacerbation      Subjective:    He is still anxious. He had 2 falls without injuries due to weakness yesterday. He still feels short of breath . He has less chest pain and abdominal pain. Mild occasional cough, mostly dry. . He has poor appetite . He requested garcia insertion ! He wants to take garcia home ! Examination:    Vitals: /69   Pulse 111   Temp 97.9 °F (36.6 °C) (Oral)   Resp 20   Ht 5' 5\" (1.651 m)   Wt 120 lb (54.4 kg)   SpO2 98%   BMI 19.97 kg/m²   SpO2  Av.7 %  Min: 94 %  Max: 98 %  General appearance: alert and cooperative with exam  Neck: No JVD  Lungs:  Decreased breath sounds no crackles or wheezing  Heart: regular rate and rhythm, S1, S2 normal, no gallop  Abdomen: Soft, non tender, + BS  Extremities: no cyanosis or clubbing. 1 + edema, tattoos noted    LABs:    CBC:   Recent Labs      18   1605   WBC  18.4*   HGB  10.4*   HCT  32.5*   PLT  200     BMP:   Recent Labs      18   0453  18   1605   NA   --   138   K   --   4.3   CO2   --   35*   BUN  14  15   CREATININE  0.56*  0.87   LABGLOM  >60  >60   GLUCOSE   --   105*     Radiology:  CXR 18  Stable right lower lobe consolidation and nodular densities in the right   upper lobe and left lower lobe.             CT chest      1. Similar area of masslike consolidation involving the right lower lobe. Residual disease is not excluded.  PET-CT could be considered for further   evaluation as indicated. 2. Increasing size and number of nodules in the right and left lung, as   above, is concerning for progression of disease. 3. Nonobstructing left renal calculus.            Impression:    · Chronic Respiratory failure  · Acute exacerbation of COPD  · Tracheobronchitis/ atelactasis  · Active smoker  · advanced recurrent squamous cell lung cancer/ lung nodules   · History of GI bleed/hypertension/anemia/anxiety

## 2018-08-22 NOTE — PROGRESS NOTES
Pt was SOB at rest on an doff today. Spoke to Dr. Chasity Quick about pts IV fluids and +1 BLE edema. New order received.

## 2018-08-22 NOTE — PROGRESS NOTES
(1.651 m)   Wt 120 lb (54.4 kg)   SpO2 97%   BMI 19.97 kg/m²   General appearance - well appearing, no in pain or distress   Mental status - alert and cooperative   Eyes - pupils equal and reactive, extraocular eye movements intact   Ears - bilateral TM's and external ear canals normal   Mouth - mucous membranes moist, pharynx normal without lesions   Neck - supple, no significant adenopathy   Lymphatics - no palpable lymphadenopathy, no hepatosplenomegaly   Chest - clear to auscultation, no wheezes, rales or rhonchi, symmetric air entry   Heart - normal rate, regular rhythm, normal S1, S2, no murmurs, rubs, clicks or gallops   Abdomen - soft, nontender, nondistended, no masses or organomegaly   Neurological - alert, oriented, normal speech, no focal findings or movement disorder noted   Musculoskeletal - no joint tenderness, deformity or swelling   Extremities - peripheral pulses normal, no pedal edema, no clubbing or cyanosis   Skin - normal coloration and turgor, no rashes, no suspicious skin lesions noted ,     Data:  No intake/output data recorded. In: 300 [P.O.:300]  Out: 2200 [Urine:2200]  CBC:   Recent Labs      08/21/18   1605   WBC  18.4*   HGB  10.4*   PLT  200     BMP:    Recent Labs      08/21/18   0453  08/21/18   1605   NA   --   138   K   --   4.3   CL   --   95*   CO2   --   35*   BUN  14  15   CREATININE  0.56*  0.87   GLUCOSE   --   105*     Hepatic:   No results for input(s): AST, ALT, ALB, BILITOT, ALKPHOS in the last 72 hours. INR: No results for input(s): INR in the last 72 hours. IMAGING DATA:  Reviewed    Assessment    1. Lung cancer  2. COPD  3. Shortness of breath    Plan     1. Patient has COPD exacerbation. He has shortness of breath. Further measurements as per primary and pulmonary. 2. Patient has metastatic squamous cell carcinoma. He is maintained on Opdivo since March. He is following with Dr. Myla Brar as outpatient.   His recent scan showing increase in size of lung nodules. She will need repeat scan in 3-4 weeks after treatment for his infection  to assess true tumor progression   3. He is on  Opdivo Treatment as o/p. This will need to be delayed until he feels better  4. Patient's questions were answered to the best of his satisfaction and he verbalized full understanding and agreement. 5. Thank you for allowing us to participate in the care of this pleasant patient.       Discussed with patient and Nurse. Benjamín Vela MD  Hematologist/Medical Oncologist    Cell: [phone]      This note is created with the assistance of a speech recognition program.  While intending to generate a document that actually reflects the content of the visit, the document can still have some errors including those of syntax and sound a like substitutions which may escape proof reading. It such instances, actual meaning can be extrapolated by contextual diversion.

## 2018-08-22 NOTE — PROGRESS NOTES
Dr. Dhiraj Villafuerte seen pt and informed that pt keeps asking for all his narcotics early- Dr. Dhiraj Villafuerte told pt that he needs to wait for the due times.

## 2018-08-22 NOTE — PROGRESS NOTES
Short of breath today. No rales. X-ray: stable right lower lobe consolidation and nodular densities in the right upper and lower lobes. Bilateral pedal pedal edema. IV now at [facility]. One dose of IV bumex. Pt requested garcia cath yesterday - states he has had chronic problems \"controlling his urine\" - he has attributed this to a neck injury in the 1980s. Will ask Dr. Linsey Peña to evaluate patient.

## 2018-08-23 LAB
ANION GAP SERPL CALCULATED.3IONS-SCNC: 8 MMOL/L (ref 9–17)
BUN BLDV-MCNC: 20 MG/DL (ref 6–20)
BUN BLDV-MCNC: 20 MG/DL (ref 6–20)
BUN/CREAT BLD: 36 (ref 9–20)
CALCIUM SERPL-MCNC: 8.5 MG/DL (ref 8.6–10.4)
CHLORIDE BLD-SCNC: 92 MMOL/L (ref 98–107)
CO2: 35 MMOL/L (ref 20–31)
CREAT SERPL-MCNC: 0.51 MG/DL (ref 0.7–1.2)
CREAT SERPL-MCNC: 0.56 MG/DL (ref 0.7–1.2)
GFR AFRICAN AMERICAN: >60 ML/MIN
GFR AFRICAN AMERICAN: >60 ML/MIN
GFR NON-AFRICAN AMERICAN: >60 ML/MIN
GFR NON-AFRICAN AMERICAN: >60 ML/MIN
GFR SERPL CREATININE-BSD FRML MDRD: ABNORMAL ML/MIN/{1.73_M2}
GLUCOSE BLD-MCNC: 180 MG/DL (ref 75–110)
GLUCOSE BLD-MCNC: 240 MG/DL (ref 75–110)
GLUCOSE BLD-MCNC: 245 MG/DL (ref 75–110)
GLUCOSE BLD-MCNC: 271 MG/DL (ref 75–110)
GLUCOSE BLD-MCNC: 81 MG/DL (ref 70–99)
HCT VFR BLD CALC: 35.1 % (ref 41–53)
HEMOGLOBIN: 11.3 G/DL (ref 13.5–17.5)
POTASSIUM SERPL-SCNC: 4.1 MMOL/L (ref 3.7–5.3)
SODIUM BLD-SCNC: 135 MMOL/L (ref 135–144)

## 2018-08-23 PROCEDURE — 6370000000 HC RX 637 (ALT 250 FOR IP): Performed by: INTERNAL MEDICINE

## 2018-08-23 PROCEDURE — 80048 BASIC METABOLIC PNL TOTAL CA: CPT

## 2018-08-23 PROCEDURE — 82947 ASSAY GLUCOSE BLOOD QUANT: CPT

## 2018-08-23 PROCEDURE — 6360000002 HC RX W HCPCS: Performed by: INTERNAL MEDICINE

## 2018-08-23 PROCEDURE — 2580000003 HC RX 258: Performed by: INTERNAL MEDICINE

## 2018-08-23 PROCEDURE — 82565 ASSAY OF CREATININE: CPT

## 2018-08-23 PROCEDURE — 85014 HEMATOCRIT: CPT

## 2018-08-23 PROCEDURE — 85018 HEMOGLOBIN: CPT

## 2018-08-23 PROCEDURE — 2700000000 HC OXYGEN THERAPY PER DAY

## 2018-08-23 PROCEDURE — 94640 AIRWAY INHALATION TREATMENT: CPT

## 2018-08-23 PROCEDURE — 84520 ASSAY OF UREA NITROGEN: CPT

## 2018-08-23 PROCEDURE — 97110 THERAPEUTIC EXERCISES: CPT

## 2018-08-23 PROCEDURE — 36415 COLL VENOUS BLD VENIPUNCTURE: CPT

## 2018-08-23 PROCEDURE — 2060000000 HC ICU INTERMEDIATE R&B

## 2018-08-23 PROCEDURE — G8979 MOBILITY GOAL STATUS: HCPCS

## 2018-08-23 PROCEDURE — 94761 N-INVAS EAR/PLS OXIMETRY MLT: CPT

## 2018-08-23 PROCEDURE — G8978 MOBILITY CURRENT STATUS: HCPCS

## 2018-08-23 RX ADMIN — OXYCODONE HYDROCHLORIDE 40 MG: 40 TABLET, FILM COATED, EXTENDED RELEASE ORAL at 21:39

## 2018-08-23 RX ADMIN — DRONABINOL 2.5 MG: 2.5 CAPSULE ORAL at 08:49

## 2018-08-23 RX ADMIN — OLANZAPINE 10 MG: 5 TABLET, FILM COATED ORAL at 20:51

## 2018-08-23 RX ADMIN — OXYCODONE HYDROCHLORIDE 10 MG: 5 TABLET ORAL at 18:39

## 2018-08-23 RX ADMIN — PREGABALIN 25 MG: 25 CAPSULE ORAL at 08:52

## 2018-08-23 RX ADMIN — VANCOMYCIN HYDROCHLORIDE 750 MG: 750 INJECTION, POWDER, LYOPHILIZED, FOR SOLUTION INTRAVENOUS at 22:37

## 2018-08-23 RX ADMIN — LORAZEPAM 0.5 MG: 0.5 TABLET ORAL at 21:39

## 2018-08-23 RX ADMIN — CEFTRIAXONE SODIUM 1 G: 1 INJECTION, POWDER, FOR SOLUTION INTRAMUSCULAR; INTRAVENOUS at 18:39

## 2018-08-23 RX ADMIN — LEVALBUTEROL 1.25 MG: 1.25 SOLUTION, CONCENTRATE RESPIRATORY (INHALATION) at 07:45

## 2018-08-23 RX ADMIN — LEVALBUTEROL 1.25 MG: 1.25 SOLUTION, CONCENTRATE RESPIRATORY (INHALATION) at 15:42

## 2018-08-23 RX ADMIN — INSULIN LISPRO 6 UNITS: 100 INJECTION, SOLUTION INTRAVENOUS; SUBCUTANEOUS at 21:39

## 2018-08-23 RX ADMIN — IPRATROPIUM BROMIDE 0.5 MG: 0.5 SOLUTION RESPIRATORY (INHALATION) at 11:31

## 2018-08-23 RX ADMIN — VANCOMYCIN HYDROCHLORIDE 750 MG: 750 INJECTION, POWDER, LYOPHILIZED, FOR SOLUTION INTRAVENOUS at 10:39

## 2018-08-23 RX ADMIN — Medication 1 TABLET: at 20:51

## 2018-08-23 RX ADMIN — Medication 1 TABLET: at 08:52

## 2018-08-23 RX ADMIN — LEVALBUTEROL 1.25 MG: 1.25 SOLUTION, CONCENTRATE RESPIRATORY (INHALATION) at 19:28

## 2018-08-23 RX ADMIN — OXYCODONE HYDROCHLORIDE 10 MG: 5 TABLET ORAL at 12:28

## 2018-08-23 RX ADMIN — ACETYLCYSTEINE 600 MG: 200 SOLUTION ORAL; RESPIRATORY (INHALATION) at 07:45

## 2018-08-23 RX ADMIN — PANTOPRAZOLE SODIUM 40 MG: 40 TABLET, DELAYED RELEASE ORAL at 06:26

## 2018-08-23 RX ADMIN — ACETYLCYSTEINE 400 MG: 200 SOLUTION ORAL; RESPIRATORY (INHALATION) at 19:27

## 2018-08-23 RX ADMIN — DRONABINOL 2.5 MG: 2.5 CAPSULE ORAL at 15:40

## 2018-08-23 RX ADMIN — LEVALBUTEROL 1.25 MG: 1.25 SOLUTION, CONCENTRATE RESPIRATORY (INHALATION) at 11:31

## 2018-08-23 RX ADMIN — AZITHROMYCIN MONOHYDRATE 500 MG: 500 INJECTION, POWDER, LYOPHILIZED, FOR SOLUTION INTRAVENOUS at 20:50

## 2018-08-23 RX ADMIN — OXYCODONE HYDROCHLORIDE 40 MG: 40 TABLET, FILM COATED, EXTENDED RELEASE ORAL at 08:52

## 2018-08-23 RX ADMIN — OXYCODONE HYDROCHLORIDE 10 MG: 5 TABLET ORAL at 05:24

## 2018-08-23 RX ADMIN — INSULIN LISPRO 4 UNITS: 100 INJECTION, SOLUTION INTRAVENOUS; SUBCUTANEOUS at 12:34

## 2018-08-23 RX ADMIN — SODIUM CHLORIDE: 9 INJECTION, SOLUTION INTRAVENOUS at 06:21

## 2018-08-23 RX ADMIN — METHYLPREDNISOLONE SODIUM SUCCINATE 40 MG: 40 INJECTION, POWDER, FOR SOLUTION INTRAMUSCULAR; INTRAVENOUS at 01:37

## 2018-08-23 RX ADMIN — BUDESONIDE 500 MCG: 0.5 SUSPENSION RESPIRATORY (INHALATION) at 19:28

## 2018-08-23 RX ADMIN — IPRATROPIUM BROMIDE 0.5 MG: 0.5 SOLUTION RESPIRATORY (INHALATION) at 07:45

## 2018-08-23 RX ADMIN — METHYLPREDNISOLONE SODIUM SUCCINATE 40 MG: 40 INJECTION, POWDER, FOR SOLUTION INTRAMUSCULAR; INTRAVENOUS at 14:02

## 2018-08-23 RX ADMIN — LORAZEPAM 0.5 MG: 0.5 TABLET ORAL at 15:40

## 2018-08-23 RX ADMIN — INSULIN LISPRO 2 UNITS: 100 INJECTION, SOLUTION INTRAVENOUS; SUBCUTANEOUS at 17:11

## 2018-08-23 RX ADMIN — PREGABALIN 25 MG: 25 CAPSULE ORAL at 14:01

## 2018-08-23 RX ADMIN — BUDESONIDE 500 MCG: 0.5 SUSPENSION RESPIRATORY (INHALATION) at 07:45

## 2018-08-23 RX ADMIN — IPRATROPIUM BROMIDE 0.5 MG: 0.5 SOLUTION RESPIRATORY (INHALATION) at 15:42

## 2018-08-23 RX ADMIN — PREGABALIN 25 MG: 25 CAPSULE ORAL at 20:51

## 2018-08-23 RX ADMIN — INSULIN LISPRO 4 UNITS: 100 INJECTION, SOLUTION INTRAVENOUS; SUBCUTANEOUS at 08:52

## 2018-08-23 RX ADMIN — LORAZEPAM 0.5 MG: 0.5 TABLET ORAL at 08:52

## 2018-08-23 RX ADMIN — IPRATROPIUM BROMIDE 0.5 MG: 0.5 SOLUTION RESPIRATORY (INHALATION) at 19:28

## 2018-08-23 ASSESSMENT — PAIN DESCRIPTION - FREQUENCY
FREQUENCY: CONTINUOUS
FREQUENCY: CONTINUOUS
FREQUENCY: INTERMITTENT
FREQUENCY: CONTINUOUS

## 2018-08-23 ASSESSMENT — PAIN DESCRIPTION - ONSET: ONSET: ON-GOING

## 2018-08-23 ASSESSMENT — PAIN DESCRIPTION - PAIN TYPE
TYPE: CHRONIC PAIN

## 2018-08-23 ASSESSMENT — PAIN DESCRIPTION - LOCATION
LOCATION: BACK

## 2018-08-23 ASSESSMENT — PAIN DESCRIPTION - PROGRESSION
CLINICAL_PROGRESSION: NOT CHANGED

## 2018-08-23 ASSESSMENT — PAIN DESCRIPTION - DESCRIPTORS
DESCRIPTORS: ACHING

## 2018-08-23 ASSESSMENT — PAIN SCALES - GENERAL
PAINLEVEL_OUTOF10: 9
PAINLEVEL_OUTOF10: 0
PAINLEVEL_OUTOF10: 5
PAINLEVEL_OUTOF10: 8
PAINLEVEL_OUTOF10: 9
PAINLEVEL_OUTOF10: 5
PAINLEVEL_OUTOF10: 9
PAINLEVEL_OUTOF10: 9
PAINLEVEL_OUTOF10: 4
PAINLEVEL_OUTOF10: 8
PAINLEVEL_OUTOF10: 9

## 2018-08-23 ASSESSMENT — PAIN DESCRIPTION - ORIENTATION
ORIENTATION: POSTERIOR

## 2018-08-23 NOTE — PROGRESS NOTES
Pulmonary Critical Care Progress Note    Patient seen for the follow up of  exacerbation      Subjective:    He is less anxious. He is sitting in the chair. He has improved dyspnea. . He has less chest pain and abdominal pain. Mild occasional cough, mostly dry. . He has poor appetite . Examination:    Vitals: /81   Pulse 117   Temp 97.5 °F (36.4 °C) (Oral)   Resp 18   Ht 5' 5\" (1.651 m)   Wt 120 lb (54.4 kg)   SpO2 99%   BMI 19.97 kg/m²   SpO2  Av.7 %  Min: 95 %  Max: 99 %  General appearance: alert and cooperative with exam  Neck: No JVD  Lungs:  Decreased breath sounds no crackles or wheezing  Heart: regular rate and rhythm, S1, S2 normal, no gallop  Abdomen: Soft, non tender, + BS  Extremities: no cyanosis or clubbing. 1 + edema, tattoos noted    LABs:    CBC:   Recent Labs      18   1605   WBC  18.4*   HGB  10.4*   HCT  32.5*   PLT  200     BMP:   Recent Labs      18   1605  18   0540   NA  138   --    K  4.3   --    CO2  35*   --    BUN  15  20   CREATININE  0.87  0.51*   LABGLOM  >60  >60   GLUCOSE  105*   --      Radiology:  CXR 18  Stable right lower lobe consolidation and nodular densities in the right   upper lobe and left lower lobe.             CT chest      1. Similar area of masslike consolidation involving the right lower lobe. Residual disease is not excluded.  PET-CT could be considered for further   evaluation as indicated. 2. Increasing size and number of nodules in the right and left lung, as   above, is concerning for progression of disease. 3. Nonobstructing left renal calculus.            Impression:    · Chronic Respiratory failure  · Acute exacerbation of COPD  · Tracheobronchitis/ atelactasis  · Active smoker  · advanced recurrent squamous cell lung cancer/ lung nodules   · History of GI bleed/hypertension/anemia/anxiety disorder    Recommendations:    · Oxygen nasal cannula   · Incentive Spirometer every hr WA  ·  Xopenex 1.25 and Artrovent  by nebulizer every 4 hours and as needed   · Mucomyst by nebulizer every 12 hours  · keep IV solu-medrol 40 every 12 hrs   · Pulmicort 0.5 every 12 hours  · Rocephin Zithromax and Vanco  · Check labs now  · Oncology on consult  · Nicotine patch  · Anxiety control  · Smoking cessation advised  · pain control  · Discussed with family  · PTOT/ Rehab discharge evaluation   · DVT prophylaxis with low molecular weight heparin    Minh Dotson MD, [facility]  Pulmonary Critical Care and Sleep Medicine,  [facility]  Cell: [phone]  Office: [phone]

## 2018-08-23 NOTE — PROGRESS NOTES
Vancomycin Day: 6  Current Dose:  750mg q12h  Patient's labs, cultures, vitals, and vancomycin regimen reviewed. No changes today. Temp Readings from Last 3 Encounters:   08/23/18 97.5 °F (36.4 °C) (Oral)   07/19/18 97.2 °F (36.2 °C) (Temporal)   07/13/18 98.2 °F (36.8 °C) (Oral)     Recent Labs      08/21/18   1605   WBC  18.4*     Recent Labs      08/21/18   1605  08/23/18   0540   CREATININE  0.87  0.51*     Estimated Creatinine Clearance: 123 mL/min (A) (based on SCr of 0.51 mg/dL (L)). Intake/Output Summary (Last 24 hours) at 08/23/18 1301  Last data filed at 08/23/18 1007   Gross per 24 hour   Intake 1100 ml   Output 2375 ml   Net -1275 ml         Thank you for the consult. Pharmacy will continue to follow.   Merline Vasquez RPh/PharmD  8/23/2018 1:01 PM

## 2018-08-23 NOTE — CONSULTS
Jinny Watts  Urology Consultation    Patient:  Letty Hernandez  MRN: [id number]  YOB: 1960    CHIEF COMPLAINT:  Urinary retention    HISTORY OF PRESENT ILLNESS:   The patient is a 63 y.o. male who presents with Shortness of breath, cough and congestion, patient with COPD being treated presently for lung cancer. He has had difficulty upon urination previously documented. He has had a prior  Cystoscopy that demonstrated bladder outlet obstruction     Patient's old records, notes and chart reviewed and summarized above. Past Medical History:    Past Medical History:   Diagnosis Date    Benign essential HTN 12/10/2016    no cardiologist and on no medication    Cardiac arrest due to respiratory disorder ([address] 12/10/2016    \"had a seizure and aspirated vomit, then arrested\"    Cellulitis     Chronic low back pain 8/16/2016    Compression fracture of L1 lumbar vertebra ([address] 06/2018    COPD (chronic obstructive pulmonary disease) ([address] 2013    Emphysema of lung ([address]     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    last dose of chemo was 6/19/2018    Head injury     July 1984, [place] hit head on [place], Halo placed    Hepatitis     patient unsure    Insomnia     Osteoarthritis of spine with radiculopathy, lumbar region 8/15/2017    Oxygen dependent     2l NC at HS and when out in car or errands per pt.     Reflux     Right sided weakness     due to head injury in 1984    Seizures ([address]     last one Dec 2016 no problems since and no medication (patient stated he stopped drinking)    Tremor     hx of tremor    Ulcer        Past Surgical History:    Past Surgical History:   Procedure Laterality Date    CERVICAL DISC SURGERY  2000    c3-c6 fusion    COLONOSCOPY      CYSTOSCOPY  7/27/2015    DILATATION, ESOPHAGUS      ulcer repair    ENDOSCOPY, COLON, DIAGNOSTIC      EGD    KYPHOSIS SURGERY  07/19/2018    L1    CT PERQ VERTEBROPLASTY UNI/BI INJX

## 2018-08-23 NOTE — PROGRESS NOTES
Balance  Posture: Fair  Sitting - Static: Fair  Sitting - Dynamic: Fair  Standing - Static: Fair;-  Standing - Dynamic: Poor  Comments: Pt stood semisupported x 2-3 minutes in jean-claude keyes, stood fully x 3 reps for 10-15 seconds each rep  Other exercises  Other exercises?: Yes  Other exercises 1: Seated B LE AROM x 10-15 reps with rest breaks  Other exercises 2: Cues for proper breathing and rests           Assessment   Body structures, Functions, Activity limitations: Decreased functional mobility ; Decreased ROM; Decreased strength;Decreased safe awareness;Decreased endurance;Decreased balance  Assessment: Pt able to complete limited standing, increased HR in 130's with rests, SpO2 remained >94% throughout. Pt would benefit from SNF due to weakness and high fall risk  Prognosis: Good  REQUIRES PT FOLLOW UP: Yes  Activity Tolerance  Activity Tolerance: Treatment limited secondary to medical complications (free text) (SOB)     G-Code  PT G-Codes  Functional Assessment Tool Used: [facility]  Score: 10/24  Functional Limitation: Mobility: Walking and moving around  Mobility: Walking and Moving Around Current Status (): At least 60 percent but less than 80 percent impaired, limited or restricted  Mobility: Walking and Moving Around Goal Status ():  At least 20 percent but less than 40 percent impaired, limited or restricted               AM-PAC Score  AM-PAC Inpatient Mobility Raw Score : 10  AM-PAC Inpatient T-Scale Score : 32.29  Mobility Inpatient CMS 0-100% Score: 76.75  Mobility Inpatient CMS G-Code Modifier : CL          Goals  Short term goals  Time Frame for Short term goals: 12 tx's  Short term goal 1: Bed mob independent  Short term goal 2: Transfers independetn  Short term goal 3: Amb x 4 ft with r.walker independent  Short term goal 4: Pt able to tolerate transfers with O2 sats > 90%  Patient Goals   Patient goals : Pt goal is to get his strength back before going home    Plan    Plan  Times per week: 1-2/day.   5-6x/week   Current Treatment Recommendations: Strengthening, Balance Training, Functional Mobility Training, Transfer Training, Endurance Training, Cognitive Reorientation, Patient/Caregiver Education & Training, Home Exercise Program, Gait Training, Stair training, Safety Education & Training, Positioning  Safety Devices  Type of devices: Call light within reach, Gait belt, Left in chair, Chair alarm in place     Therapy Time   Individual Concurrent Group Co-treatment   Time In 1210         Time Out 1233         [facility] [place] [address] [facility]

## 2018-08-24 ENCOUNTER — APPOINTMENT (OUTPATIENT)
Dept: GENERAL RADIOLOGY | Age: 58
DRG: 191 | End: 2018-08-24
Payer: MEDICARE

## 2018-08-24 ENCOUNTER — APPOINTMENT (OUTPATIENT)
Dept: ULTRASOUND IMAGING | Age: 58
DRG: 191 | End: 2018-08-24
Payer: MEDICARE

## 2018-08-24 LAB
ABSOLUTE EOS #: 0 K/UL (ref 0–0.4)
ABSOLUTE IMMATURE GRANULOCYTE: ABNORMAL K/UL (ref 0–0.3)
ABSOLUTE LYMPH #: 0.79 K/UL (ref 1–4.8)
ABSOLUTE MONO #: 0.39 K/UL (ref 0.2–0.8)
ANION GAP SERPL CALCULATED.3IONS-SCNC: 5 MMOL/L (ref 9–17)
BASOPHILS # BLD: 0 %
BASOPHILS ABSOLUTE: 0 K/UL (ref 0–0.2)
BUN BLDV-MCNC: 23 MG/DL (ref 6–20)
BUN/CREAT BLD: 43 (ref 9–20)
CALCIUM SERPL-MCNC: 7.9 MG/DL (ref 8.6–10.4)
CHLORIDE BLD-SCNC: 94 MMOL/L (ref 98–107)
CO2: 39 MMOL/L (ref 20–31)
CREAT SERPL-MCNC: 0.53 MG/DL (ref 0.7–1.2)
CULTURE: NORMAL
CULTURE: NORMAL
DIFFERENTIAL TYPE: ABNORMAL
EOSINOPHILS RELATIVE PERCENT: 0 % (ref 1–4)
GFR AFRICAN AMERICAN: >60 ML/MIN
GFR NON-AFRICAN AMERICAN: >60 ML/MIN
GFR SERPL CREATININE-BSD FRML MDRD: ABNORMAL ML/MIN/{1.73_M2}
GFR SERPL CREATININE-BSD FRML MDRD: ABNORMAL ML/MIN/{1.73_M2}
GLUCOSE BLD-MCNC: 114 MG/DL (ref 70–99)
GLUCOSE BLD-MCNC: 117 MG/DL (ref 75–110)
GLUCOSE BLD-MCNC: 159 MG/DL (ref 75–110)
GLUCOSE BLD-MCNC: 162 MG/DL (ref 75–110)
GLUCOSE BLD-MCNC: 232 MG/DL (ref 75–110)
HCT VFR BLD CALC: 32.5 % (ref 41–53)
HEMOGLOBIN: 10.3 G/DL (ref 13.5–17.5)
IMMATURE GRANULOCYTES: ABNORMAL %
LYMPHOCYTES # BLD: 6 % (ref 24–44)
Lab: NORMAL
Lab: NORMAL
MCH RBC QN AUTO: 30.1 PG (ref 26–34)
MCHC RBC AUTO-ENTMCNC: 31.8 G/DL (ref 31–37)
MCV RBC AUTO: 94.8 FL (ref 80–100)
MONOCYTES # BLD: 3 % (ref 1–7)
NRBC AUTOMATED: ABNORMAL PER 100 WBC
PDW BLD-RTO: 18.6 % (ref 11.5–14.5)
PLATELET # BLD: 163 K/UL (ref 130–400)
PLATELET ESTIMATE: ABNORMAL
PMV BLD AUTO: 7.1 FL (ref 6–12)
POTASSIUM SERPL-SCNC: 4.4 MMOL/L (ref 3.7–5.3)
RBC # BLD: 3.43 M/UL (ref 4.5–5.9)
RBC # BLD: ABNORMAL 10*6/UL
SEG NEUTROPHILS: 91 % (ref 36–66)
SEGMENTED NEUTROPHILS ABSOLUTE COUNT: 11.92 K/UL (ref 1.8–7.7)
SODIUM BLD-SCNC: 138 MMOL/L (ref 135–144)
SPECIMEN DESCRIPTION: NORMAL
SPECIMEN DESCRIPTION: NORMAL
STATUS: NORMAL
STATUS: NORMAL
WBC # BLD: 13.1 K/UL (ref 3.5–11)
WBC # BLD: ABNORMAL 10*3/UL

## 2018-08-24 PROCEDURE — 6360000002 HC RX W HCPCS: Performed by: INTERNAL MEDICINE

## 2018-08-24 PROCEDURE — 82947 ASSAY GLUCOSE BLOOD QUANT: CPT

## 2018-08-24 PROCEDURE — 97530 THERAPEUTIC ACTIVITIES: CPT

## 2018-08-24 PROCEDURE — 94640 AIRWAY INHALATION TREATMENT: CPT

## 2018-08-24 PROCEDURE — 71045 X-RAY EXAM CHEST 1 VIEW: CPT

## 2018-08-24 PROCEDURE — 85025 COMPLETE CBC W/AUTO DIFF WBC: CPT

## 2018-08-24 PROCEDURE — 36415 COLL VENOUS BLD VENIPUNCTURE: CPT

## 2018-08-24 PROCEDURE — 6370000000 HC RX 637 (ALT 250 FOR IP): Performed by: INTERNAL MEDICINE

## 2018-08-24 PROCEDURE — 2700000000 HC OXYGEN THERAPY PER DAY

## 2018-08-24 PROCEDURE — 2060000000 HC ICU INTERMEDIATE R&B

## 2018-08-24 PROCEDURE — 2580000003 HC RX 258: Performed by: INTERNAL MEDICINE

## 2018-08-24 PROCEDURE — 80048 BASIC METABOLIC PNL TOTAL CA: CPT

## 2018-08-24 PROCEDURE — 76775 US EXAM ABDO BACK WALL LIM: CPT

## 2018-08-24 PROCEDURE — 97110 THERAPEUTIC EXERCISES: CPT

## 2018-08-24 RX ORDER — PREDNISONE 20 MG/1
40 TABLET ORAL DAILY
Status: DISCONTINUED | OUTPATIENT
Start: 2018-08-24 | End: 2018-08-25 | Stop reason: HOSPADM

## 2018-08-24 RX ADMIN — OXYCODONE HYDROCHLORIDE 10 MG: 5 TABLET ORAL at 07:32

## 2018-08-24 RX ADMIN — INSULIN LISPRO 2 UNITS: 100 INJECTION, SOLUTION INTRAVENOUS; SUBCUTANEOUS at 09:42

## 2018-08-24 RX ADMIN — OLANZAPINE 10 MG: 5 TABLET, FILM COATED ORAL at 19:51

## 2018-08-24 RX ADMIN — LORAZEPAM 0.5 MG: 0.5 TABLET ORAL at 11:11

## 2018-08-24 RX ADMIN — CEFTRIAXONE SODIUM 1 G: 1 INJECTION, POWDER, FOR SOLUTION INTRAMUSCULAR; INTRAVENOUS at 19:20

## 2018-08-24 RX ADMIN — DRONABINOL 2.5 MG: 2.5 CAPSULE ORAL at 16:15

## 2018-08-24 RX ADMIN — Medication 1 TABLET: at 09:41

## 2018-08-24 RX ADMIN — PREDNISONE 40 MG: 20 TABLET ORAL at 11:07

## 2018-08-24 RX ADMIN — IPRATROPIUM BROMIDE 0.5 MG: 0.5 SOLUTION RESPIRATORY (INHALATION) at 14:59

## 2018-08-24 RX ADMIN — BUDESONIDE 500 MCG: 0.5 SUSPENSION RESPIRATORY (INHALATION) at 07:21

## 2018-08-24 RX ADMIN — OXYCODONE HYDROCHLORIDE 10 MG: 5 TABLET ORAL at 19:21

## 2018-08-24 RX ADMIN — IPRATROPIUM BROMIDE 0.5 MG: 0.5 SOLUTION RESPIRATORY (INHALATION) at 07:20

## 2018-08-24 RX ADMIN — IPRATROPIUM BROMIDE 0.5 MG: 0.5 SOLUTION RESPIRATORY (INHALATION) at 19:25

## 2018-08-24 RX ADMIN — IPRATROPIUM BROMIDE 0.5 MG: 0.5 SOLUTION RESPIRATORY (INHALATION) at 11:24

## 2018-08-24 RX ADMIN — Medication 1 TABLET: at 19:51

## 2018-08-24 RX ADMIN — AZITHROMYCIN MONOHYDRATE 500 MG: 500 INJECTION, POWDER, LYOPHILIZED, FOR SOLUTION INTRAVENOUS at 21:42

## 2018-08-24 RX ADMIN — PREGABALIN 25 MG: 25 CAPSULE ORAL at 21:41

## 2018-08-24 RX ADMIN — PREGABALIN 25 MG: 25 CAPSULE ORAL at 09:41

## 2018-08-24 RX ADMIN — LEVALBUTEROL 1.25 MG: 1.25 SOLUTION, CONCENTRATE RESPIRATORY (INHALATION) at 11:24

## 2018-08-24 RX ADMIN — INSULIN LISPRO 4 UNITS: 100 INJECTION, SOLUTION INTRAVENOUS; SUBCUTANEOUS at 13:19

## 2018-08-24 RX ADMIN — OXYCODONE HYDROCHLORIDE 40 MG: 40 TABLET, FILM COATED, EXTENDED RELEASE ORAL at 09:41

## 2018-08-24 RX ADMIN — LEVALBUTEROL 1.25 MG: 1.25 SOLUTION, CONCENTRATE RESPIRATORY (INHALATION) at 07:21

## 2018-08-24 RX ADMIN — ACETYLCYSTEINE 800 MG: 200 SOLUTION ORAL; RESPIRATORY (INHALATION) at 19:25

## 2018-08-24 RX ADMIN — ACETYLCYSTEINE 600 MG: 200 SOLUTION ORAL; RESPIRATORY (INHALATION) at 07:20

## 2018-08-24 RX ADMIN — ZOLPIDEM TARTRATE 5 MG: 5 TABLET ORAL at 22:57

## 2018-08-24 RX ADMIN — LEVALBUTEROL 1.25 MG: 1.25 SOLUTION, CONCENTRATE RESPIRATORY (INHALATION) at 19:25

## 2018-08-24 RX ADMIN — METHYLPREDNISOLONE SODIUM SUCCINATE 40 MG: 40 INJECTION, POWDER, FOR SOLUTION INTRAMUSCULAR; INTRAVENOUS at 01:34

## 2018-08-24 RX ADMIN — LORAZEPAM 0.5 MG: 0.5 TABLET ORAL at 19:51

## 2018-08-24 RX ADMIN — OXYCODONE HYDROCHLORIDE 40 MG: 40 TABLET, FILM COATED, EXTENDED RELEASE ORAL at 21:41

## 2018-08-24 RX ADMIN — BUDESONIDE 500 MCG: 0.5 SUSPENSION RESPIRATORY (INHALATION) at 19:25

## 2018-08-24 RX ADMIN — PANTOPRAZOLE SODIUM 40 MG: 40 TABLET, DELAYED RELEASE ORAL at 06:19

## 2018-08-24 RX ADMIN — DRONABINOL 2.5 MG: 2.5 CAPSULE ORAL at 06:19

## 2018-08-24 RX ADMIN — LEVALBUTEROL 1.25 MG: 1.25 SOLUTION, CONCENTRATE RESPIRATORY (INHALATION) at 14:59

## 2018-08-24 RX ADMIN — INSULIN LISPRO 1 UNITS: 100 INJECTION, SOLUTION INTRAVENOUS; SUBCUTANEOUS at 21:41

## 2018-08-24 RX ADMIN — OXYCODONE HYDROCHLORIDE 10 MG: 5 TABLET ORAL at 13:32

## 2018-08-24 RX ADMIN — VANCOMYCIN HYDROCHLORIDE 750 MG: 750 INJECTION, POWDER, LYOPHILIZED, FOR SOLUTION INTRAVENOUS at 09:41

## 2018-08-24 RX ADMIN — PREGABALIN 25 MG: 25 CAPSULE ORAL at 14:39

## 2018-08-24 ASSESSMENT — PAIN DESCRIPTION - DESCRIPTORS
DESCRIPTORS: ACHING

## 2018-08-24 ASSESSMENT — PAIN DESCRIPTION - FREQUENCY
FREQUENCY: CONTINUOUS

## 2018-08-24 ASSESSMENT — PAIN DESCRIPTION - LOCATION
LOCATION: GENERALIZED
LOCATION: GENERALIZED;BACK
LOCATION: BACK

## 2018-08-24 ASSESSMENT — PAIN SCALES - GENERAL
PAINLEVEL_OUTOF10: 9
PAINLEVEL_OUTOF10: 8
PAINLEVEL_OUTOF10: 9
PAINLEVEL_OUTOF10: 9
PAINLEVEL_OUTOF10: 8
PAINLEVEL_OUTOF10: 9
PAINLEVEL_OUTOF10: 7
PAINLEVEL_OUTOF10: 0
PAINLEVEL_OUTOF10: 9

## 2018-08-24 ASSESSMENT — PAIN DESCRIPTION - PROGRESSION

## 2018-08-24 ASSESSMENT — PAIN DESCRIPTION - ONSET: ONSET: ON-GOING

## 2018-08-24 ASSESSMENT — PAIN DESCRIPTION - PAIN TYPE
TYPE: CHRONIC PAIN

## 2018-08-24 NOTE — CARE COORDINATION
[facility]: [facility] for [facility]. Will advise pt and girlfriend. Will ask for more choices.   Manasa muller

## 2018-08-24 NOTE — PROGRESS NOTES
6/19/2018    Head injury     July 1984, [place], Halo placed    Hepatitis     patient unsure    Insomnia     Osteoarthritis of spine with radiculopathy, lumbar region 8/15/2017    Oxygen dependent     2l NC at HS and when out in car or errands per pt.  Reflux     Right sided weakness     due to head injury in 1984    Seizures ([address]     last one Dec 2016 no problems since and no medication (patient stated he stopped drinking)    Tremor     hx of tremor    Ulcer       Allergies:    Allergies   Allergen Reactions    Aspirin     Fish-Derived Products Swelling    Shellfish-Derived Products      Other reaction(s): syncope/severe facial swelling/vomits    Motrin [Ibuprofen Micronized] Other (See Comments)     Upset stomach         Medications:   Scheduled Meds:   methylPREDNISolone  40 mg Intravenous Q12H    levalbuterol  1.25 mg Nebulization Q4H WA    ipratropium  0.5 mg Nebulization Q4H WA    vancomycin  750 mg Intravenous Q12H    acetylcysteine  600 mg Inhalation BID    budesonide  0.5 mg Nebulization BID    insulin lispro  0-12 Units Subcutaneous TID WC    insulin lispro  0-6 Units Subcutaneous Nightly    Calcium Carb-Cholecalciferol  1 tablet Oral BID    dronabinol  2.5 mg Oral BID AC    pregabalin  25 mg Oral TID    OLANZapine  10 mg Oral Nightly    oxyCODONE  40 mg Oral Q12H    pantoprazole  40 mg Oral QAM AC    cefTRIAXone (ROCEPHIN) IV  1 g Intravenous Q24H    azithromycin  500 mg Intravenous Q24H    vancomycin (VANCOCIN) intermittent dosing (placeholder)   Other RX Placeholder     PRN Medications: glucose, dextrose, glucagon (rDNA), dextrose, LORazepam, loperamide, ondansetron, oxyCODONE HCl, albuterol, zolpidem, guaiFENesin-dextromethorphan  Continuous Infusions:   dextrose      sodium chloride 10 mL/hr at 08/23/18 1508       Objective:   Vitals: /76   Pulse 130   Temp 98.8 °F (37.1 °C) (Oral)   Resp 18   Ht 5' 5\" (1.651 m)   Wt 120 lb since March. He is following with   [facility] as outpatient. His recent scan showing increase in size of lung nodules. She will need repeat scan in 3-4 weeks after treatment for his infection  to assess true tumor progression   4. He is on  Opdivo Treatment as o/p. This will need to be delayed until he feels better  5. Patient's questions were answered to the best of his satisfaction and he verbalized full understanding and agreement. 6. Thank you for allowing us to participate in the care of this pleasant patient.       Discussed with patient and Nurse. Benjamín Lazaro MD  Hematologist/Medical Oncologist    Cell: [phone]      This note is created with the assistance of a speech recognition program.  While intending to generate a document that actually reflects the content of the visit, the document can still have some errors including those of syntax and sound a like substitutions which may escape proof reading. It such instances, actual meaning can be extrapolated by contextual diversion.

## 2018-08-24 NOTE — PROGRESS NOTES
Progress Note               Date:                           8/24/2018  Patient name:           Sujata Cummins  Date of admission:  8/18/2018  3:29 PM  MRN:   [id number]  YOB: 1960  PCP:                           Zully Dejesus DO        Subjective:   Patient seen and examined  SOB better  Anxious today  Sen by Urology for urinary symptoms  Renal US showing stone  No fever chills      HPI in Brief:   The patient is a 63 y.o. male who is admitted to the hospital for further management of shortness of breath COPD with acute exacerbation. The patient had increasing shortness of breath for the last few days. He has cough with clear sputum. No hemoptysis. No fever or chills. No chest pains. He had imaging which showed mild bilateral lung infiltrates. Possibly chronic. Patient has history of squamous cell carcinoma diagnosed in 2015 s/o concurrent chemoRT and then observation. He had recurrent disease in 2017 and was treated with chemo and most recently he is receiving palliative immunotherapy with Opdivo since March 2018. Next treatment is next week. No side effects related to treatment.     Problem Lists:   Primary Problem:  <principal problem not specified>   Current Problems:  Active Hospital Problems    Diagnosis Date Noted    Chronic respiratory failure 08/19/2018    COPD with acute exacerbation ([facility] [J44.1] 08/18/2018    Squamous cell carcinoma of lung ([facility] [C34.90] 08/18/2018    COPD with exacerbation ([facility] [J44.1] 08/18/2018     PMH:  Past Medical History:   Diagnosis Date    Benign essential HTN 12/10/2016    no cardiologist and on no medication    Cardiac arrest due to respiratory disorder ([address] 12/10/2016    \"had a seizure and aspirated vomit, then arrested\"    Cellulitis     Chronic low back pain 8/16/2016    Compression fracture of L1 lumbar vertebra ([address] 06/2018    COPD (chronic obstructive pulmonary disease) ([facility] 2013    Emphysema of lung ([facility]     Epidermoid carcinoma of lung ([facility] 8/16/2016    last dose of chemo was 6/19/2018    Head injury     July 1984, [place], Halo placed    Hepatitis     patient unsure    Insomnia     Osteoarthritis of spine with radiculopathy, lumbar region 8/15/2017    Oxygen dependent     2l NC at HS and when out in car or errands per pt.  Reflux     Right sided weakness     due to head injury in 1984    Seizures ([facility]     last one Dec 2016 no problems since and no medication (patient stated he stopped drinking)    Tremor     hx of tremor    Ulcer       Allergies:    Allergies   Allergen Reactions    Aspirin     Fish-Derived Products Swelling    Shellfish-Derived Products      Other reaction(s): syncope/severe facial swelling/vomits    Motrin [Ibuprofen Micronized] Other (See Comments)     Upset stomach         Medications:   Scheduled Meds:   predniSONE  40 mg Oral Daily    levalbuterol  1.25 mg Nebulization Q4H WA    ipratropium  0.5 mg Nebulization Q4H WA    acetylcysteine  600 mg Inhalation BID    budesonide  0.5 mg Nebulization BID    insulin lispro  0-12 Units Subcutaneous TID WC    insulin lispro  0-6 Units Subcutaneous Nightly    calcium carb-cholecalciferol  1 tablet Oral BID    dronabinol  2.5 mg Oral BID AC    pregabalin  25 mg Oral TID    OLANZapine  10 mg Oral Nightly    oxyCODONE  40 mg Oral Q12H    pantoprazole  40 mg Oral QAM AC    cefTRIAXone (ROCEPHIN) IV  1 g Intravenous Q24H    azithromycin  500 mg Intravenous Q24H     PRN Medications: glucose, dextrose, glucagon (rDNA), dextrose, LORazepam, loperamide, ondansetron, oxyCODONE HCl, albuterol, zolpidem, guaiFENesin-dextromethorphan  Continuous Infusions:   dextrose      sodium chloride 10 mL/hr at 08/23/18 0108       Objective:   Vitals: /73   Pulse 114   Temp 98.4 °F (36.9 °C) (Oral)   Resp 18   Ht 5' 5\" (1.651 m)   Wt 120 lb (54.4 kg)   SpO2 97%   BMI 19.97 kg/m²   General appearance - well

## 2018-08-24 NOTE — CARE COORDINATION
Social work: [facility] pt for [facility]. Will advise pt and girlfriend.   Perez muller

## 2018-08-24 NOTE — CARE COORDINATION
Social work: notified pt and girlfriend of acceptance at [address] Hens completed and faxed. Await stefani, Rx and discharge med list.  Lead RN working on that now.   Vahe muller

## 2018-08-24 NOTE — PROGRESS NOTES
Spirometer every hr WA  ·  Xopenex 1.25 and Artrovent  by nebulizer every 4 hours and as needed   · Mucomyst by nebulizer every 12 hours  · Prednisone 40 PO QD taper  · Pulmicort 0.5 every 12 hours  · Rocephin Zithromax   · Stop Vanco  · Oncology on consult  · Nicotine patch  · Anxiety control  · Smoking cessation advised  · pain control  · PTOT/ Rehab discharge evaluation   · DVT prophylaxis with low molecular weight heparin    Trena Martel MD, [facility]  Pulmonary Critical Care and Sleep Medicine,  [facility]  Cell: [phone]  Office: [phone]

## 2018-08-24 NOTE — PLAN OF CARE
Problem: Falls - Risk of:  Goal: Will remain free from falls  Will remain free from falls   Outcome: Ongoing  Patient is fall risk per fall scale. Falling star on door. Fall sticker on armband. Hourly rounding performed. Personal belongings and call light within reach. Bed in low position. Restraint alternatives in place. Problem: Breathing Pattern - Ineffective:  Goal: Ability to achieve and maintain a regular respiratory rate will improve  Ability to achieve and maintain a regular respiratory rate will improve   Outcome: Ongoing  Oxygen administered as needed. Pulse oximetry levels WNL. No cyanosis noted. Lung sounds diminished breath sounds- throughout. Repositioned to encourage proper ventilation. Problem: Pain:  Goal: Pain level will decrease  Pain level will decrease   Outcome: Ongoing  Patient states understanding of pain scale and interventions. Pain assessed with hourly rounding and PRN. Patient states pain level is moderate. Will continue to monitor.

## 2018-08-24 NOTE — PROGRESS NOTES
Rylee Gallegos                                                                                  Urology Progress Note            Subjective: Follow-up urinary retention, neurogenic bladder    Patient Vitals for the past 24 hrs:   BP Temp Temp src Pulse Resp SpO2   08/23/18 1948 137/76 98.8 °F (37.1 °C) Oral 130 18 99 %   08/23/18 1925 - - - - - 98 %   08/23/18 1531 (!) 144/74 98.6 °F (37 °C) Oral 117 20 97 %   08/23/18 1137 138/81 97.5 °F (36.4 °C) Oral 117 18 99 %   08/23/18 1133 - - - - - 95 %   08/23/18 0749 - - - - 20 97 %   08/23/18 0639 128/74 98.2 °F (36.8 °C) Oral 106 18 98 %       Intake/Output Summary (Last 24 hours) at 08/24/18 0454  Last data filed at 08/23/18 1843   Gross per 24 hour   Intake             2500 ml   Output             2050 ml   Net              450 ml       Recent Labs      08/21/18   1605  08/23/18   1411   WBC  18.4*   --    HGB  10.4*  11.3*   HCT  32.5*  35.1*   MCV  94.4   --    PLT  200   --      Recent Labs      08/21/18   1605  08/23/18   0540  08/23/18   1411   NA  138   --   135   K  4.3   --   4.1   CL  95*   --   92*   CO2  35*   --   35*   BUN  15  20  20   CREATININE  0.87  0.51*  0.56*       Recent Labs      08/22/18   0610   COLORU  YELLOW   PHUR  7.0   WBCUA  0 TO 2   RBCUA  10 TO 20   MUCUS  NOT REPORTED   TRICHOMONAS  NOT REPORTED   YEAST  NOT REPORTED   BACTERIA  NOT REPORTED   SPECGRAV  1.010   LEUKOCYTESUR  NEGATIVE   UROBILINOGEN  Normal   BILIRUBINUR  NEGATIVE       Additional Lab/culture results:  BUN 23   6 - 20 mg/dL Final 08/24/2018  5:41 AM - [facility]   CREATININE 0.53   0.70 - 1.20 mg/dL Final 08/24/2018  5:41 AM [facility]          Physical Exam: Patient not in acute distress, tolerating catheter, bladder decompressed, no gross hematuria    Interval Imaging Findings:  1. Small echogenic foci within the right renal pelvis, which could represent   nonobstructing stones.    2. Otherwise, unremarkable

## 2018-08-25 VITALS
BODY MASS INDEX: 19.99 KG/M2 | WEIGHT: 120 LBS | RESPIRATION RATE: 18 BRPM | OXYGEN SATURATION: 96 % | HEIGHT: 65 IN | DIASTOLIC BLOOD PRESSURE: 82 MMHG | SYSTOLIC BLOOD PRESSURE: 127 MMHG | HEART RATE: 116 BPM | TEMPERATURE: 98.2 F

## 2018-08-25 LAB
ANION GAP SERPL CALCULATED.3IONS-SCNC: 7 MMOL/L (ref 9–17)
BUN BLDV-MCNC: 25 MG/DL (ref 6–20)
BUN/CREAT BLD: 45 (ref 9–20)
CALCIUM SERPL-MCNC: 8.3 MG/DL (ref 8.6–10.4)
CHLORIDE BLD-SCNC: 90 MMOL/L (ref 98–107)
CO2: 38 MMOL/L (ref 20–31)
CREAT SERPL-MCNC: 0.56 MG/DL (ref 0.7–1.2)
GFR AFRICAN AMERICAN: >60 ML/MIN
GFR NON-AFRICAN AMERICAN: >60 ML/MIN
GFR SERPL CREATININE-BSD FRML MDRD: ABNORMAL ML/MIN/{1.73_M2}
GFR SERPL CREATININE-BSD FRML MDRD: ABNORMAL ML/MIN/{1.73_M2}
GLUCOSE BLD-MCNC: 124 MG/DL (ref 75–110)
GLUCOSE BLD-MCNC: 146 MG/DL (ref 70–99)
GLUCOSE BLD-MCNC: 175 MG/DL (ref 75–110)
POTASSIUM SERPL-SCNC: 3.7 MMOL/L (ref 3.7–5.3)
SODIUM BLD-SCNC: 135 MMOL/L (ref 135–144)

## 2018-08-25 PROCEDURE — 6360000002 HC RX W HCPCS: Performed by: INTERNAL MEDICINE

## 2018-08-25 PROCEDURE — 6370000000 HC RX 637 (ALT 250 FOR IP): Performed by: INTERNAL MEDICINE

## 2018-08-25 PROCEDURE — 82947 ASSAY GLUCOSE BLOOD QUANT: CPT

## 2018-08-25 PROCEDURE — 2580000003 HC RX 258: Performed by: INTERNAL MEDICINE

## 2018-08-25 PROCEDURE — 80048 BASIC METABOLIC PNL TOTAL CA: CPT

## 2018-08-25 PROCEDURE — 94640 AIRWAY INHALATION TREATMENT: CPT

## 2018-08-25 RX ORDER — OXYCODONE HYDROCHLORIDE 10 MG/1
10 TABLET ORAL EVERY 6 HOURS PRN
Qty: 15 TABLET | Refills: 0 | Status: SHIPPED | OUTPATIENT
Start: 2018-08-25 | End: 2018-09-01

## 2018-08-25 RX ORDER — OXYCODONE HCL 40 MG/1
40 TABLET, FILM COATED, EXTENDED RELEASE ORAL EVERY 12 HOURS
Qty: 10 TABLET | Refills: 0 | Status: SHIPPED | OUTPATIENT
Start: 2018-08-25 | End: 2018-09-08

## 2018-08-25 RX ORDER — LORAZEPAM 0.5 MG/1
0.5 TABLET ORAL EVERY 6 HOURS PRN
Qty: 15 TABLET | Refills: 0 | Status: SHIPPED | OUTPATIENT
Start: 2018-08-25 | End: 2018-09-24

## 2018-08-25 RX ADMIN — DRONABINOL 2.5 MG: 2.5 CAPSULE ORAL at 05:53

## 2018-08-25 RX ADMIN — Medication 500 UNITS: at 15:32

## 2018-08-25 RX ADMIN — OXYCODONE HYDROCHLORIDE 10 MG: 5 TABLET ORAL at 07:59

## 2018-08-25 RX ADMIN — LORAZEPAM 0.5 MG: 0.5 TABLET ORAL at 13:58

## 2018-08-25 RX ADMIN — BUDESONIDE 500 MCG: 0.5 SUSPENSION RESPIRATORY (INHALATION) at 07:51

## 2018-08-25 RX ADMIN — PREGABALIN 25 MG: 25 CAPSULE ORAL at 13:58

## 2018-08-25 RX ADMIN — LEVALBUTEROL 1.25 MG: 1.25 SOLUTION, CONCENTRATE RESPIRATORY (INHALATION) at 07:51

## 2018-08-25 RX ADMIN — PREDNISONE 40 MG: 20 TABLET ORAL at 08:01

## 2018-08-25 RX ADMIN — OXYCODONE HYDROCHLORIDE 40 MG: 40 TABLET, FILM COATED, EXTENDED RELEASE ORAL at 09:49

## 2018-08-25 RX ADMIN — IPRATROPIUM BROMIDE 0.5 MG: 0.5 SOLUTION RESPIRATORY (INHALATION) at 07:52

## 2018-08-25 RX ADMIN — LORAZEPAM 0.5 MG: 0.5 TABLET ORAL at 08:21

## 2018-08-25 RX ADMIN — LEVALBUTEROL 1.25 MG: 1.25 SOLUTION, CONCENTRATE RESPIRATORY (INHALATION) at 12:17

## 2018-08-25 RX ADMIN — OXYCODONE HYDROCHLORIDE 10 MG: 5 TABLET ORAL at 13:58

## 2018-08-25 RX ADMIN — SODIUM CHLORIDE: 9 INJECTION, SOLUTION INTRAVENOUS at 05:54

## 2018-08-25 RX ADMIN — PANTOPRAZOLE SODIUM 40 MG: 40 TABLET, DELAYED RELEASE ORAL at 05:53

## 2018-08-25 RX ADMIN — Medication 1 TABLET: at 07:58

## 2018-08-25 RX ADMIN — INSULIN LISPRO 2 UNITS: 100 INJECTION, SOLUTION INTRAVENOUS; SUBCUTANEOUS at 09:49

## 2018-08-25 RX ADMIN — PREGABALIN 25 MG: 25 CAPSULE ORAL at 07:59

## 2018-08-25 RX ADMIN — IPRATROPIUM BROMIDE 0.5 MG: 0.5 SOLUTION RESPIRATORY (INHALATION) at 12:19

## 2018-08-25 RX ADMIN — DRONABINOL 2.5 MG: 2.5 CAPSULE ORAL at 15:31

## 2018-08-25 ASSESSMENT — PAIN DESCRIPTION - PROGRESSION

## 2018-08-25 ASSESSMENT — PAIN SCALES - GENERAL
PAINLEVEL_OUTOF10: 9
PAINLEVEL_OUTOF10: 6
PAINLEVEL_OUTOF10: 7
PAINLEVEL_OUTOF10: 9

## 2018-08-25 NOTE — PROGRESS NOTES
No new complaints. Hemodynamically stable. Specialty notes reviewed - cleared for discharge. RN informed me that Dr. Michael Joy will patient in his office in two weeks - leave garcia in. Meds reconciled. Patient was approved for SNF. Follow-up with Oncology. Follow-up with his PCP on discharge from SNF. SNF physician to assume care while in facility. Qustions answered -expressed understanding.

## 2018-08-25 NOTE — PROGRESS NOTES
Telephone call from [facility] at [place] . They have received Ativan, oxycontin and oxycodone prescriptions but also need written prescriptions for Lyrica and Marinol. Dr. Jakub Villarreal paged and requested that he either fax  [phone] or call in a prescription [phone] to their pharmacy. Dr. Jakub Villarreal paged and informed and he states unable to do either. Returned call to [facility] and informed.

## 2018-08-25 NOTE — PLAN OF CARE
Problem: Falls - Risk of:  Goal: Will remain free from falls  Will remain free from falls   Outcome: Ongoing  Pt is high risk or falls and is on fall precautions. Pt bed is in low position, call light within reach, bed alarm is on, fall sign and bracelet used and hourly checks are continued.      Problem: Pain:  Goal: Pain level will decrease  Pain level will decrease   Outcome: Ongoing

## 2018-08-25 NOTE — PROGRESS NOTES
Pulmonary Critical Care Progress Note    Patient seen for the follow up of  exacerbation      Subjective:    He is sill anxious and requests Ativan . He has stable  dyspnea. . He has less chest pain and abdominal pain on Narcotics and Oxycontin. Mild occasional cough, mostly dry. . He has poor appetite . Examination:    Vitals: /76   Pulse 111   Temp 98.2 °F (36.8 °C) (Oral)   Resp 22   Ht 5' 5\" (1.651 m)   Wt 120 lb (54.4 kg)   SpO2 97%   BMI 19.97 kg/m²   SpO2  Av.3 %  Min: 97 %  Max: 98 %  General appearance: alert and cooperative with exam  Neck: No JVD  Lungs:  Decreased breath sounds no crackles or wheezing  Heart: regular rate and rhythm, S1, S2 normal, no gallop  Abdomen: Soft, non tender, + BS  Extremities: no cyanosis or clubbing. 1 + edema, tattoos noted    LABs:    CBC:   Recent Labs      18   1411  18   0541   WBC   --   13.1*   HGB  11.3*  10.3*   HCT  35.1*  32.5*   PLT   --   163     BMP:   Recent Labs      18   0541  18   0815   NA  138  135   K  4.4  3.7   CO2  39*  38*   BUN  23*  25*   CREATININE  0.53*  0.56*   LABGLOM  >60  >60   GLUCOSE  114*  146*     Radiology:  CXR 18    Hyperinflated lungs.       1.4 cm right upper lobe nodule unchanged. CT chest      1. Similar area of masslike consolidation involving the right lower lobe. Residual disease is not excluded.  PET-CT could be considered for further   evaluation as indicated. 2. Increasing size and number of nodules in the right and left lung, as   above, is concerning for progression of disease. 3. Nonobstructing left renal calculus.            Impression:    · Chronic Respiratory failure  · Acute exacerbation of COPD  · Tracheobronchitis/ atelactasis  · Active smoker  · advanced recurrent squamous cell lung cancer/ lung nodules   · History of GI bleed/hypertension/anemia/anxiety disorder    Recommendations:    · Oxygen nasal cannula   · Incentive Spirometer every hr

## 2018-08-25 NOTE — DISCHARGE SUMMARY
progress note from day of discharge. Code Status:  Prior    Hospital Course: uneventful. Consults:  pulmonary/intensive care ,hematology/oncology, urology    Significant Diagnostic Studies: see EHR. Treatments: medical.    Disposition: SNF    Discharged Condition: Stable    Follow Up:  Gin Patel DO after discharge from SNF. SNF physician to assume care while in facility. Follow-up with Dr. Alberto Adam (Urology) in 2 weeks. Patient has garcia. Follow-up with Oncology. Discharge Medications:   Continue O2   Ad Fitch   Home Medication Instructions UYL:[id number]    Printed on:08/25/18 2238   Medication Information                      Albuterol Sulfate (VENTOLIN HFA IN)  Inhale 2 puffs into the lungs every 6 hours as needed              calcium-vitamin D (OSCAL-500) 500-200 MG-UNIT per tablet  Take 1 tablet by mouth 2 times daily             dexamethasone (DECADRON) 4 MG tablet  Take 1 tablet by mouth daily (with breakfast)             dronabinol (MARINOL) 10 MG capsule  Take 10 mg by mouth 2 times daily (before meals). .             Fluticasone Furoate-Vilanterol (BREO ELLIPTA) 200-25 MCG/INH AEPB  Inhale 1 puff into the lungs daily             ipratropium-albuterol (DUONEB) 0.5-2.5 (3) MG/3ML SOLN nebulizer solution  Inhale 3 mLs into the lungs every 4 hours (while awake)             LORazepam (ATIVAN) 0.5 MG tablet  Take 0.5 mg by mouth every 6 hours as needed for Anxiety. Clerance Riis LORazepam (ATIVAN) 0.5 MG tablet  Take 1 tablet by mouth every 6 hours as needed for Anxiety for up to 30 days. Clerance Riis LYRICA 25 MG capsule  Take 1 capsule by mouth 3 times daily for 30 days. Clerance Riis              OLANZapine (ZYPREXA) 10 MG tablet  Take 1 tablet by mouth nightly             ondansetron (ZOFRAN) 8 MG tablet  TAKE 1 TABLET BY MOUTH EVERY 8 HOURS AS NEEDED FOR NAUSEA OR VOMITING             oxyCODONE (OXY-IR) 10 MG immediate release tablet  Take 1 tablet by mouth every 6 hours as needed for Pain for up to 7 days. Carlota Frank oxyCODONE (OXYCONTIN) 40 MG extended release tablet  Take 1 tablet by mouth every 12 hours for 14 days. Carlota Frank oxyCODONE HCl (OXY-IR) 10 MG immediate release tablet  TAKE 1 TABLET BY MOUTH EVERY 6 HOURS AS NEEDED FOR PAIN             OXYCONTIN 40 MG extended release tablet  Take 1 tablet by mouth every 12 hours for 30 days. .             pantoprazole (PROTONIX) 40 MG tablet  Take 1 tablet by mouth every morning (before breakfast)             Umeclidinium Bromide (INCRUSE ELLIPTA) 62.5 MCG/INH AEPB  Inhale 1 puff into the lungs daily                  Activity: per SNF. Diet: regular diet    Time Spent on discharge is more than 30 minutes in the examination, evaluation, counseling and review of medications and discharge plan. Electronically signed by Mile Bridges MD on 8/25/2018 at 2:14 PM     Thank you Dr. Almas Perez DO for the opportunity to be involved in this patient's care.

## 2018-08-26 ENCOUNTER — CARE COORDINATION (OUTPATIENT)
Dept: CASE MANAGEMENT | Age: 58
End: 2018-08-26

## 2018-08-27 ENCOUNTER — CARE COORDINATION (OUTPATIENT)
Dept: CASE MANAGEMENT | Age: 58
End: 2018-08-27

## 2018-09-04 ENCOUNTER — CARE COORDINATION (OUTPATIENT)
Dept: CARE COORDINATION | Age: 58
End: 2018-09-04

## 2018-09-04 NOTE — CARE COORDINATION
Called the [facility] to get an update on Yair's discharge plan left a message with discharge planner to call [facility]

## 2018-09-05 ENCOUNTER — CARE COORDINATION (OUTPATIENT)
Dept: CASE MANAGEMENT | Age: 58
End: 2018-09-05

## 2018-09-10 ENCOUNTER — CARE COORDINATION (OUTPATIENT)
Dept: CARE COORDINATION | Age: 58
End: 2018-09-10

## 2018-09-10 NOTE — CARE COORDINATION
Spoke with Gabriele Toribio from [facility] who said the goal is to discharge Elaine later this week. She will have him call office for follow up apt.

## 2018-09-13 ENCOUNTER — CARE COORDINATION (OUTPATIENT)
Dept: CASE MANAGEMENT | Age: 58
End: 2018-09-13

## 2018-09-24 ENCOUNTER — CARE COORDINATION (OUTPATIENT)
Dept: CARE COORDINATION | Age: 58
End: 2018-09-24

## 2018-09-25 ENCOUNTER — CARE COORDINATION (OUTPATIENT)
Dept: CARE COORDINATION | Age: 58
End: 2018-09-25

## 2018-09-25 ENCOUNTER — CARE COORDINATION (OUTPATIENT)
Dept: CASE MANAGEMENT | Age: 58
End: 2018-09-25

## 2018-09-25 NOTE — CARE COORDINATION
Spoke with pt who said he is still at [facility] at the [facility]. He feels he is getting better. He is eating ok and maintaining his wt. He said he feels like he needs to get a little stronger.  He denies any needs at this time

## 2018-10-02 ENCOUNTER — CARE COORDINATION (OUTPATIENT)
Dept: CASE MANAGEMENT | Age: 58
End: 2018-10-02

## 2018-10-08 ENCOUNTER — CARE COORDINATION (OUTPATIENT)
Dept: CASE MANAGEMENT | Age: 58
End: 2018-10-08

## 2018-10-08 DIAGNOSIS — F51.01 PRIMARY INSOMNIA: ICD-10-CM

## 2018-10-08 NOTE — CARE COORDINATION
[facility] Update Call    10/8/2018    Patient: Mariaelena Lowe Patient : 1960   MRN: [id number]  Reason for Admission: There are no discharge diagnoses documented for the most recent discharge. Discharge Date: 18 RARS: Readmission Risk Score: 46       Care Transitions Post Acute Facility Update    Care Transitions Interventions     Other Services:  (Comment: Martin)   [address] [place] [address] Dr:  Home from [address] [place] [address] 10/9/18  Post Acute Facility Update  Reported Nursing Issues:  Oxygen  Cognition:  min cognitive impairments and working with speech therapy on compensatory strategies   ADLs:  Maximum Assistance   Transfer Assistance: Moderate Assistance   Ambulation Assistance:  Minimal Assistance   How far (in feet) is the patient ambulating?:  10   Does patient use an assistive device?:  Yes (Comment: rolling walker)   Anticipated date for discharge:  10/9/18    Anticipated discharge services:  24 hour care. Patient lives with girlfriend (she is an [address]) that will be providing the care.

## 2018-10-10 ENCOUNTER — APPOINTMENT (OUTPATIENT)
Dept: GENERAL RADIOLOGY | Age: 58
DRG: 194 | End: 2018-10-10
Payer: MEDICARE

## 2018-10-10 ENCOUNTER — APPOINTMENT (OUTPATIENT)
Dept: CT IMAGING | Age: 58
DRG: 194 | End: 2018-10-10
Payer: MEDICARE

## 2018-10-10 ENCOUNTER — HOSPITAL ENCOUNTER (INPATIENT)
Age: 58
LOS: 3 days | Discharge: NURSING, REHAB OR LONG-TERM CARE | DRG: 194 | End: 2018-10-14
Attending: EMERGENCY MEDICINE | Admitting: INTERNAL MEDICINE
Payer: MEDICARE

## 2018-10-10 ENCOUNTER — OFFICE VISIT (OUTPATIENT)
Dept: FAMILY MEDICINE CLINIC | Age: 58
End: 2018-10-10
Payer: MEDICARE

## 2018-10-10 VITALS
BODY MASS INDEX: 21.66 KG/M2 | WEIGHT: 130 LBS | DIASTOLIC BLOOD PRESSURE: 61 MMHG | HEART RATE: 105 BPM | OXYGEN SATURATION: 94 % | SYSTOLIC BLOOD PRESSURE: 88 MMHG | RESPIRATION RATE: 16 BRPM | HEIGHT: 65 IN

## 2018-10-10 DIAGNOSIS — G89.3 PAIN OF METASTATIC MALIGNANCY: ICD-10-CM

## 2018-10-10 DIAGNOSIS — R53.1 GENERAL WEAKNESS: Primary | ICD-10-CM

## 2018-10-10 DIAGNOSIS — G89.29 CHRONIC BILATERAL LOW BACK PAIN WITHOUT SCIATICA: ICD-10-CM

## 2018-10-10 DIAGNOSIS — R53.1 WEAKNESS: Primary | ICD-10-CM

## 2018-10-10 DIAGNOSIS — F51.01 PRIMARY INSOMNIA: ICD-10-CM

## 2018-10-10 DIAGNOSIS — F32.1 MODERATE SINGLE CURRENT EPISODE OF MAJOR DEPRESSIVE DISORDER (HCC): ICD-10-CM

## 2018-10-10 DIAGNOSIS — M54.50 CHRONIC BILATERAL LOW BACK PAIN WITHOUT SCIATICA: ICD-10-CM

## 2018-10-10 PROBLEM — R79.89 ELEVATED LFTS: Status: ACTIVE | Noted: 2018-10-10

## 2018-10-10 LAB
-: ABNORMAL
ABSOLUTE EOS #: 0 K/UL (ref 0–0.4)
ABSOLUTE IMMATURE GRANULOCYTE: ABNORMAL K/UL (ref 0–0.3)
ABSOLUTE LYMPH #: 1.11 K/UL (ref 1–4.8)
ABSOLUTE MONO #: 0.37 K/UL (ref 0.2–0.8)
ALBUMIN SERPL-MCNC: 2.7 G/DL (ref 3.5–5.2)
ALBUMIN/GLOBULIN RATIO: ABNORMAL (ref 1–2.5)
ALP BLD-CCNC: 157 U/L (ref 40–129)
ALT SERPL-CCNC: 157 U/L (ref 5–41)
AMORPHOUS: ABNORMAL
ANION GAP SERPL CALCULATED.3IONS-SCNC: 9 MMOL/L (ref 9–17)
AST SERPL-CCNC: 95 U/L
BACTERIA: ABNORMAL
BASOPHILS # BLD: 0 %
BASOPHILS ABSOLUTE: 0 K/UL (ref 0–0.2)
BILIRUB SERPL-MCNC: 0.76 MG/DL (ref 0.3–1.2)
BILIRUBIN URINE: NEGATIVE
BNP INTERPRETATION: ABNORMAL
BUN BLDV-MCNC: 12 MG/DL (ref 6–20)
BUN/CREAT BLD: 28 (ref 9–20)
CALCIUM SERPL-MCNC: 9.1 MG/DL (ref 8.6–10.4)
CASTS UA: ABNORMAL /LPF
CHLORIDE BLD-SCNC: 92 MMOL/L (ref 98–107)
CO2: 39 MMOL/L (ref 20–31)
COLOR: YELLOW
COMMENT UA: ABNORMAL
CREAT SERPL-MCNC: 0.43 MG/DL (ref 0.7–1.2)
CRYSTALS, UA: ABNORMAL /HPF
DIFFERENTIAL TYPE: ABNORMAL
EOSINOPHILS RELATIVE PERCENT: 0 % (ref 1–4)
EPITHELIAL CELLS UA: ABNORMAL /HPF (ref 0–5)
GFR AFRICAN AMERICAN: >60 ML/MIN
GFR NON-AFRICAN AMERICAN: >60 ML/MIN
GFR SERPL CREATININE-BSD FRML MDRD: ABNORMAL ML/MIN/{1.73_M2}
GFR SERPL CREATININE-BSD FRML MDRD: ABNORMAL ML/MIN/{1.73_M2}
GLUCOSE BLD-MCNC: 121 MG/DL (ref 70–99)
GLUCOSE URINE: NEGATIVE
HCT VFR BLD CALC: 36.9 % (ref 41–53)
HEMOGLOBIN: 12.2 G/DL (ref 13.5–17.5)
IMMATURE GRANULOCYTES: ABNORMAL %
KETONES, URINE: NEGATIVE
LACTIC ACID: 1.7 MMOL/L (ref 0.5–2.2)
LEUKOCYTE ESTERASE, URINE: ABNORMAL
LIPASE: 13 U/L (ref 13–60)
LYMPHOCYTES # BLD: 9 % (ref 24–44)
MCH RBC QN AUTO: 30.2 PG (ref 26–34)
MCHC RBC AUTO-ENTMCNC: 33.1 G/DL (ref 31–37)
MCV RBC AUTO: 91.1 FL (ref 80–100)
MONOCYTES # BLD: 3 % (ref 1–7)
MUCUS: ABNORMAL
NITRITE, URINE: NEGATIVE
NRBC AUTOMATED: ABNORMAL PER 100 WBC
OTHER OBSERVATIONS UA: ABNORMAL
PDW BLD-RTO: 18 % (ref 11.5–14.5)
PH UA: 7.5 (ref 5–8)
PLATELET # BLD: 166 K/UL (ref 130–400)
PLATELET ESTIMATE: ABNORMAL
PMV BLD AUTO: 7.9 FL (ref 6–12)
POTASSIUM SERPL-SCNC: 3.7 MMOL/L (ref 3.7–5.3)
PRO-BNP: 618 PG/ML
PROTEIN UA: NEGATIVE
RBC # BLD: 4.05 M/UL (ref 4.5–5.9)
RBC # BLD: ABNORMAL 10*6/UL
RBC UA: ABNORMAL /HPF (ref 0–2)
RENAL EPITHELIAL, UA: ABNORMAL /HPF
SEG NEUTROPHILS: 88 % (ref 36–66)
SEGMENTED NEUTROPHILS ABSOLUTE COUNT: 10.82 K/UL (ref 1.8–7.7)
SODIUM BLD-SCNC: 140 MMOL/L (ref 135–144)
SPECIFIC GRAVITY UA: 1.01 (ref 1–1.03)
TOTAL CK: 36 U/L (ref 39–308)
TOTAL PROTEIN: 5.2 G/DL (ref 6.4–8.3)
TRICHOMONAS: ABNORMAL
TURBIDITY: CLEAR
URINE HGB: NEGATIVE
UROBILINOGEN, URINE: NORMAL
WBC # BLD: 12.3 K/UL (ref 3.5–11)
WBC # BLD: ABNORMAL 10*3/UL
WBC UA: ABNORMAL /HPF (ref 0–5)
YEAST: ABNORMAL

## 2018-10-10 PROCEDURE — 96361 HYDRATE IV INFUSION ADD-ON: CPT

## 2018-10-10 PROCEDURE — 83880 ASSAY OF NATRIURETIC PEPTIDE: CPT

## 2018-10-10 PROCEDURE — 85025 COMPLETE CBC W/AUTO DIFF WBC: CPT

## 2018-10-10 PROCEDURE — 6360000004 HC RX CONTRAST MEDICATION: Performed by: EMERGENCY MEDICINE

## 2018-10-10 PROCEDURE — G0378 HOSPITAL OBSERVATION PER HR: HCPCS

## 2018-10-10 PROCEDURE — 2580000003 HC RX 258: Performed by: NURSE PRACTITIONER

## 2018-10-10 PROCEDURE — 1036F TOBACCO NON-USER: CPT | Performed by: FAMILY MEDICINE

## 2018-10-10 PROCEDURE — 6370000000 HC RX 637 (ALT 250 FOR IP): Performed by: EMERGENCY MEDICINE

## 2018-10-10 PROCEDURE — 6360000002 HC RX W HCPCS: Performed by: EMERGENCY MEDICINE

## 2018-10-10 PROCEDURE — G8427 DOCREV CUR MEDS BY ELIG CLIN: HCPCS | Performed by: FAMILY MEDICINE

## 2018-10-10 PROCEDURE — 82550 ASSAY OF CK (CPK): CPT

## 2018-10-10 PROCEDURE — 87040 BLOOD CULTURE FOR BACTERIA: CPT

## 2018-10-10 PROCEDURE — G8420 CALC BMI NORM PARAMETERS: HCPCS | Performed by: FAMILY MEDICINE

## 2018-10-10 PROCEDURE — 3017F COLORECTAL CA SCREEN DOC REV: CPT | Performed by: FAMILY MEDICINE

## 2018-10-10 PROCEDURE — 6360000002 HC RX W HCPCS: Performed by: INTERNAL MEDICINE

## 2018-10-10 PROCEDURE — G8484 FLU IMMUNIZE NO ADMIN: HCPCS | Performed by: FAMILY MEDICINE

## 2018-10-10 PROCEDURE — 2580000003 HC RX 258: Performed by: EMERGENCY MEDICINE

## 2018-10-10 PROCEDURE — 99285 EMERGENCY DEPT VISIT HI MDM: CPT

## 2018-10-10 PROCEDURE — APPNB15 APP NON BILLABLE TIME 0-15 MINS: Performed by: NURSE PRACTITIONER

## 2018-10-10 PROCEDURE — 99223 1ST HOSP IP/OBS HIGH 75: CPT | Performed by: INTERNAL MEDICINE

## 2018-10-10 PROCEDURE — 99213 OFFICE O/P EST LOW 20 MIN: CPT | Performed by: FAMILY MEDICINE

## 2018-10-10 PROCEDURE — 96372 THER/PROPH/DIAG INJ SC/IM: CPT

## 2018-10-10 PROCEDURE — 96374 THER/PROPH/DIAG INJ IV PUSH: CPT

## 2018-10-10 PROCEDURE — 83605 ASSAY OF LACTIC ACID: CPT

## 2018-10-10 PROCEDURE — 71260 CT THORAX DX C+: CPT

## 2018-10-10 PROCEDURE — 81001 URINALYSIS AUTO W/SCOPE: CPT

## 2018-10-10 PROCEDURE — 83690 ASSAY OF LIPASE: CPT

## 2018-10-10 PROCEDURE — 71046 X-RAY EXAM CHEST 2 VIEWS: CPT

## 2018-10-10 PROCEDURE — 80053 COMPREHEN METABOLIC PANEL: CPT

## 2018-10-10 PROCEDURE — 6370000000 HC RX 637 (ALT 250 FOR IP): Performed by: NURSE PRACTITIONER

## 2018-10-10 RX ORDER — OXYCODONE HCL 10 MG/1
10 TABLET, FILM COATED, EXTENDED RELEASE ORAL ONCE
Status: COMPLETED | OUTPATIENT
Start: 2018-10-10 | End: 2018-10-10

## 2018-10-10 RX ORDER — ONDANSETRON 2 MG/ML
4 INJECTION INTRAMUSCULAR; INTRAVENOUS EVERY 6 HOURS PRN
Status: DISCONTINUED | OUTPATIENT
Start: 2018-10-10 | End: 2018-10-10 | Stop reason: ALTCHOICE

## 2018-10-10 RX ORDER — CITALOPRAM 20 MG/1
20 TABLET ORAL DAILY
COMMUNITY

## 2018-10-10 RX ORDER — SODIUM CHLORIDE 0.9 % (FLUSH) 0.9 %
10 SYRINGE (ML) INJECTION
Status: COMPLETED | OUTPATIENT
Start: 2018-10-10 | End: 2018-10-10

## 2018-10-10 RX ORDER — OXYCODONE HYDROCHLORIDE 5 MG/1
5 TABLET ORAL EVERY 4 HOURS PRN
Status: DISCONTINUED | OUTPATIENT
Start: 2018-10-10 | End: 2018-10-14 | Stop reason: HOSPADM

## 2018-10-10 RX ORDER — NICOTINE 21 MG/24HR
1 PATCH, TRANSDERMAL 24 HOURS TRANSDERMAL DAILY PRN
Status: DISCONTINUED | OUTPATIENT
Start: 2018-10-10 | End: 2018-10-14 | Stop reason: HOSPADM

## 2018-10-10 RX ORDER — 0.9 % SODIUM CHLORIDE 0.9 %
1000 INTRAVENOUS SOLUTION INTRAVENOUS ONCE
Status: COMPLETED | OUTPATIENT
Start: 2018-10-10 | End: 2018-10-10

## 2018-10-10 RX ORDER — SODIUM CHLORIDE 9 MG/ML
INJECTION, SOLUTION INTRAVENOUS CONTINUOUS
Status: DISCONTINUED | OUTPATIENT
Start: 2018-10-10 | End: 2018-10-14 | Stop reason: HOSPADM

## 2018-10-10 RX ORDER — POTASSIUM CHLORIDE 20MEQ/15ML
40 LIQUID (ML) ORAL PRN
Status: DISCONTINUED | OUTPATIENT
Start: 2018-10-10 | End: 2018-10-14 | Stop reason: HOSPADM

## 2018-10-10 RX ORDER — POTASSIUM CHLORIDE 20 MEQ/1
40 TABLET, EXTENDED RELEASE ORAL PRN
Status: DISCONTINUED | OUTPATIENT
Start: 2018-10-10 | End: 2018-10-14 | Stop reason: HOSPADM

## 2018-10-10 RX ORDER — FUROSEMIDE 40 MG/1
40 TABLET ORAL 2 TIMES DAILY
COMMUNITY

## 2018-10-10 RX ORDER — BISACODYL 10 MG
10 SUPPOSITORY, RECTAL RECTAL DAILY PRN
Status: DISCONTINUED | OUTPATIENT
Start: 2018-10-10 | End: 2018-10-14 | Stop reason: HOSPADM

## 2018-10-10 RX ORDER — POTASSIUM CHLORIDE 20 MEQ/1
40 TABLET, EXTENDED RELEASE ORAL 2 TIMES DAILY
Status: ON HOLD | COMMUNITY
End: 2018-10-14 | Stop reason: HOSPADM

## 2018-10-10 RX ORDER — OXYCODONE HYDROCHLORIDE 10 MG/1
10 TABLET ORAL EVERY 6 HOURS PRN
Qty: 15 TABLET | Refills: 0 | OUTPATIENT
Start: 2018-10-10 | End: 2018-10-17

## 2018-10-10 RX ORDER — ZOLPIDEM TARTRATE 5 MG/1
5 TABLET ORAL NIGHTLY PRN
Status: ON HOLD | COMMUNITY
End: 2018-10-14

## 2018-10-10 RX ORDER — ONDANSETRON 2 MG/ML
4 INJECTION INTRAMUSCULAR; INTRAVENOUS EVERY 6 HOURS PRN
Status: DISCONTINUED | OUTPATIENT
Start: 2018-10-10 | End: 2018-10-14 | Stop reason: HOSPADM

## 2018-10-10 RX ORDER — ONDANSETRON HYDROCHLORIDE 8 MG/1
8 TABLET, FILM COATED ORAL EVERY 8 HOURS PRN
COMMUNITY

## 2018-10-10 RX ORDER — OXYCODONE HYDROCHLORIDE 5 MG/1
10 TABLET ORAL EVERY 4 HOURS PRN
Status: DISCONTINUED | OUTPATIENT
Start: 2018-10-10 | End: 2018-10-14 | Stop reason: HOSPADM

## 2018-10-10 RX ORDER — SODIUM CHLORIDE 0.9 % (FLUSH) 0.9 %
10 SYRINGE (ML) INJECTION PRN
Status: DISCONTINUED | OUTPATIENT
Start: 2018-10-10 | End: 2018-10-14 | Stop reason: HOSPADM

## 2018-10-10 RX ORDER — OXYCODONE HYDROCHLORIDE 5 MG/1
20 TABLET ORAL ONCE
Status: COMPLETED | OUTPATIENT
Start: 2018-10-10 | End: 2018-10-10

## 2018-10-10 RX ORDER — POTASSIUM CHLORIDE 7.45 MG/ML
10 INJECTION INTRAVENOUS PRN
Status: DISCONTINUED | OUTPATIENT
Start: 2018-10-10 | End: 2018-10-14 | Stop reason: HOSPADM

## 2018-10-10 RX ORDER — ACETAMINOPHEN 325 MG/1
650 TABLET ORAL EVERY 4 HOURS PRN
Status: DISCONTINUED | OUTPATIENT
Start: 2018-10-10 | End: 2018-10-14 | Stop reason: HOSPADM

## 2018-10-10 RX ORDER — OXYCODONE HCL 40 MG/1
40 TABLET, FILM COATED, EXTENDED RELEASE ORAL EVERY 12 HOURS
Qty: 10 TABLET | Refills: 0 | OUTPATIENT
Start: 2018-10-10 | End: 2018-10-24

## 2018-10-10 RX ORDER — OXYCODONE HCL 40 MG/1
40 TABLET, FILM COATED, EXTENDED RELEASE ORAL EVERY 12 HOURS
Status: ON HOLD | COMMUNITY
End: 2018-10-14 | Stop reason: HOSPADM

## 2018-10-10 RX ORDER — OXYCODONE HYDROCHLORIDE 10 MG/1
10 TABLET ORAL EVERY 6 HOURS PRN
Status: ON HOLD | COMMUNITY
End: 2018-10-14 | Stop reason: HOSPADM

## 2018-10-10 RX ORDER — ONDANSETRON 4 MG/1
4 TABLET, ORALLY DISINTEGRATING ORAL EVERY 6 HOURS PRN
Status: DISCONTINUED | OUTPATIENT
Start: 2018-10-10 | End: 2018-10-14 | Stop reason: HOSPADM

## 2018-10-10 RX ORDER — MAGNESIUM SULFATE 1 G/100ML
1 INJECTION INTRAVENOUS PRN
Status: DISCONTINUED | OUTPATIENT
Start: 2018-10-10 | End: 2018-10-14 | Stop reason: HOSPADM

## 2018-10-10 RX ORDER — SODIUM CHLORIDE 0.9 % (FLUSH) 0.9 %
10 SYRINGE (ML) INJECTION EVERY 12 HOURS SCHEDULED
Status: DISCONTINUED | OUTPATIENT
Start: 2018-10-10 | End: 2018-10-14 | Stop reason: HOSPADM

## 2018-10-10 RX ADMIN — SODIUM CHLORIDE 1000 ML: 9 INJECTION, SOLUTION INTRAVENOUS at 13:16

## 2018-10-10 RX ADMIN — SODIUM CHLORIDE 100 ML/HR: 9 INJECTION, SOLUTION INTRAVENOUS at 19:30

## 2018-10-10 RX ADMIN — IOPAMIDOL 100 ML: 755 INJECTION, SOLUTION INTRAVENOUS at 18:59

## 2018-10-10 RX ADMIN — OXYCODONE HYDROCHLORIDE 10 MG: 10 TABLET, FILM COATED, EXTENDED RELEASE ORAL at 16:04

## 2018-10-10 RX ADMIN — OXYCODONE HYDROCHLORIDE 20 MG: 5 TABLET ORAL at 14:22

## 2018-10-10 RX ADMIN — HYDROMORPHONE HYDROCHLORIDE 0.5 MG: 1 INJECTION, SOLUTION INTRAMUSCULAR; INTRAVENOUS; SUBCUTANEOUS at 13:26

## 2018-10-10 RX ADMIN — ENOXAPARIN SODIUM 40 MG: 40 INJECTION SUBCUTANEOUS at 20:50

## 2018-10-10 RX ADMIN — Medication 10 ML: at 19:00

## 2018-10-10 RX ADMIN — OXYCODONE HYDROCHLORIDE 5 MG: 5 TABLET ORAL at 19:41

## 2018-10-10 ASSESSMENT — PAIN SCALES - GENERAL
PAINLEVEL_OUTOF10: 6
PAINLEVEL_OUTOF10: 6
PAINLEVEL_OUTOF10: 9
PAINLEVEL_OUTOF10: 7
PAINLEVEL_OUTOF10: 7
PAINLEVEL_OUTOF10: 6
PAINLEVEL_OUTOF10: 7
PAINLEVEL_OUTOF10: 9

## 2018-10-10 ASSESSMENT — PAIN DESCRIPTION - PAIN TYPE
TYPE: ACUTE PAIN
TYPE: CHRONIC PAIN

## 2018-10-10 ASSESSMENT — PAIN DESCRIPTION - LOCATION: LOCATION: BACK

## 2018-10-10 ASSESSMENT — PAIN DESCRIPTION - ORIENTATION: ORIENTATION: MID

## 2018-10-10 NOTE — PROGRESS NOTES
DO Jorge   Fluticasone Furoate-Vilanterol (BREO ELLIPTA) 200-25 MCG/INH AEPB Inhale 1 puff into the lungs daily    Historical Provider, MD   Umeclidinium Bromide (INCRUSE ELLIPTA) 62.5 MCG/INH AEPB Inhale 1 puff into the lungs daily    Historical Provider, MD   OLANZapine (ZYPREXA) 10 MG tablet Take 1 tablet by mouth nightly 2/16/18   Gin Patel DO           calcium-vitamin D (OSCAL-500) 500-200 MG-UNIT per tablet Take 1 tablet by mouth 2 times daily    Historical Provider, MD   Albuterol Sulfate (VENTOLIN HFA IN) Inhale 2 puffs into the lungs every 6 hours as needed     Historical Provider, MD

## 2018-10-10 NOTE — ED PROVIDER NOTES
[address] [place] [address]  Emergency Medicine Department    Pt Name: Kimi Villalpando  MRN: [id number]  [place] 1960  Date of evaluation: 10/10/2018  Provider: Vane Aguilar MD    CHIEF COMPLAINT     Chief Complaint   Patient presents with    Dehydration       HISTORY OF PRESENT ILLNESS  (Location/Symptom, Timing/Onset, Context/Setting,Quality, Duration, Modifying Factors, Severity.)   Kimi Villalpando is a 63 y.o. male with a history of lung squamous cell carcinoma him primary care doctor after she found him to be extremely weak and run down. He was discharged from an [facility] 2 days ago and reports his weakness has been worsening since getting home. He denies any nausea or vomiting. He denies any fevers. He does report of shortness of breath. He has had no cough. He has chronic back pain which she rates as a 10 out of 10 at this time. He states he normally takes oxycodone 40 mg and OxyContin 10 mg at home. Nursing Notes were reviewed. ALLERGIES     Aspirin; Fish-derived products; Morphine; Shellfish-derived products; and Motrin [ibuprofen micronized]    CURRENT MEDICATIONS       Previous Medications    ALBUTEROL SULFATE (VENTOLIN HFA IN)    Inhale 2 puffs into the lungs every 6 hours as needed     CALCIUM-VITAMIN D (OSCAL-500) 500-200 MG-UNIT PER TABLET    Take 1 tablet by mouth 2 times daily    DEXAMETHASONE (DECADRON) 4 MG TABLET    Take 1 tablet by mouth daily (with breakfast)    DRONABINOL (MARINOL) 10 MG CAPSULE    Take 10 mg by mouth 2 times daily (before meals). .    FLUTICASONE FUROATE-VILANTEROL (BREO ELLIPTA) 200-25 MCG/INH AEPB    Inhale 1 puff into the lungs daily    FUROSEMIDE (LASIX) 40 MG TABLET    Take 40 mg by mouth 2 times daily    IPRATROPIUM-ALBUTEROL (DUONEB) 0.5-2.5 (3) MG/3ML SOLN NEBULIZER SOLUTION    Inhale 3 mLs into the lungs every 4 hours (while awake)    LORAZEPAM (ATIVAN) 0.5 MG TABLET    Take 0.5 mg by mouth every 6 hours as needed for Anxiety. Herlinda Belts     LYRICA 25 PLACEMENT         FAMILY HISTORY       Family History   Problem Relation Age of Onset    Heart Disease Mother         CABG    Cancer Mother     Cancer Father         Throat    Heart Disease Father     Stroke Maternal Grandfather     Heart Disease Paternal Grandmother     Diabetes Neg Hx      Family Status   Relation Status    Mother Alive    Father     Sister Alive    Brother Alive    Brother Alive    Sister Alive    Sister Alive    MGF (Not Specified)    PGM (Not Specified)    Neg Hx (Not Specified)        SOCIAL HISTORY      reports that he quit smoking about 3 months ago. His smoking use included Cigarettes. He has a 20.00 pack-year smoking history. He has never used smokeless tobacco. He reports that he does not drink alcohol or use drugs. REVIEW OF SYSTEMS    (2-9 systems for level 4, 10 or more for level 5)     Review of Systems  GEN: no fever  CV: No CP, No palpitations  Pulm: +SOB, Nowheezing, No chest tightness, No cough  GI: No abdominal pain, No Nausea, No Vomiting  Neuro: No HA, No numbness. + generalizedweakness  MSK: No msk pain, No msk injuries    Except as noted above the remainder of the review of systems was reviewed and negative. PHYSICAL EXAM    (up to 7 for level 4, 8 or more for level 5)     ED Triage Vitals [10/10/18 1238]   BP Temp Temp src Pulse Resp SpO2 Height Weight   96/61 98.2 °F (36.8 °C) -- 116 18 98 % 5' 5\" (1.651 m) 133 lb (60.3 kg)       Physical Exam   Constitutional: He is oriented to person, place, and time. He appears well-developed and well-nourished. No distress. HENT:   Head: Normocephalic and atraumatic. Swelling of the face diffusely. No oropharyngeal swelling   Eyes: EOM are normal.   Neck: Normal range of motion. Neck supple. Cardiovascular: Normal rate, regular rhythm, normal heart sounds and intact distal pulses. No murmur heard. Pulmonary/Chest: Effort normal and breath sounds normal. No respiratory distress.  He has no

## 2018-10-11 ENCOUNTER — APPOINTMENT (OUTPATIENT)
Dept: ULTRASOUND IMAGING | Age: 58
DRG: 194 | End: 2018-10-11
Payer: MEDICARE

## 2018-10-11 PROBLEM — K92.2 ACUTE UPPER GI BLEED: Status: RESOLVED | Noted: 2017-10-31 | Resolved: 2018-10-11

## 2018-10-11 PROBLEM — J96.11 CHRONIC RESPIRATORY FAILURE WITH HYPOXIA (HCC): Status: ACTIVE | Noted: 2018-10-11

## 2018-10-11 PROBLEM — D69.6 THROMBOCYTOPENIA (HCC): Status: RESOLVED | Noted: 2017-10-31 | Resolved: 2018-10-11

## 2018-10-11 PROBLEM — J18.9 POST-OBSTRUCTIVE PNEUMONIA DUE TO FOREIGN BODY ASPIRATION: Status: ACTIVE | Noted: 2018-10-11

## 2018-10-11 PROBLEM — A41.50 GRAM-NEGATIVE SEPSIS (HCC): Status: RESOLVED | Noted: 2017-11-06 | Resolved: 2018-10-11

## 2018-10-11 PROBLEM — R06.03 ACUTE RESPIRATORY DISTRESS: Status: RESOLVED | Noted: 2017-11-04 | Resolved: 2018-10-11

## 2018-10-11 PROBLEM — T17.908S POST-OBSTRUCTIVE PNEUMONIA DUE TO FOREIGN BODY ASPIRATION: Status: ACTIVE | Noted: 2018-10-11

## 2018-10-11 LAB
ANION GAP SERPL CALCULATED.3IONS-SCNC: 5 MMOL/L (ref 9–17)
BUN BLDV-MCNC: 10 MG/DL (ref 6–20)
BUN/CREAT BLD: 24 (ref 9–20)
CALCIUM SERPL-MCNC: 7.9 MG/DL (ref 8.6–10.4)
CHLORIDE BLD-SCNC: 99 MMOL/L (ref 98–107)
CO2: 37 MMOL/L (ref 20–31)
CREAT SERPL-MCNC: 0.41 MG/DL (ref 0.7–1.2)
GFR AFRICAN AMERICAN: >60 ML/MIN
GFR NON-AFRICAN AMERICAN: >60 ML/MIN
GFR SERPL CREATININE-BSD FRML MDRD: ABNORMAL ML/MIN/{1.73_M2}
GFR SERPL CREATININE-BSD FRML MDRD: ABNORMAL ML/MIN/{1.73_M2}
GLUCOSE BLD-MCNC: 85 MG/DL (ref 70–99)
HCT VFR BLD CALC: 29.6 % (ref 41–53)
HEMOGLOBIN: 9.7 G/DL (ref 13.5–17.5)
INR BLD: 1.1
MAGNESIUM: 1.7 MG/DL (ref 1.6–2.6)
MCH RBC QN AUTO: 29.9 PG (ref 26–34)
MCHC RBC AUTO-ENTMCNC: 32.9 G/DL (ref 31–37)
MCV RBC AUTO: 91 FL (ref 80–100)
NRBC AUTOMATED: ABNORMAL PER 100 WBC
PDW BLD-RTO: 18.8 % (ref 11.5–14.5)
PLATELET # BLD: 131 K/UL (ref 130–400)
PMV BLD AUTO: 8.1 FL (ref 6–12)
POTASSIUM SERPL-SCNC: 3.3 MMOL/L (ref 3.7–5.3)
PROTHROMBIN TIME: 11 SEC (ref 9.7–11.6)
RBC # BLD: 3.25 M/UL (ref 4.5–5.9)
SODIUM BLD-SCNC: 141 MMOL/L (ref 135–144)
WBC # BLD: 7.3 K/UL (ref 3.5–11)

## 2018-10-11 PROCEDURE — 2700000000 HC OXYGEN THERAPY PER DAY

## 2018-10-11 PROCEDURE — 87086 URINE CULTURE/COLONY COUNT: CPT

## 2018-10-11 PROCEDURE — 97535 SELF CARE MNGMENT TRAINING: CPT

## 2018-10-11 PROCEDURE — 36415 COLL VENOUS BLD VENIPUNCTURE: CPT

## 2018-10-11 PROCEDURE — 87186 SC STD MICRODIL/AGAR DIL: CPT

## 2018-10-11 PROCEDURE — 83735 ASSAY OF MAGNESIUM: CPT

## 2018-10-11 PROCEDURE — G8978 MOBILITY CURRENT STATUS: HCPCS

## 2018-10-11 PROCEDURE — 99233 SBSQ HOSP IP/OBS HIGH 50: CPT | Performed by: INTERNAL MEDICINE

## 2018-10-11 PROCEDURE — 6360000002 HC RX W HCPCS: Performed by: INTERNAL MEDICINE

## 2018-10-11 PROCEDURE — 6360000002 HC RX W HCPCS: Performed by: NURSE PRACTITIONER

## 2018-10-11 PROCEDURE — G8979 MOBILITY GOAL STATUS: HCPCS

## 2018-10-11 PROCEDURE — 97530 THERAPEUTIC ACTIVITIES: CPT

## 2018-10-11 PROCEDURE — 86403 PARTICLE AGGLUT ANTBDY SCRN: CPT

## 2018-10-11 PROCEDURE — 51798 US URINE CAPACITY MEASURE: CPT

## 2018-10-11 PROCEDURE — 85027 COMPLETE CBC AUTOMATED: CPT

## 2018-10-11 PROCEDURE — 94640 AIRWAY INHALATION TREATMENT: CPT

## 2018-10-11 PROCEDURE — 1200000000 HC SEMI PRIVATE

## 2018-10-11 PROCEDURE — 6370000000 HC RX 637 (ALT 250 FOR IP): Performed by: NURSE PRACTITIONER

## 2018-10-11 PROCEDURE — G8988 SELF CARE GOAL STATUS: HCPCS

## 2018-10-11 PROCEDURE — 97166 OT EVAL MOD COMPLEX 45 MIN: CPT

## 2018-10-11 PROCEDURE — 2580000003 HC RX 258: Performed by: NURSE PRACTITIONER

## 2018-10-11 PROCEDURE — 2580000003 HC RX 258: Performed by: INTERNAL MEDICINE

## 2018-10-11 PROCEDURE — 76705 ECHO EXAM OF ABDOMEN: CPT

## 2018-10-11 PROCEDURE — 97162 PT EVAL MOD COMPLEX 30 MIN: CPT

## 2018-10-11 PROCEDURE — G8987 SELF CARE CURRENT STATUS: HCPCS

## 2018-10-11 PROCEDURE — 80048 BASIC METABOLIC PNL TOTAL CA: CPT

## 2018-10-11 PROCEDURE — 85610 PROTHROMBIN TIME: CPT

## 2018-10-11 RX ORDER — OXYCODONE HCL 40 MG/1
40 TABLET, FILM COATED, EXTENDED RELEASE ORAL EVERY 12 HOURS
Status: DISCONTINUED | OUTPATIENT
Start: 2018-10-11 | End: 2018-10-14 | Stop reason: HOSPADM

## 2018-10-11 RX ORDER — METHYLPREDNISOLONE SODIUM SUCCINATE 125 MG/2ML
60 INJECTION, POWDER, LYOPHILIZED, FOR SOLUTION INTRAMUSCULAR; INTRAVENOUS EVERY 6 HOURS
Status: DISCONTINUED | OUTPATIENT
Start: 2018-10-11 | End: 2018-10-13

## 2018-10-11 RX ORDER — IPRATROPIUM BROMIDE AND ALBUTEROL SULFATE 2.5; .5 MG/3ML; MG/3ML
1 SOLUTION RESPIRATORY (INHALATION)
Status: DISCONTINUED | OUTPATIENT
Start: 2018-10-12 | End: 2018-10-14 | Stop reason: HOSPADM

## 2018-10-11 RX ORDER — IPRATROPIUM BROMIDE AND ALBUTEROL SULFATE 2.5; .5 MG/3ML; MG/3ML
1 SOLUTION RESPIRATORY (INHALATION)
Status: DISCONTINUED | OUTPATIENT
Start: 2018-10-11 | End: 2018-10-11

## 2018-10-11 RX ORDER — BUDESONIDE 0.5 MG/2ML
0.5 INHALANT ORAL 2 TIMES DAILY
Status: DISCONTINUED | OUTPATIENT
Start: 2018-10-11 | End: 2018-10-14 | Stop reason: HOSPADM

## 2018-10-11 RX ORDER — VANCOMYCIN HYDROCHLORIDE 1 G/200ML
1000 INJECTION, SOLUTION INTRAVENOUS EVERY 12 HOURS
Status: DISCONTINUED | OUTPATIENT
Start: 2018-10-11 | End: 2018-10-12 | Stop reason: DRUGHIGH

## 2018-10-11 RX ORDER — FORMOTEROL FUMARATE 20 UG/2ML
20 SOLUTION RESPIRATORY (INHALATION) 2 TIMES DAILY
Status: DISCONTINUED | OUTPATIENT
Start: 2018-10-11 | End: 2018-10-14 | Stop reason: HOSPADM

## 2018-10-11 RX ADMIN — VANCOMYCIN HYDROCHLORIDE 1000 MG: 1 INJECTION, SOLUTION INTRAVENOUS at 21:52

## 2018-10-11 RX ADMIN — Medication 10 ML: at 12:02

## 2018-10-11 RX ADMIN — CEFEPIME HYDROCHLORIDE 2 G: 2 INJECTION, POWDER, FOR SOLUTION INTRAVENOUS at 10:01

## 2018-10-11 RX ADMIN — CEFEPIME HYDROCHLORIDE 2 G: 2 INJECTION, POWDER, FOR SOLUTION INTRAVENOUS at 19:46

## 2018-10-11 RX ADMIN — OXYCODONE HYDROCHLORIDE 40 MG: 40 TABLET, FILM COATED, EXTENDED RELEASE ORAL at 20:11

## 2018-10-11 RX ADMIN — ONDANSETRON 4 MG: 2 INJECTION INTRAMUSCULAR; INTRAVENOUS at 10:01

## 2018-10-11 RX ADMIN — IPRATROPIUM BROMIDE AND ALBUTEROL SULFATE 1 AMPULE: .5; 3 SOLUTION RESPIRATORY (INHALATION) at 20:15

## 2018-10-11 RX ADMIN — Medication 10 ML: at 00:14

## 2018-10-11 RX ADMIN — OXYCODONE HYDROCHLORIDE 10 MG: 5 TABLET ORAL at 11:09

## 2018-10-11 RX ADMIN — OXYCODONE HYDROCHLORIDE 10 MG: 5 TABLET ORAL at 00:14

## 2018-10-11 RX ADMIN — Medication 10 ML: at 10:01

## 2018-10-11 RX ADMIN — OXYCODONE HYDROCHLORIDE 10 MG: 5 TABLET ORAL at 22:58

## 2018-10-11 RX ADMIN — ENOXAPARIN SODIUM 40 MG: 40 INJECTION SUBCUTANEOUS at 20:50

## 2018-10-11 RX ADMIN — METHYLPREDNISOLONE SODIUM SUCCINATE 60 MG: 125 INJECTION, POWDER, FOR SOLUTION INTRAMUSCULAR; INTRAVENOUS at 13:52

## 2018-10-11 RX ADMIN — BUDESONIDE 500 MCG: 0.5 SUSPENSION RESPIRATORY (INHALATION) at 20:16

## 2018-10-11 RX ADMIN — METHYLPREDNISOLONE SODIUM SUCCINATE 60 MG: 125 INJECTION, POWDER, FOR SOLUTION INTRAMUSCULAR; INTRAVENOUS at 19:46

## 2018-10-11 RX ADMIN — IPRATROPIUM BROMIDE AND ALBUTEROL SULFATE 1 AMPULE: .5; 3 SOLUTION RESPIRATORY (INHALATION) at 16:07

## 2018-10-11 RX ADMIN — OXYCODONE HYDROCHLORIDE 10 MG: 5 TABLET ORAL at 16:27

## 2018-10-11 RX ADMIN — SODIUM CHLORIDE: 9 INJECTION, SOLUTION INTRAVENOUS at 04:14

## 2018-10-11 RX ADMIN — SODIUM CHLORIDE: 9 INJECTION, SOLUTION INTRAVENOUS at 16:27

## 2018-10-11 RX ADMIN — VANCOMYCIN HYDROCHLORIDE 1000 MG: 1 INJECTION, SOLUTION INTRAVENOUS at 11:56

## 2018-10-11 RX ADMIN — FORMOTEROL FUMARATE DIHYDRATE 20 MCG: 20 SOLUTION RESPIRATORY (INHALATION) at 20:15

## 2018-10-11 ASSESSMENT — PAIN DESCRIPTION - LOCATION
LOCATION: BACK
LOCATION: BACK;ABDOMEN
LOCATION: BACK

## 2018-10-11 ASSESSMENT — PAIN SCALES - GENERAL
PAINLEVEL_OUTOF10: 7
PAINLEVEL_OUTOF10: 6
PAINLEVEL_OUTOF10: 8
PAINLEVEL_OUTOF10: 6
PAINLEVEL_OUTOF10: 0
PAINLEVEL_OUTOF10: 6
PAINLEVEL_OUTOF10: 0
PAINLEVEL_OUTOF10: 5
PAINLEVEL_OUTOF10: 7
PAINLEVEL_OUTOF10: 7
PAINLEVEL_OUTOF10: 4

## 2018-10-11 ASSESSMENT — PAIN DESCRIPTION - DESCRIPTORS
DESCRIPTORS: ACHING

## 2018-10-11 ASSESSMENT — PAIN DESCRIPTION - PAIN TYPE
TYPE: ACUTE PAIN;CHRONIC PAIN
TYPE: CHRONIC PAIN
TYPE: ACUTE PAIN;CHRONIC PAIN
TYPE: CHRONIC PAIN
TYPE: ACUTE PAIN;CHRONIC PAIN

## 2018-10-11 ASSESSMENT — PATIENT HEALTH QUESTIONNAIRE - PHQ9
2. FEELING DOWN, DEPRESSED OR HOPELESS: 0
SUM OF ALL RESPONSES TO PHQ9 QUESTIONS 1 & 2: 0
1. LITTLE INTEREST OR PLEASURE IN DOING THINGS: 0
SUM OF ALL RESPONSES TO PHQ QUESTIONS 1-9: 0
SUM OF ALL RESPONSES TO PHQ QUESTIONS 1-9: 0

## 2018-10-11 ASSESSMENT — PAIN DESCRIPTION - FREQUENCY
FREQUENCY: CONTINUOUS
FREQUENCY: INTERMITTENT
FREQUENCY: CONTINUOUS

## 2018-10-11 ASSESSMENT — PAIN DESCRIPTION - ONSET: ONSET: ON-GOING

## 2018-10-11 ASSESSMENT — PAIN DESCRIPTION - ORIENTATION
ORIENTATION: MID
ORIENTATION: MID

## 2018-10-11 ASSESSMENT — PAIN DESCRIPTION - PROGRESSION
CLINICAL_PROGRESSION: GRADUALLY IMPROVING
CLINICAL_PROGRESSION: GRADUALLY IMPROVING
CLINICAL_PROGRESSION: NOT CHANGED
CLINICAL_PROGRESSION: GRADUALLY IMPROVING
CLINICAL_PROGRESSION: GRADUALLY IMPROVING

## 2018-10-11 NOTE — PLAN OF CARE
[address]   Nighttime In-House Nurse Practitioner      10/10/2018    9:35 PM    Name:   Jass Cantrell  MRN:     [id number]     [place]:      [de-identified]   Room:   2016/2016-02  IP Day:  0  Admit Date:  10/10/2018 12:46 PM    PCP:   Gordon Hollis DO  Code Status:  Full Code    Called to the floor by staff to evaluate Ad Goins in 2016/2016-02 at 9:35 PM with request to utilize patient port for venous access. Order placed, ok to use patient port.      Marciano Dent APRN - CNP  10/10/2018  9:35 PM

## 2018-10-11 NOTE — CARE COORDINATION
Case Management Initial Discharge Plan  Ad MANDI Esparza Monica,         Readmission Risk              Risk of Unplanned Readmission:        40               Met with:patient to discuss discharge plans. Information verified: address, contacts, phone number, , insurance Yes  PCP: Edgardo Childers DO  Date of last visit: 10-10-18    Insurance Provider: Medicare/Medicaid    Discharge Planning  Current Residence:  Private residence  Living Arrangements:  Spouse/Significant Other   Home has 1 stories/4outside steps. He says his friends have to carry him over the steps  Support Systems:  Spouse/Significant Other, Family Members  Current Services PTA:  SN, PT/OT and HHA ordered  Agency: [facility]  Patient able to perform ADL's:Dependent  DME in home:  Wheelchair, walker, O2 concentrator and portability from [facility]   DME used to aid ambulation prior to admission:   Wheelchair  DME used during admission:  TBD    Potential Assistance Needed:  Extended Care Placement, [address] [facility]: [employer]   Potential Assistance Purchasing Medications:  No  Does patient want to participate in local refill/ meds to beds program?  Yes    Patient agreeable to home care: Yes  Freedom of choice provided:  Phone call to [facility] and confirmed that they received a referral but a visit was planned for 10-9-18 but pt cancelled due to not feeling well      Type of Home Care Services:  [address]  Patient expects to be discharged to:  ECF    Prior SNF/Rehab Placement and Facility: [facility] to SNF/Rehab: No  [place] of choice provided: n/a   Evaluation: n/a    Expected Discharge date:      Follow Up Appointment: Best Day/ Time: Tuesday AM    Transportation provider:   Transportation arrangements needed for discharge: Yes, possible need depending on discharge plan    Discharge Plan:   Met with patient and pulmonary nurse practioneer has requested palliative consult to educate pt on

## 2018-10-11 NOTE — PROGRESS NOTES
Allergies:  Aspirin; Fish-derived products; Morphine; Shellfish-derived products; and Motrin [ibuprofen micronized]     Last Temp:   Temp Readings from Last 3 Encounters:   10/11/18 98.8 °F (37.1 °C) (Oral)   08/25/18 98.2 °F (36.8 °C) (Oral)   07/19/18 97.2 °F (36.2 °C) (Temporal)       Recent Labs      10/10/18   1310  10/11/18   0637   BUN  12  10       Recent Labs      10/10/18   1310  10/11/18   0637   CREATININE  0.43*  0.41*       Recent Labs      10/10/18   1310  10/11/18   0637   WBC  12.3*  7.3         Intake/Output Summary (Last 24 hours) at 10/11/18 0844  Last data filed at 10/11/18 3230   Gross per 24 hour   Intake 0 ml   Output 1000 ml   Net -1000 ml       GOAL TROUGH:     15-20 mcg/ml for bacteremia, endocarditis, osteomyelitis, meningitis, hospital-acquired pneumonia caused by Staphylococcus aureus       10-20 mcg/ml skin and soft tissue infections, other infections not listed above         Assessment/Plan:  Will initiate vancomycin 1000 mg IV every 12 hours. Timing of trough level will be determined based on culture results, renal function, and clinical response. Thank you for the consult. Will continue to follow.   Megan Watkins, [facility]   10/11/2018  8:44 AM

## 2018-10-11 NOTE — CONSULTS
7/27/2015    DILATATION, ESOPHAGUS      ulcer repair    ENDOSCOPY, COLON, DIAGNOSTIC      EGD    KYPHOSIS SURGERY  07/19/2018    L1    DC PERQ VERTEBROPLASTY UNI/BI INJX CERVICOTHORACIC N/A 7/19/2018    KYPHOPLASTY L1 performed by Naa Avalos MD at [address] [place] [address] with broken neck    TUNNELED VENOUS PORT PLACEMENT       Previous  surgery: none     Medications:    Scheduled Meds:   cefepime  2 g Intravenous Q12H    vancomycin (VANCOCIN) intermittent dosing (placeholder)   Other RX Placeholder    vancomycin  1,000 mg Intravenous Q12H    formoterol  20 mcg Nebulization BID    budesonide  0.5 mg Nebulization BID    ipratropium-albuterol  1 ampule Inhalation Q4H While awake    methylPREDNISolone  60 mg Intravenous Q6H    sodium chloride flush  10 mL Intravenous 2 times per day    enoxaparin  40 mg Subcutaneous Daily     Continuous Infusions:   sodium chloride 100 mL/hr at 10/11/18 1627     PRN Meds:.sodium chloride flush, potassium chloride **OR** potassium chloride **OR** potassium chloride, magnesium sulfate, magnesium hydroxide, bisacodyl, nicotine, acetaminophen, oxyCODONE **OR** oxyCODONE, ondansetron **OR** ondansetron    Allergies:  Aspirin; Fish-derived products; Morphine; Shellfish-derived products; and Motrin [ibuprofen micronized]    Social History:    Social History     Social History    Marital status: Single     Spouse name: N/A    Number of children: N/A    Years of education: N/A     Occupational History    Not on file. Social History Main Topics    Smoking status: Former Smoker     Packs/day: 0.50     Years: 40.00     Types: Cigarettes     Quit date: 7/8/2018    Smokeless tobacco: Never Used      Comment: Nicotine Patch causes Nausea/vomiting; He states it's too difficult to stop smoking.     Alcohol use No      Comment: quit 12/9/2017    Drug use: No    Sexual activity: Not on file     Other Topics Concern    Not on file     Social History Narrative   

## 2018-10-11 NOTE — PROGRESS NOTES
Facility-Administered Medications Ordered in Other Visits   Medication Dose Route Frequency Provider Last Rate Last Dose    cefepime (MAXIPIME) 2 g IVPB minibag  2 g Intravenous Q12H Gage Hamilton MD   Stopped at 10/11/18 1031    vancomycin (VANCOCIN) intermittent dosing (placeholder)   Other RX Placeholder Gage Hamilton MD        vancomycin (VANCOCIN) 1000 mg in dextrose 5% 200 mL IVPB  1,000 mg Intravenous Q12H Gage Hamilton MD   Stopped at 10/11/18 1256    formoterol (PERFOROMIST) nebulizer solution 20 mcg  20 mcg Nebulization BID Ashlie Breath, APRN - CNP        budesonide (PULMICORT) nebulizer suspension 500 mcg  0.5 mg Nebulization BID Ashlie Breath, APRN - CNP        ipratropium-albuterol (DUONEB) nebulizer solution 1 ampule  1 ampule Inhalation Q4H While awake Ashlie Breath, APRN - CNP        methylPREDNISolone sodium (SOLU-MEDROL) injection 60 mg  60 mg Intravenous Q6H Ashlie Breath, APRN - CNP        sodium chloride flush 0.9 % injection 10 mL  10 mL Intravenous 2 times per day Gage Hamilton MD   10 mL at 10/11/18 1001    sodium chloride flush 0.9 % injection 10 mL  10 mL Intravenous PRN Gage Hamilton MD   10 mL at 10/11/18 1202    potassium chloride (KLOR-CON M) extended release tablet 40 mEq  40 mEq Oral PRN Gage Hamilton MD        Or    potassium chloride 20 MEQ/15ML (10%) oral solution 40 mEq  40 mEq Oral PRN Gage Hamilton MD        Or    potassium chloride 10 mEq/100 mL IVPB (Peripheral Line)  10 mEq Intravenous PRN Gage Hamilton MD        magnesium sulfate 1 g in dextrose 5% 100 mL IVPB  1 g Intravenous PRN Gage Hamilton MD        magnesium hydroxide (MILK OF MAGNESIA) 400 MG/5ML suspension 30 mL  30 mL Oral Daily PRN Gage Hamilton MD        bisacodyl (DULCOLAX) suppository 10 mg  10 mg Rectal Daily PRN Gage Hamilton MD        nicotine (NICODERM CQ) 21 MG/24HR 1 patch  1 patch Transdermal Daily PRN Gage Hamilton MD        acetaminophen (TYLENOL)

## 2018-10-11 NOTE — FLOWSHEET NOTE
Patient anointed by Father Queta Price. Writer charting on behalf of Jerson Romero. 10/11/18 0932   Encounter Summary   Services provided to: Patient   Referral/Consult From: [facility]   [facility] of Yarsani from [facility] (10/11/18; anointed)   Complexity of Encounter Low   Length of Encounter 15 minutes   Routine   Type Initial   Sacraments   Sacrament of Sick-Anointing Anointed  (10/11/18;  Fr Willy mccollum)

## 2018-10-11 NOTE — CARE COORDINATION
[facility] meeting with patient 10-11 at 5:30pm as arranged by palliative nurse Burr Phoenix

## 2018-10-11 NOTE — PROGRESS NOTES
Occupational Therapy   Occupational Therapy Initial Assessment  Date: 10/11/2018   Patient Name: Abdirahman Slaughter  MRN: [id number]     : 1960    RN reports patient is medically stable for therapy treatment this date. Chart reviewed prior to treatment and patient is agreeable for therapy. All lines intact and patient positioned comfortably at end of treatment. All patient needs addressed prior to ending therapy session. Date of Service: 10/11/2018    Discharge Recommendations:  Subacute/Skilled Nursing Facility  OT Equipment Recommendations  Equipment Needed: Yes  Mobility Devices: ADL Assistive Devices  ADL Assistive Devices: Reacher;Long-handled Shoe Horn;Long-handled Sponge; Toileting - 3-in-1 Commode;Grab Bars - shower      Patient Diagnosis(es): The encounter diagnosis was General weakness. has a past medical history of Benign essential HTN; Cardiac arrest due to respiratory disorder ([address] Cellulitis; Chronic low back pain; Compression fracture of L1 lumbar vertebra (HCC); COPD (chronic obstructive pulmonary disease) ([address] Emphysema of lung ([address] Epidermoid carcinoma of lung ([address] Head injury; Hepatitis; Insomnia; Osteoarthritis of spine with radiculopathy, lumbar region; Oxygen dependent; Reflux; Right sided weakness; Seizures ([address] Tremor; and Ulcer. has a past surgical history that includes Cervical disc surgery (); Cystocopy (2015); Colonoscopy; Endoscopy, colon, diagnostic; tracheostomy; Tunneled venous port placement; Dilatation, esophagus; Kyphosis surgery (2018); and pr perq vertebroplasty uni/bi injx cervicothoracic (N/A, 2018). PER H&P:  64 yo with Squamous cell CA (s/p chemo in - recurrence in 2017-chemo)on immunotherapy with opdivo since 2018 was admitted with COPD excerebration in 2018 after which he was sent to SNF. Discharged from SNF and has been feeling very weak, unable to walk around house without SOB.  Feels this has been getting worse in last week or so but noticed it more since going home in last 2 d.   +fatigue+chills, no fevers. +nonproductive cough. +chronic low back pain :10/10, with radiation in to left. Usually on narcotics at home, but was sent home from SNF without a prescription.    - Was apparently evaluated by PCP who felt patient is far from his baseline with activity level, appeared flushed with swollen face per Ed physician's report. Ed w/u: Afebrile, BP 96/61,WBC 12.3, LFTs: 95, 157, 157, bili 0.76   CXR: chronic opacity in Rt base with volume loss. IS getting new IV placed now for CT chest with contrast to be done shortly.        Restrictions  Restrictions/Precautions  Restrictions/Precautions: Fall Risk, Up as Tolerated  Position Activity Restriction  Other position/activity restrictions: 3 L O2 per NC, chemo port, h/o lung squamous cell carcinoma, NPO, garcia, c/o dizziness sitting EOB and notified RN     Subjective   General  Chart Reviewed: Yes  Patient assessed for rehabilitation services?: Yes  Family / Caregiver Present: No  Pain Assessment  Patient Currently in Pain: Yes  Pain Assessment: 0-10  Pain Level: 7  Pain Type: Acute pain, Chronic pain  Pain Location: Back, Abdomen  Pain Orientation: Mid  Pain Descriptors: Aching  Pain Frequency: Continuous      Social/Functional History  Social/Functional History  Lives With: Spouse (pt lives with spouse and her ex; pt states both are able to assist at DC as needed )  Type of Home: House  Home Layout: One level  Home Access: Stairs to enter with rails (front entrance; pt has assist with bumping up and down steps )  Entrance Stairs - Number of Steps: 4  Entrance Stairs - Rails: Left  Bathroom Shower/Tub: Tub/Shower unit  Bathroom Toilet: Standard  Bathroom Equipment: Shower chair  Bathroom Accessibility: Wheelchair accessible  Home Equipment: Rolling walker, Megan Mendes 50 Help From: Family (pt states HHA is suppose to be starting soon )  ADL instruction/tactile assist needed for hand placement on grab bar and proper log rolling/pursed lip breathing   Transfers  Stand Pivot Transfers: Unable to assess  Sit to stand: Unable to assess  Stand to sit: Unable to assess  Transfer Comments: Pt declined to attempt and requested back to bed due to dizziness/fatigue   Vision - Basic Assessment  Prior Vision: Wears glasses only for reading  Vision Comments: Pt denies visual changes   Cognition  Overall Cognitive Status: Exceptions  Arousal/Alertness: Appropriate responses to stimuli  Following Commands: Follows one step commands with increased time  Attention Span: Appears intact  Memory: Decreased short term memory  Safety Judgement: Decreased awareness of need for assistance;Decreased awareness of need for safety  Problem Solving: Assistance required to generate solutions;Assistance required to implement solutions;Assistance required to correct errors made;Assistance required to identify errors made;Decreased awareness of errors  Insights: Decreased awareness of deficits  Initiation: Requires cues for some  Sequencing: Requires cues for some  Perception  Overall Perceptual Status: WFL     Sensation  Overall Sensation Status: WFL        LUE AROM (degrees)  LUE AROM : WFL  RUE AROM (degrees)  RUE AROM : WFL  LUE Strength  Gross LUE Strength: WFL  LUE Strength Comment: grossly 4 minus/5 for BUE's   RUE Strength  Gross RUE Strength: WFL         *Pt requested to lay on side in bed and pillow was placed in between BLE's/under pt's side to off load chandni area for comfort and promoting good skin integrity. Pt voiced comfortable position after adaptations made. Assessment   Performance deficits / Impairments: Decreased functional mobility ; Decreased ADL status; Decreased strength;Decreased safe awareness;Decreased balance;Decreased endurance;Decreased coordination  Assessment: Pt requires skilled OT to maximize functional I and safety as able to reduce caregiver

## 2018-10-11 NOTE — PROGRESS NOTES
.. PALLIATIVE CARE NURSING ASSESSMENT    Patient: Kimi Villalpando  Room: 2016/2016-02    Reason For Consult   Goals of care evaluation  Distress management  Guidance and support  Facilitate communications  Assistance in coordinating care      Impression: Kimi Villalpando is a 63y.o. year old male  has a past medical history of Benign essential HTN; Cardiac arrest due to respiratory disorder ([facility] Cellulitis; Chronic low back pain; Compression fracture of L1 lumbar vertebra (HCC); COPD (chronic obstructive pulmonary disease) ([facility] Emphysema of lung ([facility] Epidermoid carcinoma of lung ([facility] Head injury; Hepatitis; Insomnia; Osteoarthritis of spine with radiculopathy, lumbar region; Oxygen dependent; Reflux; Right sided weakness; Seizures ([facility] Tremor; and Ulcer. .  Currently hospitalized for the management of chronic respiratory failure, . The Palliative Care Team is following to assist with goals of care. Vital Signs  Blood pressure 108/72, pulse 120, temperature 99 °F (37.2 °C), temperature source Oral, resp. rate 18, height 5' 5\" (1.651 m), weight 135 lb (61.2 kg), SpO2 90 %.     Patient Active Problem List   Diagnosis    GERD (gastroesophageal reflux disease)    Chronic obstructive pulmonary disease (HCC)    Anemia of chronic disease    Benign essential HTN    Mixed anxiety depressive disorder    Cachectic ([facility]    Pain of metastatic malignancy    Chronic bilateral low back pain without sciatica    Advance directive discussed with patient    Mixed simple and mucopurulent chronic bronchitis ([facility]    Encounter for palliative care    Squamous cell carcinoma of lung ([facility]    Constipation due to opioid therapy    Osteoarthritis of spine with radiculopathy, lumbar region    Cervical radiculopathy    Failed neck syndrome    Chemotherapy-induced neutropenia (HCC)    Gastrointestinal hemorrhage    Iron deficiency anemia due to chronic blood loss    Hypomagnesemia    Neutropenia with fever ([facility]    Multifocal pneumonia    H/O: GI bleed    Moraxella catarrhalis bronchitis    Acute metabolic encephalopathy    Status post tracheostomy ([facility]    COPD exacerbation (HCC)    Pneumonia    Chemotherapy-induced neuropathy (HCC)    Closed compression fracture of L1 lumbar vertebra (HCC)    Pathological fracture of lumbar vertebra due to secondary osteoporosis ([facility]    Osteoporosis with current pathological fracture    COPD with acute exacerbation (HCC)    Squamous cell carcinoma of lung ([facility]    COPD with exacerbation (HCC)    Chronic respiratory failure (HCC)    Elevated LFTs    Fatigue    General weakness       Palliative Interaction: Pt resting in bed. He is alert and oriented. Was asked by pulmonology to see patient re: options for care as patient's most recent CT showed progression of lung cancer while on treatment. I talked with wife and his girlfriend Max. Pt states, aKtherine Simmons said I should go to hospice\". Patient inquiring about the difference between palliative care and hospice care. I explained the difference between the 2 services. Patient deferring decision making to girlfriend. Yumiko states the last time patient met with Dr. Krys Mayorga he stated if scans were the same or better, pt would continue on treatment. If the scans were worse, he would either offer comfort care or he would send him to a physician at [facility]. Patient declines going to see a specialist at [facility]. Pt and girlfriend request information from hospice. They request Hospice of [address] [place] [address]. Meeting arranged for 5:30-6pm tonight. Pt notified and girlfriend will be here. Staff updated. Emotional support offered to patient and girlfriend. Will follow as needed.     Goals/Plan of care  Education/support to family  Education/support to patient  Discharge planning/helping to coordinate care  Providing support for coping/adaptation/distress of family  Discussing meaning/purpose   Continue with current plan of care  Clarification of

## 2018-10-11 NOTE — PLAN OF CARE
Problem: Falls - Risk of:  Goal: Will remain free from falls  Will remain free from falls   Outcome: Ongoing  Siderails up x 2  Hourly rounding  Call light in reach  Instructed to call for assist before attempting out of bed. Remains free from falls and accidental injury at this time   Floor free from obstacles  Bed is locked and in lowest position  Adequate lighting provided  Bed alarm on, [place] star and Stay with Me signs posted          Problem: Risk for Impaired Skin Integrity  Goal: Tissue integrity - skin and mucous membranes  Structural intactness and normal physiological function of skin and  mucous membranes. Outcome: Ongoing      Problem: Pain:  Goal: Control of chronic pain  Control of chronic pain   Outcome: Ongoing  Pain level assessment complete.    Patient educated on pain scale and control interventions  PRN pain medication given per patient request  Patient instructed to call out with new onset of pain or unrelieved pain
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
## 2018-10-11 NOTE — CONSULTS
Drips/Infusions   sodium chloride 100 mL/hr at 10/11/18 0414     Home Medications  Prescriptions Prior to Admission: potassium chloride (KLOR-CON M) 20 MEQ extended release tablet, Take 40 mEq by mouth 2 times daily  citalopram (CELEXA) 20 MG tablet, Take 20 mg by mouth daily  zolpidem (AMBIEN) 5 MG tablet, Take 5 mg by mouth nightly as needed for Sleep. Jolaine Dent oxyCODONE (OXYCONTIN) 40 MG extended release tablet, Take 40 mg by mouth every 12 hours. Jolaine Dent oxyCODONE HCl (OXY-IR) 10 MG immediate release tablet, Take 10 mg by mouth every 6 hours as needed for Pain. .  ondansetron (ZOFRAN) 8 MG tablet, Take 8 mg by mouth every 8 hours as needed for Nausea or Vomiting  furosemide (LASIX) 40 MG tablet, Take 40 mg by mouth 2 times daily  LYRICA 25 MG capsule, Take 1 capsule by mouth 3 times daily for 30 days. Jolaine Dent dronabinol (MARINOL) 10 MG capsule, Take 10 mg by mouth 2 times daily (before meals). .  pantoprazole (PROTONIX) 40 MG tablet, Take 1 tablet by mouth every morning (before breakfast)  Fluticasone Furoate-Vilanterol (BREO ELLIPTA) 200-25 MCG/INH AEPB, Inhale 1 puff into the lungs daily  Umeclidinium Bromide (INCRUSE ELLIPTA) 62.5 MCG/INH AEPB, Inhale 1 puff into the lungs daily  OLANZapine (ZYPREXA) 10 MG tablet, Take 1 tablet by mouth nightly  calcium-vitamin D (OSCAL-500) 500-200 MG-UNIT per tablet, Take 1 tablet by mouth 2 times daily  Albuterol Sulfate (VENTOLIN HFA IN), Inhale 2 puffs into the lungs every 6 hours as needed     Allergies    Aspirin; Fish-derived products; Morphine; Shellfish-derived products; and Motrin [ibuprofen micronized]  Social History     Social History     Social History    Marital status: Single     Spouse name: N/A    Number of children: N/A    Years of education: N/A     Occupational History    Not on file.      Social History Main Topics    Smoking status: Former Smoker     Packs/day: 0.50     Years: 40.00     Types: Cigarettes     Quit date: 7/8/2018    Smokeless tobacco: Never Used Comment: Nicotine Patch causes Nausea/vomiting; He states it's too difficult to stop smoking.  Alcohol use No      Comment: quit 12/9/2017    Drug use: No    Sexual activity: Not on file     Other Topics Concern    Not on file     Social History Narrative    No narrative on file     Family History      Family History   Problem Relation Age of Onset    Heart Disease Mother         CABG    Cancer Mother     Cancer Father         Throat    Heart Disease Father     Stroke Maternal Grandfather     Heart Disease Paternal Grandmother     Diabetes Neg Hx      ROS - 11 systems   General Denies any fever or chills, significant weakness  HEENT Denies any diplopia, tinnitus or vertigo  Resp positive for  cough with sputum, dyspnea and wheezing  Cardiac Denies any chest pain, palpitations, claudication or edema  GI Denies any melena, hematochezia, hematemesis or pyrosis   Denies any frequency, urgency, hesitancy or incontinence  Heme Denies bruising or bleeding easily  Endocrine Denies any history of diabetes or thyroid disease  Neuro Denies any focal motor or sensory deficits  Psychiatric Denies anxiety, depression, suicidal ideation  Skin Denies rashes, itching, open sores    Vitals    /68   Pulse 103   Temp 98.8 °F (37.1 °C) (Oral)   Resp 16   Ht 5' 5\" (1.651 m)   Wt 135 lb (61.2 kg)   SpO2 96%   BMI 22.47 kg/m²   Body mass index is 22.47 kg/m². I/O      Intake/Output Summary (Last 24 hours) at 10/11/18 1122  Last data filed at 10/11/18 0946   Gross per 24 hour   Intake                0 ml   Output             1200 ml   Net            -1200 ml     I/O last 3 completed shifts:  In: -   Out: 1000 [Urine:1000]   Patient Vitals for the past 96 hrs (Last 3 readings):   Weight   10/11/18 0303 135 lb (61.2 kg)   10/10/18 2125 135 lb 4.8 oz (61.4 kg)   10/10/18 1238 133 lb (60.3 kg)     Exam   General Appearance Awake, alert, oriented, in no acute distress  HEENT - Head is normocephalic, atraumatic.

## 2018-10-11 NOTE — PROGRESS NOTES
Patient admitted to room 2016-02 from ED. Orders and database reviewed with patient. Bed mechanics in place and questions answered. Will continue to monitor with hourly rounding.

## 2018-10-11 NOTE — PROGRESS NOTES
Scheduled Meds:    cefepime  2 g Intravenous Q12H    vancomycin (VANCOCIN) intermittent dosing (placeholder)   Other RX Placeholder    vancomycin  1,000 mg Intravenous Q12H    formoterol  20 mcg Nebulization BID    budesonide  0.5 mg Nebulization BID    ipratropium-albuterol  1 ampule Inhalation Q4H While awake    methylPREDNISolone  60 mg Intravenous Q6H    sodium chloride flush  10 mL Intravenous 2 times per day    enoxaparin  40 mg Subcutaneous Daily     Continuous Infusions:    sodium chloride 100 mL/hr at 10/11/18 1627     PRN Meds: sodium chloride flush, potassium chloride **OR** potassium chloride **OR** potassium chloride, magnesium sulfate, magnesium hydroxide, bisacodyl, nicotine, acetaminophen, oxyCODONE **OR** oxyCODONE, ondansetron **OR** ondansetron    Data:     Past Medical History:   has a past medical history of Benign essential HTN; Cardiac arrest due to respiratory disorder ([facility] Cellulitis; Chronic low back pain; Compression fracture of L1 lumbar vertebra (HCC); COPD (chronic obstructive pulmonary disease) ([facility] [address] Emphysema of lung ([facility] [address] Epidermoid carcinoma of lung ([facility] [address] [facility] Head injury; Hepatitis; Insomnia; Osteoarthritis of spine with radiculopathy, lumbar region; Oxygen dependent; Reflux; Right sided weakness; Seizures ([facility] [address] [facility] Tremor; and Ulcer. Social History:   reports that he quit smoking about 3 months ago. His smoking use included Cigarettes. He has a 20.00 pack-year smoking history. He has never used smokeless tobacco. He reports that he does not drink alcohol or use drugs.      Family History:   Family History   Problem Relation Age of Onset    Heart Disease Mother         CABG    Cancer Mother     Cancer Father         Throat    Heart Disease Father     Stroke Maternal Grandfather     Heart Disease Paternal Grandmother     Diabetes Neg Hx        Vitals:  /69   Pulse 115   Temp 100 °F (37.8 °C) (Oral)   Resp 24   Ht 5' 5\" (1.651 m)   Wt 135 lb (61.2 kg) SpO2 91%   BMI 22.47 kg/m²   Temp (24hrs), Av °F (37.2 °C), Min:98.6 °F (37 °C), Max:100 °F (37.8 °C)    No results for input(s): POCGLU in the last 72 hours. I/O (24Hr): Intake/Output Summary (Last 24 hours) at 10/11/18 1909  Last data filed at 10/11/18 1838   Gross per 24 hour   Intake             1163 ml   Output             1750 ml   Net             -587 ml         Physical Examination:        General appearance:  alert, cooperative and no distress  Mental Status:  oriented to person, place and time and normal affect  Lungs:  Decreased air entry Rt side. Diffuse wheeze b/l   Heart:  regular rate and rhythm, no murmur  Abdomen:  soft, nontender, nondistended, normal bowel sounds, no masses, hepatomegaly, splenomegaly  Extremities:  no edema, redness, tenderness in the calves  Skin:  no gross lesions, rashes, induration    Assessment:        Primary Problem  Fatigue    Active Hospital Problems    Diagnosis Date Noted    Postobstructive pneumonia [J18.9] 10/11/2018    General weakness [R53.1]     Elevated LFTs [R94.5] 10/10/2018    Fatigue [R53.83]     Benign essential HTN [I10] 12/10/2016    Squamous cell carcinoma of lung (HCC) [C34.90] 2016    Chronic obstructive pulmonary disease ([facility] [address] [address] [J44.9] 2013    GERD (gastroesophageal reflux disease) [K21.9] 10/03/2012       Plan:      - post obstructive pneumonia:empiric abx. Pulmonary consulted. D/w CNP. With worsening lung CA findings noted on CT they recommend Palliative care. Consult requested. Discussed CT findings with patient: states understanding. Indicates girlfriend Cody Myers has [facility] and 'will make the right decision for him'. When talking aboout code status patient showed poor understanding and got frustrated and gave conflicting things 'to be done for me' like CPR/intubation etc he then stated he would like us to talk to Cody Myers and make a decision for him. - continue aerosols. - DVT prophylaxis.      Benjamin Torres, MD  10/11/2018

## 2018-10-12 ENCOUNTER — ANESTHESIA (OUTPATIENT)
Dept: OPERATING ROOM | Age: 58
DRG: 194 | End: 2018-10-12
Payer: MEDICARE

## 2018-10-12 ENCOUNTER — APPOINTMENT (OUTPATIENT)
Dept: GENERAL RADIOLOGY | Age: 58
DRG: 194 | End: 2018-10-12
Payer: MEDICARE

## 2018-10-12 ENCOUNTER — ANESTHESIA EVENT (OUTPATIENT)
Dept: OPERATING ROOM | Age: 58
DRG: 194 | End: 2018-10-12
Payer: MEDICARE

## 2018-10-12 VITALS — OXYGEN SATURATION: 91 % | SYSTOLIC BLOOD PRESSURE: 112 MMHG | DIASTOLIC BLOOD PRESSURE: 69 MMHG

## 2018-10-12 LAB
-: ABNORMAL
ABSOLUTE EOS #: 0 K/UL (ref 0–0.4)
ABSOLUTE IMMATURE GRANULOCYTE: ABNORMAL K/UL (ref 0–0.3)
ABSOLUTE LYMPH #: 0.5 K/UL (ref 1–4.8)
ABSOLUTE MONO #: 0.2 K/UL (ref 0.2–0.8)
AMORPHOUS: ABNORMAL
ANION GAP SERPL CALCULATED.3IONS-SCNC: 8 MMOL/L (ref 9–17)
BACTERIA: ABNORMAL
BASOPHILS # BLD: 0 % (ref 0–2)
BASOPHILS ABSOLUTE: 0 K/UL (ref 0–0.2)
BILIRUBIN URINE: NEGATIVE
BUN BLDV-MCNC: 10 MG/DL (ref 6–20)
BUN/CREAT BLD: 23 (ref 9–20)
CALCIUM SERPL-MCNC: 7.7 MG/DL (ref 8.6–10.4)
CASTS UA: ABNORMAL /LPF
CHLORIDE BLD-SCNC: 101 MMOL/L (ref 98–107)
CO2: 32 MMOL/L (ref 20–31)
COLOR: YELLOW
COMMENT UA: ABNORMAL
CREAT SERPL-MCNC: 0.43 MG/DL (ref 0.7–1.2)
CRYSTALS, UA: ABNORMAL /HPF
DIFFERENTIAL TYPE: ABNORMAL
EOSINOPHILS RELATIVE PERCENT: 0 % (ref 1–4)
EPITHELIAL CELLS UA: ABNORMAL /HPF (ref 0–5)
GFR AFRICAN AMERICAN: >60 ML/MIN
GFR NON-AFRICAN AMERICAN: >60 ML/MIN
GFR SERPL CREATININE-BSD FRML MDRD: ABNORMAL ML/MIN/{1.73_M2}
GFR SERPL CREATININE-BSD FRML MDRD: ABNORMAL ML/MIN/{1.73_M2}
GLUCOSE BLD-MCNC: 182 MG/DL (ref 70–99)
GLUCOSE URINE: NEGATIVE
HCT VFR BLD CALC: 29.4 % (ref 41–53)
HEMOGLOBIN: 9.6 G/DL (ref 13.5–17.5)
IMMATURE GRANULOCYTES: ABNORMAL %
KETONES, URINE: NEGATIVE
LEUKOCYTE ESTERASE, URINE: ABNORMAL
LYMPHOCYTES # BLD: 6 % (ref 24–44)
MCH RBC QN AUTO: 29.7 PG (ref 26–34)
MCHC RBC AUTO-ENTMCNC: 32.7 G/DL (ref 31–37)
MCV RBC AUTO: 90.9 FL (ref 80–100)
MONOCYTES # BLD: 2 % (ref 1–7)
MUCUS: ABNORMAL
NITRITE, URINE: NEGATIVE
NRBC AUTOMATED: ABNORMAL PER 100 WBC
OTHER OBSERVATIONS UA: ABNORMAL
PDW BLD-RTO: 18.9 % (ref 11.5–14.5)
PH UA: 6 (ref 5–8)
PLATELET # BLD: 145 K/UL (ref 130–400)
PLATELET ESTIMATE: ABNORMAL
PMV BLD AUTO: 8.4 FL (ref 6–12)
POTASSIUM SERPL-SCNC: 3.7 MMOL/L (ref 3.7–5.3)
PROTEIN UA: NEGATIVE
RBC # BLD: 3.23 M/UL (ref 4.5–5.9)
RBC # BLD: ABNORMAL 10*6/UL
RBC UA: ABNORMAL /HPF (ref 0–2)
RENAL EPITHELIAL, UA: ABNORMAL /HPF
SEG NEUTROPHILS: 92 % (ref 36–66)
SEGMENTED NEUTROPHILS ABSOLUTE COUNT: 7.6 K/UL (ref 1.8–7.7)
SODIUM BLD-SCNC: 141 MMOL/L (ref 135–144)
SPECIFIC GRAVITY UA: 1.01 (ref 1–1.03)
TRICHOMONAS: ABNORMAL
TURBIDITY: ABNORMAL
URINE HGB: ABNORMAL
UROBILINOGEN, URINE: NORMAL
VANCOMYCIN TROUGH DATE LAST DOSE: NORMAL
VANCOMYCIN TROUGH DOSE AMOUNT: NORMAL
VANCOMYCIN TROUGH TIME LAST DOSE: NORMAL
VANCOMYCIN TROUGH: 13.7 UG/ML (ref 10–20)
WBC # BLD: 8.3 K/UL (ref 3.5–11)
WBC # BLD: ABNORMAL 10*3/UL
WBC UA: ABNORMAL /HPF (ref 0–5)
YEAST: ABNORMAL

## 2018-10-12 PROCEDURE — 2580000003 HC RX 258: Performed by: NURSE ANESTHETIST, CERTIFIED REGISTERED

## 2018-10-12 PROCEDURE — 3700000000 HC ANESTHESIA ATTENDED CARE: Performed by: UROLOGY

## 2018-10-12 PROCEDURE — 36415 COLL VENOUS BLD VENIPUNCTURE: CPT

## 2018-10-12 PROCEDURE — 94640 AIRWAY INHALATION TREATMENT: CPT

## 2018-10-12 PROCEDURE — 7100000001 HC PACU RECOVERY - ADDTL 15 MIN: Performed by: UROLOGY

## 2018-10-12 PROCEDURE — 6360000002 HC RX W HCPCS: Performed by: NURSE PRACTITIONER

## 2018-10-12 PROCEDURE — 6360000002 HC RX W HCPCS: Performed by: INTERNAL MEDICINE

## 2018-10-12 PROCEDURE — 0T9B80Z DRAINAGE OF BLADDER WITH DRAINAGE DEVICE, VIA NATURAL OR ARTIFICIAL OPENING ENDOSCOPIC: ICD-10-PCS | Performed by: UROLOGY

## 2018-10-12 PROCEDURE — 3700000001 HC ADD 15 MINUTES (ANESTHESIA): Performed by: UROLOGY

## 2018-10-12 PROCEDURE — 6360000002 HC RX W HCPCS: Performed by: NURSE ANESTHETIST, CERTIFIED REGISTERED

## 2018-10-12 PROCEDURE — 7100000000 HC PACU RECOVERY - FIRST 15 MIN: Performed by: UROLOGY

## 2018-10-12 PROCEDURE — 94760 N-INVAS EAR/PLS OXIMETRY 1: CPT

## 2018-10-12 PROCEDURE — 80048 BASIC METABOLIC PNL TOTAL CA: CPT

## 2018-10-12 PROCEDURE — 3600000002 HC SURGERY LEVEL 2 BASE: Performed by: UROLOGY

## 2018-10-12 PROCEDURE — 2700000000 HC OXYGEN THERAPY PER DAY

## 2018-10-12 PROCEDURE — 6370000000 HC RX 637 (ALT 250 FOR IP): Performed by: UROLOGY

## 2018-10-12 PROCEDURE — 87086 URINE CULTURE/COLONY COUNT: CPT

## 2018-10-12 PROCEDURE — 2580000003 HC RX 258: Performed by: INTERNAL MEDICINE

## 2018-10-12 PROCEDURE — 2580000003 HC RX 258: Performed by: NURSE PRACTITIONER

## 2018-10-12 PROCEDURE — 3600000012 HC SURGERY LEVEL 2 ADDTL 15MIN: Performed by: UROLOGY

## 2018-10-12 PROCEDURE — 71045 X-RAY EXAM CHEST 1 VIEW: CPT

## 2018-10-12 PROCEDURE — 6370000000 HC RX 637 (ALT 250 FOR IP): Performed by: NURSE PRACTITIONER

## 2018-10-12 PROCEDURE — 2709999900 HC NON-CHARGEABLE SUPPLY: Performed by: UROLOGY

## 2018-10-12 PROCEDURE — 80202 ASSAY OF VANCOMYCIN: CPT

## 2018-10-12 PROCEDURE — 2500000003 HC RX 250 WO HCPCS: Performed by: NURSE ANESTHETIST, CERTIFIED REGISTERED

## 2018-10-12 PROCEDURE — 1200000000 HC SEMI PRIVATE

## 2018-10-12 PROCEDURE — 99232 SBSQ HOSP IP/OBS MODERATE 35: CPT | Performed by: INTERNAL MEDICINE

## 2018-10-12 PROCEDURE — 6370000000 HC RX 637 (ALT 250 FOR IP): Performed by: INTERNAL MEDICINE

## 2018-10-12 PROCEDURE — 81001 URINALYSIS AUTO W/SCOPE: CPT

## 2018-10-12 PROCEDURE — 85025 COMPLETE CBC W/AUTO DIFF WBC: CPT

## 2018-10-12 RX ORDER — SODIUM CHLORIDE, SODIUM LACTATE, POTASSIUM CHLORIDE, CALCIUM CHLORIDE 600; 310; 30; 20 MG/100ML; MG/100ML; MG/100ML; MG/100ML
INJECTION, SOLUTION INTRAVENOUS CONTINUOUS PRN
Status: DISCONTINUED | OUTPATIENT
Start: 2018-10-12 | End: 2018-10-12 | Stop reason: SDUPTHER

## 2018-10-12 RX ORDER — LIDOCAINE HYDROCHLORIDE 20 MG/ML
INJECTION, SOLUTION EPIDURAL; INFILTRATION; INTRACAUDAL; PERINEURAL PRN
Status: DISCONTINUED | OUTPATIENT
Start: 2018-10-12 | End: 2018-10-12 | Stop reason: SDUPTHER

## 2018-10-12 RX ORDER — PROPOFOL 10 MG/ML
INJECTION, EMULSION INTRAVENOUS PRN
Status: DISCONTINUED | OUTPATIENT
Start: 2018-10-12 | End: 2018-10-12 | Stop reason: SDUPTHER

## 2018-10-12 RX ORDER — FENTANYL CITRATE 50 UG/ML
INJECTION, SOLUTION INTRAMUSCULAR; INTRAVENOUS PRN
Status: DISCONTINUED | OUTPATIENT
Start: 2018-10-12 | End: 2018-10-12 | Stop reason: SDUPTHER

## 2018-10-12 RX ADMIN — FORMOTEROL FUMARATE DIHYDRATE 20 MCG: 20 SOLUTION RESPIRATORY (INHALATION) at 07:33

## 2018-10-12 RX ADMIN — METHYLPREDNISOLONE SODIUM SUCCINATE 60 MG: 125 INJECTION, POWDER, FOR SOLUTION INTRAMUSCULAR; INTRAVENOUS at 23:14

## 2018-10-12 RX ADMIN — PROPOFOL 30 MG: 10 INJECTION, EMULSION INTRAVENOUS at 10:49

## 2018-10-12 RX ADMIN — ONDANSETRON 4 MG: 2 INJECTION INTRAMUSCULAR; INTRAVENOUS at 08:48

## 2018-10-12 RX ADMIN — SODIUM CHLORIDE, POTASSIUM CHLORIDE, SODIUM LACTATE AND CALCIUM CHLORIDE: 600; 310; 30; 20 INJECTION, SOLUTION INTRAVENOUS at 10:44

## 2018-10-12 RX ADMIN — CEFEPIME HYDROCHLORIDE 2 G: 2 INJECTION, POWDER, FOR SOLUTION INTRAVENOUS at 08:27

## 2018-10-12 RX ADMIN — PROPOFOL 30 MG: 10 INJECTION, EMULSION INTRAVENOUS at 10:54

## 2018-10-12 RX ADMIN — LIDOCAINE HYDROCHLORIDE 60 MG: 20 INJECTION, SOLUTION EPIDURAL; INFILTRATION; INTRACAUDAL; PERINEURAL at 10:49

## 2018-10-12 RX ADMIN — METHYLPREDNISOLONE SODIUM SUCCINATE 60 MG: 125 INJECTION, POWDER, FOR SOLUTION INTRAMUSCULAR; INTRAVENOUS at 00:47

## 2018-10-12 RX ADMIN — OXYCODONE HYDROCHLORIDE 40 MG: 40 TABLET, FILM COATED, EXTENDED RELEASE ORAL at 21:16

## 2018-10-12 RX ADMIN — OXYCODONE HYDROCHLORIDE 10 MG: 5 TABLET ORAL at 03:27

## 2018-10-12 RX ADMIN — FORMOTEROL FUMARATE DIHYDRATE 20 MCG: 20 SOLUTION RESPIRATORY (INHALATION) at 18:00

## 2018-10-12 RX ADMIN — BUDESONIDE 500 MCG: 0.5 SUSPENSION RESPIRATORY (INHALATION) at 17:58

## 2018-10-12 RX ADMIN — SODIUM CHLORIDE: 9 INJECTION, SOLUTION INTRAVENOUS at 05:14

## 2018-10-12 RX ADMIN — SODIUM CHLORIDE: 9 INJECTION, SOLUTION INTRAVENOUS at 17:28

## 2018-10-12 RX ADMIN — OXYCODONE HYDROCHLORIDE 10 MG: 5 TABLET ORAL at 08:27

## 2018-10-12 RX ADMIN — CEFEPIME HYDROCHLORIDE 2 G: 2 INJECTION, POWDER, FOR SOLUTION INTRAVENOUS at 21:16

## 2018-10-12 RX ADMIN — BUDESONIDE 500 MCG: 0.5 SUSPENSION RESPIRATORY (INHALATION) at 07:18

## 2018-10-12 RX ADMIN — OXYCODONE HYDROCHLORIDE 10 MG: 5 TABLET ORAL at 13:11

## 2018-10-12 RX ADMIN — PROPOFOL 10 MG: 10 INJECTION, EMULSION INTRAVENOUS at 10:55

## 2018-10-12 RX ADMIN — IPRATROPIUM BROMIDE AND ALBUTEROL SULFATE 1 AMPULE: .5; 3 SOLUTION RESPIRATORY (INHALATION) at 14:48

## 2018-10-12 RX ADMIN — METHYLPREDNISOLONE SODIUM SUCCINATE 60 MG: 125 INJECTION, POWDER, FOR SOLUTION INTRAMUSCULAR; INTRAVENOUS at 18:48

## 2018-10-12 RX ADMIN — METHYLPREDNISOLONE SODIUM SUCCINATE 60 MG: 125 INJECTION, POWDER, FOR SOLUTION INTRAMUSCULAR; INTRAVENOUS at 06:16

## 2018-10-12 RX ADMIN — IPRATROPIUM BROMIDE AND ALBUTEROL SULFATE 1 AMPULE: .5; 3 SOLUTION RESPIRATORY (INHALATION) at 07:18

## 2018-10-12 RX ADMIN — FENTANYL CITRATE 25 MCG: 50 INJECTION, SOLUTION INTRAMUSCULAR; INTRAVENOUS at 10:49

## 2018-10-12 RX ADMIN — IPRATROPIUM BROMIDE AND ALBUTEROL SULFATE 1 AMPULE: .5; 3 SOLUTION RESPIRATORY (INHALATION) at 17:59

## 2018-10-12 RX ADMIN — METHYLPREDNISOLONE SODIUM SUCCINATE 60 MG: 125 INJECTION, POWDER, FOR SOLUTION INTRAMUSCULAR; INTRAVENOUS at 13:10

## 2018-10-12 RX ADMIN — VANCOMYCIN HYDROCHLORIDE 1 G: 1 INJECTION, SOLUTION INTRAVENOUS at 10:53

## 2018-10-12 RX ADMIN — OXYCODONE HYDROCHLORIDE 40 MG: 40 TABLET, FILM COATED, EXTENDED RELEASE ORAL at 08:27

## 2018-10-12 RX ADMIN — OXYCODONE HYDROCHLORIDE 10 MG: 5 TABLET ORAL at 18:48

## 2018-10-12 ASSESSMENT — PAIN DESCRIPTION - DESCRIPTORS
DESCRIPTORS: CONSTANT
DESCRIPTORS: ACHING

## 2018-10-12 ASSESSMENT — PULMONARY FUNCTION TESTS
PIF_VALUE: -1
PIF_VALUE: 0
PIF_VALUE: -1

## 2018-10-12 ASSESSMENT — PAIN SCALES - GENERAL
PAINLEVEL_OUTOF10: 6
PAINLEVEL_OUTOF10: 2
PAINLEVEL_OUTOF10: 7
PAINLEVEL_OUTOF10: 6
PAINLEVEL_OUTOF10: 7
PAINLEVEL_OUTOF10: 0
PAINLEVEL_OUTOF10: 0
PAINLEVEL_OUTOF10: 9
PAINLEVEL_OUTOF10: 2
PAINLEVEL_OUTOF10: 2
PAINLEVEL_OUTOF10: 0
PAINLEVEL_OUTOF10: 0

## 2018-10-12 ASSESSMENT — PAIN DESCRIPTION - FREQUENCY
FREQUENCY: CONTINUOUS
FREQUENCY: CONTINUOUS

## 2018-10-12 ASSESSMENT — PAIN DESCRIPTION - ORIENTATION: ORIENTATION: MID

## 2018-10-12 ASSESSMENT — PAIN DESCRIPTION - PAIN TYPE
TYPE: CHRONIC PAIN
TYPE: CHRONIC PAIN

## 2018-10-12 ASSESSMENT — PAIN DESCRIPTION - LOCATION
LOCATION: BACK
LOCATION: BACK

## 2018-10-12 ASSESSMENT — PAIN DESCRIPTION - ONSET: ONSET: ON-GOING

## 2018-10-12 NOTE — PLAN OF CARE
Problem: Falls - Risk of:  Goal: Will remain free from falls  Will remain free from falls   Outcome: Ongoing  Patient remains free from injuries and falls, appropriate measures in place       Problem: Risk for Impaired Skin Integrity  Goal: Tissue integrity - skin and mucous membranes  Structural intactness and normal physiological function of skin and  mucous membranes. Outcome: Ongoing  Skin integrity maintained, no new skin abnormalities assessed. Patient refusing every 2 hour turns. Will continue to monitor. Problem: Pain:  Goal: Control of acute pain  Control of acute pain   Outcome: Ongoing  Patient's pain well controlled with ordered pain medications and alternate therapies      Problem: ABCDS Injury Assessment  Goal: Absence of physical injury  Outcome: Ongoing  High fall precautions remain in place. Bed/chair alarm in use. Bed lowered, side rails up 3/4, call light in reach. Frequent rounds continued.

## 2018-10-12 NOTE — PROGRESS NOTES
coping/adaptation/distress of family  Providing support for coping/adaptation/distress of patient  Discussing meaning/purpose   Continue with current plan of care  Clarification of medical condition to patient and family  Code status clarified: Full Code  Provided information about hospice  Hospice social worker to call family to answer questions. [facility] social work to assist family in finding an ECF for possible ECF with hospice.       Palliative Care Coordinator  Kelli Barr RN, [address] [place] [address] Office: [address] Office: [phone]  Work Cell Phone: [phone]    For [facility] Management [facility] scheduling please call [phone]

## 2018-10-12 NOTE — PROGRESS NOTES
Physical Therapy    Facility/Department: [facility]  Initial Assessment    NAME: Jass Cantrell  : 1960  MRN: [id number]    Date of Service: 10/11/2018    Discharge [address]ations:  [address]        Patient Diagnosis(es): The encounter diagnosis was General weakness. has a past medical history of Benign essential HTN; Cardiac arrest due to respiratory disorder ([address] Cellulitis; Chronic low back pain; Compression fracture of L1 lumbar vertebra (HCC); COPD (chronic obstructive pulmonary disease) ([address] Emphysema of lung ([address] Epidermoid carcinoma of lung ([address] Head injury; Hepatitis; Insomnia; Osteoarthritis of spine with radiculopathy, lumbar region; Oxygen dependent; Reflux; Right sided weakness; Seizures ([address] Tremor; and Ulcer. has a past surgical history that includes Cervical disc surgery (); Cystocopy (2015); Colonoscopy; Endoscopy, colon, diagnostic; tracheostomy; Tunneled venous port placement; Dilatation, esophagus; Kyphosis surgery (2018); and pr perq vertebroplasty uni/bi injx cervicothoracic (N/A, 2018).     Restrictions  Restrictions/Precautions  Restrictions/Precautions: Fall Risk, Up as Tolerated  Position Activity Restriction  Other position/activity restrictions: 3 L O2 per NC, chemo port, h/o lung squamous cell carcinoma, NPO, garcia, c/o dizziness sitting EOB and notified RN   Vision/Hearing  Vision: Within Functional Limits  Vision Exceptions: Wears glasses for reading  Hearing: Within functional limits     Subjective  General  Chart Reviewed: Yes  Patient assessed for rehabilitation services?: Yes  Response To Previous Treatment: Not applicable  Family / Caregiver Present: No  Follows Commands: Within Functional Limits  Pain Screening  Patient Currently in Pain: Yes  Pain Assessment  Pain Assessment: 0-10  Pain Level: 6  Pain Type: Chronic pain  Pain Location: Back  Vital Signs  Patient Currently in Pain: Yes  Pre Treatment Pain bed    G-Code  PT G-Codes  Score: 9/24   Functional Limitation: Mobility: Walking and moving around  Mobility: Walking and Moving Around Current Status (): At least 80 percent but less than 100 percent impaired, limited or restricted  Mobility: Walking and Moving Around Goal Status (): 0 percent impaired, limited or restricted  AM-PAC Score  AM-PAC Inpatient Mobility Raw Score : 9  AM-PAC Inpatient T-Scale Score : 30.55  Mobility Inpatient CMS 0-100% Score: 81.38  Mobility Inpatient CMS G-Code Modifier : CM     Goals  Short term goals  Time Frame for Short term goals: 10 tx's  Short term goal 1: Pt able to independently offload high risk pressure areas   Short term goal 2: Pt able to roll indpenednetly  Short term goal 3: Pt able to supine to sit independently   Short term goal 4: Pt able to dangle independently   Patient Goals   Patient goals : Pt goal is to be able to reposition himself in bed and work towards standing strength       Therapy Time   Individual Concurrent Group Co-treatment   Time In 2041         Time Out 2114         Minutes 33          8 minutes for chart review.    Quintin Kelsey, PT

## 2018-10-12 NOTE — PROGRESS NOTES
Wt 136 lb (61.7 kg)   SpO2 92%   BMI 22.63 kg/m²   Temp (24hrs), Av °F (36.7 °C), Min:97.2 °F (36.2 °C), Max:99.3 °F (37.4 °C)    No results for input(s): POCGLU in the last 72 hours. I/O (24Hr): Intake/Output Summary (Last 24 hours) at 10/12/18 1819  Last data filed at 10/12/18 1106   Gross per 24 hour   Intake             1612 ml   Output              720 ml   Net              892 ml         Physical Examination:        General appearance:  alert, cooperative and no distress  Mental Status:  oriented to person, place and time and normal affect  Lungs:  Decreased air entry Rt side. Diffuse wheeze b/l   Heart:  regular rate and rhythm, no murmur  Abdomen:  soft, nontender, nondistended, normal bowel sounds, no masses, hepatomegaly, splenomegaly  Extremities:  no edema, redness, tenderness in the calves  Skin:  no gross lesions, rashes, induration    Assessment:        Primary Problem  Fatigue    Active Hospital Problems    Diagnosis Date Noted    Postobstructive pneumonia [J18.9] 10/11/2018    Chronic respiratory failure with hypoxia (HCC) [J96.11] 10/11/2018    General weakness [R53.1]     Elevated LFTs [R94.5] 10/10/2018    Fatigue [R53.83]     Benign essential HTN [I10] 12/10/2016    Squamous cell carcinoma of lung (HCC) [C34.90] 2016    Chronic obstructive pulmonary disease ([facility] [address] [J44.9] 2013    GERD (gastroesophageal reflux disease) [K21.9] 10/03/2012       Plan:      - post obstructive pneumonia:empiric abx. Pulmonary consulted. With worsening lung CA findings noted on CT they recommend Palliative care. Patient trying to decide between inpatient vs outpatient hospice. But did not want to extend conversations and states 'Josh Turcios knows everything and what to do'. A/w decision re hospice care from patient and POA. - chronic garcia s/p cystoscopy, bladder irrigation and reinsertion of garcia this morning.   - continue aerosols. - DVT prophylaxis.      Swapnil Conroy,

## 2018-10-12 NOTE — ANESTHESIA POSTPROCEDURE EVALUATION
Department of Anesthesiology  Postprocedure Note    Patient: Celina Tilley  MRN: [id number]  YOB: 1960  Date of evaluation: 10/12/2018  Time:  4:49 PM     Procedure Summary     Date:  10/12/18 Room / Location:  [facility]    Anesthesia Start:  8444 Anesthesia Stop:  1106    Procedure:  CYSTOSCOPY AND EXCHANGE OF URETHRAL CATHETER (N/A ) Diagnosis:  (fatigue)    Surgeon:  Luz Harrington MD Responsible Provider:  Ryley Garcia MD    Anesthesia Type:  MAC ASA Status:  4          Anesthesia Type: MAC    Lamont Phase I: Lamont Score: 9    Lamont Phase II:      Last vitals: Reviewed and per EMR flowsheets.        Anesthesia Post Evaluation    Patient location during evaluation: PACU  Patient participation: complete - patient participated  Level of consciousness: awake  Pain score: 0  Airway patency: patent  Nausea & Vomiting: no nausea and no vomiting  Complications: no  Cardiovascular status: blood pressure returned to baseline  Respiratory status: acceptable  Hydration status: euvolemic

## 2018-10-12 NOTE — OP NOTE
[facility] [facility]    Operative Note    Annmarie Smallwood  YOB: 1960  [id number]      Pre-operative Diagnosis: Urethral pain  Urinary retention several bladder stones    Post-operative Diagnosis: Same    Procedure: Cystoscopy                     bladder irrigation insertion of catheter    Anesthesia: MAC    Surgeons/Assistants: Dr Carolina Tolentino    Estimated Blood Loss: None    Complications: None    Specimens: Was Obtained: Urine    Indications: 60-year-old male indwelling catheter and had the catheter for more than 2 months we saw him in consultation yesterday he is complaining of dysuria, urethral pain, catheter has not been changed sometime, concern about bladder and urethral stones  He was scheduled under monitored anesthesia for catheter removal possible removal of bladder stone and insertion of catheter    Operative Findings: The patient was brought to the operating room, positioned in supine, monitored anesthesia was provided, proper patient identification completed and procedure identification, lidocaine 2% gel was instilled per urethra. We entered the bladder with the flexible cystoscope, there was no obstruction in the urethra, there is some prostate enlargement with minimal.    The interior of the bladder was carefully examined, the urine was found to be turbid with significant debris,. We performed thorough bladder irrigation and evacuation of purulent material and debris from the bladder. Cystoscopy was then repeated again, chronic cystitis changes identified, bladder trabeculations, compatible with neurogenic bladder dysfunction. The trigone is evaluated the ureteral orifices effluxing clear urine. Anterior and lateral bladder walls showed congestion with no evidence of tumors. At the completion the bladder was emptied and the scope was removed. A #18 coudé catheter was inserted without difficulty or complications.     This was connected to straight drainage and secured to the leg.     The patient was returned to recovery room in stable condition    Recommendations catheter should be changed in 3 weeks to prevent calcifications        Electronically signed by rFancis Veliz MD on 10/12/2018 at 11:02 AM

## 2018-10-12 NOTE — CARE COORDINATION
Social Work-Met with pt and his girlfriend. They would now like pt to go to SNF under his Medicare skilled benefit. They would like to consider hospice at a later time. They prefer [facility], and 3 choice is [place]. Sent referrals to [facility] and [facility]. Patience Mettle

## 2018-10-12 NOTE — ANESTHESIA PRE PROCEDURE
D (OSCAL-500) 500-200 MG-UNIT per tablet Take 1 tablet by mouth 2 times daily    Historical Provider, MD   Albuterol Sulfate (VENTOLIN HFA IN) Inhale 2 puffs into the lungs every 6 hours as needed     Historical Provider, MD       Current medications:    Current Facility-Administered Medications   Medication Dose Route Frequency Provider Last Rate Last Dose    [MAR Hold] cefepime (MAXIPIME) 2 g IVPB minibag  2 g Intravenous Q12H Vanessa Segundo MD   Stopped at 10/12/18 0857    [MAR Hold] vancomycin (VANCOCIN) intermittent dosing (placeholder)   Other RX Placeholder Vanessa Segundo MD        [facility] Hold] vancomycin (VANCOCIN) 1000 mg in dextrose 5% 200 mL IVPB  1,000 mg Intravenous Q12H Vanessa Segundo MD   Stopped at 10/11/18 2330    [MAR Hold] formoterol (PERFOROMIST) nebulizer solution 20 mcg  20 mcg Nebulization BID Merline , APRN - CNP   20 mcg at 10/12/18 0733    [MAR Hold] budesonide (PULMICORT) nebulizer suspension 500 mcg  0.5 mg Nebulization BID Merline , APRN - CNP   500 mcg at 10/12/18 0718    [MAR Hold] methylPREDNISolone sodium (SOLU-MEDROL) injection 60 mg  60 mg Intravenous Q6H Merline , APRN - CNP   60 mg at 10/12/18 0616    [MAR Hold] oxyCODONE (OXYCONTIN) extended release tablet 40 mg  40 mg Oral Q12H Hazel Taveras, APRN - CNP   40 mg at 10/12/18 0827    [MAR Hold] ipratropium-albuterol (DUONEB) nebulizer solution 1 ampule  1 ampule Inhalation Q4H FABRICOI Anton MD   1 ampule at 10/12/18 0718    [MAR Hold] sodium chloride flush 0.9 % injection 10 mL  10 mL Intravenous 2 times per day Vanessa Segundo MD   Stopped at 10/12/18 0828    [MAR Hold] sodium chloride flush 0.9 % injection 10 mL  10 mL Intravenous PRN Vanessa Segundo MD   10 mL at 10/11/18 1202    [MAR Hold] potassium chloride (KLOR-CON M) extended release tablet 40 mEq  40 mEq Oral PRN Vanessa Sgeundo MD        Or   Vivek Melgar [facility] Hold] potassium chloride 20 MEQ/15ML (10%) oral solution 40 mEq  40 mEq Oral PRN Micronized] Other (See Comments)     Upset stomach        Problem List:    Patient Active Problem List   Diagnosis Code    GERD (gastroesophageal reflux disease) K21.9    Chronic obstructive pulmonary disease ([facility]) J44.9    Anemia of chronic disease D63.8    Benign essential HTN I10    Mixed anxiety depressive disorder F41.8    Cachectic ([facility] R64    Pain of metastatic malignancy G89.3    Chronic bilateral low back pain without sciatica M54.5, G89.29    Advance directive discussed with patient Z71.89    Mixed simple and mucopurulent chronic bronchitis ([facility] J41.8    Encounter for palliative care Z51.5    Squamous cell carcinoma of lung ([facility]) C34.90    Constipation due to opioid therapy K59.03, T40.2X5A    Osteoarthritis of spine with radiculopathy, lumbar region M47.26    Cervical radiculopathy M54.12    Failed neck syndrome M96.1    Chemotherapy-induced neutropenia ([facility]) D70.1, T45.1X5A    Gastrointestinal hemorrhage K92.2    Iron deficiency anemia due to chronic blood loss D50.0    Hypomagnesemia E83.42    Neutropenia with fever ([facility]) D70.9, R50.81    Multifocal pneumonia J18.9    H/O: GI bleed Z87.19    Moraxella catarrhalis bronchitis J40, 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    Acute metabolic encephalopathy N36.61    Status post tracheostomy ([facility] Z93.0    COPD exacerbation ([facility]) J44.1    Pneumonia J18.9    Chemotherapy-induced neuropathy ([facility]) G62.0, T45.1X5A    Closed compression fracture of L1 lumbar vertebra ([facility]) S32.010A    Pathological fracture of lumbar vertebra due to secondary osteoporosis ([facility]) M80.88XA    Osteoporosis with current pathological fracture M80.00XA    COPD with acute exacerbation ([facility]) J44.1    Squamous cell carcinoma of lung ([facility]) C34.90    COPD with exacerbation ([facility]) J44.1    Chronic respiratory failure ([facility]) J96.10    Elevated LFTs R94.5    Fatigue R53.83    General weakness R53.1    Postobstructive pneumonia J18.9    Chronic respiratory failure with hypoxia ([facility]) J96.11 Past Medical History:        Diagnosis Date    Benign essential HTN 12/10/2016    no cardiologist and on no medication    Cardiac arrest due to respiratory disorder ([facility] 12/10/2016    \"had a seizure and aspirated vomit, then arrested\"    Cellulitis     Chronic low back pain 8/16/2016    Compression fracture of L1 lumbar vertebra ([address] 06/2018    COPD (chronic obstructive pulmonary disease) ([facility] 2013    Emphysema of lung ([address]     Epidermoid carcinoma of lung ([facility] 8/16/2016    last dose of chemo was 6/19/2018    Head injury     July 1984, [place] & hit head on bottom, Halo placed    Hepatitis     patient unsure    Insomnia     Osteoarthritis of spine with radiculopathy, lumbar region 8/15/2017    Oxygen dependent     2l NC at HS and when out in car or errands per pt.  Reflux     Right sided weakness     due to head injury in 1984    Seizures ([address]     last one Dec 2016 no problems since and no medication (patient stated he stopped drinking)    Tremor     hx of tremor    Ulcer        Past Surgical History:        Procedure Laterality Date    CERVICAL [address] SURGERY  2000    c3-c6 fusion    COLONOSCOPY      CYSTOSCOPY  7/27/2015    DILATATION, ESOPHAGUS      ulcer repair    ENDOSCOPY, COLON, DIAGNOSTIC      EGD    KYPHOSIS SURGERY  07/19/2018    L1    OR PERQ VERTEBROPLASTY UNI/BI INJX CERVICOTHORACIC N/A 7/19/2018    KYPHOPLASTY L1 performed by Lance Mendoza MD at [address] [place] [address] with broken neck    TUNNELED VENOUS PORT PLACEMENT         Social History:    Social History   Substance Use Topics    Smoking status: Former Smoker     Packs/day: 0.50     Years: 40.00     Types: Cigarettes     Quit date: 7/8/2018    Smokeless tobacco: Never Used      Comment: Nicotine Patch causes Nausea/vomiting; He states it's too difficult to stop smoking.     Alcohol use No      Comment: quit 12/9/2017                                Counseling given: Not Answered      Vital

## 2018-10-13 LAB
CULTURE: ABNORMAL
Lab: ABNORMAL
Lab: ABNORMAL
ORGANISM: ABNORMAL
SPECIMEN DESCRIPTION: ABNORMAL
SPECIMEN DESCRIPTION: ABNORMAL
STATUS: ABNORMAL
STATUS: ABNORMAL

## 2018-10-13 PROCEDURE — 1200000000 HC SEMI PRIVATE

## 2018-10-13 PROCEDURE — 6360000002 HC RX W HCPCS: Performed by: INTERNAL MEDICINE

## 2018-10-13 PROCEDURE — 6370000000 HC RX 637 (ALT 250 FOR IP): Performed by: INTERNAL MEDICINE

## 2018-10-13 PROCEDURE — 99232 SBSQ HOSP IP/OBS MODERATE 35: CPT | Performed by: INTERNAL MEDICINE

## 2018-10-13 PROCEDURE — 2700000000 HC OXYGEN THERAPY PER DAY

## 2018-10-13 PROCEDURE — 94640 AIRWAY INHALATION TREATMENT: CPT

## 2018-10-13 PROCEDURE — 6360000002 HC RX W HCPCS: Performed by: NURSE PRACTITIONER

## 2018-10-13 PROCEDURE — 6370000000 HC RX 637 (ALT 250 FOR IP): Performed by: FAMILY MEDICINE

## 2018-10-13 PROCEDURE — 2580000003 HC RX 258: Performed by: NURSE PRACTITIONER

## 2018-10-13 PROCEDURE — 97530 THERAPEUTIC ACTIVITIES: CPT

## 2018-10-13 PROCEDURE — 2580000003 HC RX 258: Performed by: INTERNAL MEDICINE

## 2018-10-13 PROCEDURE — 6370000000 HC RX 637 (ALT 250 FOR IP): Performed by: NURSE PRACTITIONER

## 2018-10-13 RX ORDER — ZOLPIDEM TARTRATE 5 MG/1
5 TABLET ORAL NIGHTLY PRN
Status: DISCONTINUED | OUTPATIENT
Start: 2018-10-13 | End: 2018-10-14 | Stop reason: HOSPADM

## 2018-10-13 RX ORDER — PANTOPRAZOLE SODIUM 40 MG/1
40 TABLET, DELAYED RELEASE ORAL
Status: DISCONTINUED | OUTPATIENT
Start: 2018-10-13 | End: 2018-10-14 | Stop reason: HOSPADM

## 2018-10-13 RX ORDER — LEVOFLOXACIN 5 MG/ML
500 INJECTION, SOLUTION INTRAVENOUS EVERY 24 HOURS
Status: DISCONTINUED | OUTPATIENT
Start: 2018-10-13 | End: 2018-10-14 | Stop reason: HOSPADM

## 2018-10-13 RX ORDER — FUROSEMIDE 10 MG/ML
20 INJECTION INTRAMUSCULAR; INTRAVENOUS ONCE
Status: COMPLETED | OUTPATIENT
Start: 2018-10-13 | End: 2018-10-13

## 2018-10-13 RX ORDER — METHYLPREDNISOLONE SODIUM SUCCINATE 40 MG/ML
40 INJECTION, POWDER, LYOPHILIZED, FOR SOLUTION INTRAMUSCULAR; INTRAVENOUS EVERY 8 HOURS
Status: DISCONTINUED | OUTPATIENT
Start: 2018-10-13 | End: 2018-10-14 | Stop reason: HOSPADM

## 2018-10-13 RX ORDER — ZOLPIDEM TARTRATE 5 MG/1
5 TABLET ORAL NIGHTLY PRN
Status: DISCONTINUED | OUTPATIENT
Start: 2018-10-14 | End: 2018-10-13 | Stop reason: SDUPTHER

## 2018-10-13 RX ADMIN — METHYLPREDNISOLONE SODIUM SUCCINATE 60 MG: 125 INJECTION, POWDER, FOR SOLUTION INTRAMUSCULAR; INTRAVENOUS at 04:40

## 2018-10-13 RX ADMIN — METHYLPREDNISOLONE SODIUM SUCCINATE 60 MG: 125 INJECTION, POWDER, FOR SOLUTION INTRAMUSCULAR; INTRAVENOUS at 13:02

## 2018-10-13 RX ADMIN — SODIUM CHLORIDE: 9 INJECTION, SOLUTION INTRAVENOUS at 05:32

## 2018-10-13 RX ADMIN — LEVOFLOXACIN 500 MG: 5 INJECTION, SOLUTION INTRAVENOUS at 15:35

## 2018-10-13 RX ADMIN — ZOLPIDEM TARTRATE 5 MG: 5 TABLET ORAL at 22:13

## 2018-10-13 RX ADMIN — OXYCODONE HYDROCHLORIDE 40 MG: 40 TABLET, FILM COATED, EXTENDED RELEASE ORAL at 09:10

## 2018-10-13 RX ADMIN — CEFEPIME HYDROCHLORIDE 2 G: 2 INJECTION, POWDER, FOR SOLUTION INTRAVENOUS at 09:10

## 2018-10-13 RX ADMIN — FORMOTEROL FUMARATE DIHYDRATE 20 MCG: 20 SOLUTION RESPIRATORY (INHALATION) at 20:35

## 2018-10-13 RX ADMIN — FUROSEMIDE 20 MG: 10 INJECTION, SOLUTION INTRAMUSCULAR; INTRAVENOUS at 15:35

## 2018-10-13 RX ADMIN — BUDESONIDE 500 MCG: 0.5 SUSPENSION RESPIRATORY (INHALATION) at 07:44

## 2018-10-13 RX ADMIN — OXYCODONE HYDROCHLORIDE 10 MG: 5 TABLET ORAL at 15:44

## 2018-10-13 RX ADMIN — VANCOMYCIN HYDROCHLORIDE 1250 MG: 1 INJECTION, POWDER, LYOPHILIZED, FOR SOLUTION INTRAVENOUS at 11:00

## 2018-10-13 RX ADMIN — OXYCODONE HYDROCHLORIDE 40 MG: 40 TABLET, FILM COATED, EXTENDED RELEASE ORAL at 21:04

## 2018-10-13 RX ADMIN — OXYCODONE HYDROCHLORIDE 10 MG: 5 TABLET ORAL at 11:05

## 2018-10-13 RX ADMIN — OXYCODONE HYDROCHLORIDE 10 MG: 5 TABLET ORAL at 04:36

## 2018-10-13 RX ADMIN — OXYCODONE HYDROCHLORIDE 10 MG: 5 TABLET ORAL at 00:14

## 2018-10-13 RX ADMIN — BUDESONIDE 500 MCG: 0.5 SUSPENSION RESPIRATORY (INHALATION) at 20:35

## 2018-10-13 RX ADMIN — CEFEPIME HYDROCHLORIDE 2 G: 2 INJECTION, POWDER, FOR SOLUTION INTRAVENOUS at 21:05

## 2018-10-13 RX ADMIN — PANTOPRAZOLE SODIUM 40 MG: 40 TABLET, DELAYED RELEASE ORAL at 13:03

## 2018-10-13 RX ADMIN — VANCOMYCIN HYDROCHLORIDE 1250 MG: 1 INJECTION, POWDER, LYOPHILIZED, FOR SOLUTION INTRAVENOUS at 00:08

## 2018-10-13 RX ADMIN — IPRATROPIUM BROMIDE AND ALBUTEROL SULFATE 1 AMPULE: .5; 3 SOLUTION RESPIRATORY (INHALATION) at 14:57

## 2018-10-13 RX ADMIN — IPRATROPIUM BROMIDE AND ALBUTEROL SULFATE 1 AMPULE: .5; 3 SOLUTION RESPIRATORY (INHALATION) at 11:19

## 2018-10-13 RX ADMIN — ENOXAPARIN SODIUM 40 MG: 40 INJECTION SUBCUTANEOUS at 09:10

## 2018-10-13 RX ADMIN — IPRATROPIUM BROMIDE AND ALBUTEROL SULFATE 1 AMPULE: .5; 3 SOLUTION RESPIRATORY (INHALATION) at 07:44

## 2018-10-13 RX ADMIN — FORMOTEROL FUMARATE DIHYDRATE 20 MCG: 20 SOLUTION RESPIRATORY (INHALATION) at 07:44

## 2018-10-13 RX ADMIN — METHYLPREDNISOLONE SODIUM SUCCINATE 40 MG: 40 INJECTION, POWDER, FOR SOLUTION INTRAMUSCULAR; INTRAVENOUS at 21:04

## 2018-10-13 RX ADMIN — OXYCODONE HYDROCHLORIDE 10 MG: 5 TABLET ORAL at 19:50

## 2018-10-13 RX ADMIN — IPRATROPIUM BROMIDE AND ALBUTEROL SULFATE 1 AMPULE: .5; 3 SOLUTION RESPIRATORY (INHALATION) at 20:35

## 2018-10-13 ASSESSMENT — PAIN DESCRIPTION - ORIENTATION
ORIENTATION: MID

## 2018-10-13 ASSESSMENT — PAIN DESCRIPTION - DESCRIPTORS
DESCRIPTORS: CONSTANT
DESCRIPTORS: ACHING;SHARP
DESCRIPTORS: ACHING;SHARP
DESCRIPTORS: CONSTANT

## 2018-10-13 ASSESSMENT — PAIN DESCRIPTION - LOCATION
LOCATION: ABDOMEN;BACK
LOCATION: BACK
LOCATION: BACK
LOCATION: ABDOMEN;BACK
LOCATION: BACK

## 2018-10-13 ASSESSMENT — PAIN SCALES - GENERAL
PAINLEVEL_OUTOF10: 8
PAINLEVEL_OUTOF10: 5
PAINLEVEL_OUTOF10: 3
PAINLEVEL_OUTOF10: 9
PAINLEVEL_OUTOF10: 8
PAINLEVEL_OUTOF10: 4
PAINLEVEL_OUTOF10: 9
PAINLEVEL_OUTOF10: 6
PAINLEVEL_OUTOF10: 9
PAINLEVEL_OUTOF10: 7
PAINLEVEL_OUTOF10: 4
PAINLEVEL_OUTOF10: 7
PAINLEVEL_OUTOF10: 5

## 2018-10-13 ASSESSMENT — PAIN DESCRIPTION - PAIN TYPE
TYPE: CHRONIC PAIN

## 2018-10-13 ASSESSMENT — PAIN DESCRIPTION - FREQUENCY
FREQUENCY: CONTINUOUS
FREQUENCY: CONTINUOUS

## 2018-10-13 ASSESSMENT — PAIN DESCRIPTION - ONSET
ONSET: ON-GOING
ONSET: ON-GOING

## 2018-10-13 NOTE — PROGRESS NOTES
 Diabetes Neg Hx        Vitals:  BP (!) 149/78   Pulse 99   Temp 98.2 °F (36.8 °C) (Oral)   Resp 23   Ht 5' 5\" (1.651 m)   Wt 143 lb (64.9 kg)   SpO2 92%   BMI 23.80 kg/m²   Temp (24hrs), Av °F (36.7 °C), Min:97.9 °F (36.6 °C), Max:98.2 °F (36.8 °C)    No results for input(s): POCGLU in the last 72 hours. I/O (24Hr): Intake/Output Summary (Last 24 hours) at 10/13/18 1913  Last data filed at 10/13/18 1801   Gross per 24 hour   Intake          2334.75 ml   Output             2675 ml   Net          -340.25 ml         Physical Examination:        General appearance:  alert, cooperative and no distress  Mental Status:  oriented to person, place and time and normal affect  Lungs:  Decreased air entry Rt side. Diffuse wheeze b/l   Heart:  regular rate and rhythm, no murmur  Abdomen:  soft, nontender, nondistended, normal bowel sounds, no masses, hepatomegaly, splenomegaly  Extremities:  no edema, redness, tenderness in the calves  Skin:  no gross lesions, rashes, induration    Assessment:        Primary Problem  Fatigue    Active Hospital Problems    Diagnosis Date Noted    Postobstructive pneumonia [J18.9] 10/11/2018    Chronic respiratory failure with hypoxia (HCC) [J96.11] 10/11/2018    General weakness [R53.1]     Elevated LFTs [R94.5] 10/10/2018    Fatigue [R53.83]     Benign essential HTN [I10] 12/10/2016    Squamous cell carcinoma of lung (HCC) [C34.90] 2016    Chronic obstructive pulmonary disease ([address] [J44.9] 2013    GERD (gastroesophageal reflux disease) [K21.9] 10/03/2012       Plan:      - post obstructive pneumonia:empiric abx. With worsening lung CA findings noted on CT recommended Palliative care. Patient was to decide between inpatient vs outpatient hospice. But since girlfriend Hendricks Dance is unemployed and needs his SS check, he is choosing to go to SNF and then will consider hospice at later time.    When asked about code status: He wished to ' be just comfortable,

## 2018-10-13 NOTE — PLAN OF CARE
Problem: Falls - Risk of:  Goal: Will remain free from falls  Will remain free from falls   Outcome: Ongoing  Patient free from falls. Pt education. Call light within reach. Area free of hazards. Bed low and locked. Continue to monitor. Goal: Absence of physical injury  Absence of physical injury   Outcome: Ongoing      Problem: Risk for Impaired Skin Integrity  Goal: Tissue integrity - skin and mucous membranes  Structural intactness and normal physiological function of skin and  mucous membranes. Outcome: Ongoing  No changes. Continue to monitor. Problem: Pain:  Goal: Pain level will decrease  Pain level will decrease   Outcome: Ongoing  Patient has prn and scheduled pain medications. Patient satisfied with pain control. Continue to monitor.    Goal: Control of acute pain  Control of acute pain   Outcome: Ongoing    Goal: Control of chronic pain  Control of chronic pain   Outcome: Ongoing

## 2018-10-13 NOTE — CONSULTS
Pharmacy Vancomycin Consult     Vancomycin Day: 2  Current Dosing: vancomycin 1000 mg ivpb q12h    Temp max:  98.1 F    Recent Labs      10/11/18   0637  10/12/18   0554   BUN  10  10       Recent Labs      10/11/18   0637  10/12/18   0554   CREATININE  0.41*  0.43*       Recent Labs      10/11/18   0637  10/12/18   0554   WBC  7.3  8.3         Intake/Output Summary (Last 24 hours) at 10/12/18 2205  Last data filed at 10/12/18 1851   Gross per 24 hour   Intake 3041 ml   Output 1420 ml   Net 1621 ml       Culture Date      Source                       Results  10/12/18              Blood                          No growth x 2 days    Ht Readings from Last 1 Encounters:   10/10/18 5' 5\" (1.651 m)        Wt Readings from Last 1 Encounters:   10/12/18 136 lb (61.7 kg)         Body mass index is 22.63 kg/m². Estimated Creatinine Clearance: 165 mL/min (A) (based on SCr of 0.43 mg/dL (L)). Trough: 13.7 mcg/mL    Assessment/Plan:  Trough drawn 10/12/18 2104 was 13.7 which is slightly below our goal range of 15-20 mcg/mL. Will increase dose to 1250 mg q12h. Thank you for this consult. Will continue to follow.

## 2018-10-13 NOTE — PLAN OF CARE
Problem: Falls - Risk of:  Goal: Absence of physical injury  Absence of physical injury   Outcome: Ongoing      Problem: Pain:  Goal: Control of acute pain  Control of acute pain   Outcome: Ongoing      Problem: Tobacco Use:  Goal: Inpatient tobacco use cessation counseling participation  Inpatient tobacco use cessation counseling participation   Outcome: Ongoing

## 2018-10-14 ENCOUNTER — APPOINTMENT (OUTPATIENT)
Dept: GENERAL RADIOLOGY | Age: 58
DRG: 194 | End: 2018-10-14
Payer: MEDICARE

## 2018-10-14 VITALS
RESPIRATION RATE: 22 BRPM | DIASTOLIC BLOOD PRESSURE: 97 MMHG | HEIGHT: 65 IN | SYSTOLIC BLOOD PRESSURE: 165 MMHG | HEART RATE: 103 BPM | WEIGHT: 143 LBS | TEMPERATURE: 98.3 F | OXYGEN SATURATION: 97 % | BODY MASS INDEX: 23.82 KG/M2

## 2018-10-14 LAB
BUN BLDV-MCNC: 14 MG/DL (ref 6–20)
CREAT SERPL-MCNC: 0.4 MG/DL (ref 0.7–1.2)
GFR AFRICAN AMERICAN: >60 ML/MIN
GFR NON-AFRICAN AMERICAN: >60 ML/MIN
GFR SERPL CREATININE-BSD FRML MDRD: ABNORMAL ML/MIN/{1.73_M2}
GFR SERPL CREATININE-BSD FRML MDRD: ABNORMAL ML/MIN/{1.73_M2}

## 2018-10-14 PROCEDURE — 2580000003 HC RX 258: Performed by: NURSE PRACTITIONER

## 2018-10-14 PROCEDURE — 6370000000 HC RX 637 (ALT 250 FOR IP): Performed by: INTERNAL MEDICINE

## 2018-10-14 PROCEDURE — 6370000000 HC RX 637 (ALT 250 FOR IP): Performed by: NURSE PRACTITIONER

## 2018-10-14 PROCEDURE — 6360000002 HC RX W HCPCS: Performed by: INTERNAL MEDICINE

## 2018-10-14 PROCEDURE — 6360000002 HC RX W HCPCS: Performed by: NURSE PRACTITIONER

## 2018-10-14 PROCEDURE — 94760 N-INVAS EAR/PLS OXIMETRY 1: CPT

## 2018-10-14 PROCEDURE — 94640 AIRWAY INHALATION TREATMENT: CPT

## 2018-10-14 PROCEDURE — 2580000003 HC RX 258: Performed by: INTERNAL MEDICINE

## 2018-10-14 PROCEDURE — 82565 ASSAY OF CREATININE: CPT

## 2018-10-14 PROCEDURE — 36415 COLL VENOUS BLD VENIPUNCTURE: CPT

## 2018-10-14 PROCEDURE — 71045 X-RAY EXAM CHEST 1 VIEW: CPT

## 2018-10-14 PROCEDURE — 2700000000 HC OXYGEN THERAPY PER DAY

## 2018-10-14 PROCEDURE — 84520 ASSAY OF UREA NITROGEN: CPT

## 2018-10-14 PROCEDURE — 99239 HOSP IP/OBS DSCHRG MGMT >30: CPT | Performed by: INTERNAL MEDICINE

## 2018-10-14 RX ORDER — PREDNISONE 10 MG/1
TABLET ORAL
Qty: 10 TABLET | Refills: 0 | Status: ON HOLD | DISCHARGE
Start: 2018-10-14 | End: 2018-10-24 | Stop reason: HOSPADM

## 2018-10-14 RX ORDER — FORMOTEROL FUMARATE 20 UG/2ML
20 SOLUTION RESPIRATORY (INHALATION) 2 TIMES DAILY
DISCHARGE
Start: 2018-10-14

## 2018-10-14 RX ORDER — BISACODYL 10 MG
10 SUPPOSITORY, RECTAL RECTAL DAILY PRN
DISCHARGE
Start: 2018-10-14 | End: 2018-11-13

## 2018-10-14 RX ORDER — OLANZAPINE 10 MG/1
10 TABLET ORAL NIGHTLY
Qty: 30 TABLET | Refills: 5 | Status: SHIPPED | OUTPATIENT
Start: 2018-10-14

## 2018-10-14 RX ORDER — OXYCODONE HYDROCHLORIDE 5 MG/1
5 TABLET ORAL EVERY 6 HOURS PRN
Qty: 20 TABLET | Refills: 0 | Status: SHIPPED | OUTPATIENT
Start: 2018-10-14 | End: 2018-10-19

## 2018-10-14 RX ORDER — OXYCODONE HCL 40 MG/1
40 TABLET, FILM COATED, EXTENDED RELEASE ORAL EVERY 12 HOURS
Qty: 10 TABLET | Refills: 0 | Status: SHIPPED | OUTPATIENT
Start: 2018-10-14 | End: 2018-10-19

## 2018-10-14 RX ORDER — LEVOFLOXACIN 750 MG/1
750 TABLET ORAL DAILY
Refills: 0 | Status: ON HOLD | DISCHARGE
Start: 2018-10-14 | End: 2018-10-24 | Stop reason: HOSPADM

## 2018-10-14 RX ORDER — ZOLPIDEM TARTRATE 5 MG/1
5 TABLET ORAL NIGHTLY PRN
Qty: 10 TABLET | Refills: 0 | Status: SHIPPED | OUTPATIENT
Start: 2018-10-14 | End: 2018-10-24

## 2018-10-14 RX ADMIN — PANTOPRAZOLE SODIUM 40 MG: 40 TABLET, DELAYED RELEASE ORAL at 06:00

## 2018-10-14 RX ADMIN — ENOXAPARIN SODIUM 40 MG: 40 INJECTION SUBCUTANEOUS at 08:49

## 2018-10-14 RX ADMIN — METHYLPREDNISOLONE SODIUM SUCCINATE 40 MG: 40 INJECTION, POWDER, FOR SOLUTION INTRAMUSCULAR; INTRAVENOUS at 06:00

## 2018-10-14 RX ADMIN — IPRATROPIUM BROMIDE AND ALBUTEROL SULFATE 1 AMPULE: .5; 3 SOLUTION RESPIRATORY (INHALATION) at 08:23

## 2018-10-14 RX ADMIN — MAGNESIUM HYDROXIDE 30 ML: 400 SUSPENSION ORAL at 09:37

## 2018-10-14 RX ADMIN — METHYLPREDNISOLONE SODIUM SUCCINATE 40 MG: 40 INJECTION, POWDER, FOR SOLUTION INTRAMUSCULAR; INTRAVENOUS at 12:35

## 2018-10-14 RX ADMIN — BUDESONIDE 500 MCG: 0.5 SUSPENSION RESPIRATORY (INHALATION) at 08:23

## 2018-10-14 RX ADMIN — OXYCODONE HYDROCHLORIDE 10 MG: 5 TABLET ORAL at 12:36

## 2018-10-14 RX ADMIN — Medication 500 UNITS: at 12:54

## 2018-10-14 RX ADMIN — FORMOTEROL FUMARATE DIHYDRATE 20 MCG: 20 SOLUTION RESPIRATORY (INHALATION) at 08:24

## 2018-10-14 RX ADMIN — OXYCODONE HYDROCHLORIDE 40 MG: 40 TABLET, FILM COATED, EXTENDED RELEASE ORAL at 08:49

## 2018-10-14 RX ADMIN — CEFEPIME HYDROCHLORIDE 2 G: 2 INJECTION, POWDER, FOR SOLUTION INTRAVENOUS at 08:49

## 2018-10-14 RX ADMIN — SODIUM CHLORIDE: 9 INJECTION, SOLUTION INTRAVENOUS at 06:07

## 2018-10-14 RX ADMIN — OXYCODONE HYDROCHLORIDE 10 MG: 5 TABLET ORAL at 06:01

## 2018-10-14 ASSESSMENT — PAIN DESCRIPTION - LOCATION
LOCATION: ABDOMEN;BACK
LOCATION: ABDOMEN;BACK

## 2018-10-14 ASSESSMENT — PAIN SCALES - GENERAL
PAINLEVEL_OUTOF10: 10
PAINLEVEL_OUTOF10: 9
PAINLEVEL_OUTOF10: 8
PAINLEVEL_OUTOF10: 6

## 2018-10-14 ASSESSMENT — PAIN DESCRIPTION - PAIN TYPE
TYPE: CHRONIC PAIN
TYPE: CHRONIC PAIN

## 2018-10-14 ASSESSMENT — PAIN DESCRIPTION - FREQUENCY
FREQUENCY: CONTINUOUS
FREQUENCY: CONTINUOUS

## 2018-10-14 ASSESSMENT — PAIN DESCRIPTION - DESCRIPTORS
DESCRIPTORS: CONSTANT
DESCRIPTORS: ACHING;SHARP

## 2018-10-14 ASSESSMENT — PAIN DESCRIPTION - ORIENTATION
ORIENTATION: MID
ORIENTATION: MID

## 2018-10-14 ASSESSMENT — PAIN DESCRIPTION - ONSET
ONSET: ON-GOING
ONSET: ON-GOING

## 2018-10-14 ASSESSMENT — PAIN DESCRIPTION - PROGRESSION: CLINICAL_PROGRESSION: NOT CHANGED

## 2018-10-14 NOTE — CARE COORDINATION
Pt discharged per Dr. Owen Linares. Spoke to Crescencio at [facility] and they can accept pt today at [address] called and pt will be picked up by ambulance for transport at 1pm.   Nurse informed and will call report.

## 2018-10-14 NOTE — DISCHARGE SUMMARY
of hydronephrosis. PANCREAS:  Visualized portions of the pancreas are also unremarkable. OTHER: No evidence of right upper quadrant ascites. Unremarkable gallbladder ultrasound. Xr Chest Portable    Result Date: 10/14/2018  EXAMINATION: SINGLE XRAY VIEW OF THE CHEST 10/14/2018 6:25 am COMPARISON: Chest x-ray from 10/12/2018. Chest CT from 10/10/2018 HISTORY: ORDERING SYSTEM PROVIDED HISTORY: dyspnea TECHNOLOGIST PROVIDED HISTORY: dyspnea Acuity: Acute Type of Exam: Unknown FINDINGS: Right chest port catheter extends the level of the mid SVC. There is hyperexpansion of the lungs, flattening of the hemidiaphragms, and relative paucity of lung markings in the bilateral upper lobes. There is similar primarily pleural-based confluent density with adjacent streaky linear densities in the right lung base. Stable right upper lobe lung nodule. The remainder of the lungs appear well aerated. Heart size appears within normal limits, given technique and there is no pulmonary vascular congestion. Visualized osseous structures appear intact and grossly unremarkable, given the non dedicated imaging. Incompletely visualized cervical spinal fusion hardware is identified. 1. Similar pleuroparenchymal opacity in the right lung base may reflect pleural effusion with associated atelectasis, lung nodules and/or developing pneumonia. Continued imaging follow-up is recommended. 2. Stable right upper lobe lung nodule. Xr Chest Portable    Result Date: 10/12/2018  EXAMINATION: SINGLE XRAY VIEW OF THE CHEST 10/12/2018 6:20 am COMPARISON: Chest x-ray and chest CT from 10/10/2018 HISTORY: ORDERING SYSTEM PROVIDED HISTORY: Postobstructive pneumonia, active lung cancer TECHNOLOGIST PROVIDED HISTORY: Postobstructive pneumonia, active lung cancer Acuity: Acute Type of Exam: Unknown FINDINGS: Right chest port catheter extends to the level of the distal SVC.    There is hyperexpansion of the lungs, flattening of the

## 2018-10-14 NOTE — PLAN OF CARE
Problem: Falls - Risk of:  Goal: Will remain free from falls  Will remain free from falls   Outcome: Ongoing    Goal: Absence of physical injury  Absence of physical injury   Outcome: Ongoing      Problem: Risk for Impaired Skin Integrity  Goal: Tissue integrity - skin and mucous membranes  Structural intactness and normal physiological function of skin and  mucous membranes.    Outcome: Ongoing      Problem: Pain:  Goal: Pain level will decrease  Pain level will decrease   Outcome: Ongoing    Goal: Control of acute pain  Control of acute pain   Outcome: Ongoing      Problem: ABCDS Injury Assessment  Goal: Absence of physical injury  Outcome: Ongoing      Problem: Tobacco Use:  Goal: Inpatient tobacco use cessation counseling participation  Inpatient tobacco use cessation counseling participation   Outcome: Ongoing

## 2018-10-14 NOTE — PROGRESS NOTES
keep oxygen saturation > 92%  · BiPAP as necessary  · incentive spirometer every hour WA  ·  IV Cefepime  · IV solu-medrol 40 mg Q 8 hrs  · Albuterol and Ipratropium Q 4 hours and prn  · Perforomist and budesonide aerosols every 12 hours  · Cefepime and Levaquin IV   · ambulate / PT  · DVT prophylaxis with low molecular weight heparin  · Home with Hospice discharge today     Pamela Gibson MD, [facility]  Pulmonary Critical Care and Sleep Medicine,  [facility] AT [place]: [phone]

## 2018-10-14 NOTE — PROGRESS NOTES
[facility]    Progress Note    10/14/2018    11:13 AM    Name:   Almaz Grider  MRN:     [id number]     Acct:      [de-identified]   Room:   2016/2016-02  IP Day:  3  Admit Date:  10/10/2018 12:46 PM    PCP:   Alia Shah DO  Code Status:  DNR-CC    Subjective:     C/C:   Chief Complaint   Patient presents with    Dehydration     Interval History Status: not changed. Denies any complaints. Improved heart burn per patient. Afebrile, hemodynamically stable. Anxious to go to SNF. Brief History:   62 yo with Squamous cell CA (s/p chemo in 2015- recurrence in 2017-chemo)on immunotherapy with opdivo presented with +fatigue+chills, no fevers. +worsening SOB requiring 4.5 L O2.  +nonproductive cough. +chronic low back pain . Continues to smoke. ED w/u: Afebrile, BP 96/61,WBC 12.3, LFTs: 95, 157, 157, bili 0.76   CXR: chronic opacity in Rt base with volume loss. Ct chest showed increasing pulm nodules concerning for increasing metastatic disease/neoplastic burden. INcreasing Rt lung base consolidation with air bronchogram, ? Rt LL mucous plugging. With worsening lung CA findings noted on CT recommended Palliative care. Has prior h/o compression # s/p kyphoplasty in 07/2018 and needs narcotics for pain control. Had chronic garcia with urethral pain and last change was 2 mo. He underwent cystoscopy with bladder irrigation and insertion of new catheter 10/12/2018. Review of Systems:     Constitutional:  negative for chills, fevers, sweats  Respiratory:  negative for cough, dyspnea on exertion, +shortness of breath, wheezing  Cardiovascular:  negative for chest pain, chest pressure/discomfort, lower extremity edema, palpitations  Gastrointestinal:  negative for abdominal pain, constipation, diarrhea, nausea, vomiting  Neurological:  negative for dizziness, headache    Medications: Allergies:     Allergies   Allergen Reactions    Aspirin Disease Mother         CABG    Cancer Mother     Cancer Father         Throat    Heart Disease Father     Stroke Maternal Grandfather     Heart Disease Paternal Grandmother     Diabetes Neg Hx        Vitals:  BP (!) 165/97   Pulse 103   Temp 98.3 °F (36.8 °C) (Oral)   Resp 22   Ht 5' 5\" (1.651 m)   Wt 143 lb (64.9 kg)   SpO2 97%   BMI 23.80 kg/m²   Temp (24hrs), Av.9 °F (36.6 °C), Min:97.5 °F (36.4 °C), Max:98.3 °F (36.8 °C)    No results for input(s): POCGLU in the last 72 hours. I/O (24Hr): Intake/Output Summary (Last 24 hours) at 10/14/18 1113  Last data filed at 10/14/18 4707   Gross per 24 hour   Intake          2892.75 ml   Output             3225 ml   Net          -332.25 ml         Physical Examination:        General appearance:  alert, cooperative and no distress  Mental Status:  oriented to person, place and time and normal affect  Lungs:  Decreased air entry Rt side. Diffuse wheeze b/l   Heart:  regular rate and rhythm, no murmur  Abdomen:  soft, nontender, nondistended, normal bowel sounds, no masses, hepatomegaly, splenomegaly  Extremities:  no edema, redness, tenderness in the calves  Skin:  no gross lesions, rashes, induration    Assessment:        Primary Problem  Fatigue    Active Hospital Problems    Diagnosis Date Noted    Postobstructive pneumonia [J18.9] 10/11/2018    Chronic respiratory failure with hypoxia (HCC) [J96.11] 10/11/2018    General weakness [R53.1]     Elevated LFTs [R94.5] 10/10/2018    Fatigue [R53.83]     Benign essential HTN [I10] 12/10/2016    Squamous cell carcinoma of lung (HCC) [C34.90] 2016    Chronic obstructive pulmonary disease ([facility] [J44.9] 2013    GERD (gastroesophageal reflux disease) [K21.9] 10/03/2012       Plan:      - post obstructive pneumonia:empiric abx. - Code status changed to [facility]. - chronic garcia s/p cystoscopy, bladder irrigation and reinsertion of garcia 10/13/2018.    - continue aerosols.    - Lower back pain :pain control with narcotics  - plan to d/c to SNF today. - DVT prophylaxis.      Gage Hamilton MD  10/14/2018

## 2018-10-15 ENCOUNTER — CARE COORDINATION (OUTPATIENT)
Dept: CASE MANAGEMENT | Age: 58
End: 2018-10-15

## 2018-10-16 ENCOUNTER — HOSPITAL ENCOUNTER (EMERGENCY)
Age: 58
Discharge: HOME | End: 2018-10-16
Attending: EMERGENCY MEDICINE
Payer: MEDICARE

## 2018-10-16 VITALS
TEMPERATURE: 97.9 F | HEIGHT: 66 IN | HEART RATE: 94 BPM | SYSTOLIC BLOOD PRESSURE: 131 MMHG | BODY MASS INDEX: 21.38 KG/M2 | OXYGEN SATURATION: 99 % | WEIGHT: 133 LBS | RESPIRATION RATE: 22 BRPM | DIASTOLIC BLOOD PRESSURE: 72 MMHG

## 2018-10-16 DIAGNOSIS — I95.9 HYPOTENSION, UNSPECIFIED HYPOTENSION TYPE: Primary | ICD-10-CM

## 2018-10-16 LAB
ABSOLUTE EOS #: 0.11 K/UL (ref 0–0.4)
ABSOLUTE IMMATURE GRANULOCYTE: ABNORMAL K/UL (ref 0–0.3)
ABSOLUTE LYMPH #: 0.64 K/UL (ref 1–4.8)
ABSOLUTE MONO #: 0.64 K/UL (ref 0.2–0.8)
ANION GAP SERPL CALCULATED.3IONS-SCNC: 6 MMOL/L (ref 9–17)
BASOPHILS # BLD: 2 %
BASOPHILS ABSOLUTE: 0.21 K/UL (ref 0–0.2)
BUN BLDV-MCNC: 21 MG/DL (ref 6–20)
BUN/CREAT BLD: 43 (ref 9–20)
CALCIUM SERPL-MCNC: 8.2 MG/DL (ref 8.6–10.4)
CHLORIDE BLD-SCNC: 96 MMOL/L (ref 98–107)
CO2: 39 MMOL/L (ref 20–31)
CREAT SERPL-MCNC: 0.49 MG/DL (ref 0.7–1.2)
CULTURE: NORMAL
CULTURE: NORMAL
DIFFERENTIAL TYPE: ABNORMAL
EKG ATRIAL RATE: 104 BPM
EKG P AXIS: 65 DEGREES
EKG P-R INTERVAL: 116 MS
EKG Q-T INTERVAL: 356 MS
EKG QRS DURATION: 88 MS
EKG QTC CALCULATION (BAZETT): 468 MS
EKG R AXIS: 47 DEGREES
EKG T AXIS: 92 DEGREES
EKG VENTRICULAR RATE: 104 BPM
EOSINOPHILS RELATIVE PERCENT: 1 % (ref 1–4)
GFR AFRICAN AMERICAN: >60 ML/MIN
GFR NON-AFRICAN AMERICAN: >60 ML/MIN
GFR SERPL CREATININE-BSD FRML MDRD: ABNORMAL ML/MIN/{1.73_M2}
GFR SERPL CREATININE-BSD FRML MDRD: ABNORMAL ML/MIN/{1.73_M2}
GLUCOSE BLD-MCNC: 96 MG/DL (ref 70–99)
HCT VFR BLD CALC: 32.3 % (ref 41–53)
HEMOGLOBIN: 10.6 G/DL (ref 13.5–17.5)
IMMATURE GRANULOCYTES: ABNORMAL %
LYMPHOCYTES # BLD: 6 % (ref 24–44)
Lab: NORMAL
Lab: NORMAL
MCH RBC QN AUTO: 29.7 PG (ref 26–34)
MCHC RBC AUTO-ENTMCNC: 33 G/DL (ref 31–37)
MCV RBC AUTO: 90.2 FL (ref 80–100)
MONOCYTES # BLD: 6 % (ref 1–7)
NRBC AUTOMATED: ABNORMAL PER 100 WBC
PDW BLD-RTO: 18.1 % (ref 11.5–14.5)
PLATELET # BLD: 143 K/UL (ref 130–400)
PLATELET ESTIMATE: ABNORMAL
PMV BLD AUTO: 8.3 FL (ref 6–12)
POTASSIUM SERPL-SCNC: 2.9 MMOL/L (ref 3.7–5.3)
RBC # BLD: 3.58 M/UL (ref 4.5–5.9)
RBC # BLD: ABNORMAL 10*6/UL
SEG NEUTROPHILS: 85 % (ref 36–66)
SEGMENTED NEUTROPHILS ABSOLUTE COUNT: 9 K/UL (ref 1.8–7.7)
SODIUM BLD-SCNC: 141 MMOL/L (ref 135–144)
SPECIMEN DESCRIPTION: NORMAL
SPECIMEN DESCRIPTION: NORMAL
STATUS: NORMAL
STATUS: NORMAL
WBC # BLD: 10.6 K/UL (ref 3.5–11)
WBC # BLD: ABNORMAL 10*3/UL

## 2018-10-16 PROCEDURE — 93005 ELECTROCARDIOGRAM TRACING: CPT

## 2018-10-16 PROCEDURE — 80048 BASIC METABOLIC PNL TOTAL CA: CPT

## 2018-10-16 PROCEDURE — 6370000000 HC RX 637 (ALT 250 FOR IP): Performed by: NURSE PRACTITIONER

## 2018-10-16 PROCEDURE — 85025 COMPLETE CBC W/AUTO DIFF WBC: CPT

## 2018-10-16 PROCEDURE — 99285 EMERGENCY DEPT VISIT HI MDM: CPT

## 2018-10-16 RX ORDER — POTASSIUM CHLORIDE 20 MEQ/1
20 TABLET, EXTENDED RELEASE ORAL DAILY
Qty: 30 TABLET | Refills: 0 | Status: SHIPPED | OUTPATIENT
Start: 2018-10-16

## 2018-10-16 RX ORDER — POTASSIUM BICARBONATE 25 MEQ/1
50 TABLET, EFFERVESCENT ORAL ONCE
Status: COMPLETED | OUTPATIENT
Start: 2018-10-16 | End: 2018-10-16

## 2018-10-16 RX ADMIN — POTASSIUM BICARBONATE 50 MEQ: 25 TABLET, EFFERVESCENT ORAL at 12:58

## 2018-10-16 ASSESSMENT — PAIN SCALES - GENERAL: PAINLEVEL_OUTOF10: 7

## 2018-10-16 ASSESSMENT — ENCOUNTER SYMPTOMS
CONSTIPATION: 0
ABDOMINAL PAIN: 0
SINUS PRESSURE: 0
RHINORRHEA: 0
COUGH: 0
VOMITING: 0
SORE THROAT: 0
SHORTNESS OF BREATH: 0
DIARRHEA: 0
NAUSEA: 0
WHEEZING: 0
COLOR CHANGE: 0

## 2018-10-16 NOTE — ED PROVIDER NOTES
radiculopathy, lumbar region 8/15/2017    Oxygen dependent     2l NC at HS and when out in car or errands per pt.  Reflux     Right sided weakness     due to head injury in 1984    Seizures ([address]     last one Dec 2016 no problems since and no medication (patient stated he stopped drinking)    Tremor     hx of tremor    Ulcer        SURGICAL HISTORY           Procedure Laterality Date    CERVICAL [address] SURGERY      c3-c6 fusion    COLONOSCOPY      CYSTOSCOPY  2015    DILATATION, ESOPHAGUS      ulcer repair    ENDOSCOPY, COLON, DIAGNOSTIC      EGD    KYPHOSIS SURGERY  2018    L1    MT CYSTO/URETERO/PYELOSCOPY, DX N/A 10/12/2018    CYSTOSCOPY AND EXCHANGE OF URETHRAL CATHETER performed by Jessica La MD at [facility] VERTEBROPLASTY UNI/BI INJX CERVICOTHORACIC N/A 2018    KYPHOPLASTY L1 performed by Senia Box MD at [address] [place] [address] with broken neck    TUNNELED VENOUS PORT PLACEMENT           FAMILY HISTORY       Family History   Problem Relation Age of Onset    Heart Disease Mother         CABG    Cancer Mother     Cancer Father         Throat    Heart Disease Father     Stroke Maternal Grandfather     Heart Disease Paternal Grandmother     Diabetes Neg Hx      Family Status   Relation Status    Mother Alive    Father     Sister Alive    Brother Alive    Brother Alive    Sister Alive    Sister Alive    MGF (Not Specified)    PGM (Not Specified)    Neg Hx (Not Specified)        SOCIAL HISTORY      reports that he has been smoking Cigarettes. He has a 20.00 pack-year smoking history. He has never used smokeless tobacco. He reports that he does not drink alcohol or use drugs. REVIEW OF SYSTEMS    (2-9 systems for level 4, 10 or more for level 5)     Review of Systems   Constitutional: Negative for chills, fever and unexpected weight change. HENT: Negative for congestion, rhinorrhea, sinus pressure and sore throat. Lymphocytes 6 (*)     Segs Absolute 9.00 (*)     Absolute Lymph # 0.64 (*)     Basophils # 0.21 (*)     All other components within normal limits   BASIC METABOLIC PANEL - Abnormal; Notable for the following:     BUN 21 (*)     CREATININE 0.49 (*)     Bun/Cre Ratio 43 (*)     Calcium 8.2 (*)     Potassium 2.9 (*)     Chloride 96 (*)     CO2 39 (*)     Anion Gap 6 (*)     All other components within normal limits       All other labs were within normal range or not returned as of this dictation. EMERGENCY DEPARTMENT COURSE and DIFFERENTIAL DIAGNOSIS/MDM:   Vitals:    Vitals:    10/16/18 1108 10/16/18 1131 10/16/18 1147 10/16/18 1202   BP: 101/69  117/70 131/72   Pulse: 100  89 94   Resp: 21  17 22   Temp:  97.9 °F (36.6 °C)     TempSrc:  Oral     SpO2: 97%  98% 99%   Weight: 133 lb (60.3 kg)      Height: 5' 6\" (1.676 m)          Medical Decision Making:   Medications   potassium bicarbonate (K-LYTE) disintegrating tablet 50 mEq (50 mEq Oral Given 10/16/18 1258)     FINAL IMPRESSION      1.  Hypotension, unspecified hypotension type          DISPOSITION/PLAN   DISPOSITION Decision To Discharge 10/16/2018 12:50:26 PM      PATIENT REFERRED TO:   Brenda Knowles DO  [address] [place] [id number]  [phone]    Call in 2 days      [facility] ED  [address] [facility]  [phone]    If symptoms worsen      DISCHARGE MEDICATIONS:     Discharge Medication List as of 10/16/2018 12:51 PM      START taking these medications    Details   potassium chloride (KLOR-CON M) 20 MEQ extended release tablet Take 1 tablet by mouth daily, Disp-30 tablet, R-0Print                 (Please note that portions of this note were completed with a voice recognition program.  Efforts were made to edit the dictations but occasionally words are mis-transcribed.)    Mayuri Martinez NP, APRN - CNP  Certified Nurse Practitioner            MICHELLE Arambula CNP  10/16/18 [id number]

## 2018-10-19 ENCOUNTER — HOSPITAL ENCOUNTER (OUTPATIENT)
Age: 58
Setting detail: SPECIMEN
Discharge: HOME | End: 2018-10-19
Payer: MEDICARE

## 2018-10-19 LAB
INR BLD: 1
PROTHROMBIN TIME: 10.5 SEC (ref 9.7–11.6)

## 2018-10-19 PROCEDURE — 85610 PROTHROMBIN TIME: CPT

## 2018-10-19 PROCEDURE — P9603 ONE-WAY ALLOW PRORATED MILES: HCPCS

## 2018-10-19 PROCEDURE — 36415 COLL VENOUS BLD VENIPUNCTURE: CPT

## 2018-10-20 ENCOUNTER — HOSPITAL ENCOUNTER (OUTPATIENT)
Age: 58
Setting detail: SPECIMEN
Discharge: HOME | End: 2018-10-20
Payer: MEDICARE

## 2018-10-20 LAB
INR BLD: 1
PROTHROMBIN TIME: 10.5 SEC (ref 9.7–11.6)

## 2018-10-20 PROCEDURE — 36415 COLL VENOUS BLD VENIPUNCTURE: CPT

## 2018-10-20 PROCEDURE — 85610 PROTHROMBIN TIME: CPT

## 2018-10-21 ENCOUNTER — HOSPITAL ENCOUNTER (OUTPATIENT)
Age: 58
Setting detail: SPECIMEN
Discharge: HOME | End: 2018-10-21
Payer: MEDICARE

## 2018-10-21 LAB
INR BLD: 1.1
PROTHROMBIN TIME: 11.4 SEC (ref 9.7–11.6)

## 2018-10-21 PROCEDURE — 85610 PROTHROMBIN TIME: CPT

## 2018-10-22 ENCOUNTER — HOSPITAL ENCOUNTER (OUTPATIENT)
Age: 58
Setting detail: SPECIMEN
Discharge: HOME | End: 2018-10-22
Payer: MEDICARE

## 2018-10-22 LAB
INR BLD: 1
PROTHROMBIN TIME: 10.2 SEC (ref 9.7–11.6)

## 2018-10-22 PROCEDURE — 36415 COLL VENOUS BLD VENIPUNCTURE: CPT

## 2018-10-22 PROCEDURE — 85610 PROTHROMBIN TIME: CPT

## 2018-10-22 PROCEDURE — P9603 ONE-WAY ALLOW PRORATED MILES: HCPCS

## 2018-10-23 ENCOUNTER — CARE COORDINATION (OUTPATIENT)
Dept: CARE COORDINATION | Age: 58
End: 2018-10-23

## 2018-10-23 ENCOUNTER — HOSPITAL ENCOUNTER (OUTPATIENT)
Age: 58
Setting detail: SPECIMEN
Discharge: HOME | End: 2018-10-23
Payer: MEDICARE

## 2018-10-23 LAB
ANION GAP SERPL CALCULATED.3IONS-SCNC: 16 MMOL/L (ref 9–17)
BUN BLDV-MCNC: 16 MG/DL (ref 6–20)
BUN/CREAT BLD: 35 (ref 9–20)
CALCIUM SERPL-MCNC: 9.2 MG/DL (ref 8.6–10.4)
CHLORIDE BLD-SCNC: 88 MMOL/L (ref 98–107)
CO2: 38 MMOL/L (ref 20–31)
CREAT SERPL-MCNC: 0.46 MG/DL (ref 0.7–1.2)
GFR AFRICAN AMERICAN: >60 ML/MIN
GFR NON-AFRICAN AMERICAN: >60 ML/MIN
GFR SERPL CREATININE-BSD FRML MDRD: ABNORMAL ML/MIN/{1.73_M2}
GFR SERPL CREATININE-BSD FRML MDRD: ABNORMAL ML/MIN/{1.73_M2}
GLUCOSE BLD-MCNC: 83 MG/DL (ref 70–99)
HCT VFR BLD CALC: 42.9 % (ref 41–53)
HEMOGLOBIN: 13.8 G/DL (ref 13.5–17.5)
INR BLD: 1
MCH RBC QN AUTO: 29.6 PG (ref 26–34)
MCHC RBC AUTO-ENTMCNC: 32.3 G/DL (ref 31–37)
MCV RBC AUTO: 91.8 FL (ref 80–100)
NRBC AUTOMATED: ABNORMAL PER 100 WBC
PDW BLD-RTO: 18.3 % (ref 11.5–14.5)
PLATELET # BLD: 169 K/UL (ref 130–400)
PMV BLD AUTO: ABNORMAL FL (ref 6–12)
POTASSIUM SERPL-SCNC: 3.3 MMOL/L (ref 3.7–5.3)
PROTHROMBIN TIME: 10.6 SEC (ref 9.7–11.6)
RBC # BLD: 4.67 M/UL (ref 4.5–5.9)
SODIUM BLD-SCNC: 142 MMOL/L (ref 135–144)
WBC # BLD: 24.4 K/UL (ref 3.5–11)

## 2018-10-23 PROCEDURE — 36415 COLL VENOUS BLD VENIPUNCTURE: CPT

## 2018-10-23 PROCEDURE — 80048 BASIC METABOLIC PNL TOTAL CA: CPT

## 2018-10-23 PROCEDURE — 85610 PROTHROMBIN TIME: CPT

## 2018-10-23 PROCEDURE — 85027 COMPLETE CBC AUTOMATED: CPT

## 2018-10-23 NOTE — CARE COORDINATION
[facility] Update Call    10/23/2018    Patient: Jass Cantrell Patient : 1960   MRN: [id number]  Reason for Admission: There are no discharge diagnoses documented for the most recent discharge. Discharge Date: 10/16/18 RARS: Readmission Risk Score: 0       Care Transitions Post Acute Facility Update    Care Transitions Interventions     Other Services:  (Comment: Martin)   Post Acute Facility:  [facility] Update     ER 10/16/18 day of discharge. CTC episode no longer open. ACC JANI Jimenez following.

## 2018-10-24 ENCOUNTER — HOSPITAL ENCOUNTER (INPATIENT)
Age: 58
LOS: 1 days | Discharge: NURSING, REHAB OR LONG-TERM CARE | DRG: 871 | End: 2018-10-24
Attending: EMERGENCY MEDICINE | Admitting: INTERNAL MEDICINE
Payer: COMMERCIAL

## 2018-10-24 ENCOUNTER — APPOINTMENT (OUTPATIENT)
Dept: GENERAL RADIOLOGY | Age: 58
DRG: 871 | End: 2018-10-24
Payer: COMMERCIAL

## 2018-10-24 VITALS
SYSTOLIC BLOOD PRESSURE: 116 MMHG | TEMPERATURE: 98.5 F | HEIGHT: 65 IN | BODY MASS INDEX: 20.49 KG/M2 | OXYGEN SATURATION: 96 % | HEART RATE: 104 BPM | RESPIRATION RATE: 22 BRPM | WEIGHT: 123 LBS | DIASTOLIC BLOOD PRESSURE: 67 MMHG

## 2018-10-24 DIAGNOSIS — A41.9 SEPTICEMIA (HCC): Primary | ICD-10-CM

## 2018-10-24 DIAGNOSIS — J90 PLEURAL EFFUSION: ICD-10-CM

## 2018-10-24 DIAGNOSIS — J18.9 PNEUMONIA DUE TO ORGANISM: ICD-10-CM

## 2018-10-24 DIAGNOSIS — N12 PYELONEPHRITIS: ICD-10-CM

## 2018-10-24 DIAGNOSIS — C34.90 MALIGNANT NEOPLASM OF LUNG, UNSPECIFIED LATERALITY, UNSPECIFIED PART OF LUNG (HCC): ICD-10-CM

## 2018-10-24 LAB
-: ABNORMAL
ABSOLUTE EOS #: 0 K/UL (ref 0–0.4)
ABSOLUTE IMMATURE GRANULOCYTE: ABNORMAL K/UL (ref 0–0.3)
ABSOLUTE LYMPH #: 2.7 K/UL (ref 1–4.8)
ABSOLUTE MONO #: 1.3 K/UL (ref 0.2–0.8)
AMORPHOUS: ABNORMAL
ANION GAP SERPL CALCULATED.3IONS-SCNC: 15 MMOL/L (ref 9–17)
BACTERIA: ABNORMAL
BASOPHILS # BLD: 0 % (ref 0–2)
BASOPHILS ABSOLUTE: 0 K/UL (ref 0–0.2)
BILIRUBIN URINE: NEGATIVE
BNP INTERPRETATION: ABNORMAL
BUN BLDV-MCNC: 19 MG/DL (ref 6–20)
BUN/CREAT BLD: 30 (ref 9–20)
CALCIUM SERPL-MCNC: 8 MG/DL (ref 8.6–10.4)
CASTS UA: ABNORMAL /LPF
CHLORIDE BLD-SCNC: 89 MMOL/L (ref 98–107)
CO2: 36 MMOL/L (ref 20–31)
COLOR: YELLOW
COMMENT UA: ABNORMAL
CREAT SERPL-MCNC: 0.63 MG/DL (ref 0.7–1.2)
CRYSTALS, UA: ABNORMAL /HPF
DIFFERENTIAL TYPE: ABNORMAL
EKG ATRIAL RATE: 104 BPM
EKG P AXIS: 57 DEGREES
EKG P-R INTERVAL: 126 MS
EKG Q-T INTERVAL: 358 MS
EKG QRS DURATION: 82 MS
EKG QTC CALCULATION (BAZETT): 470 MS
EKG R AXIS: 40 DEGREES
EKG T AXIS: 93 DEGREES
EKG VENTRICULAR RATE: 104 BPM
EOSINOPHILS RELATIVE PERCENT: 0 % (ref 1–4)
EPITHELIAL CELLS UA: ABNORMAL /HPF (ref 0–5)
GFR AFRICAN AMERICAN: >60 ML/MIN
GFR NON-AFRICAN AMERICAN: >60 ML/MIN
GFR SERPL CREATININE-BSD FRML MDRD: ABNORMAL ML/MIN/{1.73_M2}
GFR SERPL CREATININE-BSD FRML MDRD: ABNORMAL ML/MIN/{1.73_M2}
GLUCOSE BLD-MCNC: 195 MG/DL (ref 70–99)
GLUCOSE URINE: NEGATIVE
HCT VFR BLD CALC: 31 % (ref 41–53)
HEMOGLOBIN: 9.6 G/DL (ref 13.5–17.5)
IMMATURE GRANULOCYTES: ABNORMAL %
KETONES, URINE: NEGATIVE
LACTIC ACID, SEPSIS WHOLE BLOOD: ABNORMAL MMOL/L (ref 0.5–1.9)
LACTIC ACID, SEPSIS WHOLE BLOOD: ABNORMAL MMOL/L (ref 0.5–1.9)
LACTIC ACID, SEPSIS: 5.5 MMOL/L (ref 0.5–1.9)
LACTIC ACID, SEPSIS: 6.2 MMOL/L (ref 0.5–1.9)
LEUKOCYTE ESTERASE, URINE: ABNORMAL
LYMPHOCYTES # BLD: 15 % (ref 24–44)
MCH RBC QN AUTO: 28.7 PG (ref 26–34)
MCHC RBC AUTO-ENTMCNC: 31.1 G/DL (ref 31–37)
MCV RBC AUTO: 92.4 FL (ref 80–100)
MONOCYTES # BLD: 7 % (ref 1–7)
MUCUS: ABNORMAL
MYOGLOBIN: 30 NG/ML (ref 28–72)
MYOGLOBIN: 32 NG/ML (ref 28–72)
NITRITE, URINE: NEGATIVE
NRBC AUTOMATED: ABNORMAL PER 100 WBC
OTHER OBSERVATIONS UA: ABNORMAL
PDW BLD-RTO: 19.3 % (ref 11.5–14.5)
PH UA: 6 (ref 5–8)
PLATELET # BLD: 147 K/UL (ref 130–400)
PLATELET ESTIMATE: ABNORMAL
PMV BLD AUTO: 8.4 FL (ref 6–12)
POTASSIUM SERPL-SCNC: 2.9 MMOL/L (ref 3.7–5.3)
PRO-BNP: 745 PG/ML
PROCALCITONIN: 0.2 NG/ML
PROTEIN UA: NEGATIVE
RBC # BLD: 3.35 M/UL (ref 4.5–5.9)
RBC # BLD: ABNORMAL 10*6/UL
RBC UA: ABNORMAL /HPF (ref 0–2)
RENAL EPITHELIAL, UA: ABNORMAL /HPF
SEG NEUTROPHILS: 78 % (ref 36–66)
SEGMENTED NEUTROPHILS ABSOLUTE COUNT: 14.3 K/UL (ref 1.8–7.7)
SODIUM BLD-SCNC: 140 MMOL/L (ref 135–144)
SPECIFIC GRAVITY UA: 1.02 (ref 1–1.03)
TRICHOMONAS: ABNORMAL
TROPONIN INTERP: ABNORMAL
TROPONIN INTERP: ABNORMAL
TROPONIN T: 0.03 NG/ML
TROPONIN T: 0.05 NG/ML
TURBIDITY: ABNORMAL
URINE HGB: ABNORMAL
UROBILINOGEN, URINE: NORMAL
WBC # BLD: 18.3 K/UL (ref 3.5–11)
WBC # BLD: ABNORMAL 10*3/UL
WBC UA: ABNORMAL /HPF (ref 0–5)
YEAST: ABNORMAL

## 2018-10-24 PROCEDURE — 6360000002 HC RX W HCPCS: Performed by: EMERGENCY MEDICINE

## 2018-10-24 PROCEDURE — 6360000002 HC RX W HCPCS: Performed by: INTERNAL MEDICINE

## 2018-10-24 PROCEDURE — 6360000002 HC RX W HCPCS: Performed by: NURSE PRACTITIONER

## 2018-10-24 PROCEDURE — 80048 BASIC METABOLIC PNL TOTAL CA: CPT

## 2018-10-24 PROCEDURE — 87040 BLOOD CULTURE FOR BACTERIA: CPT

## 2018-10-24 PROCEDURE — 94761 N-INVAS EAR/PLS OXIMETRY MLT: CPT

## 2018-10-24 PROCEDURE — 2580000003 HC RX 258: Performed by: NURSE PRACTITIONER

## 2018-10-24 PROCEDURE — 94664 DEMO&/EVAL PT USE INHALER: CPT

## 2018-10-24 PROCEDURE — 83605 ASSAY OF LACTIC ACID: CPT

## 2018-10-24 PROCEDURE — 93005 ELECTROCARDIOGRAM TRACING: CPT

## 2018-10-24 PROCEDURE — 96375 TX/PRO/DX INJ NEW DRUG ADDON: CPT

## 2018-10-24 PROCEDURE — 71045 X-RAY EXAM CHEST 1 VIEW: CPT

## 2018-10-24 PROCEDURE — 1200000000 HC SEMI PRIVATE

## 2018-10-24 PROCEDURE — 96367 TX/PROPH/DG ADDL SEQ IV INF: CPT

## 2018-10-24 PROCEDURE — 94640 AIRWAY INHALATION TREATMENT: CPT

## 2018-10-24 PROCEDURE — 2700000000 HC OXYGEN THERAPY PER DAY

## 2018-10-24 PROCEDURE — 81001 URINALYSIS AUTO W/SCOPE: CPT

## 2018-10-24 PROCEDURE — 83880 ASSAY OF NATRIURETIC PEPTIDE: CPT

## 2018-10-24 PROCEDURE — 85025 COMPLETE CBC W/AUTO DIFF WBC: CPT

## 2018-10-24 PROCEDURE — 84484 ASSAY OF TROPONIN QUANT: CPT

## 2018-10-24 PROCEDURE — 99285 EMERGENCY DEPT VISIT HI MDM: CPT

## 2018-10-24 PROCEDURE — 84145 PROCALCITONIN (PCT): CPT

## 2018-10-24 PROCEDURE — 83874 ASSAY OF MYOGLOBIN: CPT

## 2018-10-24 PROCEDURE — 87086 URINE CULTURE/COLONY COUNT: CPT

## 2018-10-24 PROCEDURE — 2580000003 HC RX 258: Performed by: INTERNAL MEDICINE

## 2018-10-24 PROCEDURE — 99222 1ST HOSP IP/OBS MODERATE 55: CPT | Performed by: INTERNAL MEDICINE

## 2018-10-24 PROCEDURE — 96365 THER/PROPH/DIAG IV INF INIT: CPT

## 2018-10-24 PROCEDURE — 6370000000 HC RX 637 (ALT 250 FOR IP): Performed by: NURSE PRACTITIONER

## 2018-10-24 PROCEDURE — 6370000000 HC RX 637 (ALT 250 FOR IP): Performed by: INTERNAL MEDICINE

## 2018-10-24 RX ORDER — POTASSIUM CHLORIDE 20 MEQ/1
20 TABLET, EXTENDED RELEASE ORAL DAILY
Status: DISCONTINUED | OUTPATIENT
Start: 2018-10-24 | End: 2018-10-24 | Stop reason: HOSPADM

## 2018-10-24 RX ORDER — CALCIUM CARBONATE/VITAMIN D3 250-3.125
500 TABLET ORAL 2 TIMES DAILY
Status: DISCONTINUED | OUTPATIENT
Start: 2018-10-24 | End: 2018-10-24 | Stop reason: HOSPADM

## 2018-10-24 RX ORDER — METHYLPREDNISOLONE SODIUM SUCCINATE 125 MG/2ML
125 INJECTION, POWDER, LYOPHILIZED, FOR SOLUTION INTRAMUSCULAR; INTRAVENOUS ONCE
Status: COMPLETED | OUTPATIENT
Start: 2018-10-24 | End: 2018-10-24

## 2018-10-24 RX ORDER — IPRATROPIUM BROMIDE AND ALBUTEROL SULFATE 2.5; .5 MG/3ML; MG/3ML
1 SOLUTION RESPIRATORY (INHALATION)
Status: DISCONTINUED | OUTPATIENT
Start: 2018-10-24 | End: 2018-10-24

## 2018-10-24 RX ORDER — OLANZAPINE 5 MG/1
10 TABLET ORAL NIGHTLY
Status: DISCONTINUED | OUTPATIENT
Start: 2018-10-24 | End: 2018-10-24 | Stop reason: HOSPADM

## 2018-10-24 RX ORDER — VANCOMYCIN HYDROCHLORIDE 1 G/200ML
1000 INJECTION, SOLUTION INTRAVENOUS EVERY 12 HOURS
Status: DISCONTINUED | OUTPATIENT
Start: 2018-10-24 | End: 2018-10-24

## 2018-10-24 RX ORDER — FORMOTEROL FUMARATE 20 UG/2ML
20 SOLUTION RESPIRATORY (INHALATION) 2 TIMES DAILY
Status: DISCONTINUED | OUTPATIENT
Start: 2018-10-24 | End: 2018-10-24 | Stop reason: HOSPADM

## 2018-10-24 RX ORDER — FENTANYL CITRATE 50 UG/ML
50 INJECTION, SOLUTION INTRAMUSCULAR; INTRAVENOUS ONCE
Status: COMPLETED | OUTPATIENT
Start: 2018-10-24 | End: 2018-10-24

## 2018-10-24 RX ORDER — ALBUTEROL SULFATE 90 UG/1
2 AEROSOL, METERED RESPIRATORY (INHALATION) EVERY 6 HOURS PRN
Status: DISCONTINUED | OUTPATIENT
Start: 2018-10-24 | End: 2018-10-24 | Stop reason: HOSPADM

## 2018-10-24 RX ORDER — PANTOPRAZOLE SODIUM 40 MG/1
40 TABLET, DELAYED RELEASE ORAL
Status: DISCONTINUED | OUTPATIENT
Start: 2018-10-25 | End: 2018-10-24 | Stop reason: HOSPADM

## 2018-10-24 RX ORDER — 0.9 % SODIUM CHLORIDE 0.9 %
30 INTRAVENOUS SOLUTION INTRAVENOUS ONCE
Status: COMPLETED | OUTPATIENT
Start: 2018-10-24 | End: 2018-10-24

## 2018-10-24 RX ORDER — SODIUM CHLORIDE 0.9 % (FLUSH) 0.9 %
10 SYRINGE (ML) INJECTION PRN
Status: DISCONTINUED | OUTPATIENT
Start: 2018-10-24 | End: 2018-10-24 | Stop reason: HOSPADM

## 2018-10-24 RX ORDER — ALBUTEROL SULFATE 2.5 MG/3ML
5 SOLUTION RESPIRATORY (INHALATION)
Status: DISCONTINUED | OUTPATIENT
Start: 2018-10-24 | End: 2018-10-24

## 2018-10-24 RX ORDER — SODIUM CHLORIDE 9 MG/ML
INJECTION, SOLUTION INTRAVENOUS CONTINUOUS
Status: DISCONTINUED | OUTPATIENT
Start: 2018-10-24 | End: 2018-10-24

## 2018-10-24 RX ORDER — DRONABINOL 2.5 MG/1
10 CAPSULE ORAL
Status: DISCONTINUED | OUTPATIENT
Start: 2018-10-24 | End: 2018-10-24 | Stop reason: HOSPADM

## 2018-10-24 RX ORDER — SODIUM CHLORIDE 0.9 % (FLUSH) 0.9 %
10 SYRINGE (ML) INJECTION EVERY 12 HOURS SCHEDULED
Status: DISCONTINUED | OUTPATIENT
Start: 2018-10-24 | End: 2018-10-24 | Stop reason: SDUPTHER

## 2018-10-24 RX ORDER — ALBUTEROL SULFATE 90 UG/1
2 AEROSOL, METERED RESPIRATORY (INHALATION)
Status: DISCONTINUED | OUTPATIENT
Start: 2018-10-24 | End: 2018-10-24

## 2018-10-24 RX ORDER — ZOLPIDEM TARTRATE 5 MG/1
5 TABLET ORAL NIGHTLY PRN
Status: DISCONTINUED | OUTPATIENT
Start: 2018-10-24 | End: 2018-10-24 | Stop reason: HOSPADM

## 2018-10-24 RX ORDER — POTASSIUM BICARBONATE 25 MEQ/1
50 TABLET, EFFERVESCENT ORAL ONCE
Status: COMPLETED | OUTPATIENT
Start: 2018-10-24 | End: 2018-10-24

## 2018-10-24 RX ORDER — FUROSEMIDE 40 MG/1
40 TABLET ORAL 2 TIMES DAILY
Status: DISCONTINUED | OUTPATIENT
Start: 2018-10-24 | End: 2018-10-24 | Stop reason: HOSPADM

## 2018-10-24 RX ORDER — SODIUM CHLORIDE 0.9 % (FLUSH) 0.9 %
10 SYRINGE (ML) INJECTION PRN
Status: DISCONTINUED | OUTPATIENT
Start: 2018-10-24 | End: 2018-10-24 | Stop reason: SDUPTHER

## 2018-10-24 RX ORDER — CITALOPRAM 20 MG/1
20 TABLET ORAL DAILY
Status: DISCONTINUED | OUTPATIENT
Start: 2018-10-24 | End: 2018-10-24 | Stop reason: HOSPADM

## 2018-10-24 RX ORDER — OXYCODONE HCL 40 MG/1
40 TABLET, FILM COATED, EXTENDED RELEASE ORAL 2 TIMES DAILY
Status: DISCONTINUED | OUTPATIENT
Start: 2018-10-24 | End: 2018-10-24 | Stop reason: HOSPADM

## 2018-10-24 RX ORDER — ACETAMINOPHEN 500 MG
1000 TABLET ORAL ONCE
Status: DISCONTINUED | OUTPATIENT
Start: 2018-10-24 | End: 2018-10-24 | Stop reason: HOSPADM

## 2018-10-24 RX ORDER — SODIUM CHLORIDE 0.9 % (FLUSH) 0.9 %
10 SYRINGE (ML) INJECTION EVERY 12 HOURS SCHEDULED
Status: DISCONTINUED | OUTPATIENT
Start: 2018-10-24 | End: 2018-10-24 | Stop reason: HOSPADM

## 2018-10-24 RX ORDER — OXYCODONE HCL 40 MG/1
40 TABLET, FILM COATED, EXTENDED RELEASE ORAL EVERY 12 HOURS
COMMUNITY

## 2018-10-24 RX ORDER — POTASSIUM CHLORIDE 20 MEQ/1
40 TABLET, EXTENDED RELEASE ORAL ONCE
Status: COMPLETED | OUTPATIENT
Start: 2018-10-24 | End: 2018-10-24

## 2018-10-24 RX ORDER — BISACODYL 10 MG
10 SUPPOSITORY, RECTAL RECTAL DAILY PRN
Status: DISCONTINUED | OUTPATIENT
Start: 2018-10-24 | End: 2018-10-24 | Stop reason: HOSPADM

## 2018-10-24 RX ORDER — ONDANSETRON 4 MG/1
8 TABLET, FILM COATED ORAL EVERY 8 HOURS PRN
Status: DISCONTINUED | OUTPATIENT
Start: 2018-10-24 | End: 2018-10-24 | Stop reason: HOSPADM

## 2018-10-24 RX ORDER — ONDANSETRON 2 MG/ML
4 INJECTION INTRAMUSCULAR; INTRAVENOUS ONCE
Status: COMPLETED | OUTPATIENT
Start: 2018-10-24 | End: 2018-10-24

## 2018-10-24 RX ORDER — PREGABALIN 25 MG/1
25 CAPSULE ORAL 3 TIMES DAILY
Status: DISCONTINUED | OUTPATIENT
Start: 2018-10-24 | End: 2018-10-24 | Stop reason: HOSPADM

## 2018-10-24 RX ADMIN — Medication 500 UNITS: at 15:22

## 2018-10-24 RX ADMIN — CEFEPIME HYDROCHLORIDE 2 G: 2 INJECTION, POWDER, FOR SOLUTION INTRAVENOUS at 09:17

## 2018-10-24 RX ADMIN — FENTANYL CITRATE 50 MCG: 50 INJECTION, SOLUTION INTRAMUSCULAR; INTRAVENOUS at 10:52

## 2018-10-24 RX ADMIN — CALCIUM CARBONATE-CHOLECALCIFEROL TAB 250 MG-125 UNIT 500 MG: 250-125 TAB at 13:27

## 2018-10-24 RX ADMIN — METHYLPREDNISOLONE SODIUM SUCCINATE 125 MG: 125 INJECTION, POWDER, FOR SOLUTION INTRAMUSCULAR; INTRAVENOUS at 09:15

## 2018-10-24 RX ADMIN — SODIUM CHLORIDE 1674 ML: 9 INJECTION, SOLUTION INTRAVENOUS at 09:06

## 2018-10-24 RX ADMIN — SODIUM CHLORIDE: 9 INJECTION, SOLUTION INTRAVENOUS at 13:31

## 2018-10-24 RX ADMIN — POTASSIUM BICARBONATE 50 MEQ: 25 TABLET, EFFERVESCENT ORAL at 10:27

## 2018-10-24 RX ADMIN — ENOXAPARIN SODIUM 40 MG: 40 INJECTION SUBCUTANEOUS at 13:28

## 2018-10-24 RX ADMIN — PREGABALIN 25 MG: 25 CAPSULE ORAL at 13:27

## 2018-10-24 RX ADMIN — ONDANSETRON 4 MG: 2 INJECTION INTRAMUSCULAR; INTRAVENOUS at 09:14

## 2018-10-24 RX ADMIN — ALBUTEROL SULFATE 5 MG: 2.5 SOLUTION RESPIRATORY (INHALATION) at 08:30

## 2018-10-24 RX ADMIN — OXYCODONE HYDROCHLORIDE 40 MG: 40 TABLET, FILM COATED, EXTENDED RELEASE ORAL at 13:27

## 2018-10-24 RX ADMIN — POTASSIUM CHLORIDE 40 MEQ: 20 TABLET, EXTENDED RELEASE ORAL at 13:30

## 2018-10-24 RX ADMIN — POTASSIUM CHLORIDE 20 MEQ: 20 TABLET, EXTENDED RELEASE ORAL at 13:27

## 2018-10-24 RX ADMIN — VANCOMYCIN HYDROCHLORIDE 1500 MG: 750 INJECTION, POWDER, LYOPHILIZED, FOR SOLUTION INTRAVENOUS at 09:54

## 2018-10-24 RX ADMIN — CITALOPRAM HYDROBROMIDE 20 MG: 20 TABLET ORAL at 13:27

## 2018-10-24 RX ADMIN — ALBUTEROL SULFATE 5 MG: 2.5 SOLUTION RESPIRATORY (INHALATION) at 08:36

## 2018-10-24 ASSESSMENT — PAIN DESCRIPTION - PAIN TYPE: TYPE: ACUTE PAIN

## 2018-10-24 ASSESSMENT — PAIN DESCRIPTION - ONSET: ONSET: ON-GOING

## 2018-10-24 ASSESSMENT — ENCOUNTER SYMPTOMS
DIARRHEA: 0
ABDOMINAL PAIN: 0
NAUSEA: 0
SHORTNESS OF BREATH: 1
COUGH: 1
VOMITING: 0

## 2018-10-24 ASSESSMENT — PAIN DESCRIPTION - ORIENTATION: ORIENTATION: MID;ANTERIOR

## 2018-10-24 ASSESSMENT — PAIN SCALES - GENERAL
PAINLEVEL_OUTOF10: 4
PAINLEVEL_OUTOF10: 7
PAINLEVEL_OUTOF10: 5
PAINLEVEL_OUTOF10: 7

## 2018-10-24 ASSESSMENT — PAIN DESCRIPTION - DESCRIPTORS: DESCRIPTORS: SHARP

## 2018-10-24 ASSESSMENT — PAIN DESCRIPTION - PROGRESSION: CLINICAL_PROGRESSION: GRADUALLY WORSENING

## 2018-10-24 ASSESSMENT — PAIN DESCRIPTION - LOCATION: LOCATION: CHEST

## 2018-10-24 ASSESSMENT — PAIN DESCRIPTION - FREQUENCY: FREQUENCY: CONTINUOUS

## 2018-10-24 NOTE — ED PROVIDER NOTES
[address] [place] [address] ED  eMERGENCY dEPARTMENT eNCOUnter   Attending Attestation     Pt Name: Jass Cantrell  MRN: [id number]  [place] 1960  Date of evaluation: 10/24/18   Jass Cantrell is a 63 y.o. male with CC: Chest Pain and Shortness of Breath    MDM:    Presents with c/o Chest pain and SOB. Had altered mental status last night, but did not wish to be transferred to ED. History of lung cancer. Code Status is DNR-CC. Visited by Hospice Care during last hospital visit, but not under Hospice Care. Planned to visit him at the [facility] today. Still wants IVF and IV antibiotics. No longer undergoing care for lung cancer. Nearly finished with course of Levaquin for pneumonia. Recent cystoscopy with indwelling urinary catheter, as well. Chronic urinary catheter for months. Attributed to neurogenic bladder from old C-spine fracture. Mild chronic paraparesis, but able to walk without assistance. Lungs diminished throughout. Indwelling urinary catheter. Tachypnea. Tachycardia. Dry oral mucosa. Hypoxia    Presentation c/w sepsis. Sepsis protocol followed. Treated for pulmonary (nosocomial/hospital acquired) and urinary sources. Given DuoNebs for COPD exacerbation. Confirmed Code Status of DNR-CC    I reviewed lab studies and imaging report. Findings of both complicated UTI and RLL Pneumonia. Elevated lactate. Repeat lactate ordered. BP normally runs low at 105/60's. Tachycardia has been the norm, lately. Requires 3-4 L O2 by NC at baseline. Discussed disposition. Patient new to DNR status. Has not yet committed to Hospice care. Wife wants patient admitted for IVF and IV antibiotics. Patient is agreeable, but would prefer to be released back to [facility] after 1 - 2 days. D/w Dr Malone who accepts admission.            CRITICAL CARE:       EKG: All EKG's are interpreted by the Emergency Department Physician who either signs or Co-signs this chart in the absence of a cardiologist.  Rhythm:
extremities equally and purposefully   Skin: Skin is warm. He is diaphoretic. DIAGNOSTIC RESULTS     EKG: All EKG's are interpreted by the Emergency Department Physician who either signs or Co-signs this chart in the absence of a cardiologist.    EKG was interpreted by Dr. Kale Diez:   Non-plain film images such as CT, Ultrasound and MRI are read by the radiologist. Plain radiographic images are visualized and preliminarily interpreted by the emergency physician with the below findings:      Interpretation per the Radiologist below, if available at the time of this note:    XR CHEST PORTABLE   Final Result   1. Increase in at least moderate likely loculated right pleural effusion with   underlying right basilar passive atelectasis. Superimposed pneumonia and   aspiration could be present given reported clinical symptoms. 2. Minimal left basilar atelectasis. 3. Unchanged right upper lobe nodule.                LABS:  Labs Reviewed   LACTATE, SEPSIS - Abnormal; Notable for the following:        Result Value    Lactic Acid, Sepsis 6.2 (*)     All other components within normal limits   LACTATE, SEPSIS - Abnormal; Notable for the following:     Lactic Acid, Sepsis 5.5 (*)     All other components within normal limits   CBC WITH AUTO DIFFERENTIAL - Abnormal; Notable for the following:     WBC 18.3 (*)     RBC 3.35 (*)     Hemoglobin 9.6 (*)     Hematocrit 31.0 (*)     RDW 19.3 (*)     Seg Neutrophils 78 (*)     Lymphocytes 15 (*)     Eosinophils % 0 (*)     Segs Absolute 14.30 (*)     Absolute Mono # 1.30 (*)     All other components within normal limits   BASIC METABOLIC PANEL - Abnormal; Notable for the following:     Glucose 195 (*)     CREATININE 0.63 (*)     Bun/Cre Ratio 30 (*)     Calcium 8.0 (*)     Potassium 2.9 (*)     Chloride 89 (*)     CO2 36 (*)     All other components within normal limits   TROP/MYOGLOBIN - Abnormal; Notable for the following:     Troponin T 0.05 (*)     All other

## 2018-10-24 NOTE — PROGRESS NOTES
· Bronchodilator assessment   [x]    Bronchodilator Assessment    FEV1 % PREDICTED   FEV1 actual: Unable  PEFR  Unable    PEFR % Predicted   RR 22  Bronchodilator assessment at level  4  BRONCHODILATOR ASSESSMENT SCORE  Score 1 2 3 4   Breath Sounds   []  Clear []  Mild Wheezing with good aeration []  Moderate I/E wheezing with adequate aeration [x]  Poor Aeration or diffuse wheezing   Respiratory Rate []  Less than 20 [x]  20-25 []  Greater than 25  []  Greater than 35    Dyspnea []  No SOB  []  SOB with minimal activity []  Speaking in partial sentences [x]  Acute/ At rest   Peakflow (asthma) []  80 % or greater predicted/PB  []  Unable []  70% or greater predicted/PB  []  Unable []  51%-70% predicted/PB  []  Unable []  Less than 50% predicted/PB  [x]  Unable due to distress   FEV1 % Predicted []  Greater than 69%  []  Unable  []  Less than 50%-69%  []  Unable  []  Less than 35%-49%  []  Unable  []  Less than 35%  [x]  Unable due to distress     · Bronchodilator assessment   [x]    Bronchodilator Assessment    FEV1 % PREDICTED 16%  FEV1 actual: 0.5  PEFR    PEFR % Predicted   RR 20  Bronchodilator assessment at level  4  BRONCHODILATOR ASSESSMENT SCORE  Score 1 2 3 4   Breath Sounds   []  Clear []  Mild Wheezing with good aeration []  Moderate I/E wheezing with adequate aeration [x]  Poor Aeration or diffuse wheezing   Respiratory Rate []  Less than 20 [x]  20-25 []  Greater than 25  []  Greater than 35    Dyspnea []  No SOB  []  SOB with minimal activity []  Speaking in partial sentences [x]  Acute/ At rest   Peakflow (asthma) []  80 % or greater predicted/PB  []  Unable []  70% or greater predicted/PB  []  Unable []  51%-70% predicted/PB  []  Unable []  Less than 50% predicted/PB  [x]  Unable due to distress   FEV1 % Predicted []  Greater than 69%  []  Unable  []  Less than 50%-69%  []  Unable  []  Less than 35%-49%  []  Unable  [x]  Less than 35%  []  Unable due to distress   · Bronchodilator assessment   [x]

## 2018-10-24 NOTE — H&P
pt.    Reflux     Right sided weakness     due to head injury in 1984    Seizures ([address]     last one Dec 2016 no problems since and no medication (patient stated he stopped drinking)    Tremor     hx of tremor    Ulcer         Past Surgical History:     Past Surgical History:   Procedure Laterality Date    CERVICAL DISC SURGERY  2000    c3-c6 fusion    COLONOSCOPY      CYSTOSCOPY  7/27/2015    DILATATION, ESOPHAGUS      ulcer repair    ENDOSCOPY, COLON, DIAGNOSTIC      EGD    KYPHOSIS SURGERY  07/19/2018    L1    UT CYSTO/URETERO/PYELOSCOPY, DX N/A 10/12/2018    CYSTOSCOPY AND EXCHANGE OF URETHRAL CATHETER performed by Shauna Wilkins MD at [facility] VERTEBROPLASTY UNI/BI INJX CERVICOTHORACIC N/A 7/19/2018    KYPHOPLASTY L1 performed by Jagdish Fernandez MD at [address] [place] [address] with broken neck    TUNNELED VENOUS PORT PLACEMENT          Medications Prior to Admission:     Prior to Admission medications    Medication Sig Start Date End Date Taking? Authorizing Provider   oxyCODONE (OXYCONTIN) 40 MG extended release tablet Take 40 mg by mouth every 12 hours. .   Yes Historical Provider, MD   potassium chloride (KLOR-CON M) 20 MEQ extended release tablet Take 1 tablet by mouth daily 10/16/18  Yes Shaista Guzman APRN - CNP   bisacodyl (DULCOLAX) 10 MG suppository Place 1 suppository rectally daily as needed for Constipation 10/14/18 11/13/18 Yes Michelle Goldman MD   formoterol (PERFOROMIST) 20 MCG/2ML nebulizer solution Take 2 mLs by nebulization 2 times daily 10/14/18  Yes Michelle Goldman MD   levofloxacin (LEVAQUIN) 750 MG tablet Take 1 tablet by mouth daily for 10 days 10/14/18 10/24/18 Yes Michelle Goldman MD   predniSONE (DELTASONE) 10 MG tablet 4 tabs daily for 3 d followed by 3 tabs daily for 3days then 2 tabs daily for 3 days and then 1 tab daily for 3 days.  10/14/18  Yes Michelle Goldman MD   zolpidem (AMBIEN) 5 MG tablet Take 1 tablet by mouth nightly as needed for Sleep for up 8:50 AM   Result Value Ref Range    WBC 18.3 (H) 3.5 - 11.0 k/uL    RBC 3.35 (L) 4.5 - 5.9 m/uL    Hemoglobin 9.6 (L) 13.5 - 17.5 g/dL    Hematocrit 31.0 (L) 41 - 53 %    MCV 92.4 80 - 100 fL    MCH 28.7 26 - 34 pg    MCHC 31.1 31 - 37 g/dL    RDW 19.3 (H) 11.5 - 14.5 %    Platelets 300 091 - 645 k/uL    MPV 8.4 6.0 - 12.0 fL    NRBC Automated NOT REPORTED per 100 WBC    Differential Type NOT REPORTED     Immature Granulocytes NOT REPORTED 0 %    Absolute Immature Granulocyte NOT REPORTED 0.00 - 0.30 k/uL    WBC Morphology NOT REPORTED     RBC Morphology NOT REPORTED     Platelet Estimate NOT REPORTED     Seg Neutrophils 78 (H) 36 - 66 %    Lymphocytes 15 (L) 24 - 44 %    Monocytes 7 1 - 7 %    Eosinophils % 0 (L) 1 - 4 %    Basophils 0 0 - 2 %    Segs Absolute 14.30 (H) 1.8 - 7.7 k/uL    Absolute Lymph # 2.70 1.0 - 4.8 k/uL    Absolute Mono # 1.30 (H) 0.2 - 0.8 k/uL    Absolute Eos # 0.00 0.0 - 0.4 k/uL    Basophils # 0.00 0.0 - 0.2 k/uL   Basic Metabolic Panel    Collection Time: 10/24/18  8:50 AM   Result Value Ref Range    Glucose 195 (H) 70 - 99 mg/dL    BUN 19 6 - 20 mg/dL    CREATININE 0.63 (L) 0.70 - 1.20 mg/dL    Bun/Cre Ratio 30 (H) 9 - 20    Calcium 8.0 (L) 8.6 - 10.4 mg/dL    Sodium 140 135 - 144 mmol/L    Potassium 2.9 (LL) 3.7 - 5.3 mmol/L    Chloride 89 (L) 98 - 107 mmol/L    CO2 36 (H) 20 - 31 mmol/L    Anion Gap 15 9 - 17 mmol/L    GFR Non-African American >60 >60 mL/min    GFR African American >60 >60 mL/min    GFR Comment          GFR Staging NOT REPORTED    TROP/MYOGLOBIN    Collection Time: 10/24/18  8:50 AM   Result Value Ref Range    Troponin T 0.05 (H) <0.03 ng/mL    Troponin Interp          Myoglobin 32 28 - 72 ng/mL   Brain Natriuretic Peptide    Collection Time: 10/24/18  8:50 AM   Result Value Ref Range    Pro- (H) <300 pg/mL    BNP Interpretation         Lactate, Sepsis    Collection Time: 10/24/18 10:55 AM   Result Value Ref Range    Lactic Acid, Sepsis 5.5 (H) 0.5 - 1.9 mmol/L Lactic Acid, Sepsis, Whole Blood NOT REPORTED 0.5 - 1.9 mmol/L   TROP/MYOGLOBIN    Collection Time: 10/24/18 10:55 AM   Result Value Ref Range    Troponin T 0.03 (H) <0.03 ng/mL    Troponin Interp          Myoglobin 30 28 - 72 ng/mL       Imaging/Diagnostics:    Cxr:  Impression   1. Increase in at least moderate likely loculated right pleural effusion with   underlying right basilar passive atelectasis.  Superimposed pneumonia and   aspiration could be present given reported clinical symptoms. 2. Minimal left basilar atelectasis. 3. Unchanged right upper lobe nodule. Assessment :      Primary Problem  Pleural effusion on right    Active Hospital Problems    Diagnosis Date Noted    Sepsis ([facility] [A41.9] 10/24/2018    Pneumonia of right lower lobe due to infectious organism ([facility] [J18.1] 10/24/2018    Pleural effusion on right [J90] 10/24/2018    Chronic respiratory failure with hypoxia ([facility] [J96.11] 10/11/2018    Benign essential HTN [I10] 12/10/2016    Squamous cell carcinoma of lung ([facility] [C34.90] 08/16/2016    Chronic obstructive pulmonary disease ([facility] [J44.9] 04/25/2013       Plan:     Patient status Admit as inpatient in the  Med/Surge    1. Iv antibiotics ordered initially  2. Poor prognosis-he is terminal; pall care consult; in speaking further with him, he wants hospice care, not aggressive care but wants to speak to girlfriend before making final decision  3. Dc back to [facility] if he chooses to go with hospice  4. dnrcc    Consultations:   PHARMACY TO DOSE VANCOMYCIN  IP CONSULT TO INTERNAL MEDICINE  PHARMACY TO DOSE VANCOMYCIN  IP CONSULT TO PALLIATIVE CARE     Patient is admitted as inpatient status because of co-morbidities listed above, severity of signs and symptoms as outlined, requirement for current medical therapies and most importantly because of direct risk to patient if care not provided in a hospital setting.     Alexi Malone DO  10/24/2018  12:45 PM    Copy sent to

## 2018-10-24 NOTE — DISCHARGE INSTR - COC
he requires Hospice for greater 30 days.      Update Admission H&P: No change in H&P    PHYSICIAN SIGNATURE:  Electronically signed by Rj Malone DO on 10/24/18 at 1:58 PM Alert-The patient is alert, awake and responds to voice. The patient is oriented to time, place, and person. The triage nurse is able to obtain subjective information.

## 2018-10-24 NOTE — DISCHARGE SUMMARY
noted measuring approximately 3.3 x 2 cm, image 100. Small pleural effusion on the right is noted. There is questionable pleural thickening in the right lung base. There is a right jugular catheter in place extending into the superior vena cava. The vena cava is patent. Brachiocephalic veins are patent Lungs/pleura: Emphysematous changes are noted. Dependent atelectasis on the left is noted. Left lower lobe nodule on axial image 79 measures approximately 1.5 x 1.1 cm. This is much larger compared to the prior. New nodular density left apex measures approximately 3.8 mm on image 25. Apical scarring is noted, right greater than left. Right lateral upper lobe nodule is much larger compared the prior measuring approximately 14 x 12 mm. Lobulation in the medial right lung base is much larger compared to the prior. There are numerous nodules in this location. Partial consolidation of the right lower lobe is again noted, increased. There is intermediate density within the proximal right lower lobe bronchus consistent with mucous plugging. Upper Abdomen: Left renal calculus is noted without obstruction. Multiple small upper abdominal nodes are present. Inferior vena cava is patent into the heart. Soft Tissues/Bones: Postoperative changes are noted in the lower cervical spine. Mild degenerative changes in the thoracic spine are noted. Upper lumbar post vertebroplasty changes are noted. There is no acute fracture     Increasing pulmonary nodules concerning for increasing metastatic disease/neoplastic burden. Increasing right lung base consolidation with air bronchograms Probable right lower lobe mucous plugging.      Us Gallbladder Ruq    Result Date: 10/11/2018  EXAMINATION: RIGHT UPPER QUADRANT ULTRASOUND 10/11/2018 7:44 am COMPARISON: CT scan abdomen and pelvis from 10/14/2017 HISTORY: ORDERING SYSTEM PROVIDED HISTORY: elevated LFT's FINDINGS: LIVER:  Visualized liver demonstrates normal echogenicity without evidence of intrahepatic biliary ductal dilatation. BILIARY SYSTEM:  Gallbladder is unremarkable without evidence of pericholecystic fluid, wall thickening or stones. Negative sonographic Ramos's sign. Common bile duct is within normal limits measuring 5 mm. RIGHT KIDNEY: The visualized right kidney is grossly unremarkable without evidence of hydronephrosis. PANCREAS:  Visualized portions of the pancreas are also unremarkable. OTHER: No evidence of right upper quadrant ascites. Unremarkable gallbladder ultrasound. Xr Chest Portable    Result Date: 10/24/2018  EXAMINATION: SINGLE XRAY VIEW OF THE CHEST 10/24/2018 8:44 am COMPARISON: Chest radiograph 10/14/2018 HISTORY: ORDERING SYSTEM PROVIDED HISTORY: fever, cough, sob TECHNOLOGIST PROVIDED HISTORY: fever, cough, sob Ordering Physician Provided Reason for Exam: Pt currently being treated for sepsis, hx of lung CA, COPD. C/o diff breathing Acuity: Chronic Type of Exam: Subsequent/Follow-up FINDINGS: Minimal inclusion of changes of posterior cervical spine fusion. Unchanged right internal jugular central venous port catheter. Patient rotation to the right. Unchanged 1.4 cm x 1.3 cm nodule in the lateral right upper lobe. Further increase in a right lung volume loss with increased right basilar airspace opacity and at least moderate likely loculated right pleural effusion. Associated mediastinal shift to the right. Minimal left basilar airspace opacity. No definite findings of pneumothorax or left pleural effusion. Normal hilar and cardiac contours. Diffuse osseous demineralization. No obvious acute fracture. 1. Increase in at least moderate likely loculated right pleural effusion with underlying right basilar passive atelectasis. Superimposed pneumonia and aspiration could be present given reported clinical symptoms. 2. Minimal left basilar atelectasis. 3. Unchanged right upper lobe nodule.      Xr Chest Portable    Result Date: 10/14/2018  EXAMINATION: SINGLE XRAY VIEW OF THE CHEST 10/14/2018 6:25 am COMPARISON: Chest x-ray from 10/12/2018. Chest CT from 10/10/2018 HISTORY: ORDERING SYSTEM PROVIDED HISTORY: dyspnea TECHNOLOGIST PROVIDED HISTORY: dyspnea Acuity: Acute Type of Exam: Unknown FINDINGS: Right chest port catheter extends the level of the mid SVC. There is hyperexpansion of the lungs, flattening of the hemidiaphragms, and relative paucity of lung markings in the bilateral upper lobes. There is similar primarily pleural-based confluent density with adjacent streaky linear densities in the right lung base. Stable right upper lobe lung nodule. The remainder of the lungs appear well aerated. Heart size appears within normal limits, given technique and there is no pulmonary vascular congestion. Visualized osseous structures appear intact and grossly unremarkable, given the non dedicated imaging. Incompletely visualized cervical spinal fusion hardware is identified. 1. Similar pleuroparenchymal opacity in the right lung base may reflect pleural effusion with associated atelectasis, lung nodules and/or developing pneumonia. Continued imaging follow-up is recommended. 2. Stable right upper lobe lung nodule. Xr Chest Portable    Result Date: 10/12/2018  EXAMINATION: SINGLE XRAY VIEW OF THE CHEST 10/12/2018 6:20 am COMPARISON: Chest x-ray and chest CT from 10/10/2018 HISTORY: ORDERING SYSTEM PROVIDED HISTORY: Postobstructive pneumonia, active lung cancer TECHNOLOGIST PROVIDED HISTORY: Postobstructive pneumonia, active lung cancer Acuity: Acute Type of Exam: Unknown FINDINGS: Right chest port catheter extends to the level of the distal SVC. There is hyperexpansion of the lungs, flattening of the hemidiaphragms, and relative paucity of lung markings in the bilateral upper lobes. Similar linear densities in the periphery of the right lung base likely reflect atelectasis or scar.   1.8 cm nodule identified in the right lung apex correlates with a nodule identified on the chest CT. Additional nodular densities in right lower lobe are not well appreciated on the basis of this imaging. Visualized osseous structures appear moderately demineralized, but grossly intact, given the non dedicated imaging. 1. Similar emphysematous changes and scarring in the right lung base. No focal airspace consolidation. 2. Stable right upper lobe lung nodule. Additional nodules identified in the right lower lobe on the chest CT from 10/10/2018 are not well demonstrated on the basis of this exam.         Consultations:    Consults:     Final Specialist Recommendations/Findings:   Paul Abernathy      The patient was seen and examined on day of discharge and this discharge summary is in conjunction with any daily progress note from day of discharge. Discharge plan:     Disposition: ecf with hospice    Physician Follow Up:     Manuel Faustin DO  [address] [place] [address]  [phone]             Requiring Further Evaluation/Follow Up POST HOSPITALIZATION/Incidental Findings: hospice    Diet: regular diet    Activity: As tolerated    Instructions to Patient:     Discharge Medications:      Medication List      CONTINUE taking these medications    bisacodyl 10 MG suppository  Commonly known as:  DULCOLAX  Place 1 suppository rectally daily as needed for Constipation     calcium-vitamin D 500-200 MG-UNIT per tablet  Commonly known as:  OSCAL-500     citalopram 20 MG tablet  Commonly known as:  CELEXA     dronabinol 10 MG capsule  Commonly known as:  MARINOL     formoterol 20 MCG/2ML nebulizer solution  Commonly known as:  PERFOROMIST  Take 2 mLs by nebulization 2 times daily     furosemide 40 MG tablet  Commonly known as:  LASIX     LYRICA 25 MG capsule  Generic drug:  pregabalin  Take 1 capsule by mouth 3 times daily for 30 days. Master Nagel OLANZapine 10 MG tablet  Commonly known as:  ZYPREXA  Take 1 tablet by mouth nightly     ondansetron 8 MG tablet  Commonly known as:  ZOFRAN     oxyCODONE 40 MG extended release tablet  Commonly known as:  OXYCONTIN     pantoprazole 40 MG tablet  Commonly known as:  PROTONIX  Take 1 tablet by mouth every morning (before breakfast)     potassium chloride 20 MEQ extended release tablet  Commonly known as:  KLOR-CON M  Take 1 tablet by mouth daily     VENTOLIN HFA IN     zolpidem 5 MG tablet  Commonly known as:  AMBIEN  Take 1 tablet by mouth nightly as needed for Sleep for up to 10 days. [place] Route STOP taking these medications    levofloxacin 750 MG tablet  Commonly known as:  LEVAQUIN     predniSONE 10 MG tablet  Commonly known as:  DELTASONE            Time Spent on discharge is  35 mins in patient examination, evaluation, counseling as well as medication reconciliation, prescriptions for required medications, discharge plan and follow up. Electronically signed by   Nikhil Malone DO  10/24/2018  2:40 PM      Thank you Dr. Farzana Alfaro DO for the opportunity to be involved in this patient's care.

## 2018-10-25 LAB
CULTURE: ABNORMAL
Lab: ABNORMAL
SPECIMEN DESCRIPTION: ABNORMAL
STATUS: ABNORMAL

## 2018-10-30 ENCOUNTER — CARE COORDINATION (OUTPATIENT)
Dept: CARE COORDINATION | Age: 58
End: 2018-10-30

## 2018-10-30 LAB
CULTURE: NORMAL
CULTURE: NORMAL
Lab: NORMAL
Lab: NORMAL
SPECIMEN DESCRIPTION: NORMAL
SPECIMEN DESCRIPTION: NORMAL
STATUS: NORMAL
STATUS: NORMAL

## 2018-11-07 ENCOUNTER — CARE COORDINATION (OUTPATIENT)
Dept: CASE MANAGEMENT | Age: 58
End: 2018-11-07

## 2020-11-03 PROBLEM — J18.9 PNEUMONIA OF RIGHT LOWER LOBE DUE TO INFECTIOUS ORGANISM: Status: RESOLVED | Noted: 2018-10-24 | Resolved: 2020-11-03

## (undated) DEVICE — MINOR BSIN PK

## (undated) DEVICE — PAD,ABDOMINAL,5"X9",ST,LF,25/BX: Brand: MEDLINE INDUSTRIES, INC.

## (undated) DEVICE — Z DISCONTINUED USE 2275676 GLOVE SURG SZ 65 L12IN FNGR THK87MIL DK GRN LTX FREE ISOLEX

## (undated) DEVICE — SHEET, T, LAPAROTOMY, STERILE: Brand: MEDLINE

## (undated) DEVICE — TAPE, MEDFIX EZ, SELF WOUND, 6"X11YD: Brand: MEDLINE

## (undated) DEVICE — DRAPE IRRIG FLD WRM W44XL44IN W/ AORN STD PRTBL INTRATEMP

## (undated) DEVICE — SOLUTION IV IRRIG POUR BRL 0.9% SODIUM CHL 2F7124

## (undated) DEVICE — TOWEL,OR,DSP,ST,BLUE,STD,4/PK,20PK/CS: Brand: MEDLINE

## (undated) DEVICE — Device

## (undated) DEVICE — STRAP,CATHETER,ELASTIC,HOOK&LOOP: Brand: MEDLINE

## (undated) DEVICE — DRAPE C ARM UNIV W41XL74IN CLR PLAS XR VELC CLSR POLY STRP

## (undated) DEVICE — DUP USE 240185 SOLUTION IV IRRIG WATER 1000ML IRRIG BAG 2B7114

## (undated) DEVICE — NEEDLE SPNL 18GA L3.5IN W/ QNCKE SHARPER BVL DURA CLICK

## (undated) DEVICE — BONE TAMP KIT KPX153PB FF X2 15/3 1 STP: Brand: KYPHOPAK™ TRAY

## (undated) DEVICE — DRESSING PETRO W3XL8IN OIL EMUL N ADH GZ KNIT IMPREG CELOS

## (undated) DEVICE — 35 ML SYRINGE LUER-LOCK TIP: Brand: MONOJECT

## (undated) DEVICE — YANKAUER,FLEXIBLE HANDLE,REGLR CAPACITY: Brand: MEDLINE INDUSTRIES, INC.

## (undated) DEVICE — DRAINBAG,ANTI-REFLUX TOWER,L/F,2000ML,LL: Brand: MEDLINE

## (undated) DEVICE — CHLORAPREP 26ML ORANGE

## (undated) DEVICE — INTENDED FOR TISSUE SEPARATION, AND OTHER PROCEDURES THAT REQUIRE A SHARP SURGICAL BLADE TO PUNCTURE OR CUT.: Brand: BARD-PARKER ® CARBON RIB-BACK BLADES

## (undated) DEVICE — CATHETER,FOLEY,COUDE,LATEX,18FR,10ML: Brand: MEDLINE

## (undated) DEVICE — CONTAINER,SPECIMEN,OR STERILE,4OZ: Brand: MEDLINE

## (undated) DEVICE — SPONGE GZ W4XL4IN RAYON POLY FILL CVR W/ NONWOVEN FAB

## (undated) DEVICE — CUSHION PRONEVIEW L HD NK FOAM

## (undated) DEVICE — KIT POS TBL W O HDRST JACK

## (undated) DEVICE — BONE BIOPSY DEVICE F05A BBD SIZE 3: Brand: MEDTRONIC REUSABLE INSTRUMENTS

## (undated) DEVICE — PAD N ADH W3XL4IN POLY COT SFT PERF FLM EASILY CUT ABSRB

## (undated) DEVICE — 1010 S-DRAPE TOWEL DRAPE 10/BX: Brand: STERI-DRAPE™

## (undated) DEVICE — TUBING, SUCTION, 1/4" X 12', STRAIGHT: Brand: MEDLINE

## (undated) DEVICE — Z DISCONTINUED USE 2275686 GLOVE SURG SZ 8 L12IN FNGR THK13MIL WHT ISOLEX POLYISOPRENE

## (undated) DEVICE — MIXER A07A CEMENT

## (undated) DEVICE — MEDICINE CUP, GRADUATED, STER: Brand: MEDLINE

## (undated) DEVICE — GLOVE SURG SZ 7 L12IN FNGR THK87MIL WHT LTX FREE